# Patient Record
Sex: MALE | Race: WHITE | NOT HISPANIC OR LATINO | URBAN - METROPOLITAN AREA
[De-identification: names, ages, dates, MRNs, and addresses within clinical notes are randomized per-mention and may not be internally consistent; named-entity substitution may affect disease eponyms.]

---

## 2017-06-13 ENCOUNTER — OUTPATIENT (OUTPATIENT)
Dept: OUTPATIENT SERVICES | Facility: HOSPITAL | Age: 72
LOS: 1 days | End: 2017-06-13
Payer: MEDICARE

## 2017-06-13 VITALS
SYSTOLIC BLOOD PRESSURE: 132 MMHG | RESPIRATION RATE: 18 BRPM | HEART RATE: 72 BPM | WEIGHT: 216.05 LBS | DIASTOLIC BLOOD PRESSURE: 83 MMHG | HEIGHT: 72 IN | TEMPERATURE: 98 F | OXYGEN SATURATION: 95 %

## 2017-06-13 VITALS — WEIGHT: 212.97 LBS

## 2017-06-13 DIAGNOSIS — Z01.810 ENCOUNTER FOR PREPROCEDURAL CARDIOVASCULAR EXAMINATION: ICD-10-CM

## 2017-06-13 DIAGNOSIS — Z98.890 OTHER SPECIFIED POSTPROCEDURAL STATES: Chronic | ICD-10-CM

## 2017-06-13 DIAGNOSIS — I51.9 HEART DISEASE, UNSPECIFIED: Chronic | ICD-10-CM

## 2017-06-13 LAB
ANION GAP SERPL CALC-SCNC: 14 MMOL/L — SIGNIFICANT CHANGE UP (ref 5–17)
APTT BLD: 34.1 SEC — SIGNIFICANT CHANGE UP (ref 27.5–37.4)
BASOPHILS # BLD AUTO: 0 K/UL — SIGNIFICANT CHANGE UP (ref 0–0.2)
BASOPHILS NFR BLD AUTO: 0.1 % — SIGNIFICANT CHANGE UP (ref 0–2)
BUN SERPL-MCNC: 22 MG/DL — HIGH (ref 8–20)
CALCIUM SERPL-MCNC: 9.4 MG/DL — SIGNIFICANT CHANGE UP (ref 8.6–10.2)
CHLORIDE SERPL-SCNC: 99 MMOL/L — SIGNIFICANT CHANGE UP (ref 98–107)
CHOLEST SERPL-MCNC: 148 MG/DL — SIGNIFICANT CHANGE UP (ref 110–199)
CO2 SERPL-SCNC: 25 MMOL/L — SIGNIFICANT CHANGE UP (ref 22–29)
CREAT SERPL-MCNC: 1.68 MG/DL — HIGH (ref 0.5–1.3)
EOSINOPHIL # BLD AUTO: 0.1 K/UL — SIGNIFICANT CHANGE UP (ref 0–0.5)
EOSINOPHIL NFR BLD AUTO: 1.1 % — SIGNIFICANT CHANGE UP (ref 0–5)
GLUCOSE SERPL-MCNC: 113 MG/DL — SIGNIFICANT CHANGE UP (ref 70–115)
HCT VFR BLD CALC: 39.1 % — LOW (ref 42–52)
HDLC SERPL-MCNC: 46 MG/DL — LOW
HGB BLD-MCNC: 13.3 G/DL — LOW (ref 14–18)
INR BLD: 1.11 RATIO — SIGNIFICANT CHANGE UP (ref 0.88–1.16)
LIPID PNL WITH DIRECT LDL SERPL: 65 MG/DL — SIGNIFICANT CHANGE UP
LYMPHOCYTES # BLD AUTO: 1.9 K/UL — SIGNIFICANT CHANGE UP (ref 1–4.8)
LYMPHOCYTES # BLD AUTO: 27.6 % — SIGNIFICANT CHANGE UP (ref 20–55)
MAGNESIUM SERPL-MCNC: 2.3 MG/DL — SIGNIFICANT CHANGE UP (ref 1.6–2.6)
MCHC RBC-ENTMCNC: 31.6 PG — HIGH (ref 27–31)
MCHC RBC-ENTMCNC: 34 G/DL — SIGNIFICANT CHANGE UP (ref 32–36)
MCV RBC AUTO: 92.9 FL — SIGNIFICANT CHANGE UP (ref 80–94)
MONOCYTES # BLD AUTO: 0.7 K/UL — SIGNIFICANT CHANGE UP (ref 0–0.8)
MONOCYTES NFR BLD AUTO: 10.6 % — HIGH (ref 3–10)
NEUTROPHILS # BLD AUTO: 4.2 K/UL — SIGNIFICANT CHANGE UP (ref 1.8–8)
NEUTROPHILS NFR BLD AUTO: 60.2 % — SIGNIFICANT CHANGE UP (ref 37–73)
PLATELET # BLD AUTO: 266 K/UL — SIGNIFICANT CHANGE UP (ref 150–400)
POTASSIUM SERPL-MCNC: 4.2 MMOL/L — SIGNIFICANT CHANGE UP (ref 3.5–5.3)
POTASSIUM SERPL-SCNC: 4.2 MMOL/L — SIGNIFICANT CHANGE UP (ref 3.5–5.3)
PROTHROM AB SERPL-ACNC: 12.2 SEC — SIGNIFICANT CHANGE UP (ref 9.8–12.7)
RBC # BLD: 4.21 M/UL — LOW (ref 4.6–6.2)
RBC # FLD: 13.4 % — SIGNIFICANT CHANGE UP (ref 11–15.6)
SODIUM SERPL-SCNC: 138 MMOL/L — SIGNIFICANT CHANGE UP (ref 135–145)
TOTAL CHOLESTEROL/HDL RATIO MEASUREMENT: 3 RATIO — LOW (ref 3.4–9.6)
TRIGL SERPL-MCNC: 184 MG/DL — SIGNIFICANT CHANGE UP (ref 10–200)
TSH SERPL-MCNC: 4.31 UIU/ML — HIGH (ref 0.27–4.2)
WBC # BLD: 7 K/UL — SIGNIFICANT CHANGE UP (ref 4.8–10.8)
WBC # FLD AUTO: 7 K/UL — SIGNIFICANT CHANGE UP (ref 4.8–10.8)

## 2017-06-13 PROCEDURE — G0463: CPT

## 2017-06-13 PROCEDURE — 85730 THROMBOPLASTIN TIME PARTIAL: CPT

## 2017-06-13 PROCEDURE — 84443 ASSAY THYROID STIM HORMONE: CPT

## 2017-06-13 PROCEDURE — 93005 ELECTROCARDIOGRAM TRACING: CPT

## 2017-06-13 PROCEDURE — 85027 COMPLETE CBC AUTOMATED: CPT

## 2017-06-13 PROCEDURE — 82248 BILIRUBIN DIRECT: CPT

## 2017-06-13 PROCEDURE — 85610 PROTHROMBIN TIME: CPT

## 2017-06-13 PROCEDURE — 83735 ASSAY OF MAGNESIUM: CPT

## 2017-06-13 PROCEDURE — 80061 LIPID PANEL: CPT

## 2017-06-13 PROCEDURE — 80048 BASIC METABOLIC PNL TOTAL CA: CPT

## 2017-06-13 PROCEDURE — 80053 COMPREHEN METABOLIC PANEL: CPT

## 2017-06-13 PROCEDURE — 93010 ELECTROCARDIOGRAM REPORT: CPT

## 2017-06-13 NOTE — H&P PST ADULT - ASSESSMENT
70 y/o white male with recent VT noted on interrogation of ICD to undergo LHC to evaluate for CAD s/p CABG/PCI

## 2017-06-13 NOTE — H&P PST ADULT - NEGATIVE OPHTHALMOLOGIC SYMPTOMS
no blurred vision R/no lacrimation L/no photophobia/no diplopia/no blurred vision L/no lacrimation R/no discharge R/no discharge L

## 2017-06-13 NOTE — H&P PST ADULT - RS GEN PE MLT RESP DETAILS PC
breath sounds equal/good air movement/normal/no chest wall tenderness/clear to auscultation bilaterally/airway patent/respirations non-labored

## 2017-06-13 NOTE — H&P PST ADULT - HISTORY OF PRESENT ILLNESS
72 y/o white male presents to [facility] for LHC to evaluate for VT noted on routine interrogation of ICD (BSC) in May 2017  Started on amiodarone 400 then decreased to 200 mg June 2017  Denies CP, CROOK/SOB  May 2017 NST negative for ischemia with global hypokinesis and EF 45%.  March 2017 NST states cannot r/o ischemia in the basal anterior, anterolateral and inferolateral walls  CABG [facility] 1997  LIMA LAD SVG to RCA and SVG to Ramus (occluded per 2009 cath).  2010 LHC with PCI SVG-PDA 72 y/o white male presents to [facility] for LHC to evaluate for VT noted on routine interrogation of ICD (BSC) in May 2017  Started on amiodarone 400 then decreased to 200 mg June 2017  Denies CP, CROOK/SOB  May 2017 PET/CT negative for ischemia with global hypokinesis and EF 45%.    CABG [facility] 1997  LIMA LAD SVG to RCA and SVG to Ramus (occluded per 2009 cath).  2010 LHC with PCI SVG-PDA

## 2017-06-13 NOTE — H&P PST ADULT - PMH
AICD (automatic cardioverter/defibrillator) present  2010BSC  Angina pectoris    Arrhythmia    Bronchitis    CAD (coronary artery disease)    Chronic systolic CHF (congestive heart failure), NYHA class 2    Former smoker    HLD (hyperlipidemia)    Hypertension    Hypokinesis  apical  Hypothyroidism    Ischemic cardiomyopathy    Mitral regurgitation    Mitral regurgitation    Prostate cancer  s/p surgery no RT/CT  PSVT (paroxysmal supraventricular tachycardia)    PSVT (paroxysmal supraventricular tachycardia)    RBBB    Tricuspid regurgitation    Vitamin D deficiency    VT (ventricular tachycardia)  May 2017 no ICD shock

## 2017-06-13 NOTE — ASU PATIENT PROFILE, ADULT - PSH
H/O cardiac catheterization  stenting of SVG TO PDA IN 2010  History of electrophysiologic study  2009 positive study (AICD placement ) Cardiac disorder  triple bypass may 1997 [facility]  H/O cardiac catheterization  stenting of SVG TO PDA IN 2010  History of electrophysiologic study  2009 positive study (AICD placement )  History of prostate surgery  davinci surgery in 2008

## 2017-06-13 NOTE — H&P PST ADULT - NEGATIVE NEUROLOGICAL SYMPTOMS
no tremors/no paresthesias/no focal seizures/no vertigo/no syncope/no weakness/no generalized seizures/no transient paralysis

## 2017-06-13 NOTE — ASU PATIENT PROFILE, ADULT - PMH
AICD (automatic cardioverter/defibrillator) present    Angina pectoris    Arrhythmia    Bronchitis    CAD (coronary artery disease)    HLD (hyperlipidemia)    Hypertension    Hypothyroidism    Ischemic cardiomyopathy    Mitral regurgitation    Prostate cancer    PSVT (paroxysmal supraventricular tachycardia)    RBBB    Tricuspid regurgitation    Vitamin D deficiency    VT (ventricular tachycardia) AICD (automatic cardioverter/defibrillator) present  2010  Angina pectoris    Arrhythmia    Bronchitis    CAD (coronary artery disease)    HLD (hyperlipidemia)    Hypertension    Hypothyroidism    Ischemic cardiomyopathy    Mitral regurgitation    Prostate cancer    PSVT (paroxysmal supraventricular tachycardia)    RBBB    Tricuspid regurgitation    Vitamin D deficiency    VT (ventricular tachycardia)

## 2017-06-13 NOTE — H&P PST ADULT - NEUROLOGICAL DETAILS
alert and oriented x 3/responds to pain/sensation intact/deep reflexes intact/cranial nerves intact/responds to verbal commands

## 2017-06-13 NOTE — H&P PST ADULT - NEGATIVE ENMT SYMPTOMS
no vertigo/no sinus symptoms/no hearing difficulty/no nasal congestion/no nasal discharge/no ear pain/no tinnitus

## 2017-06-13 NOTE — H&P PST ADULT - NEGATIVE GENERAL GENITOURINARY SYMPTOMS
no urine discoloration/no hematuria/no renal colic/h/o prostate cancer/no flank pain R/no flank pain L

## 2017-06-13 NOTE — H&P PST ADULT - PSH
Cardiac disorder  triple bypass may 1997 [facility]  H/O cardiac catheterization  stenting of SVG TO PDA IN 2010  History of electrophysiologic study  2009 positive study (AICD placement )  History of prostate surgery  davinci surgery in 2008

## 2017-06-15 ENCOUNTER — APPOINTMENT (OUTPATIENT)
Dept: PULMONOLOGY | Facility: CLINIC | Age: 72
End: 2017-06-15

## 2017-06-15 VITALS
BODY MASS INDEX: 28.71 KG/M2 | DIASTOLIC BLOOD PRESSURE: 82 MMHG | OXYGEN SATURATION: 96 % | HEART RATE: 70 BPM | SYSTOLIC BLOOD PRESSURE: 134 MMHG | WEIGHT: 212 LBS | HEIGHT: 72 IN

## 2017-06-15 DIAGNOSIS — Z80.42 FAMILY HISTORY OF MALIGNANT NEOPLASM OF PROSTATE: ICD-10-CM

## 2017-06-15 DIAGNOSIS — Z86.79 PERSONAL HISTORY OF OTHER DISEASES OF THE CIRCULATORY SYSTEM: ICD-10-CM

## 2017-06-15 DIAGNOSIS — K21.9 GASTRO-ESOPHAGEAL REFLUX DISEASE W/OUT ESOPHAGITIS: ICD-10-CM

## 2017-06-15 DIAGNOSIS — Z95.5 PRESENCE OF CORONARY ANGIOPLASTY IMPLANT AND GRAFT: ICD-10-CM

## 2017-06-15 DIAGNOSIS — Z87.2 PERSONAL HISTORY OF DISEASES OF THE SKIN AND SUBCUTANEOUS TISSUE: ICD-10-CM

## 2017-06-15 DIAGNOSIS — I25.10 ATHEROSCLEROTIC HEART DISEASE OF NATIVE CORONARY ARTERY W/OUT ANGINA PECTORIS: ICD-10-CM

## 2017-06-15 DIAGNOSIS — Z86.39 PERSONAL HISTORY OF OTHER ENDOCRINE, NUTRITIONAL AND METABOLIC DISEASE: ICD-10-CM

## 2017-06-15 DIAGNOSIS — Z87.891 PERSONAL HISTORY OF NICOTINE DEPENDENCE: ICD-10-CM

## 2017-06-15 DIAGNOSIS — Z85.46 PERSONAL HISTORY OF MALIGNANT NEOPLASM OF PROSTATE: ICD-10-CM

## 2017-06-15 PROBLEM — I20.9 ANGINA PECTORIS, UNSPECIFIED: Chronic | Status: ACTIVE | Noted: 2017-06-13

## 2017-06-15 PROBLEM — I07.1 RHEUMATIC TRICUSPID INSUFFICIENCY: Chronic | Status: ACTIVE | Noted: 2017-06-13

## 2017-06-15 PROBLEM — I47.1 SUPRAVENTRICULAR TACHYCARDIA: Chronic | Status: ACTIVE | Noted: 2017-06-13

## 2017-06-15 PROBLEM — J40 BRONCHITIS, NOT SPECIFIED AS ACUTE OR CHRONIC: Chronic | Status: ACTIVE | Noted: 2017-06-13

## 2017-06-15 PROBLEM — I25.5 ISCHEMIC CARDIOMYOPATHY: Chronic | Status: ACTIVE | Noted: 2017-06-13

## 2017-06-15 PROBLEM — E55.9 VITAMIN D DEFICIENCY, UNSPECIFIED: Chronic | Status: ACTIVE | Noted: 2017-06-13

## 2017-06-15 PROBLEM — I34.0 NONRHEUMATIC MITRAL (VALVE) INSUFFICIENCY: Chronic | Status: ACTIVE | Noted: 2017-06-13

## 2017-06-15 PROBLEM — E03.9 HYPOTHYROIDISM, UNSPECIFIED: Chronic | Status: ACTIVE | Noted: 2017-06-13

## 2017-06-15 PROBLEM — E78.5 HYPERLIPIDEMIA, UNSPECIFIED: Chronic | Status: ACTIVE | Noted: 2017-06-13

## 2017-06-15 PROBLEM — I10 ESSENTIAL (PRIMARY) HYPERTENSION: Chronic | Status: ACTIVE | Noted: 2017-06-13

## 2017-06-15 PROBLEM — I45.10 UNSPECIFIED RIGHT BUNDLE-BRANCH BLOCK: Chronic | Status: ACTIVE | Noted: 2017-06-13

## 2017-06-15 PROBLEM — C61 MALIGNANT NEOPLASM OF PROSTATE: Chronic | Status: ACTIVE | Noted: 2017-06-13

## 2017-06-15 PROBLEM — I47.2 VENTRICULAR TACHYCARDIA: Chronic | Status: ACTIVE | Noted: 2017-06-13

## 2017-06-15 PROBLEM — I49.9 CARDIAC ARRHYTHMIA, UNSPECIFIED: Chronic | Status: ACTIVE | Noted: 2017-06-13

## 2017-06-16 ENCOUNTER — INPATIENT (INPATIENT)
Facility: HOSPITAL | Age: 72
LOS: 0 days | Discharge: HOME | DRG: 247 | End: 2017-06-17
Attending: INTERNAL MEDICINE | Admitting: INTERNAL MEDICINE
Payer: MEDICARE

## 2017-06-16 VITALS
TEMPERATURE: 98 F | SYSTOLIC BLOOD PRESSURE: 142 MMHG | HEART RATE: 64 BPM | OXYGEN SATURATION: 97 % | RESPIRATION RATE: 16 BRPM | DIASTOLIC BLOOD PRESSURE: 90 MMHG

## 2017-06-16 DIAGNOSIS — Z98.890 OTHER SPECIFIED POSTPROCEDURAL STATES: Chronic | ICD-10-CM

## 2017-06-16 DIAGNOSIS — I47.2 VENTRICULAR TACHYCARDIA: ICD-10-CM

## 2017-06-16 DIAGNOSIS — I51.9 HEART DISEASE, UNSPECIFIED: Chronic | ICD-10-CM

## 2017-06-16 LAB
ALBUMIN SERPL ELPH-MCNC: 4.7 G/DL — SIGNIFICANT CHANGE UP (ref 3.3–5.2)
ALP SERPL-CCNC: 67 U/L — SIGNIFICANT CHANGE UP (ref 40–120)
ALT FLD-CCNC: 20 U/L — SIGNIFICANT CHANGE UP
ANION GAP SERPL CALC-SCNC: 13 MMOL/L — SIGNIFICANT CHANGE UP (ref 5–17)
APPEARANCE UR: CLEAR — SIGNIFICANT CHANGE UP
AST SERPL-CCNC: 21 U/L — SIGNIFICANT CHANGE UP
BILIRUB DIRECT SERPL-MCNC: 0.2 MG/DL — SIGNIFICANT CHANGE UP (ref 0–0.3)
BILIRUB INDIRECT FLD-MCNC: 0.6 MG/DL — SIGNIFICANT CHANGE UP (ref 0.2–1)
BILIRUB SERPL-MCNC: 0.8 MG/DL — SIGNIFICANT CHANGE UP (ref 0.4–2)
BILIRUB UR-MCNC: NEGATIVE — SIGNIFICANT CHANGE UP
BUN SERPL-MCNC: 22 MG/DL — HIGH (ref 8–20)
CALCIUM SERPL-MCNC: 9.6 MG/DL — SIGNIFICANT CHANGE UP (ref 8.6–10.2)
CHLORIDE SERPL-SCNC: 103 MMOL/L — SIGNIFICANT CHANGE UP (ref 98–107)
CO2 SERPL-SCNC: 26 MMOL/L — SIGNIFICANT CHANGE UP (ref 22–29)
COLOR SPEC: YELLOW — SIGNIFICANT CHANGE UP
CREAT SERPL-MCNC: 1.57 MG/DL — HIGH (ref 0.5–1.3)
DIFF PNL FLD: NEGATIVE — SIGNIFICANT CHANGE UP
GLUCOSE SERPL-MCNC: 113 MG/DL — SIGNIFICANT CHANGE UP (ref 70–115)
GLUCOSE UR QL: NEGATIVE MG/DL — SIGNIFICANT CHANGE UP
KETONES UR-MCNC: NEGATIVE — SIGNIFICANT CHANGE UP
LEUKOCYTE ESTERASE UR-ACNC: NEGATIVE — SIGNIFICANT CHANGE UP
NITRITE UR-MCNC: NEGATIVE — SIGNIFICANT CHANGE UP
OSMOLALITY UR: 303 MOSM/KG — SIGNIFICANT CHANGE UP (ref 300–1000)
PH UR: 5 — SIGNIFICANT CHANGE UP (ref 5–8)
POTASSIUM SERPL-MCNC: 4.9 MMOL/L — SIGNIFICANT CHANGE UP (ref 3.5–5.3)
POTASSIUM SERPL-SCNC: 4.9 MMOL/L — SIGNIFICANT CHANGE UP (ref 3.5–5.3)
POTASSIUM UR-SCNC: 38 MMOL/L — SIGNIFICANT CHANGE UP
PROT SERPL-MCNC: 7.7 G/DL — SIGNIFICANT CHANGE UP (ref 6.6–8.7)
PROT UR-MCNC: NEGATIVE MG/DL — SIGNIFICANT CHANGE UP
SODIUM SERPL-SCNC: 142 MMOL/L — SIGNIFICANT CHANGE UP (ref 135–145)
SODIUM UR-SCNC: 34 MMOL/L — SIGNIFICANT CHANGE UP
SP GR SPEC: 1.01 — SIGNIFICANT CHANGE UP (ref 1.01–1.02)
UROBILINOGEN FLD QL: NEGATIVE MG/DL — SIGNIFICANT CHANGE UP

## 2017-06-16 PROCEDURE — 93010 ELECTROCARDIOGRAM REPORT: CPT

## 2017-06-16 RX ORDER — ATORVASTATIN CALCIUM 80 MG/1
40 TABLET, FILM COATED ORAL AT BEDTIME
Qty: 0 | Refills: 0 | Status: DISCONTINUED | OUTPATIENT
Start: 2017-06-16 | End: 2017-06-17

## 2017-06-16 RX ORDER — ONDANSETRON 8 MG/1
4 TABLET, FILM COATED ORAL EVERY 8 HOURS
Qty: 0 | Refills: 0 | Status: DISCONTINUED | OUTPATIENT
Start: 2017-06-16 | End: 2017-06-17

## 2017-06-16 RX ORDER — ACETYLCYSTEINE 200 MG/ML
1200 VIAL (ML) MISCELLANEOUS EVERY 12 HOURS
Qty: 0 | Refills: 0 | Status: DISCONTINUED | OUTPATIENT
Start: 2017-06-16 | End: 2017-06-16

## 2017-06-16 RX ORDER — ACETAMINOPHEN 500 MG
650 TABLET ORAL EVERY 6 HOURS
Qty: 0 | Refills: 0 | Status: DISCONTINUED | OUTPATIENT
Start: 2017-06-16 | End: 2017-06-17

## 2017-06-16 RX ORDER — ASPIRIN/CALCIUM CARB/MAGNESIUM 324 MG
81 TABLET ORAL DAILY
Qty: 0 | Refills: 0 | Status: DISCONTINUED | OUTPATIENT
Start: 2017-06-16 | End: 2017-06-17

## 2017-06-16 RX ORDER — CLOPIDOGREL BISULFATE 75 MG/1
75 TABLET, FILM COATED ORAL DAILY
Qty: 0 | Refills: 0 | Status: DISCONTINUED | OUTPATIENT
Start: 2017-06-16 | End: 2017-06-17

## 2017-06-16 RX ORDER — SODIUM CHLORIDE 9 MG/ML
1000 INJECTION, SOLUTION INTRAVENOUS
Qty: 0 | Refills: 0 | Status: DISCONTINUED | OUTPATIENT
Start: 2017-06-16 | End: 2017-06-16

## 2017-06-16 RX ORDER — ASPIRIN/CALCIUM CARB/MAGNESIUM 325 MG
325 TABLET ORAL ONCE
Qty: 0 | Refills: 0 | Status: COMPLETED | OUTPATIENT
Start: 2017-06-16 | End: 2017-06-16

## 2017-06-16 RX ORDER — SODIUM CHLORIDE 9 MG/ML
1000 INJECTION, SOLUTION INTRAVENOUS
Qty: 0 | Refills: 0 | Status: DISCONTINUED | OUTPATIENT
Start: 2017-06-16 | End: 2017-06-17

## 2017-06-16 RX ORDER — FAMOTIDINE 10 MG/ML
20 INJECTION INTRAVENOUS DAILY
Qty: 0 | Refills: 0 | Status: DISCONTINUED | OUTPATIENT
Start: 2017-06-16 | End: 2017-06-17

## 2017-06-16 RX ORDER — FENOFIBRATE,MICRONIZED 130 MG
145 CAPSULE ORAL DAILY
Qty: 0 | Refills: 0 | Status: DISCONTINUED | OUTPATIENT
Start: 2017-06-16 | End: 2017-06-17

## 2017-06-16 RX ORDER — ACETYLCYSTEINE 200 MG/ML
1200 VIAL (ML) MISCELLANEOUS EVERY 12 HOURS
Qty: 0 | Refills: 0 | Status: DISCONTINUED | OUTPATIENT
Start: 2017-06-16 | End: 2017-06-17

## 2017-06-16 RX ORDER — METOPROLOL TARTRATE 50 MG
100 TABLET ORAL DAILY
Qty: 0 | Refills: 0 | Status: DISCONTINUED | OUTPATIENT
Start: 2017-06-16 | End: 2017-06-17

## 2017-06-16 RX ORDER — AMIODARONE HYDROCHLORIDE 400 MG/1
200 TABLET ORAL DAILY
Qty: 0 | Refills: 0 | Status: DISCONTINUED | OUTPATIENT
Start: 2017-06-16 | End: 2017-06-17

## 2017-06-16 RX ORDER — LEVOTHYROXINE SODIUM 125 MCG
75 TABLET ORAL DAILY
Qty: 0 | Refills: 0 | Status: DISCONTINUED | OUTPATIENT
Start: 2017-06-16 | End: 2017-06-17

## 2017-06-16 RX ADMIN — Medication 325 MILLIGRAM(S): at 12:39

## 2017-06-16 RX ADMIN — Medication 1200 MILLIGRAM(S): at 13:05

## 2017-06-16 RX ADMIN — ATORVASTATIN CALCIUM 40 MILLIGRAM(S): 80 TABLET, FILM COATED ORAL at 22:59

## 2017-06-16 NOTE — DISCHARGE NOTE ADULT - PLAN OF CARE
Choose lean meats and poultry without skin and prepare them without added saturated and trans fat.  Eat fish at least twice a week. Recent research shows that eating oily fish containing omega-3 fatty acids (for example, salmon, trout and herring) may help lower your risk of death from coronary artery disease.  Select fat-free, 1 percent fat and low-fat dairy products.  Cut back on foods containing partially hydrogenated vegetable oils to reduce trans fat in your diet.   To lower cholesterol, reduce saturated fat to no more than 5 to 6 percent of total calories. For someone eating 2,000 calories a day, that’s about 13 grams of saturated fat.  Cut back on beverages and foods with added sugars.  Choose and prepare foods with little or no salt. To lower blood pressure, aim to eat no more than 2,400 milligrams of sodium per day. Reducing daily intake to 1,500 mg is desirable because it can lower blood pressure even further.  If you drink alcohol, drink in moderation. That means one drink per day if you’re a woman and two drinks  per day if you’re a man.  Follow the American Heart Association recommendations when you eat out, and keep an eye on your portion sizes. ADL without CP/VT

## 2017-06-16 NOTE — CONSULT NOTE ADULT - ATTENDING COMMENTS
Mucomyst and bicarb  pre cath.  Will need work up renal as op if sent home post cath., repeat bmp in couple days post cath as op .

## 2017-06-16 NOTE — PROGRESS NOTE ADULT - PROBLEM SELECTOR PLAN 1
continue amiodarone  daily asa/plavix  am labs/ecg  Rt groin site care w/instructions to pt  F/U Dr. Negron outpt  Renal f/u outpt Dr. Sanchez

## 2017-06-16 NOTE — DISCHARGE NOTE ADULT - HOSPITAL COURSE
Elective cath for VT on ICD received ZARINA X 1 to SVG to RPDA.  Tele SB with occasional PVC/s.  EF 50%.  Right groin angioseal site benign. AM EKG SR with PVC's with RBBB.  Labs noted. OK to d/c home. Creatinine stable.  Repeat BMP in 2 days follow up with Dr Sanchez as outpatient.  Will monitor. Also f/u with [facility] for further pulmonary work up as outpatient.

## 2017-06-16 NOTE — DISCHARGE NOTE ADULT - MEDICATION SUMMARY - MEDICATIONS TO TAKE
I will START or STAY ON the medications listed below when I get home from the hospital:    aspirin 81 mg oral tablet  -- 1 tab(s) by mouth once a day  -- Indication: For Ventricular tachycardia    amiodarone 200 mg oral tablet  -- 1 tab(s) by mouth once a day  -- Indication: For Ventricular tachycardia    Lipofen 150 mg oral capsule  -- 1 cap(s) by mouth once a day  -- Indication: For HLD    Lipitor 40 mg oral tablet  -- 1 tab(s) by mouth once a day  -- Indication: For HLD    Plavix 75 mg oral tablet  -- 1 tab(s) by mouth once a day  -- Indication: For CAD    Toprol- mg oral tablet, extended release  -- 1 tab(s) by mouth once a day  -- Indication: For Ventricular tachycardia    Axid Pulvules 150 mg oral capsule  --  by mouth once a day  -- Indication: For GERD    CoQ10  -- 100 milligram(s) by mouth once a day  -- Indication: For Supplement    levothyroxine 75 mcg (0.075 mg) oral tablet  -- 1 tab(s) by mouth once a day  -- Indication: For Thyroid    Centrum Silver oral tablet  -- 1 tab(s) by mouth once a day  -- Indication: For Supplement    Vitamin D3 2000 intl units oral tablet  -- 1 tab(s) by mouth once a day  -- Indication: For Supplement

## 2017-06-16 NOTE — CONSULT NOTE ADULT - SUBJECTIVE AND OBJECTIVE BOX
Patient is a 71y old  Male who presents for cardiac cath.    HPI: Hx tachycardia after receiving  antibiotic for URI.      PAST MEDICAL & SURGICAL HISTORY:    PSVT (paroxysmal supraventricular tachycardia)  Chronic systolic CHF (congestive heart failure), NYHA class 2  Former smoker  Hypokinesis: apical  Mitral regurgitation  Bronchitis  Hypertension  PSVT (paroxysmal supraventricular tachycardia)  Ischemic cardiomyopathy  AICD (automatic cardioverter/defibrillator) present: 2010BSC  VT (ventricular tachycardia): May 2017 no ICD shock  Vitamin D deficiency  Tricuspid regurgitation  RBBB  Prostate cancer: s/p surgery no RT/CT  Mitral regurgitation  Hypothyroidism  HLD (hyperlipidemia)  Arrhythmia  Angina pectoris  CAD (coronary artery disease)  History of prostate surgery: davinci surgery in 2008  Cardiac disorder: triple bypass may 1997 [facility]  History of electrophysiologic study: 2009 positive study (AICD placement )  H/O cardiac catheterization: stenting of SVG TO PDA IN 2010      FAMILY HISTORY:  NC    Social History:Non smoker    MEDICATIONS  (STANDING):  sodium chloride 0.9% 1000milliLiter(s) IV Continuous <Continuous>  aspirin buffered 325milliGRAM(s) Oral once    MEDICATIONS  (PRN):   Meds reviewed    Allergies    macrolide antibiotics (Urticaria; Rash; Hives)  penicillin (Urticaria; Rash)  Zithromax Z-Christian (Urticaria; Rash; Hives)    Intolerances         REVIEW OF SYSTEMS:    CONSTITUTIONAL:  Feels well  EYES: No eye pain, visual disturbances, or discharge  ENMT:  No difficulty hearing, tinnitus, vertigo; No sinus or throat pain  NECK: No pain or stiffness  BREASTS: No pain, masses, or nipple discharge  RESPIRATORY: cough with hemoptysis  CARDIOVASCULAR: No chest pain, palpitations, dizziness,   GASTROINTESTINAL: No abdominal or epigastric pain. No nausea, vomiting, or hematemesis; No diarrhea or constipation. No melena    GENITOURINARY: No dysuria, frequency, hematuria, or incontinence  NEUROLOGICAL: No headaches, memory loss, loss of strength, numbness, or tremors  SKIN: neg  LYMPH NODES: No enlarged glands  ENDOCRINE: No heat or cold intolerance; No hair loss  MUSCULOSKELETAL: Neg  PSYCHIATRIC: No depression, anxiety, mood swings, or difficulty sleeping  HEME/LYMPH: No easy bruising, or bleeding gums  ALLERY AND IMMUNOLOGIC: No hives or eczema      Vital Signs Last 24 Hrs  T(C): 36.4, Max: 36.4 (06-16 @ 12:00)  T(F): 97.5, Max: 97.5 (06-16 @ 12:00)  HR: 64 (64 - 64)  BP: 142/90 (142/90 - 142/90)  BP(mean): --  RR: 16 (16 - 16)  SpO2: 97% (97% - 97%)  Daily     Daily     PHYSICAL EXAM:    GENERAL: Oriented  HEAD:  Atraumatic, Normocephalic  EYES: EOMI, PERRLA, conjunctiva and sclera clear  ENMT: No tonsillar erythema, exudates, or enlargement; Moist mucous membranes,   NECK: Supple, neck  veins full  NERVOUS SYSTEM:  Alert & Oriented X3, Good concentration; Motor Strength wnl upper and lower extremities;  CHEST/LUNG: Clear to percussion bilaterally; base crackles  HEART: Regular rate and rhythm; No murmurs, rubs, or gallops; sternal scar left upper chest wall pacer  ABDOMEN: Soft, Nontender, Nondistended; Bowel sounds present, body wall and flank edema  EXTREMITIES:  wnl  LYMPH: No lymphadenopathy noted  SKIN: No rashes or lesions, pale   neg    LABS:    Reviewed as op.                    RADIOLOGY & ADDITIONAL TESTS:

## 2017-06-16 NOTE — PROGRESS NOTE ADULT - SUBJECTIVE AND OBJECTIVE BOX
I have seen and examined this patient. There is no change since the last H& P. Consent obtained. Agree with cardiac cath. Risks and benefits fully explained. All questions answered.    Ameya Buenrostro MD
s/p LHC RFA w/angioseal closure  PCI ZARINA x1 SVG to RPDA  Tolerated procedure well w/o complications  Seen post procedure by Dr. Buenrostro      REVIEW OF SYSTEMS:  Denies SOB, CP, NV, HA, dizziness, palpitations, site pain  PHYSICAL EXAM: A&Ox3 NAD Skin warm and dry  NEURO: Speech intact +gag +swallow Tongue midline GÓMEZ  NECK: No JVD, trachea midline. Eupneic  HEART: RRR S1S2 no g/m Tele/ECG SR/RBB  PULMONARY:  CTA chela  ABDOMEN: Soft nontender X4 +BS   EXTREMITIES: RFA site: No bleed, hematoma, pain, ecchymosis or swelling Rt DP/PT+

## 2017-06-16 NOTE — DISCHARGE NOTE ADULT - INSTRUCTIONS
Activities as tolerated minimize stair climbing, heavy lifting greater than 10lbs, strenous house work, contact sports,No intercourse for 1 week. Site care no Bath tubs, Hot tubs , Pools for 1 week. Monitor site for infection such as warmth drainage or swelling of site. Monitor for bleeding call MD if continous bleeding occurs hold pressure report to nearest ER, Do not opperate heavy machinery or drive for 24hours.  REMOVE RIGHT GROIN DRESSING IN 24 HOURS/JUNE 17

## 2017-06-16 NOTE — DISCHARGE NOTE ADULT - CARE PROVIDER_API CALL
Flaquito Negron (MILDRED), Cardiovascular Disease; Internal Medicine; Nuclear Cardiology  [address] [place] [address]  Phone: [phone]  Fax: [phone] Flaquito Negron (MILDRED), Cardiovascular Disease; Internal Medicine; Nuclear Cardiology  [address] [place] [id number]  Phone: [phone]  Fax: [phone]    Nilay Sanchez), Internal Medicine; Nephrology  [address] [place] [id number]  Phone: [phone]  Fax: [phone]

## 2017-06-16 NOTE — DISCHARGE NOTE ADULT - CARE PLAN
Instructions for follow-up, activity and diet:	Choose lean meats and poultry without skin and prepare them without added saturated and trans fat.  Eat fish at least twice a week. Recent research shows that eating oily fish containing omega-3 fatty acids (for example, salmon, trout and herring) may help lower your risk of death from coronary artery disease.  Select fat-free, 1 percent fat and low-fat dairy products.  Cut back on foods containing partially hydrogenated vegetable oils to reduce trans fat in your diet.   To lower cholesterol, reduce saturated fat to no more than 5 to 6 percent of total calories. For someone eating 2,000 calories a day, that’s about 13 grams of saturated fat.  Cut back on beverages and foods with added sugars.  Choose and prepare foods with little or no salt. To lower blood pressure, aim to eat no more than 2,400 milligrams of sodium per day. Reducing daily intake to 1,500 mg is desirable because it can lower blood pressure even further.  If you drink alcohol, drink in moderation. That means one drink per day if you’re a woman and two drinks  per day if you’re a man.  Follow the American Heart Association recommendations when you eat out, and keep an eye on your portion sizes. Principal Discharge DX:	CAD (coronary artery disease)  Goal:	ADL without CP/VT  Instructions for follow-up, activity and diet:	Choose lean meats and poultry without skin and prepare them without added saturated and trans fat.  Eat fish at least twice a week. Recent research shows that eating oily fish containing omega-3 fatty acids (for example, salmon, trout and herring) may help lower your risk of death from coronary artery disease.  Select fat-free, 1 percent fat and low-fat dairy products.  Cut back on foods containing partially hydrogenated vegetable oils to reduce trans fat in your diet.   To lower cholesterol, reduce saturated fat to no more than 5 to 6 percent of total calories. For someone eating 2,000 calories a day, that’s about 13 grams of saturated fat.  Cut back on beverages and foods with added sugars.  Choose and prepare foods with little or no salt. To lower blood pressure, aim to eat no more than 2,400 milligrams of sodium per day. Reducing daily intake to 1,500 mg is desirable because it can lower blood pressure even further.  If you drink alcohol, drink in moderation. That means one drink per day if you’re a woman and two drinks  per day if you’re a man.  Follow the American Heart Association recommendations when you eat out, and keep an eye on your portion sizes.

## 2017-06-16 NOTE — PATIENT PROFILE ADULT. - PSH
Cardiac disorder  triple bypass may 1997 [facility]  H/O cardiac catheterization  stenting of SVG TO PDA IN 2010  History of electrophysiologic study  2009 positive study (AICD placement )  History of prostate surgery  davinci surgery in 2008

## 2017-06-16 NOTE — DISCHARGE NOTE ADULT - PATIENT PORTAL LINK FT
“You can access the [employer] Patient Portal, offered by [facility], by registering with the following website: [email or web address]”

## 2017-06-17 VITALS — DIASTOLIC BLOOD PRESSURE: 90 MMHG | SYSTOLIC BLOOD PRESSURE: 144 MMHG | HEART RATE: 65 BPM

## 2017-06-17 LAB
ALBUMIN SERPL ELPH-MCNC: 4.1 G/DL — SIGNIFICANT CHANGE UP (ref 3.3–5.2)
ALP SERPL-CCNC: 61 U/L — SIGNIFICANT CHANGE UP (ref 40–120)
ALT FLD-CCNC: 19 U/L — SIGNIFICANT CHANGE UP
ANION GAP SERPL CALC-SCNC: 13 MMOL/L — SIGNIFICANT CHANGE UP (ref 5–17)
AST SERPL-CCNC: 26 U/L — SIGNIFICANT CHANGE UP
BILIRUB SERPL-MCNC: 0.7 MG/DL — SIGNIFICANT CHANGE UP (ref 0.4–2)
BUN SERPL-MCNC: 17 MG/DL — SIGNIFICANT CHANGE UP (ref 8–20)
CALCIUM SERPL-MCNC: 9 MG/DL — SIGNIFICANT CHANGE UP (ref 8.6–10.2)
CHLORIDE SERPL-SCNC: 102 MMOL/L — SIGNIFICANT CHANGE UP (ref 98–107)
CO2 SERPL-SCNC: 25 MMOL/L — SIGNIFICANT CHANGE UP (ref 22–29)
CREAT SERPL-MCNC: 1.5 MG/DL — HIGH (ref 0.5–1.3)
GLUCOSE SERPL-MCNC: 140 MG/DL — HIGH (ref 70–115)
HCT VFR BLD CALC: 38.4 % — LOW (ref 42–52)
HGB BLD-MCNC: 13 G/DL — LOW (ref 14–18)
INR BLD: 1.12 RATIO — SIGNIFICANT CHANGE UP (ref 0.88–1.16)
MAGNESIUM SERPL-MCNC: 2.2 MG/DL — SIGNIFICANT CHANGE UP (ref 1.6–2.6)
MCHC RBC-ENTMCNC: 31.7 PG — HIGH (ref 27–31)
MCHC RBC-ENTMCNC: 33.9 G/DL — SIGNIFICANT CHANGE UP (ref 32–36)
MCV RBC AUTO: 93.7 FL — SIGNIFICANT CHANGE UP (ref 80–94)
PLATELET # BLD AUTO: 250 K/UL — SIGNIFICANT CHANGE UP (ref 150–400)
POTASSIUM SERPL-MCNC: 4.6 MMOL/L — SIGNIFICANT CHANGE UP (ref 3.5–5.3)
POTASSIUM SERPL-SCNC: 4.6 MMOL/L — SIGNIFICANT CHANGE UP (ref 3.5–5.3)
PROT SERPL-MCNC: 6.9 G/DL — SIGNIFICANT CHANGE UP (ref 6.6–8.7)
PROTHROM AB SERPL-ACNC: 12.3 SEC — SIGNIFICANT CHANGE UP (ref 9.8–12.7)
RBC # BLD: 4.1 M/UL — LOW (ref 4.6–6.2)
RBC # FLD: 13.6 % — SIGNIFICANT CHANGE UP (ref 11–15.6)
SODIUM SERPL-SCNC: 140 MMOL/L — SIGNIFICANT CHANGE UP (ref 135–145)
WBC # BLD: 7.8 K/UL — SIGNIFICANT CHANGE UP (ref 4.8–10.8)
WBC # FLD AUTO: 7.8 K/UL — SIGNIFICANT CHANGE UP (ref 4.8–10.8)

## 2017-06-17 PROCEDURE — 93455 CORONARY ART/GRFT ANGIO S&I: CPT

## 2017-06-17 PROCEDURE — 85027 COMPLETE CBC AUTOMATED: CPT

## 2017-06-17 PROCEDURE — 93005 ELECTROCARDIOGRAM TRACING: CPT

## 2017-06-17 PROCEDURE — 83935 ASSAY OF URINE OSMOLALITY: CPT

## 2017-06-17 PROCEDURE — 83735 ASSAY OF MAGNESIUM: CPT

## 2017-06-17 PROCEDURE — C1894: CPT

## 2017-06-17 PROCEDURE — C1874: CPT

## 2017-06-17 PROCEDURE — 81003 URINALYSIS AUTO W/O SCOPE: CPT

## 2017-06-17 PROCEDURE — 93010 ELECTROCARDIOGRAM REPORT: CPT

## 2017-06-17 PROCEDURE — 36415 COLL VENOUS BLD VENIPUNCTURE: CPT

## 2017-06-17 PROCEDURE — 84300 ASSAY OF URINE SODIUM: CPT

## 2017-06-17 PROCEDURE — 80053 COMPREHEN METABOLIC PANEL: CPT

## 2017-06-17 PROCEDURE — C1884: CPT

## 2017-06-17 PROCEDURE — C1769: CPT

## 2017-06-17 PROCEDURE — 93459 L HRT ART/GRFT ANGIO: CPT | Mod: XU

## 2017-06-17 PROCEDURE — C1887: CPT

## 2017-06-17 PROCEDURE — C1760: CPT

## 2017-06-17 PROCEDURE — 84133 ASSAY OF URINE POTASSIUM: CPT

## 2017-06-17 PROCEDURE — C9604: CPT

## 2017-06-17 PROCEDURE — 85610 PROTHROMBIN TIME: CPT

## 2017-06-17 RX ADMIN — Medication 145 MILLIGRAM(S): at 15:04

## 2017-06-17 RX ADMIN — Medication 81 MILLIGRAM(S): at 15:05

## 2017-06-17 RX ADMIN — Medication 1200 MILLIGRAM(S): at 06:31

## 2017-06-17 RX ADMIN — CLOPIDOGREL BISULFATE 75 MILLIGRAM(S): 75 TABLET, FILM COATED ORAL at 15:04

## 2017-06-17 RX ADMIN — Medication 100 MILLIGRAM(S): at 06:30

## 2017-06-17 RX ADMIN — Medication 75 MICROGRAM(S): at 06:30

## 2017-06-17 RX ADMIN — FAMOTIDINE 20 MILLIGRAM(S): 10 INJECTION INTRAVENOUS at 15:04

## 2017-06-17 RX ADMIN — AMIODARONE HYDROCHLORIDE 200 MILLIGRAM(S): 400 TABLET ORAL at 06:30

## 2017-06-18 ENCOUNTER — FORM ENCOUNTER (OUTPATIENT)
Age: 72
End: 2017-06-18

## 2017-06-19 ENCOUNTER — OUTPATIENT (OUTPATIENT)
Dept: OUTPATIENT SERVICES | Facility: HOSPITAL | Age: 72
LOS: 1 days | End: 2017-06-19
Payer: MEDICARE

## 2017-06-19 ENCOUNTER — APPOINTMENT (OUTPATIENT)
Dept: CT IMAGING | Facility: CLINIC | Age: 72
End: 2017-06-19

## 2017-06-19 DIAGNOSIS — Z98.890 OTHER SPECIFIED POSTPROCEDURAL STATES: Chronic | ICD-10-CM

## 2017-06-19 DIAGNOSIS — R04.2 HEMOPTYSIS: ICD-10-CM

## 2017-06-19 DIAGNOSIS — I51.9 HEART DISEASE, UNSPECIFIED: Chronic | ICD-10-CM

## 2017-06-19 PROCEDURE — 71250 CT THORAX DX C-: CPT | Mod: 26

## 2017-06-19 PROCEDURE — 71250 CT THORAX DX C-: CPT

## 2017-06-20 ENCOUNTER — INPATIENT (INPATIENT)
Facility: HOSPITAL | Age: 72
LOS: 2 days | Discharge: HOME | DRG: 167 | End: 2017-06-23
Attending: INTERNAL MEDICINE | Admitting: FAMILY MEDICINE
Payer: MEDICARE

## 2017-06-20 VITALS
DIASTOLIC BLOOD PRESSURE: 79 MMHG | SYSTOLIC BLOOD PRESSURE: 122 MMHG | WEIGHT: 209 LBS | TEMPERATURE: 98 F | OXYGEN SATURATION: 97 % | HEART RATE: 82 BPM | RESPIRATION RATE: 20 BRPM

## 2017-06-20 DIAGNOSIS — I50.22 CHRONIC SYSTOLIC (CONGESTIVE) HEART FAILURE: ICD-10-CM

## 2017-06-20 DIAGNOSIS — I15.9 SECONDARY HYPERTENSION, UNSPECIFIED: ICD-10-CM

## 2017-06-20 DIAGNOSIS — I25.10 ATHEROSCLEROTIC HEART DISEASE OF NATIVE CORONARY ARTERY WITHOUT ANGINA PECTORIS: ICD-10-CM

## 2017-06-20 DIAGNOSIS — Z98.890 OTHER SPECIFIED POSTPROCEDURAL STATES: Chronic | ICD-10-CM

## 2017-06-20 DIAGNOSIS — R04.2 HEMOPTYSIS: ICD-10-CM

## 2017-06-20 DIAGNOSIS — E78.5 HYPERLIPIDEMIA, UNSPECIFIED: ICD-10-CM

## 2017-06-20 DIAGNOSIS — E03.9 HYPOTHYROIDISM, UNSPECIFIED: ICD-10-CM

## 2017-06-20 DIAGNOSIS — I51.9 HEART DISEASE, UNSPECIFIED: Chronic | ICD-10-CM

## 2017-06-20 LAB
ABO RH CONFIRMATION: SIGNIFICANT CHANGE UP
ALBUMIN SERPL ELPH-MCNC: 3.9 G/DL — SIGNIFICANT CHANGE UP (ref 3.3–5.2)
ALP SERPL-CCNC: 70 U/L — SIGNIFICANT CHANGE UP (ref 40–120)
ALT FLD-CCNC: 21 U/L — SIGNIFICANT CHANGE UP
ANION GAP SERPL CALC-SCNC: 17 MMOL/L — SIGNIFICANT CHANGE UP (ref 5–17)
APTT BLD: 33.8 SEC — SIGNIFICANT CHANGE UP (ref 27.5–37.4)
AST SERPL-CCNC: 37 U/L — SIGNIFICANT CHANGE UP
BASOPHILS # BLD AUTO: 0 K/UL — SIGNIFICANT CHANGE UP (ref 0–0.2)
BASOPHILS NFR BLD AUTO: 0.1 % — SIGNIFICANT CHANGE UP (ref 0–2)
BILIRUB SERPL-MCNC: 0.6 MG/DL — SIGNIFICANT CHANGE UP (ref 0.4–2)
BLD GP AB SCN SERPL QL: SIGNIFICANT CHANGE UP
BUN SERPL-MCNC: 20 MG/DL — SIGNIFICANT CHANGE UP (ref 8–20)
CALCIUM SERPL-MCNC: 9.4 MG/DL — SIGNIFICANT CHANGE UP (ref 8.6–10.2)
CHLORIDE SERPL-SCNC: 100 MMOL/L — SIGNIFICANT CHANGE UP (ref 98–107)
CO2 SERPL-SCNC: 21 MMOL/L — LOW (ref 22–29)
CREAT SERPL-MCNC: 1.39 MG/DL — HIGH (ref 0.5–1.3)
EOSINOPHIL # BLD AUTO: 0.2 K/UL — SIGNIFICANT CHANGE UP (ref 0–0.5)
EOSINOPHIL NFR BLD AUTO: 2 % — SIGNIFICANT CHANGE UP (ref 0–5)
GLUCOSE SERPL-MCNC: 164 MG/DL — HIGH (ref 70–115)
HCT VFR BLD CALC: 35.8 % — LOW (ref 42–52)
HGB BLD-MCNC: 12.4 G/DL — LOW (ref 14–18)
INR BLD: 1.14 RATIO — SIGNIFICANT CHANGE UP (ref 0.88–1.16)
LYMPHOCYTES # BLD AUTO: 1.4 K/UL — SIGNIFICANT CHANGE UP (ref 1–4.8)
LYMPHOCYTES # BLD AUTO: 18.1 % — LOW (ref 20–55)
MCHC RBC-ENTMCNC: 32.1 PG — HIGH (ref 27–31)
MCHC RBC-ENTMCNC: 34.6 G/DL — SIGNIFICANT CHANGE UP (ref 32–36)
MCV RBC AUTO: 92.7 FL — SIGNIFICANT CHANGE UP (ref 80–94)
MONOCYTES # BLD AUTO: 0.7 K/UL — SIGNIFICANT CHANGE UP (ref 0–0.8)
MONOCYTES NFR BLD AUTO: 9.8 % — SIGNIFICANT CHANGE UP (ref 3–10)
NEUTROPHILS # BLD AUTO: 5.2 K/UL — SIGNIFICANT CHANGE UP (ref 1.8–8)
NEUTROPHILS NFR BLD AUTO: 69.7 % — SIGNIFICANT CHANGE UP (ref 37–73)
PLATELET # BLD AUTO: 273 K/UL — SIGNIFICANT CHANGE UP (ref 150–400)
POTASSIUM SERPL-MCNC: 4.9 MMOL/L — SIGNIFICANT CHANGE UP (ref 3.5–5.3)
POTASSIUM SERPL-SCNC: 4.9 MMOL/L — SIGNIFICANT CHANGE UP (ref 3.5–5.3)
PROT SERPL-MCNC: 7.5 G/DL — SIGNIFICANT CHANGE UP (ref 6.6–8.7)
PROTHROM AB SERPL-ACNC: 12.6 SEC — SIGNIFICANT CHANGE UP (ref 9.8–12.7)
RBC # BLD: 3.86 M/UL — LOW (ref 4.6–6.2)
RBC # FLD: 13.5 % — SIGNIFICANT CHANGE UP (ref 11–15.6)
SODIUM SERPL-SCNC: 138 MMOL/L — SIGNIFICANT CHANGE UP (ref 135–145)
TYPE + AB SCN PNL BLD: SIGNIFICANT CHANGE UP
WBC # BLD: 7.5 K/UL — SIGNIFICANT CHANGE UP (ref 4.8–10.8)
WBC # FLD AUTO: 7.5 K/UL — SIGNIFICANT CHANGE UP (ref 4.8–10.8)

## 2017-06-20 PROCEDURE — 99285 EMERGENCY DEPT VISIT HI MDM: CPT

## 2017-06-20 PROCEDURE — 99223 1ST HOSP IP/OBS HIGH 75: CPT

## 2017-06-20 PROCEDURE — 93010 ELECTROCARDIOGRAM REPORT: CPT

## 2017-06-20 RX ORDER — SODIUM CHLORIDE 9 MG/ML
1000 INJECTION INTRAMUSCULAR; INTRAVENOUS; SUBCUTANEOUS
Qty: 0 | Refills: 0 | Status: COMPLETED | OUTPATIENT
Start: 2017-06-20 | End: 2017-06-21

## 2017-06-20 RX ORDER — LEVOTHYROXINE SODIUM 125 MCG
75 TABLET ORAL DAILY
Qty: 0 | Refills: 0 | Status: DISCONTINUED | OUTPATIENT
Start: 2017-06-20 | End: 2017-06-23

## 2017-06-20 RX ORDER — CLOPIDOGREL BISULFATE 75 MG/1
75 TABLET, FILM COATED ORAL DAILY
Qty: 0 | Refills: 0 | Status: DISCONTINUED | OUTPATIENT
Start: 2017-06-20 | End: 2017-06-23

## 2017-06-20 RX ORDER — METOPROLOL TARTRATE 50 MG
100 TABLET ORAL DAILY
Qty: 0 | Refills: 0 | Status: DISCONTINUED | OUTPATIENT
Start: 2017-06-20 | End: 2017-06-23

## 2017-06-20 RX ORDER — FENOFIBRATE,MICRONIZED 130 MG
145 CAPSULE ORAL AT BEDTIME
Qty: 0 | Refills: 0 | Status: DISCONTINUED | OUTPATIENT
Start: 2017-06-20 | End: 2017-06-23

## 2017-06-20 RX ORDER — CHOLECALCIFEROL (VITAMIN D3) 125 MCG
2000 CAPSULE ORAL DAILY
Qty: 0 | Refills: 0 | Status: DISCONTINUED | OUTPATIENT
Start: 2017-06-20 | End: 2017-06-23

## 2017-06-20 RX ORDER — ATORVASTATIN CALCIUM 80 MG/1
40 TABLET, FILM COATED ORAL AT BEDTIME
Qty: 0 | Refills: 0 | Status: DISCONTINUED | OUTPATIENT
Start: 2017-06-20 | End: 2017-06-23

## 2017-06-20 RX ORDER — AMIODARONE HYDROCHLORIDE 400 MG/1
200 TABLET ORAL DAILY
Qty: 0 | Refills: 0 | Status: DISCONTINUED | OUTPATIENT
Start: 2017-06-20 | End: 2017-06-23

## 2017-06-20 RX ORDER — ASPIRIN/CALCIUM CARB/MAGNESIUM 324 MG
81 TABLET ORAL DAILY
Qty: 0 | Refills: 0 | Status: DISCONTINUED | OUTPATIENT
Start: 2017-06-20 | End: 2017-06-23

## 2017-06-20 RX ORDER — SODIUM CHLORIDE 9 MG/ML
3 INJECTION INTRAMUSCULAR; INTRAVENOUS; SUBCUTANEOUS ONCE
Qty: 0 | Refills: 0 | Status: COMPLETED | OUTPATIENT
Start: 2017-06-20 | End: 2017-06-20

## 2017-06-20 RX ORDER — MULTIVIT-MIN/FERROUS GLUCONATE 9 MG/15 ML
1 LIQUID (ML) ORAL DAILY
Qty: 0 | Refills: 0 | Status: DISCONTINUED | OUTPATIENT
Start: 2017-06-20 | End: 2017-06-23

## 2017-06-20 RX ORDER — FAMOTIDINE 10 MG/ML
20 INJECTION INTRAVENOUS AT BEDTIME
Qty: 0 | Refills: 0 | Status: DISCONTINUED | OUTPATIENT
Start: 2017-06-20 | End: 2017-06-23

## 2017-06-20 RX ADMIN — SODIUM CHLORIDE 3 MILLILITER(S): 9 INJECTION INTRAMUSCULAR; INTRAVENOUS; SUBCUTANEOUS at 18:39

## 2017-06-20 NOTE — CONSULT NOTE ADULT - ASSESSMENT
71M, with one month h/o hemoptysis after being treated for bronchitis, it still persisted and pt was referred to pulmonary, now s/p CT chest would showed L infrahilar mass sent to ED by pulmonologist for further eval;

## 2017-06-20 NOTE — H&P ADULT - NSHPPHYSICALEXAM_GEN_ALL_CORE
Vital Signs Last 24 Hrs  T(C): 36.6, Max: 36.6 (06-20 @ 16:45)  T(F): 97.8, Max: 97.8 (06-20 @ 16:45)  HR: 82 (82 - 82)  BP: 122/79 (122/79 - 122/79)  BP(mean): --  RR: 20 (20 - 20)  SpO2: 97% (97% - 97%)    Constitutional/General: Well developed, well nourished, vitals reviewed  EYE: PERRL, conjunctiva clear  ENT: Good dentition, oropharynx clear  NECK: No masses, thyroid normal  RESP: Diminished bilateral air entry, no wheezes, no rhonchi, normal effort  CV: RRR, normal S1S2, no murmur, 2+ DP pulses, no JVD, no edema  ABDOMEN: Soft, nontender, no HSM  LYMPH: No cervical or supraclavicular adenopathy  SKIN: Warm, dry, no rashes  NEURO: CN II-XII intact grossly, sensation intact  PSYCHIATRIC: Oriented x3, normal mood and affect

## 2017-06-20 NOTE — ED ADULT NURSE REASSESSMENT NOTE - NS ED NURSE REASSESS COMMENT FT1
received report from mick guillaume and assumed care of pt. pt sitting calm in bed. a and o x3. bragg. perrl. breathing even and unlabored. lungs cta. cap refill brisk. skin w/d/i. + and = peripheral pulses. lungs cta. s1 s2 rrr. abdomen soft nontender nondistended. pt has no complaints at this time. will continue to monitor.

## 2017-06-20 NOTE — ED STATDOCS - PROGRESS NOTE DETAILS
72 y/o male h/o HLD presenting c/o hemoptysis x 3 weeks. CAD Stent placed by Dr. Ameya Buenrostro 4 days ago. On ASA and Plavix. +Weight loss. PCP: Dr. Jarrell. Pt sent by pulmonologist Dr. Garcia due to chest CT for PNA vs tumor. Pt transferred to main ED for further evaluation Spoke to Dr. Cornell - discussed case. Will arrange cardiothoracic surgery to see

## 2017-06-20 NOTE — ED STATDOCS - PSH
Cardiac disorder  triple bypass may 1997 [facility]  H/O cardiac catheterization  stenting of SVG TO PDA IN 2010  History of electrophysiologic study  2009 positive study (AICD placement )  History of prostate surgery  davinci surgery in 2008

## 2017-06-20 NOTE — H&P ADULT - PSH
Cardiac disorder  triple bypass may 1997 [facility]  H/O cardiac catheterization  stenting of SVG TO PDA IN 2010  History of electrophysiologic study  2009 positive study (AICD placement )  History of prostate surgery  davinci surgery in 2008

## 2017-06-20 NOTE — H&P ADULT - PROBLEM SELECTOR PLAN 1
CT surgery consult appreciated  Pulmonary Dr. Cornell aware of CT scan findings.   NC  monitor vitals, hemoglobin/hematocrit  gentle IV hydration as patient may require more IV contrast for clarification of vascular involvement, in view of CKD with recent cardiac cath

## 2017-06-20 NOTE — ED PROVIDER NOTE - OBJECTIVE STATEMENT
72 y/o M has been having intermittent hemoptysis for about one month, was recently admitted and had cardiac stent on asa and plavix was sent in for eval by Dr Almaguer whom requested pt be admitted ongoing outpt w/u showed ? mass lesion close to pulm vasculature does produce evangelista sputum here

## 2017-06-20 NOTE — CONSULT NOTE ADULT - ATTENDING COMMENTS
Pt seen and images reviewed.  Hemoptysis and LLL mass.  On plavix/asa. Will plan for bronch tomorrow

## 2017-06-20 NOTE — ED PROVIDER NOTE - PSH
Cardiac disorder  triple bypass may 1997 [facility]  H/O cardiac catheterization  stenting of SVG TO PDA IN 2010  History of electrophysiologic study  2009 positive study (AICD placement )  History of prostate surgery  davinci surgery in 2008

## 2017-06-20 NOTE — H&P ADULT - NSHPSOCIALHISTORY_GEN_ALL_CORE
quit smoking 20years ago; has an 80 pack-year smoke history  quit drinking alcohol several years ago  denies illicit drug use

## 2017-06-20 NOTE — H&P ADULT - ASSESSMENT
70 y/o male, former smoker (80 pack year), prostate cancer (s/p resection 2008) with hemoptysis, found to multiple lung nodules with significant 6cm infrahilar mass.   CT chest Abnormalities:   -	6 cm left lower lobe infrahilar mass which extends into the left hilum.   -	No clearly defined fat plane between this mass and the descending thoracic aorta.   -	Areas without defined planes between this mass and the portion of the left inferior pulmonary vein or the mass and the left posterior basilar branches of the pulmonary artery.   -	Extensive consolidative and ground glass opacities in the left lower lobe,   -	There is moderate emphysema with an upper lobe predominance.   -	There is mild biapical scarring.   -	There are nonspecific ground glass opacities in the right lower lobe medially, for example on image 80 of series 2.   -	There is a 6 mm nodule in the right lower lobe on image 90 of series 2.   -	There is a 2 mm peripheral nodule in the right lower lobe on image 90 of series 2.  -	There are areas of mild bronchiectasis and tree-in-bud nodularity in the periphery of the right lower lobe on images 94-97 of series 2.   -	There is a punctate calcified granuloma in the right lower lobe on image 104 of series 2.   -	There is a punctate granuloma in the left upper lobe on image 24 of series 2.   -	There are several additional 2 mm calcified and noncalcified left upper lobe nodules.   -	There is a 2 mm nodule in the lingula on image 82 of series 2.

## 2017-06-21 ENCOUNTER — RESULT REVIEW (OUTPATIENT)
Age: 72
End: 2017-06-21

## 2017-06-21 ENCOUNTER — RECORD ABSTRACTING (OUTPATIENT)
Age: 72
End: 2017-06-21

## 2017-06-21 DIAGNOSIS — I10 ESSENTIAL (PRIMARY) HYPERTENSION: ICD-10-CM

## 2017-06-21 DIAGNOSIS — I25.10 ATHEROSCLEROTIC HEART DISEASE OF NATIVE CORONARY ARTERY WITHOUT ANGINA PECTORIS: ICD-10-CM

## 2017-06-21 DIAGNOSIS — R91.8 OTHER NONSPECIFIC ABNORMAL FINDING OF LUNG FIELD: ICD-10-CM

## 2017-06-21 PROCEDURE — 99223 1ST HOSP IP/OBS HIGH 75: CPT

## 2017-06-21 PROCEDURE — 88112 CYTOPATH CELL ENHANCE TECH: CPT | Mod: 26

## 2017-06-21 PROCEDURE — 99233 SBSQ HOSP IP/OBS HIGH 50: CPT

## 2017-06-21 PROCEDURE — 88305 TISSUE EXAM BY PATHOLOGIST: CPT | Mod: 26

## 2017-06-21 RX ADMIN — Medication 1 TABLET(S): at 11:27

## 2017-06-21 RX ADMIN — Medication 81 MILLIGRAM(S): at 11:36

## 2017-06-21 RX ADMIN — Medication 75 MICROGRAM(S): at 06:07

## 2017-06-21 RX ADMIN — SODIUM CHLORIDE 75 MILLILITER(S): 9 INJECTION INTRAMUSCULAR; INTRAVENOUS; SUBCUTANEOUS at 00:56

## 2017-06-21 RX ADMIN — Medication 2000 UNIT(S): at 11:27

## 2017-06-21 RX ADMIN — AMIODARONE HYDROCHLORIDE 200 MILLIGRAM(S): 400 TABLET ORAL at 06:07

## 2017-06-21 RX ADMIN — Medication 100 MILLIGRAM(S): at 06:07

## 2017-06-21 RX ADMIN — FAMOTIDINE 20 MILLIGRAM(S): 10 INJECTION INTRAVENOUS at 00:55

## 2017-06-21 RX ADMIN — Medication 145 MILLIGRAM(S): at 23:01

## 2017-06-21 RX ADMIN — ATORVASTATIN CALCIUM 40 MILLIGRAM(S): 80 TABLET, FILM COATED ORAL at 23:01

## 2017-06-21 RX ADMIN — ATORVASTATIN CALCIUM 40 MILLIGRAM(S): 80 TABLET, FILM COATED ORAL at 00:55

## 2017-06-21 RX ADMIN — FAMOTIDINE 20 MILLIGRAM(S): 10 INJECTION INTRAVENOUS at 23:02

## 2017-06-21 RX ADMIN — CLOPIDOGREL BISULFATE 75 MILLIGRAM(S): 75 TABLET, FILM COATED ORAL at 11:36

## 2017-06-21 NOTE — PROGRESS NOTE ADULT - ASSESSMENT
72 y/o male, former smoker (80 pack year), prostate cancer (s/p resection 2008), CAD with recent stent on 6/16/17 admitted  with hemoptysis for more than 1 month  found to multiple lung nodules with significant 6cm infrahilar mass , seen by DR James thoracic surgery for  bronchoscopy  and biopsy scheduled for am

## 2017-06-21 NOTE — ED ADULT NURSE REASSESSMENT NOTE - NS ED NURSE REASSESS COMMENT FT1
Pt sleeping on left side, breathing well on RA. Medicated as ordered. Awaiting bed assignment. Resting comfortably.

## 2017-06-21 NOTE — PROGRESS NOTE ADULT - SUBJECTIVE AND OBJECTIVE BOX
Patient is a 71y old  Male who presents with a chief complaint of coughing up phlegm and blood   after developing bronchitis one month ago.  He was sent to the E.D. by the pulmonology group of   Dr. Raymond (2017 21:26) He is scheduled to have a FLEXIBLE BRONCHOSCOPY     PAST MEDICAL HISTORY:  Paroxysmal supraventricular tachycardia  Chronic systolic CHF - NYHA class 2  Mitral regurgitation  Bronchitis  Hypertension  Ischemic cardiomyopathy  AICD (automatic cardioverter/defibrillator) present  VT (ventricular tachycardia)  Tricuspid regurgitation  RBBB  Prostate cancer  Hypothyroidism  Hyperlipidemia  Angina pectoris  CAD (coronary artery disease)    PAST SURGICAL HISTORY:  Prostatectomy   CABG 20 years ago   2 cardiac stents placed in  and one more placed 5 days ago by Dr. Ameya Buenrostro  He had surgery on his head and left shoulder and left leg post trauma 15 years ago.    MEDICATIONS  (STANDING):  aspirin enteric coated 81milliGRAM(s) Oral daily  amiodarone    Tablet 200milliGRAM(s) Oral daily  atorvastatin 40milliGRAM(s) Oral at bedtime  fenofibrate Tablet 145milliGRAM(s) Oral at bedtime  clopidogrel Tablet 75milliGRAM(s) Oral daily  metoprolol succinate ER 100milliGRAM(s) Oral daily  famotidine    Tablet 20milliGRAM(s) Oral at bedtime  levothyroxine 75MICROGram(s) Oral daily  multivitamin/minerals 1Tablet(s) Oral daily  cholecalciferol 2000Unit(s) Oral kelly    Allergies;  macrolide antibiotics (Urticaria; Rash; Hives)                  penicillin (Urticaria; Rash)                  Zithromax Z-Christian (Urticaria; Rash; Hives)    SOCIAL HISTORY:  The patient quit drinking alcohol 20 years ago but before this time he drank                                 24 beers/day (1 Case) x 10 years.  He quit smoking 20 years ago but before                                 that he smoked 2 ppd x 35 to 40 years.  He never used illicit drugs.                      12.4   7.5   )-----------( 273      ( 2017 18:08 )             35.8       PT/INR - ( 2017 18:08 )   PT: 12.6 sec;   INR: 1.14 ratio         PTT - ( 2017 18:08 )  PTT:33.8 sec    06-20    138  |  100  |  20.0  ----------------------------<  164<H>  4.9   |  21.0<L>  |  1.39<H>    Ca    9.4      2017 18:08    TPro  7.5  /  Alb  3.9  /  TBili  0.6  /  DBili  x   /  AST  37  /  ALT  21  /  AlkPhos  70      [id number]  Sinus rhythm with frequent Premature ventricular complexes  Right bundle branch block  Abnormal ECG    TT ECHO:  None    Cardiac Cath 2017  INDICATIONS: Unstable angina   HISTORY: The patient has hypertension, renal failure (not requiring  dialysis), and medication-treated dyslipidemia. S/P ICD with NSVT PRIOR  A successful stent  with distal embolic protection was performed on the 80 % lesion in the  proximal third of the saphenous vein graft from the aorta to the right  posterior descending. Following intervention there was an excellent  angiographic appearance with a 1 % residual stenosis.   A 3.5 X 30 RESOLUTE RX drug-eluting stent was placed across the lesion.  CORONARY VESSELS: The coronary circulation is right dominant.  Recent echo showed EF 46%  DIAGNOSTIC RECOMMENDATIONS: ASA and Plavix  Prepared and signed by  Ameya Buenrostro MD    CT CHEST  -   2017    FINDINGS:  There is a ICD with the pulse generator in the left anterior chest wall   and leads extending into the right atrium and right ventricle.   There is moderate emphysema with an upper lobe predominence.   IMPRESSION:  There is a  6 cm left lower lobe infrahilar mass which extends into the left hilum.    Small amount of adjacent pleural fluid. No clearly defined fat plane   between this mass and the descending thoracic aorta. Areas without   defined planes between this mass and the portion of the left inferior   pulmonary vein or the mass and the left posterior basilar branches of the   pulmonary artery. Extensive consolidative and ground glass opacities in   the left lower lobe, which may represent infection/postobstructive   pneumonitis versus blood products.     ASA # =  4   Mallampati # =  2  (He has a full set of dentures)

## 2017-06-21 NOTE — PROGRESS NOTE ADULT - SUBJECTIVE AND OBJECTIVE BOX
CC : hemoptysis   H and P reviewed , pt is seen examined , he is c/o hemoptysis , cough and mild dyspnea referred  by pulmonary for further testing and possible lung biopsy   HPI :   This is a 71 year old male who has been experiencing cough with hemoptysis, intermittent, for one month, has been treated for bronchitis at the beginning of his symptoms and three days into his treatment with Z-Christian the cardiologist changed his medications to Clindamycin, patient was then evaluated by pulmonary on Thursday 6/15/17 due to nonresolving symptoms last week who recommended a CT chest. Before getting his CT chest, pt also was scheduled for a cath 6/16 to assess NSVT found incidentally on his ICD interrogation. During cath, pt was found to have an occluded SVG to RPDA graft and a ZARINA was placed.  He has been on Asa/Plavix x several years per patient. CT was completed as outpatient  and patient was advised by pulmonologist to report to ER today. Patient denies: aggravating/relieving factors, fever, prior episodes, weight loss, night sweats, shortness of breath, chest pain, palpitations, dizziness, abdominal pain, diarrhea, or constipation.     MEDICATIONS  (STANDING):  aspirin enteric coated 81milliGRAM(s) Oral daily  amiodarone    Tablet 200milliGRAM(s) Oral daily  atorvastatin 40milliGRAM(s) Oral at bedtime  fenofibrate Tablet 145milliGRAM(s) Oral at bedtime  clopidogrel Tablet 75milliGRAM(s) Oral daily  metoprolol succinate ER 100milliGRAM(s) Oral daily  famotidine    Tablet 20milliGRAM(s) Oral at bedtime  levothyroxine 75MICROGram(s) Oral daily  multivitamin/minerals 1Tablet(s) Oral daily  cholecalciferol 2000Unit(s) Oral daily    Vital Signs Last 24 Hrs  T(C): 37.1, Max: 37.1 (06-21 @ 15:55)  T(F): 98.7, Max: 98.7 (06-21 @ 15:55)  HR: 73 (62 - 74)  BP: 119/70 (112/71 - 125/70)  BP(mean): --  RR: 18 (18 - 20)  SpO2: 95% (94% - 98%)  General : awake alert oriented , comfortable sitting in the bed   Neck : supple , no JVD   Pulmonary : clear to auscultation , no wheezing , diminished BS on the left mid upper lung   Cardiovascular : regular rate rythm S1/S2  Gastrointestinal : soft no tenderness Bs positive   Extremities : no edema or clubbing  Skin : no rash                         12.4   7.5   )-----------( 273      ( 20 Jun 2017 18:08 )             35.8   06-20    138  |  100  |  20.0  ----------------------------<  164<H>  4.9   |  21.0<L>  |  1.39<H>    Ca    9.4      20 Jun 2017 18:08    TPro  7.5  /  Alb  3.9  /  TBili  0.6  /  DBili  x   /  AST  37  /  ALT  21  /  AlkPhos  70  06-20

## 2017-06-21 NOTE — CONSULT NOTE ADULT - ASSESSMENT
Hemoptysis likely secondary to pulmonary neoplasm.  No good evidence of infection and infiltrates in LLL likely represent "blood pneumonitis".  Problematic with recent stent and requirement for plavix re:biopsy.    Plan:  1.Coughed specimen for cytology  2.Discussed with thoracic>will need some procedure would advise bronchoscopy as initial  3.Cardiology input re: antiplatelet agents>?ability to stop.

## 2017-06-21 NOTE — CONSULT NOTE ADULT - ASSESSMENT
1. elderly male with remote heavy smoking hx presents w 1 month of hemoptysis. Lesion left infrahilar area on chest ct. R/O malignancy  2. Extensive cardiac hx with remote CABG/stents and very recent ZARINA to svg to RCA.   3. Mild systolic LV impairment with moderate MR on echo.  4. AICD for ventric. tach. Recent antitach pacing for VT. Now on oral amiodarone.-monitor thyroid status  5. hypothyroid and now on amiodarone.  6. Renal impairment

## 2017-06-22 ENCOUNTER — APPOINTMENT (OUTPATIENT)
Dept: THORACIC SURGERY | Facility: HOSPITAL | Age: 72
End: 2017-06-22

## 2017-06-22 ENCOUNTER — RESULT REVIEW (OUTPATIENT)
Age: 72
End: 2017-06-22

## 2017-06-22 LAB
GRAM STN FLD: SIGNIFICANT CHANGE UP
NON-GYN CYTOLOGY SPEC: SIGNIFICANT CHANGE UP
SPECIMEN SOURCE: SIGNIFICANT CHANGE UP

## 2017-06-22 PROCEDURE — 31624 DX BRONCHOSCOPE/LAVAGE: CPT

## 2017-06-22 PROCEDURE — 99233 SBSQ HOSP IP/OBS HIGH 50: CPT

## 2017-06-22 PROCEDURE — 88104 CYTOPATH FL NONGYN SMEARS: CPT | Mod: 26

## 2017-06-22 PROCEDURE — 31623 DX BRONCHOSCOPE/BRUSH: CPT

## 2017-06-22 RX ORDER — FENTANYL CITRATE 50 UG/ML
25 INJECTION INTRAVENOUS
Qty: 0 | Refills: 0 | Status: DISCONTINUED | OUTPATIENT
Start: 2017-06-22 | End: 2017-06-22

## 2017-06-22 RX ORDER — SODIUM CHLORIDE 9 MG/ML
1000 INJECTION, SOLUTION INTRAVENOUS
Qty: 0 | Refills: 0 | Status: DISCONTINUED | OUTPATIENT
Start: 2017-06-22 | End: 2017-06-22

## 2017-06-22 RX ORDER — ONDANSETRON 8 MG/1
4 TABLET, FILM COATED ORAL ONCE
Qty: 0 | Refills: 0 | Status: DISCONTINUED | OUTPATIENT
Start: 2017-06-22 | End: 2017-06-22

## 2017-06-22 RX ADMIN — CLOPIDOGREL BISULFATE 75 MILLIGRAM(S): 75 TABLET, FILM COATED ORAL at 17:19

## 2017-06-22 RX ADMIN — ATORVASTATIN CALCIUM 40 MILLIGRAM(S): 80 TABLET, FILM COATED ORAL at 21:26

## 2017-06-22 RX ADMIN — Medication 75 MICROGRAM(S): at 07:01

## 2017-06-22 RX ADMIN — Medication 100 MILLIGRAM(S): at 07:01

## 2017-06-22 RX ADMIN — FAMOTIDINE 20 MILLIGRAM(S): 10 INJECTION INTRAVENOUS at 21:26

## 2017-06-22 RX ADMIN — AMIODARONE HYDROCHLORIDE 200 MILLIGRAM(S): 400 TABLET ORAL at 07:01

## 2017-06-22 RX ADMIN — Medication 2000 UNIT(S): at 18:52

## 2017-06-22 RX ADMIN — Medication 81 MILLIGRAM(S): at 17:18

## 2017-06-22 RX ADMIN — Medication 145 MILLIGRAM(S): at 21:26

## 2017-06-22 RX ADMIN — Medication 1 TABLET(S): at 18:52

## 2017-06-22 NOTE — PROGRESS NOTE ADULT - SUBJECTIVE AND OBJECTIVE BOX
[facility]                                   [address] [place] [address]                                                      PHONE: [phone]                                                         FAX: [phone]  [email or web address]  ---------------------------------------------------------------------------------------------------------------------------------    FU for  Pt seen and examined.     Overnight events/patient complaints:    macrolide antibiotics (Urticaria; Rash; Hives)  penicillin (Urticaria; Rash)  Zithromax Z-Christian (Urticaria; Rash; Hives)    MEDICATIONS  (STANDING):  aspirin enteric coated 81milliGRAM(s) Oral daily  amiodarone    Tablet 200milliGRAM(s) Oral daily  atorvastatin 40milliGRAM(s) Oral at bedtime  fenofibrate Tablet 145milliGRAM(s) Oral at bedtime  clopidogrel Tablet 75milliGRAM(s) Oral daily  metoprolol succinate ER 100milliGRAM(s) Oral daily  famotidine    Tablet 20milliGRAM(s) Oral at bedtime  levothyroxine 75MICROGram(s) Oral daily  multivitamin/minerals 1Tablet(s) Oral daily  cholecalciferol 2000Unit(s) Oral daily    MEDICATIONS  (PRN):      Vital Signs Last 24 Hrs  T(C): 36.3, Max: 37.1 (06-21 @ 15:55)  T(F): 97.4, Max: 98.7 (06-21 @ 15:55)  HR: 64 (64 - 74)  BP: 128/72 (112/71 - 141/73)  BP(mean): --  RR: 18 (18 - 20)  SpO2: 95% (94% - 97%)  ICU Vital Signs Last 24 Hrs  I&O's Summary      PHYSICAL EXAM:  HEENT: no JVD  CARDIOVASCULAR: Normal S1 and S2, No murmur, rub, gallop or lift.   LUNGS: No rales, rhonchi or wheeze. Normal breath sounds bilaterally.  ABDOMEN: Soft, nontender without mass or organomegaly. bowel sounds normoactive.  EXTREMITIES: no edema          LABS:                        12.4   7.5   )-----------( 273      ( 20 Jun 2017 18:08 )             35.8     06-20    138  |  100  |  20.0  ----------------------------<  164<H>  4.9   |  21.0<L>  |  1.39<H>    Ca    9.4      20 Jun 2017 18:08    TPro  7.5  /  Alb  3.9  /  TBili  0.6  /  DBili  x   /  AST  37  /  ALT  21  /  AlkPhos  70  06-20    SULEIMAN GREGORY      PT/INR - ( 20 Jun 2017 18:08 )   PT: 12.6 sec;   INR: 1.14 ratio         PTT - ( 20 Jun 2017 18:08 )  PTT:33.8 sec      RADIOLOGY & ADDITIONAL STUDIES:    LABS:                        12.4   7.5   )-----------( 273      ( 20 Jun 2017 18:08 )             35.8     06-20    138  |  100  |  20.0  ----------------------------<  164<H>  4.9   |  21.0<L>  |  1.39<H>    Ca    9.4      20 Jun 2017 18:08    TPro  7.5  /  Alb  3.9  /  TBili  0.6  /  DBili  x   /  AST  37  /  ALT  21  /  AlkPhos  70  06-20    71y      PT/INR - ( 20 Jun 2017 18:08 )   PT: 12.6 sec;   INR: 1.14 ratio         PTT - ( 20 Jun 2017 18:08 )  PTT:33.8 sec      Assessment and Plan:  1. elderly male with remote heavy smoking hx presents w 1 month of hemoptysis. Lesion left infrahilar area on chest ct. R/O malignancy  2. Extensive cardiac hx with remote CABG/stents and very recent ZARINA to svg to RCA.   3. Mild systolic LV impairment with moderate MR on echo.  4. AICD for ventric. tach. Recent antitach pacing for VT. Now on oral amiodarone.-monitor thyroid status  5. hypothyroid and now on amiodarone.  6. Renal impairment [facility]                                   [address] [place] [address]                                                      PHONE: [phone]                                                         FAX: [phone]  [email or web address]  ---------------------------------------------------------------------------------------------------------------------------------    FU for  Pt seen and examined.     Overnight events/patient complaints: denies any cp/sob    macrolide antibiotics (Urticaria; Rash; Hives)  penicillin (Urticaria; Rash)  Zithromax Z-Christian (Urticaria; Rash; Hives)    MEDICATIONS  (STANDING):  aspirin enteric coated 81milliGRAM(s) Oral daily  amiodarone    Tablet 200milliGRAM(s) Oral daily  atorvastatin 40milliGRAM(s) Oral at bedtime  fenofibrate Tablet 145milliGRAM(s) Oral at bedtime  clopidogrel Tablet 75milliGRAM(s) Oral daily  metoprolol succinate ER 100milliGRAM(s) Oral daily  famotidine    Tablet 20milliGRAM(s) Oral at bedtime  levothyroxine 75MICROGram(s) Oral daily  multivitamin/minerals 1Tablet(s) Oral daily  cholecalciferol 2000Unit(s) Oral daily    MEDICATIONS  (PRN):      Vital Signs Last 24 Hrs  T(C): 36.3, Max: 37.1 (06-21 @ 15:55)  T(F): 97.4, Max: 98.7 (06-21 @ 15:55)  HR: 64 (64 - 74)  BP: 128/72 (112/71 - 141/73)  BP(mean): --  RR: 18 (18 - 20)  SpO2: 95% (94% - 97%)  ICU Vital Signs Last 24 Hrs  I&O's Summary      PHYSICAL EXAM:  HEENT: no JVD  CARDIOVASCULAR: Normal S1 and S2, No murmur, rub, gallop or lift.   LUNGS: No rales, rhonchi or wheeze. Normal breath sounds bilaterally.  ABDOMEN: Soft, nontender without mass or organomegaly. bowel sounds normoactive.  EXTREMITIES: no edema          LABS:                        12.4   7.5   )-----------( 273      ( 20 Jun 2017 18:08 )             35.8     06-20    138  |  100  |  20.0  ----------------------------<  164<H>  4.9   |  21.0<L>  |  1.39<H>    Ca    9.4      20 Jun 2017 18:08    TPro  7.5  /  Alb  3.9  /  TBili  0.6  /  DBili  x   /  AST  37  /  ALT  21  /  AlkPhos  70  06-20    SULEIMAN GREGORY      PT/INR - ( 20 Jun 2017 18:08 )   PT: 12.6 sec;   INR: 1.14 ratio         PTT - ( 20 Jun 2017 18:08 )  PTT:33.8 sec      RADIOLOGY & ADDITIONAL STUDIES:    LABS:                        12.4   7.5   )-----------( 273      ( 20 Jun 2017 18:08 )             35.8     06-20    138  |  100  |  20.0  ----------------------------<  164<H>  4.9   |  21.0<L>  |  1.39<H>    Ca    9.4      20 Jun 2017 18:08    TPro  7.5  /  Alb  3.9  /  TBili  0.6  /  DBili  x   /  AST  37  /  ALT  21  /  AlkPhos  70  06-20    71y      PT/INR - ( 20 Jun 2017 18:08 )   PT: 12.6 sec;   INR: 1.14 ratio         PTT - ( 20 Jun 2017 18:08 )  PTT:33.8 sec      Assessment and Plan:  1. elderly male with remote heavy smoking hx presents w hemoptysis. Lesion left infrahilar area on chest ct. bronchoscopy today.   2. Extensive cardiac hx with remote CABG/stents and very recent ZARINA to svg to RCA 6/16/17. No absolute cardiac contraindication to proceed with bronchoscopy today. His antiplatelet therapy can not be withheld, as stent deployment was few days ago.   3. Mild systolic LV impairment with moderate MR on echo.  4. AICD for ventric. tach. Recent antitach pacing for VT. Now on oral amiodarone.-monitor thyroid status  5. hypothyroid and now on amiodarone.  6. Renal impairment

## 2017-06-22 NOTE — PROGRESS NOTE ADULT - ASSESSMENT
70 y/o male, former smoker (80 pack year), prostate cancer (s/p resection 2008), CAD with recent stent on 6/16/17 admitted  with hemoptysis for more than 1 month  found to multiple lung nodules with significant 6cm infrahilar mass , seen by DR James thoracic surgery for  bronchoscopy  and biopsy scheduled for am

## 2017-06-22 NOTE — PROGRESS NOTE ADULT - SUBJECTIVE AND OBJECTIVE BOX
HEALTH ISSUES - PROBLEM Dx:  HPI:  This is a 71 year old male who has been experiencing cough with hemoptysis, intermittent, for one month, has been treated for bronchitis at the beginning of his symptoms and three days into his treatment with Z-Christian the cardiologist changed his medications to Clindamycin, patient was then evaluated by pulmonary on Thursday 6/15/17 due to nonresolving symptoms last week who recommended a CT chest. Before getting his CT chest, pt also was scheduled for a cath 6/16 to assess NSVT found incidentally on his ICD interrogation. During cath, pt was found to have an occluded SVG to RPDA graft and a ZARINA was placed.  He has been on Asa/Plavix x several years per patient. CT was completed as outpt and patient was advised by pulmonologist to report to ER today. Patient denies: aggravating/relieving factors, fever, prior episodes, weight loss, night sweats, shortness of breath, chest pain, palpitations, dizziness, abdominal pain, diarrhea, or constipation. (20 Jun 2017 21:26)    NOW  Infrahilar Lung nodule, Hemoptysis, recent CAD- stenting on DAP Rx.    INTERVAL HPI/ OVERNIGHT EVENTS:  Going for Bronchoscopy today. cough +, mild dyspnea  denies chest pain, nausea, vomits, SOB, fever, HA    MEDICATIONS  (STANDING):  aspirin enteric coated 81milliGRAM(s) Oral daily  amiodarone    Tablet 200milliGRAM(s) Oral daily  atorvastatin 40milliGRAM(s) Oral at bedtime  fenofibrate Tablet 145milliGRAM(s) Oral at bedtime  clopidogrel Tablet 75milliGRAM(s) Oral daily  metoprolol succinate ER 100milliGRAM(s) Oral daily  famotidine    Tablet 20milliGRAM(s) Oral at bedtime  levothyroxine 75MICROGram(s) Oral daily  multivitamin/minerals 1Tablet(s) Oral daily  cholecalciferol 2000Unit(s) Oral daily  lactated ringers. 1000milliLiter(s) IV Continuous <Continuous>    MEDICATIONS  (PRN):  fentaNYL    Injectable 25MICROGram(s) IV Push every 5 minutes PRN Moderate Pain  ondansetron Injectable 4milliGRAM(s) IV Push once PRN Nausea and/or Vomiting      Allergies    macrolide antibiotics (Urticaria; Rash; Hives)  penicillin (Urticaria; Rash)  Zithromax Z-Christian (Urticaria; Rash; Hives)    Intolerances        Vital Signs Last 24 Hrs  T(C): 36.4, Max: 37.1 (06-21 @ 15:55)  T(F): 97.5, Max: 98.7 (06-21 @ 15:55)  HR: 68 (64 - 74)  BP: 128/75 (119/70 - 141/73)  BP(mean): --  RR: 18 (18 - 20)  SpO2: 98% (94% - 98%)    ACCUCHECKS    PHYSICAL EXAM-  General: Appears well developed, well nourished alert and cooperative. dry cough afebrile  HEENT: Head; normocephalic, atraumatic.  Eyes;   Pupils reactive, cornea wnl.  Neck; Supple, no nodes adenopathy, no NVD or carotid bruit or thyromegaly.  CARDIOVASCULAR; No murmur, rub, gallop or lift. Normal S1 and S2.  LUNGS; Coarse BS bilat.  ABDOMEN ; Soft, nontender without mass or organomegaly. bowel sounds normoactive.  EXTREMITIES; No clubbing, cyanosis or edema. Distal pulses wnl. ROM normal.  SKIN; warm and dry with normal turgor.  NEURO; Alert/oriented x 3, normal motor exam. No pathologic reflexes.    PSYCH; normal affect.    LABS:                        12.4   7.5   )-----------( 273      ( 20 Jun 2017 18:08 )             35.8     06-20    138  |  100  |  20.0  ----------------------------<  164<H>  4.9   |  21.0<L>  |  1.39<H>    Ca    9.4      20 Jun 2017 18:08    TPro  7.5  /  Alb  3.9  /  TBili  0.6  /  DBili  x   /  AST  37  /  ALT  21  /  AlkPhos  70  06-20    PT/INR - ( 20 Jun 2017 18:08 )   PT: 12.6 sec;   INR: 1.14 ratio         PTT - ( 20 Jun 2017 18:08 )  PTT:33.8 sec    RADIOLOGY & ADDITIONAL TESTS:    Assessment and Plan  DVT Prophylaxis    Discussed with: Patient, family, RN, CM, Consultants  Plan of care/ Discharge planning discussed.    Visit Time:

## 2017-06-22 NOTE — PROGRESS NOTE ADULT - PROBLEM SELECTOR PLAN 2
discussed with Dr James for bronchoscopy and biopsy in  am.  Findings- Old blood in airways; no endobronchial lesion; no active bleeding; Cx and brushing sent for Histopath
discussed with Dr James for bronchoscopy and biopsy in  am

## 2017-06-22 NOTE — PROGRESS NOTE ADULT - PROBLEM SELECTOR PLAN 1
likely due to lung mass , suspected cancer   for Bronch today
lasha due to lung mass , suspected cancer   work up in progress, pulmonary and thoracic surgery input appreciated

## 2017-06-22 NOTE — PROGRESS NOTE ADULT - PROBLEM SELECTOR PLAN 3
recent aicd firing but recent interrogation revealed episode af antitachycardia pacing for VT. Subsequent cardiac pet scan done and pt underwent cath 6/16 and needed ZRAINA to svg to RCA.
recent aicd firing but recent interrogation revealed episode af antitachycardia pacing for VT. Subsequent cardiac pet scan done and pt underwent cath 6/16 and needed ZARINA to svg to RCA.

## 2017-06-23 ENCOUNTER — TRANSCRIPTION ENCOUNTER (OUTPATIENT)
Age: 72
End: 2017-06-23

## 2017-06-23 VITALS
OXYGEN SATURATION: 96 % | TEMPERATURE: 98 F | DIASTOLIC BLOOD PRESSURE: 74 MMHG | HEART RATE: 60 BPM | SYSTOLIC BLOOD PRESSURE: 122 MMHG | RESPIRATION RATE: 18 BRPM

## 2017-06-23 PROCEDURE — 85610 PROTHROMBIN TIME: CPT

## 2017-06-23 PROCEDURE — 87116 MYCOBACTERIA CULTURE: CPT

## 2017-06-23 PROCEDURE — 99239 HOSP IP/OBS DSCHRG MGMT >30: CPT

## 2017-06-23 PROCEDURE — 87070 CULTURE OTHR SPECIMN AEROBIC: CPT

## 2017-06-23 PROCEDURE — 88305 TISSUE EXAM BY PATHOLOGIST: CPT

## 2017-06-23 PROCEDURE — 99285 EMERGENCY DEPT VISIT HI MDM: CPT | Mod: 25

## 2017-06-23 PROCEDURE — 86901 BLOOD TYPING SEROLOGIC RH(D): CPT

## 2017-06-23 PROCEDURE — 87102 FUNGUS ISOLATION CULTURE: CPT

## 2017-06-23 PROCEDURE — 85027 COMPLETE CBC AUTOMATED: CPT

## 2017-06-23 PROCEDURE — 86850 RBC ANTIBODY SCREEN: CPT

## 2017-06-23 PROCEDURE — 87015 SPECIMEN INFECT AGNT CONCNTJ: CPT

## 2017-06-23 PROCEDURE — 80053 COMPREHEN METABOLIC PANEL: CPT

## 2017-06-23 PROCEDURE — 87206 SMEAR FLUORESCENT/ACID STAI: CPT

## 2017-06-23 PROCEDURE — 36415 COLL VENOUS BLD VENIPUNCTURE: CPT

## 2017-06-23 PROCEDURE — 88112 CYTOPATH CELL ENHANCE TECH: CPT

## 2017-06-23 PROCEDURE — 86900 BLOOD TYPING SEROLOGIC ABO: CPT

## 2017-06-23 PROCEDURE — 88104 CYTOPATH FL NONGYN SMEARS: CPT

## 2017-06-23 PROCEDURE — 93005 ELECTROCARDIOGRAM TRACING: CPT

## 2017-06-23 PROCEDURE — 85730 THROMBOPLASTIN TIME PARTIAL: CPT

## 2017-06-23 RX ADMIN — Medication 100 MILLIGRAM(S): at 05:51

## 2017-06-23 RX ADMIN — CLOPIDOGREL BISULFATE 75 MILLIGRAM(S): 75 TABLET, FILM COATED ORAL at 11:57

## 2017-06-23 RX ADMIN — Medication 2000 UNIT(S): at 11:57

## 2017-06-23 RX ADMIN — AMIODARONE HYDROCHLORIDE 200 MILLIGRAM(S): 400 TABLET ORAL at 05:51

## 2017-06-23 RX ADMIN — Medication 75 MICROGRAM(S): at 05:51

## 2017-06-23 RX ADMIN — Medication 81 MILLIGRAM(S): at 11:57

## 2017-06-23 RX ADMIN — Medication 1 TABLET(S): at 11:57

## 2017-06-23 NOTE — DISCHARGE NOTE ADULT - PATIENT PORTAL LINK FT
“You can access the [employer] Patient Portal, offered by [facility], by registering with the following website: [email or web address]”

## 2017-06-23 NOTE — PROGRESS NOTE ADULT - ASSESSMENT
A:  Lung mass  Hemoptysis due to above exacerbated by anti-platelet therapy post CATH STENT  P:  Consider DC and bring back for TTNA if no Dx  OP FU with CTS and Pulmonary A:  Lung mass  Hemoptysis due to above exacerbated by anti-platelet therapy post CATH STENT  P:  Consider DC and bring back for TTNA if no Dx and IR was willing despite asa/plavix (unlikely)  RT Onc evaluation  OP FU with CTS and Pulmonary

## 2017-06-23 NOTE — DISCHARGE NOTE ADULT - HOSPITAL COURSE
admitted with hemoptysis. on DAP agents for CAD and recent stenting. s/p bronch. Follow results outpatient. Medically stable and agreeable with discharge and follow up plan. Patient was advised to return to ED if any symptoms occur or worsen.    Vital Signs Last 24 Hrs  T(C): 36.4, Max: 36.6 (06-22 @ 22:52)  T(F): 97.5, Max: 97.9 (06-22 @ 22:52)  HR: 70 (64 - 70)  BP: 116/60 (91/53 - 120/74)  BP(mean): 65 (62 - 65)  RR: 18 (13 - 19)  SpO2: 95% (92% - 96%)    ACCUCHECKS    PHYSICAL EXAM-  General: Appears well developed, well nourished alert and cooperative. dry cough afebrile  HEENT: Head; normocephalic, atraumatic.  Eyes;   Pupils reactive, cornea wnl.  Neck; Supple, no nodes adenopathy, no NVD or carotid bruit or thyromegaly.  CARDIOVASCULAR; No murmur, rub, gallop or lift. Normal S1 and S2.  LUNGS; Coarse BS bilat.  ABDOMEN ; Soft, nontender without mass or organomegaly. bowel sounds normoactive.  EXTREMITIES; No clubbing, cyanosis or edema. Distal pulses wnl. ROM normal.  SKIN; warm and dry with normal turgor.  NEURO; Alert/oriented x 3, normal motor exam. No pathologic reflexes.    PSYCH; normal affect.      TIME 45 mins

## 2017-06-23 NOTE — DISCHARGE NOTE ADULT - MEDICATION SUMMARY - MEDICATIONS TO TAKE
I will START or STAY ON the medications listed below when I get home from the hospital:    aspirin 81 mg oral tablet  -- 1 tab(s) by mouth once a day  -- Indication: For Heart    amiodarone 200 mg oral tablet  -- 1 tab(s) by mouth once a day  -- Indication: For Heart    Lipofen 150 mg oral capsule  -- 1 cap(s) by mouth once a day  -- Indication: For Cholesterol    Lipitor 40 mg oral tablet  -- 1 tab(s) by mouth once a day  -- Indication: For Cholesterol    Plavix 75 mg oral tablet  -- 1 tab(s) by mouth once a day  -- Indication: For Heart    Toprol- mg oral tablet, extended release  -- 1 tab(s) by mouth once a day  -- Indication: For Heart    Axid Pulvules 150 mg oral capsule  --  by mouth once a day  -- Indication: For Stomach protecting    CoQ10  -- 100 milligram(s) by mouth once a day  -- Indication: For Choelsterol    levothyroxine 75 mcg (0.075 mg) oral tablet  -- 1 tab(s) by mouth once a day  -- Indication: For thyroid    Centrum Silver oral tablet  -- 1 tab(s) by mouth once a day  -- Indication: For vitamin    Vitamin D3 2000 intl units oral tablet  -- 1 tab(s) by mouth once a day  -- Indication: For vitamin

## 2017-06-23 NOTE — DISCHARGE NOTE ADULT - SECONDARY DIAGNOSIS.
Vitamin D deficiency Prostate cancer Hypothyroidism, unspecified type Hypertension Coronary artery disease involving native heart without angina pectoris, unspecified vessel or lesion type Chronic systolic CHF (congestive heart failure), NYHA class 2

## 2017-06-23 NOTE — DISCHARGE NOTE ADULT - CARE PROVIDER_API CALL
christi ADAIR,   Phone: (   )    -  Fax: (   )    -    Oc Raymond), Internal Medicine; Pulmonary Disease  [place] [address]  Phone: [phone]  Fax: [phone]

## 2017-06-23 NOTE — PROGRESS NOTE ADULT - SUBJECTIVE AND OBJECTIVE BOX
[facility], THE HEART CENTER                                   [address] [place] [address]                                                      PHONE: [phone]                                                         FAX: [phone]  -------------------------------------------------------------------------------------------------------------------------------    Pt seen and examined. FU for CAD    Overnight events/patient complaints: Pt without complains. Mild hemoptysis yesterday. No other bleeding.    Vital Signs Last 24 Hrs  T(C): 36.4, Max: 36.8 (06-22 @ 13:37)  T(F): 97.5, Max: 98.2 (06-22 @ 13:37)  HR: 70 (64 - 75)  BP: 116/60 (90/59 - 128/75)  BP(mean): 65 (62 - 86)  RR: 18 (8 - 19)  SpO2: 95% (91% - 98%)  I&O's Summary      PHYSICAL EXAM:  Constitutional: Oriented to time, place and person. Appears well developed, well nourished; alert and co-operative  HEENT:     Conjunctiva normal, Normal oral mucosa, No JVD	  Cardiovascular: Normal S1 S2, No murmurs  Respiratory: Lungs clear to auscultation; no crepitations, no wheeze  Gastrointestinal:  Soft, Non-tender, + BS	  Extremities: No cyanosis, clubbing or edema  Skin: Warm and dry  Neurologic: Alert oriented to time place and person  Psychiatric: affect was normal        LABS:    Telemetry: No arrhythmias    MEDICATIONS:  MEDICATIONS  (STANDING):  aspirin enteric coated 81milliGRAM(s) Oral daily  amiodarone    Tablet 200milliGRAM(s) Oral daily  atorvastatin 40milliGRAM(s) Oral at bedtime  fenofibrate Tablet 145milliGRAM(s) Oral at bedtime  clopidogrel Tablet 75milliGRAM(s) Oral daily  metoprolol succinate ER 100milliGRAM(s) Oral daily  famotidine    Tablet 20milliGRAM(s) Oral at bedtime  levothyroxine 75MICROGram(s) Oral daily  multivitamin/minerals 1Tablet(s) Oral daily  cholecalciferol 2000Unit(s) Oral daily    MEDICATIONS  (PRN):      ASSESSMENT AND PLAN:    71y Male with prior history of CABG/stents and very recent ZARINA to SVG to RCA 6/16/17, AICD for VT, Recent antitach pacing for VT- Now on oral amiodarone with hemoptysis.     -  Had been on ASA and Plavix prior to recent stents as per pt without any change in antiplatelet therapy  -  Continue remainder of cardiac meds  -  No cardiac contraindication to proceed with bronchoscopy [facility], THE HEART CENTER                                   [address] [place] [address]                                                      PHONE: [phone]                                                         FAX: [phone]  -------------------------------------------------------------------------------------------------------------------------------    Pt seen and examined. FU for CAD    Overnight events/patient complaints: Pt without complains. Mild hemoptysis yesterday. No other bleeding.    Vital Signs Last 24 Hrs  T(C): 36.4, Max: 36.8 (06-22 @ 13:37)  T(F): 97.5, Max: 98.2 (06-22 @ 13:37)  HR: 70 (64 - 75)  BP: 116/60 (90/59 - 128/75)  BP(mean): 65 (62 - 86)  RR: 18 (8 - 19)  SpO2: 95% (91% - 98%)  I&O's Summary      PHYSICAL EXAM:  Constitutional: Oriented to time, place and person. Appears well developed, well nourished; alert and co-operative  HEENT:     Conjunctiva normal, Normal oral mucosa, No JVD	  Cardiovascular: Normal S1 S2, No murmurs  Respiratory: Lungs clear to auscultation; no crepitations, no wheeze  Gastrointestinal:  Soft, Non-tender, + BS	  Extremities: No cyanosis, clubbing or edema  Skin: Warm and dry  Neurologic: Alert oriented to time place and person  Psychiatric: affect was normal        LABS:    Telemetry: No arrhythmias    MEDICATIONS:  MEDICATIONS  (STANDING):  aspirin enteric coated 81milliGRAM(s) Oral daily  amiodarone    Tablet 200milliGRAM(s) Oral daily  atorvastatin 40milliGRAM(s) Oral at bedtime  fenofibrate Tablet 145milliGRAM(s) Oral at bedtime  clopidogrel Tablet 75milliGRAM(s) Oral daily  metoprolol succinate ER 100milliGRAM(s) Oral daily  famotidine    Tablet 20milliGRAM(s) Oral at bedtime  levothyroxine 75MICROGram(s) Oral daily  multivitamin/minerals 1Tablet(s) Oral daily  cholecalciferol 2000Unit(s) Oral daily    MEDICATIONS  (PRN):      ASSESSMENT AND PLAN:    71y Male with prior history of CABG/stents and very recent ZARINA to SVG to RCA 6/16/17, AICD for VT, Recent antitach pacing for VT- Now on oral amiodarone with hemoptysis.     -  Had been on ASA and Plavix prior to recent stents as per pt without any change in antiplatelet therapy  -  Continue remainder of cardiac meds

## 2017-06-23 NOTE — PROGRESS NOTE ADULT - SUBJECTIVE AND OBJECTIVE BOX
PULMONARY PROGRESS NOTE      SULEIMAN GREGORYLAURA-[id number]    Patient is a 71y old  Male who presents with a chief complaint of sent by pulmonologist Dr. Segundo cuevas for coughing up blood (20 Jun 2017 21:26)  This is a 71 year old male who has been experiencing cough with hemoptysis, intermittent, for one month, has been treated for bronchitis at the beginning of his symptoms and three days into his treatment with Z-Christian the cardiologist changed his medications to Clindamycin, patient was then evaluated by pulmonary on Thursday 6/15/17 due to nonresolving symptoms last week who recommended a CT chest. Before getting his CT chest, pt also was scheduled for a cath 6/16 to assess NSVT found incidentally on his ICD interrogation. During cath, pt was found to have an occluded SVG to RPDA graft and a ZARINA was placed.  He has been on Asa/Plavix x several years per patient. CT was completed as outpt and patient was advised by pulmonologist to report to ER today. Patient denies: aggravating/relieving factors, fever, prior episodes, weight loss, night sweats, shortness of breath, chest pain, palpitations, dizziness, abdominal pain, diarrhea, or constipation. (20 Jun 2017    INTERVAL HPI/OVERNIGHT EVENTS:  Comfortable  Much less hemoptysis  Bronch on 6/22    MEDICATIONS  (STANDING):  aspirin enteric coated 81milliGRAM(s) Oral daily  amiodarone    Tablet 200milliGRAM(s) Oral daily  atorvastatin 40milliGRAM(s) Oral at bedtime  fenofibrate Tablet 145milliGRAM(s) Oral at bedtime  clopidogrel Tablet 75milliGRAM(s) Oral daily  metoprolol succinate ER 100milliGRAM(s) Oral daily  famotidine    Tablet 20milliGRAM(s) Oral at bedtime  levothyroxine 75MICROGram(s) Oral daily  multivitamin/minerals 1Tablet(s) Oral daily  cholecalciferol 2000Unit(s) Oral daily      MEDICATIONS  (PRN):      Allergies    macrolide antibiotics (Urticaria; Rash; Hives)  penicillin (Urticaria; Rash)  Zithromax Z-Christian (Urticaria; Rash; Hives)    Intolerances        PAST MEDICAL & SURGICAL HISTORY:    PSVT (paroxysmal supraventricular tachycardia)  Chronic systolic CHF (congestive heart failure), NYHA class 2  Former smoker  Hypokinesis: apical  Mitral regurgitation  Bronchitis  Hypertension  PSVT (paroxysmal supraventricular tachycardia)  Ischemic cardiomyopathy  AICD (automatic cardioverter/defibrillator) present: 2010BSC  VT (ventricular tachycardia): May 2017 no ICD shock  Vitamin D deficiency  Tricuspid regurgitation  RBBB  Prostate cancer: s/p surgery no RT/CT  Mitral regurgitation  Hypothyroidism  HLD (hyperlipidemia)  Arrhythmia  Angina pectoris  CAD (coronary artery disease)  History of prostate surgery: davinci surgery in 2008  Cardiac disorder: triple bypass may 1997 [facility]  History of electrophysiologic study: 2009 positive study (AICD placement )  H/O cardiac catheterization: stenting of SVG TO PDA IN 2010      SOCIAL HISTORY  Smoking History:       REVIEW OF SYSTEMS:    CONSTITUTIONAL:  No distress    HEENT:  Eyes:  No diplopia or blurred vision. ENT:  No earache, sore throat or runny nose.    CARDIOVASCULAR:  No pressure, squeezing, tightness, heaviness or aching about the chest; no palpitations.    RESPIRATORY:  No  shortness of breath, PND or orthopnea. Mild SOBOE    GASTROINTESTINAL:  No nausea, vomiting or diarrhea.    GENITOURINARY:  No dysuria, frequency or urgency.    NEUROLOGIC:  No paresthesias, fasciculations, seizures or weakness.    PSYCHIATRIC:  No disorder of thought or mood.    Vital Signs Last 24 Hrs  T(C): 36.4, Max: 36.8 (06-22 @ 13:37)  T(F): 97.6, Max: 98.2 (06-22 @ 13:37)  HR: 69 (64 - 75)  BP: 111/66 (90/59 - 128/75)  BP(mean): 65 (62 - 86)  RR: 18 (8 - 19)  SpO2: 93% (91% - 98%)    PHYSICAL EXAMINATION:    GENERAL: The patient is awake and alert in no apparent distress.     HEENT: Head is normocephalic and atraumatic. Extraocular muscles are intact. Mucous membranes are moist.    NECK: Supple.    LUNGS: Clear to auscultation without wheezing, rales or rhonchi; respirations unlabored    HEART: Regular rate and rhythm without murmur.    ABDOMEN: Soft, nontender, and nondistended.      EXTREMITIES: Without any cyanosis, clubbing, rash, lesions or edema.    NEUROLOGIC: Grossly intact.    LABS:                              MICROBIOLOGY:    RADIOLOGY & ADDITIONAL STUDIES:

## 2017-06-23 NOTE — DISCHARGE NOTE ADULT - CARE PLAN
Principal Discharge DX:	Hemoptysis  Goal:	s/p bronch  Instructions for follow-up, activity and diet:	follow with CTS for biopsy results, f/u pulmonary and PMD < 1 week  Secondary Diagnosis:	Vitamin D deficiency  Secondary Diagnosis:	Prostate cancer  Secondary Diagnosis:	Hypothyroidism, unspecified type  Secondary Diagnosis:	Hypertension  Secondary Diagnosis:	Coronary artery disease involving native heart without angina pectoris, unspecified vessel or lesion type  Secondary Diagnosis:	Chronic systolic CHF (congestive heart failure), NYHA class 2

## 2017-06-24 LAB
CULTURE RESULTS: SIGNIFICANT CHANGE UP
NIGHT BLUE STAIN TISS: SIGNIFICANT CHANGE UP
SPECIMEN SOURCE: SIGNIFICANT CHANGE UP
SPECIMEN SOURCE: SIGNIFICANT CHANGE UP

## 2017-06-26 ENCOUNTER — TRANSCRIPTION ENCOUNTER (OUTPATIENT)
Age: 72
End: 2017-06-26

## 2017-06-26 LAB — NON-GYN CYTOLOGY SPEC: SIGNIFICANT CHANGE UP

## 2017-06-28 ENCOUNTER — APPOINTMENT (OUTPATIENT)
Dept: THORACIC SURGERY | Facility: CLINIC | Age: 72
End: 2017-06-28

## 2017-06-28 VITALS
DIASTOLIC BLOOD PRESSURE: 83 MMHG | HEART RATE: 69 BPM | BODY MASS INDEX: 28.71 KG/M2 | RESPIRATION RATE: 16 BRPM | OXYGEN SATURATION: 96 % | SYSTOLIC BLOOD PRESSURE: 124 MMHG | HEIGHT: 72 IN | WEIGHT: 212 LBS

## 2017-06-28 RX ORDER — ATORVASTATIN CALCIUM 40 MG/1
40 TABLET, FILM COATED ORAL
Refills: 0 | Status: ACTIVE | COMMUNITY

## 2017-06-28 RX ORDER — METOPROLOL SUCCINATE 100 MG/1
100 TABLET, EXTENDED RELEASE ORAL
Refills: 0 | Status: ACTIVE | COMMUNITY

## 2017-06-28 RX ORDER — AMIODARONE HYDROCHLORIDE 400 MG/1
400 TABLET ORAL
Qty: 16 | Refills: 0 | Status: DISCONTINUED | COMMUNITY
Start: 2017-05-26

## 2017-06-28 RX ORDER — CLOPIDOGREL 75 MG/1
75 TABLET, FILM COATED ORAL
Refills: 0 | Status: ACTIVE | COMMUNITY

## 2017-06-28 RX ORDER — FENOFIBRATE 150 MG/1
150 CAPSULE ORAL
Refills: 0 | Status: ACTIVE | COMMUNITY

## 2017-06-28 RX ORDER — LEVOTHYROXINE SODIUM 0.05 MG/1
50 TABLET ORAL
Qty: 90 | Refills: 0 | Status: DISCONTINUED | COMMUNITY
Start: 2016-10-07

## 2017-06-28 RX ORDER — AMIODARONE HYDROCHLORIDE 200 MG/1
200 TABLET ORAL
Refills: 0 | Status: ACTIVE | COMMUNITY

## 2017-06-28 RX ORDER — CLINDAMYCIN HYDROCHLORIDE 150 MG/1
150 CAPSULE ORAL
Qty: 30 | Refills: 0 | Status: DISCONTINUED | COMMUNITY
Start: 2017-05-26

## 2017-06-28 RX ORDER — LISINOPRIL 2.5 MG/1
2.5 TABLET ORAL
Qty: 90 | Refills: 0 | Status: ACTIVE | COMMUNITY
Start: 2017-03-13

## 2017-06-28 RX ORDER — NIZATIDINE 150 MG
150 CAPSULE ORAL
Refills: 0 | Status: ACTIVE | COMMUNITY

## 2017-06-28 RX ORDER — UBIDECARENONE/VIT E ACET 100MG-5
1000 CAPSULE ORAL
Refills: 0 | Status: ACTIVE | COMMUNITY

## 2017-06-28 RX ORDER — FENOFIBRATE 160 MG/1
160 TABLET ORAL
Qty: 90 | Refills: 0 | Status: DISCONTINUED | COMMUNITY
Start: 2016-10-07

## 2017-06-28 RX ORDER — METOPROLOL SUCCINATE 50 MG/1
50 TABLET, EXTENDED RELEASE ORAL
Qty: 135 | Refills: 0 | Status: DISCONTINUED | COMMUNITY
Start: 2016-10-07

## 2017-06-28 RX ORDER — MV-MIN/FOLIC/K1/LYCOPEN/LUTEIN 300-60 MCG
TABLET ORAL
Refills: 0 | Status: ACTIVE | COMMUNITY

## 2017-06-28 RX ORDER — LEVOTHYROXINE SODIUM 0.07 MG/1
75 TABLET ORAL
Refills: 0 | Status: ACTIVE | COMMUNITY

## 2017-06-28 RX ORDER — AZITHROMYCIN 250 MG/1
250 TABLET, FILM COATED ORAL
Qty: 6 | Refills: 0 | Status: DISCONTINUED | COMMUNITY
Start: 2017-05-22

## 2017-06-28 RX ORDER — UBIDECARENONE/VIT E ACET 100MG-5
CAPSULE ORAL
Refills: 0 | Status: ACTIVE | COMMUNITY

## 2017-06-30 ENCOUNTER — OTHER (OUTPATIENT)
Age: 72
End: 2017-06-30

## 2017-06-30 ENCOUNTER — APPOINTMENT (OUTPATIENT)
Dept: PULMONOLOGY | Facility: CLINIC | Age: 72
End: 2017-06-30

## 2017-06-30 VITALS
DIASTOLIC BLOOD PRESSURE: 70 MMHG | OXYGEN SATURATION: 96 % | HEART RATE: 81 BPM | BODY MASS INDEX: 28.21 KG/M2 | SYSTOLIC BLOOD PRESSURE: 126 MMHG | WEIGHT: 208 LBS

## 2017-06-30 RX ORDER — HYDROCODONE BITARTRATE AND HOMATROPINE METHYLBROMIDE 5; 1.5 MG/5ML; MG/5ML
5-1.5 SYRUP ORAL
Qty: 300 | Refills: 0 | Status: ACTIVE | COMMUNITY
Start: 2017-06-30 | End: 1900-01-01

## 2017-06-30 RX ORDER — HYDROCODONE BITARTRATE AND HOMATROPINE METHYLBROMIDE 5; 1.5 MG/5ML; MG/5ML
5-1.5 SYRUP ORAL
Qty: 300 | Refills: 0 | Status: DISCONTINUED | COMMUNITY
Start: 2017-06-30 | End: 2017-06-30

## 2017-07-03 ENCOUNTER — OUTPATIENT (OUTPATIENT)
Dept: OUTPATIENT SERVICES | Facility: HOSPITAL | Age: 72
LOS: 1 days | End: 2017-07-03

## 2017-07-03 ENCOUNTER — APPOINTMENT (OUTPATIENT)
Dept: NUCLEAR MEDICINE | Facility: CLINIC | Age: 72
End: 2017-07-03

## 2017-07-03 DIAGNOSIS — Z98.890 OTHER SPECIFIED POSTPROCEDURAL STATES: Chronic | ICD-10-CM

## 2017-07-03 DIAGNOSIS — I51.9 HEART DISEASE, UNSPECIFIED: Chronic | ICD-10-CM

## 2017-07-09 ENCOUNTER — EMERGENCY (EMERGENCY)
Facility: HOSPITAL | Age: 72
LOS: 1 days | Discharge: HOME | End: 2017-07-09
Attending: EMERGENCY MEDICINE | Admitting: EMERGENCY MEDICINE
Payer: MEDICARE

## 2017-07-09 VITALS
HEART RATE: 70 BPM | OXYGEN SATURATION: 98 % | DIASTOLIC BLOOD PRESSURE: 67 MMHG | RESPIRATION RATE: 15 BRPM | SYSTOLIC BLOOD PRESSURE: 121 MMHG

## 2017-07-09 VITALS
TEMPERATURE: 98 F | DIASTOLIC BLOOD PRESSURE: 68 MMHG | RESPIRATION RATE: 18 BRPM | HEIGHT: 72 IN | SYSTOLIC BLOOD PRESSURE: 112 MMHG | WEIGHT: 207.9 LBS | HEART RATE: 76 BPM | OXYGEN SATURATION: 98 %

## 2017-07-09 DIAGNOSIS — I51.9 HEART DISEASE, UNSPECIFIED: Chronic | ICD-10-CM

## 2017-07-09 DIAGNOSIS — Z98.890 OTHER SPECIFIED POSTPROCEDURAL STATES: Chronic | ICD-10-CM

## 2017-07-09 DIAGNOSIS — Z98.890 OTHER SPECIFIED POSTPROCEDURAL STATES: ICD-10-CM

## 2017-07-09 DIAGNOSIS — E78.5 HYPERLIPIDEMIA, UNSPECIFIED: ICD-10-CM

## 2017-07-09 DIAGNOSIS — I25.10 ATHEROSCLEROTIC HEART DISEASE OF NATIVE CORONARY ARTERY WITHOUT ANGINA PECTORIS: ICD-10-CM

## 2017-07-09 DIAGNOSIS — R07.89 OTHER CHEST PAIN: ICD-10-CM

## 2017-07-09 DIAGNOSIS — Z88.0 ALLERGY STATUS TO PENICILLIN: ICD-10-CM

## 2017-07-09 DIAGNOSIS — M54.5 LOW BACK PAIN: ICD-10-CM

## 2017-07-09 DIAGNOSIS — Z95.5 PRESENCE OF CORONARY ANGIOPLASTY IMPLANT AND GRAFT: ICD-10-CM

## 2017-07-09 DIAGNOSIS — Z88.1 ALLERGY STATUS TO OTHER ANTIBIOTIC AGENTS STATUS: ICD-10-CM

## 2017-07-09 DIAGNOSIS — Z87.891 PERSONAL HISTORY OF NICOTINE DEPENDENCE: ICD-10-CM

## 2017-07-09 DIAGNOSIS — I10 ESSENTIAL (PRIMARY) HYPERTENSION: ICD-10-CM

## 2017-07-09 DIAGNOSIS — R04.2 HEMOPTYSIS: ICD-10-CM

## 2017-07-09 DIAGNOSIS — E03.9 HYPOTHYROIDISM, UNSPECIFIED: ICD-10-CM

## 2017-07-09 LAB
ANION GAP SERPL CALC-SCNC: 13 MMOL/L — SIGNIFICANT CHANGE UP (ref 5–17)
APTT BLD: 34.2 SEC — SIGNIFICANT CHANGE UP (ref 27.5–37.4)
BUN SERPL-MCNC: 19 MG/DL — SIGNIFICANT CHANGE UP (ref 8–20)
CALCIUM SERPL-MCNC: 9.2 MG/DL — SIGNIFICANT CHANGE UP (ref 8.6–10.2)
CHLORIDE SERPL-SCNC: 99 MMOL/L — SIGNIFICANT CHANGE UP (ref 98–107)
CK SERPL-CCNC: 65 U/L — SIGNIFICANT CHANGE UP (ref 30–200)
CK SERPL-CCNC: 66 U/L — SIGNIFICANT CHANGE UP (ref 30–200)
CO2 SERPL-SCNC: 23 MMOL/L — SIGNIFICANT CHANGE UP (ref 22–29)
CREAT SERPL-MCNC: 1.5 MG/DL — HIGH (ref 0.5–1.3)
GLUCOSE SERPL-MCNC: 187 MG/DL — HIGH (ref 70–115)
HCT VFR BLD CALC: 30.1 % — LOW (ref 42–52)
HGB BLD-MCNC: 9.9 G/DL — LOW (ref 14–18)
INR BLD: 1.2 RATIO — HIGH (ref 0.88–1.16)
MCHC RBC-ENTMCNC: 30.2 PG — SIGNIFICANT CHANGE UP (ref 27–31)
MCHC RBC-ENTMCNC: 32.9 G/DL — SIGNIFICANT CHANGE UP (ref 32–36)
MCV RBC AUTO: 91.8 FL — SIGNIFICANT CHANGE UP (ref 80–94)
NT-PROBNP SERPL-SCNC: 576 PG/ML — HIGH (ref 0–300)
PLATELET # BLD AUTO: 352 K/UL — SIGNIFICANT CHANGE UP (ref 150–400)
POTASSIUM SERPL-MCNC: 4 MMOL/L — SIGNIFICANT CHANGE UP (ref 3.5–5.3)
POTASSIUM SERPL-SCNC: 4 MMOL/L — SIGNIFICANT CHANGE UP (ref 3.5–5.3)
PROTHROM AB SERPL-ACNC: 13.3 SEC — HIGH (ref 9.8–12.7)
RBC # BLD: 3.28 M/UL — LOW (ref 4.6–6.2)
RBC # FLD: 13.2 % — SIGNIFICANT CHANGE UP (ref 11–15.6)
SODIUM SERPL-SCNC: 135 MMOL/L — SIGNIFICANT CHANGE UP (ref 135–145)
TROPONIN T SERPL-MCNC: <0.01 NG/ML — SIGNIFICANT CHANGE UP (ref 0–0.06)
TROPONIN T SERPL-MCNC: <0.01 NG/ML — SIGNIFICANT CHANGE UP (ref 0–0.06)
WBC # BLD: 8.8 K/UL — SIGNIFICANT CHANGE UP (ref 4.8–10.8)
WBC # FLD AUTO: 8.8 K/UL — SIGNIFICANT CHANGE UP (ref 4.8–10.8)

## 2017-07-09 PROCEDURE — 85610 PROTHROMBIN TIME: CPT

## 2017-07-09 PROCEDURE — 36415 COLL VENOUS BLD VENIPUNCTURE: CPT

## 2017-07-09 PROCEDURE — 93010 ELECTROCARDIOGRAM REPORT: CPT

## 2017-07-09 PROCEDURE — 80048 BASIC METABOLIC PNL TOTAL CA: CPT

## 2017-07-09 PROCEDURE — 83880 ASSAY OF NATRIURETIC PEPTIDE: CPT

## 2017-07-09 PROCEDURE — 99283 EMERGENCY DEPT VISIT LOW MDM: CPT | Mod: 25

## 2017-07-09 PROCEDURE — 85027 COMPLETE CBC AUTOMATED: CPT

## 2017-07-09 PROCEDURE — 82550 ASSAY OF CK (CPK): CPT

## 2017-07-09 PROCEDURE — 84484 ASSAY OF TROPONIN QUANT: CPT

## 2017-07-09 PROCEDURE — 99285 EMERGENCY DEPT VISIT HI MDM: CPT

## 2017-07-09 PROCEDURE — 93005 ELECTROCARDIOGRAM TRACING: CPT

## 2017-07-09 PROCEDURE — 85730 THROMBOPLASTIN TIME PARTIAL: CPT

## 2017-07-09 PROCEDURE — 71045 X-RAY EXAM CHEST 1 VIEW: CPT

## 2017-07-09 PROCEDURE — 71010: CPT | Mod: 26

## 2017-07-09 RX ORDER — SODIUM CHLORIDE 9 MG/ML
3 INJECTION INTRAMUSCULAR; INTRAVENOUS; SUBCUTANEOUS ONCE
Qty: 0 | Refills: 0 | Status: COMPLETED | OUTPATIENT
Start: 2017-07-09 | End: 2017-07-09

## 2017-07-09 RX ADMIN — SODIUM CHLORIDE 3 MILLILITER(S): 9 INJECTION INTRAMUSCULAR; INTRAVENOUS; SUBCUTANEOUS at 12:42

## 2017-07-09 NOTE — ED PROVIDER NOTE - PROGRESS NOTE DETAILS
DISCUSSED WITH DR YOUNG, COVERING DR SHAVER. REC PAIN MEDS AND D/C. NOT TO GIVE SPECIFICS OF PET SCAN RESULTS AND TO JUST RELAY THAT  THERE WAS SOME FINDINGS THAT INDICATE THE BACK PAIN COULD BE RELATED. DR SHAVER WILL DISCUSS IN FULL TOMORROW RESULTS OF LABS DISCUSSED WITH PT AND FAMILY. TOLD CARDIO TO SEE. TOLD MAY BE SOMETHING ON PET SCAN CAUSING PAIN AND DR SHAVER WILL DISCUSS TOMORROW. AGREE WITH PLAN AT THIS TIME

## 2017-07-09 NOTE — CONSULT NOTE ADULT - SUBJECTIVE AND OBJECTIVE BOX
[facility], THE HEART CENTER                                   [address] [address]                                                      PHONE: [phone]                                                         FAX: [phone]  -------------------------------------------------------------------------------------------------------------------------------    71y Male with past medical history as under presented with chest pain, back pain and hemoptysis. Chest pain and back pain have been present for 2 weeks. He has also noted persistent hemoptysis. He had PET scan and is due to see Dr. James in . No headache, dizziness. No worsening shortness of breath. At the time of evaluation, pt reports no symptoms. He has remained compliant with his medications.    PAST MEDICAL & SURGICAL HISTORY:    PSVT (paroxysmal supraventricular tachycardia)  Chronic systolic CHF (congestive heart failure), NYHA class 2  Former smoker  Hypokinesis: apical  Mitral regurgitation  Bronchitis  Hypertension  PSVT (paroxysmal supraventricular tachycardia)  Ischemic cardiomyopathy  AICD (automatic cardioverter/defibrillator) present: 2010BSC  VT (ventricular tachycardia): May 2017 no ICD shock  Vitamin D deficiency  Tricuspid regurgitation  RBBB  Prostate cancer: s/p surgery no RT/CT  Mitral regurgitation  Hypothyroidism  HLD (hyperlipidemia)  Arrhythmia  Angina pectoris  CAD (coronary artery disease)  History of prostate surgery: davinci surgery in 2008  Cardiac disorder: triple bypass may 1997 [facility]  History of electrophysiologic study: 2009 positive study (AICD placement )  H/O cardiac catheterization: stenting of SVG TO PDA IN 2010      macrolide antibiotics (Urticaria; Rash; Hives)  penicillin (Urticaria; Rash)  Zithromax Z-Christian (Urticaria; Rash; Hives)      Review of Systems:   Positive for chest pain  Rest of the systems were reviewed and was negative.     Family history reviewed and non-contributory    Social History:  Former smoking   No alcohol  No other drug use    Vital Signs Last 24 Hrs  T(C): 36.7 (09 Jul 2017 11:35), Max: 36.7 (09 Jul 2017 11:35)  T(F): 98 (09 Jul 2017 11:35), Max: 98 (09 Jul 2017 11:35)  HR: 75 (09 Jul 2017 12:00) (75 - 76)  BP: 130/74 (09 Jul 2017 12:00) (112/68 - 130/74)  BP(mean): --  RR: 15 (09 Jul 2017 12:00) (15 - 18)  SpO2: 96% (09 Jul 2017 12:00) (96% - 98%)    PHYSICAL EXAM:  Constitutional: Oriented to time, place and person. Appears well developed, well nourished; alert and co-operative  HEENT:     Conjunctiva normal, Normal oral mucosa, No JVD	  Cardiovascular: Normal S1 S2, No murmurs  Respiratory: Lungs clear to auscultation; no crepitations, no wheeze  Gastrointestinal:  Soft, Non-tender, + BS	  Extremities: No cyanosis, clubbing or edema  Skin: Warm and dry  Neurologic: Alert oriented to time place and person  Psychiatric: affect was normal        LABS:                        9.9    8.8   )-----------( 352      ( 09 Jul 2017 12:28 )             30.1     07-09    135  |  99  |  19.0  ----------------------------<  187<H>  4.0   |  23.0  |  1.50<H>    Ca    9.2      09 Jul 2017 12:28      CARDIAC MARKERS ( 09 Jul 2017 12:28 )  x     / <0.01 ng/mL / 66 U/L / x     / x          PT/INR - ( 09 Jul 2017 12:28 )   PT: 13.3 sec;   INR: 1.20 ratio         PTT - ( 09 Jul 2017 12:28 )  PTT:34.2 sec    RADIOLOGY & ADDITIONAL STUDIES:    CARDIOLOGY TESTING:     ECG: NSR with RBBB    Cardiac Catheterization:  < from: Cardiac Cath Lab - Adult (06.16.17 @ 15:05) >  VENTRICLES: LV not done Cr 1.7 Recent echo showed EF 46%  CORONARY VESSELS: The coronary circulation is right dominant.  LM:   --  LM: Normal.  LAD:--  Mid LAD: There was a 100 % stenosis. There was good blood supply  to the distal myocardium from a graft.  CX:   --  OM1: There was a 100 % stenosis.  RCA:   --  Mid RCA: There was a 100 % stenosis. There was good blood supply  to the distal myocardium from a graft.  GRAFTS:   --  Graft to the mid LAD: The graft was a LIMA. It was normal.  --  Graft to the RPDA: The graft was a saphenous vein graft from the aorta.  There was a 80 % stenosis in the proximal third of the graft. There was a  1 % stenosis in the distal third of the graft, at the site of a prior  stent.  COMPLICATIONS: There were no complications. No complications occurred  during the cath lab visit.  DIAGNOSTIC IMPRESSIONS: Prior GRAY to LAD is patent with severe disease of  SVG to RPDA. (SVG to ramus/om is known to be occluded) The SVG to PP DA  was treated with ZARINA (resolute) with distal protection.  DIAGNOSTIC RECOMMENDATIONS: ASA and Plavix  INTERVENTIONAL IMPRESSIONS: Prior GRAY to LAD is patent with severe disease  of SVG to RPDA. (SVG to ramus/om is known to be occluded) The SVG to PP DA  was treated with ZARINA (resolute) with distal protection.    < end of copied text >      MEDICATIONS:  MEDICATIONS  (STANDING):    MEDICATIONS  (PRN):      ASSESSMENT AND PLAN:    71y Male with prior history of CABG/stents and very recent ZARINA to SVG to RCA 6/16/17, AICD for VT, Recent antitach pacing for VT presents with chest pain and hemoptysis.    -  Continue ASA and Plavix given recent stents  -  Check cardiac enzymes X2. If negative, can be discharged from cardiac standpoint with outpt FU as scheduled with Dr. villeda  -  No evidence of ACS at this time based upon ECG and troponin

## 2017-07-09 NOTE — ED ADULT NURSE NOTE - OBJECTIVE STATEMENT
pt c/o chest pain radiating to back for a couple of days, states he has appt with Dr. James f/u for mass on lungs coughing up blood for a few months, recent stent June 2016, lungs left side rhonchi and course, right clear

## 2017-07-09 NOTE — ED PROVIDER NOTE - OBJECTIVE STATEMENT
PATIENT PRESENTS WITH CHEST PAIN AND BACK PAIN X 3 WEEKS. PATIENT HERE IN JUNE FOR CHEST PAIN. DR JIM JONES PLACED A STENT AT THAT TIME. DR TA SHAVER DID A BRONCHOSCOPY DURING THE SAME ADMISSION. PATIENT DISCHARGED TO HOME AFTER HOSPITAL STAY. HE CONTINUES TO HAVE HEMOPTYSIS WHICH HE DESCRIBES AS MUCOUS WITH STREAKS OF BLOOD.  HE NOW HAS PAIN TO THE LEFT BACK THAT IS INTERMITTENT. WORSE AT NIGHT AND WITH MOVEMENT. STATES IT RADIATES FROM HIS LOWER LEFT BACK TO THE UPPER BACK ON THE LEFT SIDE.  HE ALSO HAS SOME CHEST WALL PAIN THAT HE BELIEVES IS FROM THE COUGH/HEMOPTYSIS.  NO FEVER OR CHILLS. NO CHANGE IN APPETITE. ABLE TO PASS URINE WITHOUT DIFFICULTY. PT HAD A RECENT PET SCAN ORDERED BY DR SHAVER AND IS SCHEDULED TO REVIEW THE RESULTS WITH HIM TOMORROW AT TEN O'CLOCK.

## 2017-07-09 NOTE — ED PROVIDER NOTE - MEDICAL DECISION MAKING DETAILS
PATIENT WITH HEMOPTYSIS X 3WEEKS AND ASSOCIATED BACK PAIN SINCE DISCHARGE FROM HOSPITAL. S/P STENT AND BRONCHOSCOPY DURING HOSPITAL STAY 3 WEEKS AGO. BACK PAIN SINCE THEN. RECENT PET SCAN SIG LEFT LUNG TUMOR SUSPICIOUS FOR MALIGNANCY, TUMOR LEFT DIAPHRAGM SUSPICIOUS FOR MALIGNANCY AND LESION TO SACRUM, POSSIBLE MALIGNANCY. DR FALLON CALLED TO DISCUSS.  DR JIM JONES/[facility] CARDIO TO CONSULT. PMD IS DR VIDAL. WORK UP ORDERED. WILL FOLLOW.

## 2017-07-09 NOTE — ED PROVIDER NOTE - PSH
Cardiac disorder  triple bypass may 1997 [facility]  H/O cardiac catheterization  stenting of SVG TO PDA IN 2010  History of electrophysiologic study  2009 positive study (AICD placement )  History of prostate surgery  davinci surgery in 2008

## 2017-07-09 NOTE — ED PROVIDER NOTE - CONSTITUTIONAL, MLM
normal... Well appearing, well nourished, awake, alert, oriented to person, place, time/situation and in no apparent distress. CARDIAC MONITOR SIG PVCS

## 2017-07-09 NOTE — ED ADULT NURSE NOTE - PSH
Cardiac disorder  triple bypass may 1997 [facility]  H/O cardiac catheterization  stenting of SVG TO PDA IN 2010  History of electrophysiologic study  2009 positive study (AICD placement )  History of prostate surgery  davinci surgery in 2008

## 2017-07-09 NOTE — ED PROVIDER NOTE - CHPI ED SYMPTOMS NEG
no diaphoresis/no fever/no dizziness/no chills/no shortness of breath/no vomiting/no syncope/no nausea

## 2017-07-10 ENCOUNTER — APPOINTMENT (OUTPATIENT)
Dept: THORACIC SURGERY | Facility: CLINIC | Age: 72
End: 2017-07-10

## 2017-07-10 VITALS
OXYGEN SATURATION: 96 % | RESPIRATION RATE: 18 BRPM | DIASTOLIC BLOOD PRESSURE: 74 MMHG | WEIGHT: 205 LBS | HEART RATE: 78 BPM | SYSTOLIC BLOOD PRESSURE: 124 MMHG | BODY MASS INDEX: 27.77 KG/M2 | HEIGHT: 72 IN

## 2017-07-11 ENCOUNTER — APPOINTMENT (OUTPATIENT)
Dept: PULMONOLOGY | Facility: CLINIC | Age: 72
End: 2017-07-11

## 2017-07-13 ENCOUNTER — APPOINTMENT (OUTPATIENT)
Dept: RADIATION ONCOLOGY | Facility: CLINIC | Age: 72
End: 2017-07-13

## 2017-07-13 ENCOUNTER — OUTPATIENT (OUTPATIENT)
Dept: OUTPATIENT SERVICES | Facility: HOSPITAL | Age: 72
LOS: 1 days | Discharge: HOME | End: 2017-07-13

## 2017-07-13 VITALS
DIASTOLIC BLOOD PRESSURE: 64 MMHG | HEART RATE: 78 BPM | WEIGHT: 211 LBS | TEMPERATURE: 98.7 F | BODY MASS INDEX: 28.58 KG/M2 | RESPIRATION RATE: 18 BRPM | OXYGEN SATURATION: 93 % | SYSTOLIC BLOOD PRESSURE: 122 MMHG | HEIGHT: 72 IN

## 2017-07-13 DIAGNOSIS — Z98.890 OTHER SPECIFIED POSTPROCEDURAL STATES: Chronic | ICD-10-CM

## 2017-07-13 DIAGNOSIS — R91.8 OTHER NONSPECIFIC ABNORMAL FINDING OF LUNG FIELD: ICD-10-CM

## 2017-07-13 DIAGNOSIS — I51.9 HEART DISEASE, UNSPECIFIED: Chronic | ICD-10-CM

## 2017-07-17 ENCOUNTER — OUTPATIENT (OUTPATIENT)
Dept: OUTPATIENT SERVICES | Facility: HOSPITAL | Age: 72
LOS: 1 days | End: 2017-07-17
Payer: MEDICARE

## 2017-07-17 VITALS
RESPIRATION RATE: 16 BRPM | WEIGHT: 209.44 LBS | TEMPERATURE: 98 F | HEART RATE: 79 BPM | HEIGHT: 70.5 IN | DIASTOLIC BLOOD PRESSURE: 75 MMHG | SYSTOLIC BLOOD PRESSURE: 125 MMHG

## 2017-07-17 DIAGNOSIS — E03.9 HYPOTHYROIDISM, UNSPECIFIED: ICD-10-CM

## 2017-07-17 DIAGNOSIS — Z98.890 OTHER SPECIFIED POSTPROCEDURAL STATES: Chronic | ICD-10-CM

## 2017-07-17 DIAGNOSIS — I10 ESSENTIAL (PRIMARY) HYPERTENSION: ICD-10-CM

## 2017-07-17 DIAGNOSIS — I25.10 ATHEROSCLEROTIC HEART DISEASE OF NATIVE CORONARY ARTERY WITHOUT ANGINA PECTORIS: ICD-10-CM

## 2017-07-17 DIAGNOSIS — I51.9 HEART DISEASE, UNSPECIFIED: Chronic | ICD-10-CM

## 2017-07-17 DIAGNOSIS — R91.8 OTHER NONSPECIFIC ABNORMAL FINDING OF LUNG FIELD: ICD-10-CM

## 2017-07-17 DIAGNOSIS — E78.5 HYPERLIPIDEMIA, UNSPECIFIED: ICD-10-CM

## 2017-07-17 DIAGNOSIS — E55.9 VITAMIN D DEFICIENCY, UNSPECIFIED: ICD-10-CM

## 2017-07-17 DIAGNOSIS — Z01.818 ENCOUNTER FOR OTHER PREPROCEDURAL EXAMINATION: ICD-10-CM

## 2017-07-17 LAB
ANION GAP SERPL CALC-SCNC: 15 MMOL/L — SIGNIFICANT CHANGE UP (ref 5–17)
ANISOCYTOSIS BLD QL: SLIGHT — SIGNIFICANT CHANGE UP
APTT BLD: 29.5 SEC — SIGNIFICANT CHANGE UP (ref 27.5–37.4)
BUN SERPL-MCNC: 19 MG/DL — SIGNIFICANT CHANGE UP (ref 8–20)
CALCIUM SERPL-MCNC: 11.8 MG/DL — HIGH (ref 8.6–10.2)
CHLORIDE SERPL-SCNC: 94 MMOL/L — LOW (ref 98–107)
CO2 SERPL-SCNC: 22 MMOL/L — SIGNIFICANT CHANGE UP (ref 22–29)
CREAT SERPL-MCNC: 1.62 MG/DL — HIGH (ref 0.5–1.3)
ELLIPTOCYTES BLD QL SMEAR: SLIGHT — SIGNIFICANT CHANGE UP
GLUCOSE SERPL-MCNC: 149 MG/DL — HIGH (ref 70–115)
HCT VFR BLD CALC: 31.6 % — LOW (ref 42–52)
HGB BLD-MCNC: 10.3 G/DL — LOW (ref 14–18)
HYPOCHROMIA BLD QL: SLIGHT — SIGNIFICANT CHANGE UP
INR BLD: 1.28 RATIO — HIGH (ref 0.88–1.16)
LYMPHOCYTES # BLD AUTO: 13 % — LOW (ref 20–55)
MACROCYTES BLD QL: SLIGHT — SIGNIFICANT CHANGE UP
MCHC RBC-ENTMCNC: 29.5 PG — SIGNIFICANT CHANGE UP (ref 27–31)
MCHC RBC-ENTMCNC: 32.6 G/DL — SIGNIFICANT CHANGE UP (ref 32–36)
MCV RBC AUTO: 90.5 FL — SIGNIFICANT CHANGE UP (ref 80–94)
MICROCYTES BLD QL: SLIGHT — SIGNIFICANT CHANGE UP
MONOCYTES NFR BLD AUTO: 5 % — SIGNIFICANT CHANGE UP (ref 3–10)
NEUTROPHILS NFR BLD AUTO: 81 % — HIGH (ref 37–73)
NEUTS BAND # BLD: 1 % — SIGNIFICANT CHANGE UP (ref 0–8)
OVALOCYTES BLD QL SMEAR: SLIGHT — SIGNIFICANT CHANGE UP
PLAT MORPH BLD: NORMAL — SIGNIFICANT CHANGE UP
PLATELET # BLD AUTO: 504 K/UL — HIGH (ref 150–400)
POIKILOCYTOSIS BLD QL AUTO: SLIGHT — SIGNIFICANT CHANGE UP
POLYCHROMASIA BLD QL SMEAR: SLIGHT — SIGNIFICANT CHANGE UP
POTASSIUM SERPL-MCNC: 4.3 MMOL/L — SIGNIFICANT CHANGE UP (ref 3.5–5.3)
POTASSIUM SERPL-SCNC: 4.3 MMOL/L — SIGNIFICANT CHANGE UP (ref 3.5–5.3)
PROTHROM AB SERPL-ACNC: 14.1 SEC — HIGH (ref 9.8–12.7)
RBC # BLD: 3.49 M/UL — LOW (ref 4.6–6.2)
RBC # FLD: 13.5 % — SIGNIFICANT CHANGE UP (ref 11–15.6)
RBC BLD AUTO: ABNORMAL
SODIUM SERPL-SCNC: 131 MMOL/L — LOW (ref 135–145)
WBC # BLD: 16.8 K/UL — HIGH (ref 4.8–10.8)
WBC # FLD AUTO: 16.8 K/UL — HIGH (ref 4.8–10.8)

## 2017-07-17 PROCEDURE — 85730 THROMBOPLASTIN TIME PARTIAL: CPT

## 2017-07-17 PROCEDURE — G0463: CPT

## 2017-07-17 PROCEDURE — 85610 PROTHROMBIN TIME: CPT

## 2017-07-17 PROCEDURE — 85027 COMPLETE CBC AUTOMATED: CPT

## 2017-07-17 PROCEDURE — 36415 COLL VENOUS BLD VENIPUNCTURE: CPT

## 2017-07-17 PROCEDURE — 80048 BASIC METABOLIC PNL TOTAL CA: CPT

## 2017-07-17 NOTE — H&P PST ADULT - PMH
AICD (automatic cardioverter/defibrillator) present  2010 [employer]  Angina pectoris    Arrhythmia    Bronchitis    CAD (coronary artery disease)    Chronic systolic CHF (congestive heart failure), NYHA class 2    Former smoker    HLD (hyperlipidemia)    Hypertension    Hypokinesis  apical  Hypothyroidism    Ischemic cardiomyopathy    Mitral regurgitation    Mitral regurgitation    Prostate cancer  s/p surgery no RT/CT  Psoriasis    PSVT (paroxysmal supraventricular tachycardia)    PSVT (paroxysmal supraventricular tachycardia)    RBBB    Tricuspid regurgitation    Vitamin D deficiency    VT (ventricular tachycardia)  May 2017 no ICD shock

## 2017-07-17 NOTE — H&P PST ADULT - LAB RESULTS AND INTERPRETATION
7-20-17 WBC 16.8, platelets 504 , Crit 1.62, called Dr. Beard's office spoke to Shaista and informed, also faxed the results, also informed Dr. Palumbo via e mail and Dr. James.

## 2017-07-17 NOTE — ASU PATIENT PROFILE, ADULT - PSH
Cardiac disorder  triple bypass may 1997 [facility]  H/O cardiac catheterization  stenting of SVG TO PDA IN 2010  History of electrophysiologic study  2009 positive study (AICD placement )  History of prostate surgery  davinci surgery in 2008

## 2017-07-17 NOTE — H&P PST ADULT - PSH
Cardiac disorder  triple bypass may 1997 [facility]  H/O cardiac catheterization  stenting of SVG TO PDA IN 2010 2017 one stent  History of bronchoscopy  2017  History of electrophysiologic study  2009 positive study (AICD placement  2010)  History of prostate surgery  davinci surgery in 2008

## 2017-07-17 NOTE — H&P PST ADULT - ASSESSMENT
medications reviewed, instructions given on what medications to take and what not to take. Asked the patient to take the Blood pressure medication/ heart medication on DOP. patient states that cardiologist advised him to stop Plavix 5 days ago and ASA 3 days ago medications reviewed, instructions given on what medications to take and what not to take. Asked the patient to take the Blood pressure medication/ heart medication on DOP. patient states that cardiologist advised him to stop Plavix 5 days ago and ASA 3 days ago  7-20-17: spoke to patient's daughter she said he has pneumonia tried calling [facility] few times no answer medications reviewed, instructions given on what medications to take and what not to take. Asked the patient to take the Blood pressure medication/ heart medication on DOP. patient states that cardiologist advised him to stop Plavix 5 days ago and ASA 3 days ago  7-20-17: spoke to patient's daughter she said he has pneumonia called [facility] department and spoke to dr. Palumbo and he wants to cancel the case till the pneumonia resolves and the repeat chest X ray is normal,  will inform scheduling and patient medications reviewed, instructions given on what medications to take and what not to take. Asked the patient to take the Blood pressure medication/ heart medication on DOP. patient states that cardiologist advised him to stop Plavix 5 days ago and ASA 3 days ago  7-20-17: spoke to patient's daughter she said he has pneumonia called [facility] department and spoke to dr. Palumbo and he wants to cancel the case till the pneumonia resolves and the repeat chest X ray is normal,  will inform scheduling and patient   7-20-17: patient   's procedure is cancelled as per Nessa from radiology, called and informed patient's daughter medications reviewed, instructions given on what medications to take and what not to take. Asked the patient to take the Blood pressure medication/ heart medication on DOP. patient states that cardiologist advised him to stop Plavix 5 days ago and ASA 3 days ago  7-20-17: spoke to patient's daughter she said he has pneumonia called [facility] and spoke to dr. Palumbo and he wants to cancel the case till the pneumonia resolves and the repeat chest X ray is normal,  will inform scheduling and patient   7-20-17: patient   's procedure is cancelled as per Nessa from radiology, called and informed patient's daughter and dr. silver's office ([place])

## 2017-07-17 NOTE — H&P PST ADULT - NS MD HP INPLANTS MED DEV
Automatic Implantable Cardioverter Defibrillator/cardiac stents, [employer]/Vascular stents/Clips

## 2017-07-17 NOTE — ASU PATIENT PROFILE, ADULT - LEARNING ASSESSMENT (PATIENT) ADDITIONAL COMMENTS
pre surgical, surgical scrub instructions , pain management education given -- verbalized understanding

## 2017-07-17 NOTE — H&P PST ADULT - FAMILY HISTORY
Sibling  Still living? Yes, Estimated age: Age Unknown  Family history of prostate cancer, Age at diagnosis: Age Unknown  Family history of lung cancer, Age at diagnosis: Age Unknown  Family history of diabetes mellitus, Age at diagnosis: Age Unknown  Family history of heart disease, Age at diagnosis: Age Unknown

## 2017-07-17 NOTE — H&P PST ADULT - HISTORY OF PRESENT ILLNESS
70 y/o white male presents to [facility] for LHC to evaluate for VT noted on routine interrogation of ICD (BSC) in May 2017  Started on amiodarone 400 then decreased to 200 mg June 2017  Denies CP, CROOK/SOB  May 2017 PET/CT negative for ischemia with global hypokinesis and EF 45%.    CABG [facility] 1997  LIMA LAD SVG to RCA and SVG to Ramus (occluded per 2009 cath).  2010 LHC with PCI SVG-PDA 72 y/o white male presents to PST CT guided Lung biopsy, he has hemoptysis for the last few weeks.

## 2017-07-19 LAB
CULTURE RESULTS: SIGNIFICANT CHANGE UP
SPECIMEN SOURCE: SIGNIFICANT CHANGE UP

## 2017-07-20 ENCOUNTER — INPATIENT (INPATIENT)
Facility: HOSPITAL | Age: 72
LOS: 18 days | Discharge: NURSING, REHAB OR LONG-TERM CARE | DRG: 180 | End: 2017-08-08
Attending: HOSPITALIST | Admitting: HOSPITALIST
Payer: MEDICARE

## 2017-07-20 VITALS
OXYGEN SATURATION: 93 % | DIASTOLIC BLOOD PRESSURE: 73 MMHG | HEIGHT: 72 IN | SYSTOLIC BLOOD PRESSURE: 116 MMHG | RESPIRATION RATE: 18 BRPM | HEART RATE: 93 BPM | TEMPERATURE: 98 F | WEIGHT: 210.1 LBS

## 2017-07-20 DIAGNOSIS — I51.9 HEART DISEASE, UNSPECIFIED: Chronic | ICD-10-CM

## 2017-07-20 DIAGNOSIS — I50.22 CHRONIC SYSTOLIC (CONGESTIVE) HEART FAILURE: ICD-10-CM

## 2017-07-20 DIAGNOSIS — I25.10 ATHEROSCLEROTIC HEART DISEASE OF NATIVE CORONARY ARTERY WITHOUT ANGINA PECTORIS: ICD-10-CM

## 2017-07-20 DIAGNOSIS — Z98.890 OTHER SPECIFIED POSTPROCEDURAL STATES: Chronic | ICD-10-CM

## 2017-07-20 DIAGNOSIS — J18.1 LOBAR PNEUMONIA, UNSPECIFIED ORGANISM: ICD-10-CM

## 2017-07-20 DIAGNOSIS — J84.10 PULMONARY FIBROSIS, UNSPECIFIED: ICD-10-CM

## 2017-07-20 DIAGNOSIS — N17.1 ACUTE KIDNEY FAILURE WITH ACUTE CORTICAL NECROSIS: ICD-10-CM

## 2017-07-20 PROBLEM — Z95.810 PRESENCE OF AUTOMATIC (IMPLANTABLE) CARDIAC DEFIBRILLATOR: Chronic | Status: ACTIVE | Noted: 2017-06-13

## 2017-07-20 LAB
ALBUMIN SERPL ELPH-MCNC: 3.4 G/DL — SIGNIFICANT CHANGE UP (ref 3.3–5.2)
ALP SERPL-CCNC: 236 U/L — HIGH (ref 40–120)
ALT FLD-CCNC: 95 U/L — HIGH
ANION GAP SERPL CALC-SCNC: 15 MMOL/L — SIGNIFICANT CHANGE UP (ref 5–17)
APPEARANCE UR: CLEAR — SIGNIFICANT CHANGE UP
APTT BLD: 31.5 SEC — SIGNIFICANT CHANGE UP (ref 27.5–37.4)
AST SERPL-CCNC: 69 U/L — HIGH
BASOPHILS # BLD AUTO: 0 K/UL — SIGNIFICANT CHANGE UP (ref 0–0.2)
BASOPHILS NFR BLD AUTO: 0.1 % — SIGNIFICANT CHANGE UP (ref 0–2)
BILIRUB SERPL-MCNC: 1 MG/DL — SIGNIFICANT CHANGE UP (ref 0.4–2)
BILIRUB UR-MCNC: NEGATIVE — SIGNIFICANT CHANGE UP
BUN SERPL-MCNC: 22 MG/DL — HIGH (ref 8–20)
CALCIUM SERPL-MCNC: 11.6 MG/DL — HIGH (ref 8.6–10.2)
CHLORIDE SERPL-SCNC: 95 MMOL/L — LOW (ref 98–107)
CO2 SERPL-SCNC: 23 MMOL/L — SIGNIFICANT CHANGE UP (ref 22–29)
COLOR SPEC: YELLOW — SIGNIFICANT CHANGE UP
CREAT SERPL-MCNC: 1.72 MG/DL — HIGH (ref 0.5–1.3)
DIFF PNL FLD: NEGATIVE — SIGNIFICANT CHANGE UP
EOSINOPHIL # BLD AUTO: 0 K/UL — SIGNIFICANT CHANGE UP (ref 0–0.5)
EOSINOPHIL NFR BLD AUTO: 0.2 % — SIGNIFICANT CHANGE UP (ref 0–5)
GLUCOSE SERPL-MCNC: 180 MG/DL — HIGH (ref 70–115)
GLUCOSE UR QL: NEGATIVE MG/DL — SIGNIFICANT CHANGE UP
HCT VFR BLD CALC: 27.8 % — LOW (ref 42–52)
HGB BLD-MCNC: 9.2 G/DL — LOW (ref 14–18)
INR BLD: 1.42 RATIO — HIGH (ref 0.88–1.16)
KETONES UR-MCNC: NEGATIVE — SIGNIFICANT CHANGE UP
LACTATE BLDV-MCNC: 2 MMOL/L — SIGNIFICANT CHANGE UP (ref 0.5–2)
LEUKOCYTE ESTERASE UR-ACNC: NEGATIVE — SIGNIFICANT CHANGE UP
LYMPHOCYTES # BLD AUTO: 1 K/UL — SIGNIFICANT CHANGE UP (ref 1–4.8)
LYMPHOCYTES # BLD AUTO: 5.9 % — LOW (ref 20–55)
MCHC RBC-ENTMCNC: 29.4 PG — SIGNIFICANT CHANGE UP (ref 27–31)
MCHC RBC-ENTMCNC: 33.1 G/DL — SIGNIFICANT CHANGE UP (ref 32–36)
MCV RBC AUTO: 88.8 FL — SIGNIFICANT CHANGE UP (ref 80–94)
MONOCYTES # BLD AUTO: 1.2 K/UL — HIGH (ref 0–0.8)
MONOCYTES NFR BLD AUTO: 7.3 % — SIGNIFICANT CHANGE UP (ref 3–10)
NEUTROPHILS # BLD AUTO: 14.2 K/UL — HIGH (ref 1.8–8)
NEUTROPHILS NFR BLD AUTO: 85.9 % — HIGH (ref 37–73)
NITRITE UR-MCNC: NEGATIVE — SIGNIFICANT CHANGE UP
PH UR: 6 — SIGNIFICANT CHANGE UP (ref 5–8)
PLATELET # BLD AUTO: 529 K/UL — HIGH (ref 150–400)
POTASSIUM SERPL-MCNC: 3.8 MMOL/L — SIGNIFICANT CHANGE UP (ref 3.5–5.3)
POTASSIUM SERPL-SCNC: 3.8 MMOL/L — SIGNIFICANT CHANGE UP (ref 3.5–5.3)
PROT SERPL-MCNC: 6.8 G/DL — SIGNIFICANT CHANGE UP (ref 6.6–8.7)
PROT UR-MCNC: NEGATIVE MG/DL — SIGNIFICANT CHANGE UP
PROTHROM AB SERPL-ACNC: 15.7 SEC — HIGH (ref 9.8–12.7)
RBC # BLD: 3.13 M/UL — LOW (ref 4.6–6.2)
RBC # FLD: 13.9 % — SIGNIFICANT CHANGE UP (ref 11–15.6)
SODIUM SERPL-SCNC: 133 MMOL/L — LOW (ref 135–145)
SP GR SPEC: 1.01 — SIGNIFICANT CHANGE UP (ref 1.01–1.02)
UROBILINOGEN FLD QL: NEGATIVE MG/DL — SIGNIFICANT CHANGE UP
WBC # BLD: 16.5 K/UL — HIGH (ref 4.8–10.8)
WBC # FLD AUTO: 16.5 K/UL — HIGH (ref 4.8–10.8)

## 2017-07-20 PROCEDURE — 71010: CPT | Mod: 26

## 2017-07-20 PROCEDURE — 99223 1ST HOSP IP/OBS HIGH 75: CPT

## 2017-07-20 PROCEDURE — 99285 EMERGENCY DEPT VISIT HI MDM: CPT

## 2017-07-20 PROCEDURE — 93010 ELECTROCARDIOGRAM REPORT: CPT

## 2017-07-20 RX ORDER — VANCOMYCIN HCL 1 G
VIAL (EA) INTRAVENOUS
Qty: 0 | Refills: 0 | Status: DISCONTINUED | OUTPATIENT
Start: 2017-07-20 | End: 2017-07-23

## 2017-07-20 RX ORDER — ASPIRIN/CALCIUM CARB/MAGNESIUM 324 MG
81 TABLET ORAL DAILY
Qty: 0 | Refills: 0 | Status: DISCONTINUED | OUTPATIENT
Start: 2017-07-20 | End: 2017-07-20

## 2017-07-20 RX ORDER — MULTIVIT-MIN/FERROUS GLUCONATE 9 MG/15 ML
1 LIQUID (ML) ORAL DAILY
Qty: 0 | Refills: 0 | Status: DISCONTINUED | OUTPATIENT
Start: 2017-07-20 | End: 2017-07-21

## 2017-07-20 RX ORDER — VANCOMYCIN HCL 1 G
1250 VIAL (EA) INTRAVENOUS ONCE
Qty: 0 | Refills: 0 | Status: COMPLETED | OUTPATIENT
Start: 2017-07-20 | End: 2017-07-20

## 2017-07-20 RX ORDER — AZTREONAM 2 G
500 VIAL (EA) INJECTION EVERY 8 HOURS
Qty: 0 | Refills: 0 | Status: DISCONTINUED | OUTPATIENT
Start: 2017-07-21 | End: 2017-07-24

## 2017-07-20 RX ORDER — AZTREONAM 2 G
500 VIAL (EA) INJECTION ONCE
Qty: 0 | Refills: 0 | Status: COMPLETED | OUTPATIENT
Start: 2017-07-20 | End: 2017-07-20

## 2017-07-20 RX ORDER — VANCOMYCIN HCL 1 G
VIAL (EA) INTRAVENOUS
Qty: 0 | Refills: 0 | Status: DISCONTINUED | OUTPATIENT
Start: 2017-07-20 | End: 2017-07-20

## 2017-07-20 RX ORDER — FENOFIBRATE,MICRONIZED 130 MG
48 CAPSULE ORAL DAILY
Qty: 0 | Refills: 0 | Status: DISCONTINUED | OUTPATIENT
Start: 2017-07-20 | End: 2017-07-21

## 2017-07-20 RX ORDER — METOPROLOL TARTRATE 50 MG
100 TABLET ORAL DAILY
Qty: 0 | Refills: 0 | Status: DISCONTINUED | OUTPATIENT
Start: 2017-07-20 | End: 2017-07-21

## 2017-07-20 RX ORDER — AZTREONAM 2 G
VIAL (EA) INJECTION
Qty: 0 | Refills: 0 | Status: DISCONTINUED | OUTPATIENT
Start: 2017-07-20 | End: 2017-07-24

## 2017-07-20 RX ORDER — CLOPIDOGREL BISULFATE 75 MG/1
75 TABLET, FILM COATED ORAL DAILY
Qty: 0 | Refills: 0 | Status: DISCONTINUED | OUTPATIENT
Start: 2017-07-20 | End: 2017-07-24

## 2017-07-20 RX ORDER — FAMOTIDINE 10 MG/ML
20 INJECTION INTRAVENOUS DAILY
Qty: 0 | Refills: 0 | Status: DISCONTINUED | OUTPATIENT
Start: 2017-07-20 | End: 2017-07-21

## 2017-07-20 RX ORDER — SODIUM CHLORIDE 9 MG/ML
2500 INJECTION INTRAMUSCULAR; INTRAVENOUS; SUBCUTANEOUS ONCE
Qty: 0 | Refills: 0 | Status: COMPLETED | OUTPATIENT
Start: 2017-07-20 | End: 2017-07-20

## 2017-07-20 RX ORDER — ASPIRIN/CALCIUM CARB/MAGNESIUM 324 MG
81 TABLET ORAL DAILY
Qty: 0 | Refills: 0 | Status: DISCONTINUED | OUTPATIENT
Start: 2017-07-20 | End: 2017-07-24

## 2017-07-20 RX ORDER — OXYCODONE AND ACETAMINOPHEN 5; 325 MG/1; MG/1
1 TABLET ORAL ONCE
Qty: 0 | Refills: 0 | Status: DISCONTINUED | OUTPATIENT
Start: 2017-07-20 | End: 2017-07-20

## 2017-07-20 RX ORDER — PIPERACILLIN AND TAZOBACTAM 4; .5 G/20ML; G/20ML
3.38 INJECTION, POWDER, LYOPHILIZED, FOR SOLUTION INTRAVENOUS ONCE
Qty: 0 | Refills: 0 | Status: COMPLETED | OUTPATIENT
Start: 2017-07-20 | End: 2017-07-20

## 2017-07-20 RX ORDER — LEVOTHYROXINE SODIUM 125 MCG
75 TABLET ORAL DAILY
Qty: 0 | Refills: 0 | Status: DISCONTINUED | OUTPATIENT
Start: 2017-07-20 | End: 2017-07-21

## 2017-07-20 RX ORDER — IPRATROPIUM/ALBUTEROL SULFATE 18-103MCG
3 AEROSOL WITH ADAPTER (GRAM) INHALATION EVERY 6 HOURS
Qty: 0 | Refills: 0 | Status: DISCONTINUED | OUTPATIENT
Start: 2017-07-20 | End: 2017-08-02

## 2017-07-20 RX ORDER — ATORVASTATIN CALCIUM 80 MG/1
40 TABLET, FILM COATED ORAL AT BEDTIME
Qty: 0 | Refills: 0 | Status: DISCONTINUED | OUTPATIENT
Start: 2017-07-20 | End: 2017-07-21

## 2017-07-20 RX ORDER — VANCOMYCIN HCL 1 G
1250 VIAL (EA) INTRAVENOUS EVERY 24 HOURS
Qty: 0 | Refills: 0 | Status: DISCONTINUED | OUTPATIENT
Start: 2017-07-21 | End: 2017-07-23

## 2017-07-20 RX ORDER — SODIUM CHLORIDE 9 MG/ML
1000 INJECTION, SOLUTION INTRAVENOUS
Qty: 0 | Refills: 0 | Status: DISCONTINUED | OUTPATIENT
Start: 2017-07-20 | End: 2017-07-24

## 2017-07-20 RX ORDER — SODIUM CHLORIDE 9 MG/ML
3 INJECTION INTRAMUSCULAR; INTRAVENOUS; SUBCUTANEOUS ONCE
Qty: 0 | Refills: 0 | Status: COMPLETED | OUTPATIENT
Start: 2017-07-20 | End: 2017-07-20

## 2017-07-20 RX ORDER — AMIODARONE HYDROCHLORIDE 400 MG/1
200 TABLET ORAL DAILY
Qty: 0 | Refills: 0 | Status: DISCONTINUED | OUTPATIENT
Start: 2017-07-20 | End: 2017-07-21

## 2017-07-20 RX ADMIN — SODIUM CHLORIDE 2500 MILLILITER(S): 9 INJECTION INTRAMUSCULAR; INTRAVENOUS; SUBCUTANEOUS at 19:09

## 2017-07-20 RX ADMIN — ATORVASTATIN CALCIUM 40 MILLIGRAM(S): 80 TABLET, FILM COATED ORAL at 23:34

## 2017-07-20 RX ADMIN — SODIUM CHLORIDE 3 MILLILITER(S): 9 INJECTION INTRAMUSCULAR; INTRAVENOUS; SUBCUTANEOUS at 16:45

## 2017-07-20 RX ADMIN — Medication 3 MILLILITER(S): at 23:32

## 2017-07-20 RX ADMIN — OXYCODONE AND ACETAMINOPHEN 1 TABLET(S): 5; 325 TABLET ORAL at 20:45

## 2017-07-20 RX ADMIN — OXYCODONE AND ACETAMINOPHEN 1 TABLET(S): 5; 325 TABLET ORAL at 20:15

## 2017-07-20 RX ADMIN — Medication 50 MILLIGRAM(S): at 23:34

## 2017-07-20 RX ADMIN — SODIUM CHLORIDE 100 MILLILITER(S): 9 INJECTION, SOLUTION INTRAVENOUS at 23:35

## 2017-07-20 RX ADMIN — PIPERACILLIN AND TAZOBACTAM 200 GRAM(S): 4; .5 INJECTION, POWDER, LYOPHILIZED, FOR SOLUTION INTRAVENOUS at 16:45

## 2017-07-20 NOTE — H&P ADULT - NSHPPHYSICALEXAM_GEN_ALL_CORE
Gen: elderly male, mild distress, on oxygen, ill-appearing, pale, mentating  HEENT: dry MM  CVS: S1 S2 RRR  PULM: +LLL crackles and decreased breath sounds  ABD: soft, nontender, thin, no rebound, no guarding  NEURO: intact  PSYCH: mentating well  EXTREM: no edema  Skin: pale, warm

## 2017-07-20 NOTE — H&P ADULT - PSH
Cardiac disorder  triple bypass may 1997 [facility]  H/O cardiac catheterization  stenting of SVG TO PDA IN 2010 2017 one stent  History of bronchoscopy  2017  History of electrophysiologic study  2009 positive study (AICD placement  2010)  History of prostate surgery  davinci surgery in 2008

## 2017-07-20 NOTE — ED ADULT TRIAGE NOTE - CHIEF COMPLAINT QUOTE
Patient was told he has pneumonia yesterday and put on levaquin, today patient not eating, complains of chestpain, appears pale

## 2017-07-20 NOTE — ED ADULT NURSE NOTE - OBJECTIVE STATEMENT
Pt A&Ox4 c/o increase SOB with CP at this time. Pt resting comfortably, VSS, no signs of distress at this time, #20 RFA bloods sent to lab. Safety maintained, call bell in reach.

## 2017-07-20 NOTE — ED ADULT NURSE REASSESSMENT NOTE - NS ED NURSE REASSESS COMMENT FT1
pt care assumed at 1930, no apparent distress noted at this time, charting as noted. Pt received Alert and Oriented to person, place, and time sitting in bed talking with family members at the bedside. Pt c/o back pain, MD advised, awaiting further orders. Pt is 95% oxygen sat, lung sounds are diminished on the left side. IV fluids are infusing well, no edema or redness noted at the IV site. plan of care explained, will continue to monitor.

## 2017-07-20 NOTE — ED PROVIDER NOTE - MEDICAL DECISION MAKING DETAILS
likely PNA - admit less likely PE but will recommend duplex LE to med and CTA if no clinical response

## 2017-07-20 NOTE — H&P ADULT - PROBLEM SELECTOR PLAN 1
-start vancomycin/aztreonam  -monitor white count  -monitored bed w/pulse oximetry  -sputum cx, legionella ag, strep ag sent

## 2017-07-20 NOTE — ED ADULT NURSE NOTE - PSH
Cardiac disorder  triple bypass may 1997 [facility]  H/O cardiac catheterization  stenting of SVG TO PDA IN 2010 2017 one stent  History of bronchoscopy  2017  History of electrophysiologic study  2009 positive study (AICD placement  2010)  History of prostate surgery  davinci surgery in 2008

## 2017-07-20 NOTE — ED PROVIDER NOTE - OBJECTIVE STATEMENT
72 y/o M recently dx with lung ca on PET scan no active treatment or Bx yet presents with cough, fatigue, pale skin.  Was having some PST blood work for bx and found to have elev WBC ct - pt was started on ABX yesterday levaquin but not getting any better

## 2017-07-20 NOTE — H&P ADULT - HISTORY OF PRESENT ILLNESS
is a 72 y/o male w/PMHx of CAD, s/p stent in June 2017, HTN, HLD, w/recent hx of LLL mass discovered here after he was treated here for hemoptysis and pleural effusion. However, he then had a PET scan which confirmed a LLL mass with areas of metastasis. He was supposed to have a CT guided biopsy but when he came to the hospital today, he was found to be ill-appearing, with cough and a high white count, prompting an admission for pneumonia. He was started as an outpatient on levaquin but his white count remains high. I have started vancomycin/aztreonam given his recent hospitalization but I have recommended holding off on CT guided biopsy until his pneumonia is much improved. He's currently on oxygen and does not have oxygen at home.     Of note, he's an ex-smoker, smoked 20 years ago, for 20 years. One pack a day for the first ten years, and then 2 packs/day for the second ten years, total 20 year smoking hx. He was recently stented last month for CAD and we're continuing all his medications including his asa/statin/plavix/and toprol. He presents in acute renal failure presumed to be secondary to pre-renal etiology and I've started IVF.  is a 72 y/o male w/PMHx of CAD, s/p stent in June 2017, HTN, HLD, w/recent hx of LLL mass discovered here after he was treated here for hemoptysis and pleural effusion. However, he then had a PET scan which confirmed a LLL mass with areas of metastasis including left hilar lymphadenopathy, left pleural mets, left sacrum lesion. He was supposed to have a CT guided biopsy of the lung but when he came to the hospital today, he was found to be ill-appearing, with cough and a high white count, prompting an admission for pneumonia. He was started as an outpatient on levaquin but his white count remains high. I have started vancomycin/aztreonam given his recent hospitalization but I have recommended holding off on CT guided biopsy until his pneumonia is much improved. He's currently on oxygen and does not have oxygen at home.     Of note, he's an ex-smoker, smoked 20 years ago, for 20 years. One pack a day for the first ten years, and then 2 packs/day for the second ten years, total 20 year smoking hx. He was recently stented last month for CAD and we're continuing all his medications including his asa/statin/plavix/and toprol. He presents in acute renal failure presumed to be secondary to pre-renal etiology and I've started IVF.  is a 70 y/o male w/PMHx of CAD, s/p stent in June 2017, HTN, HLD, w/recent hx of LLL mass discovered here after he was treated here for hemoptysis and pleural effusion. However, he then had a PET scan which confirmed a LLL mass with areas of metastasis including left hilar lymphadenopathy, left pleural mets, left sacrum lesion. He was supposed to have a CT guided biopsy of the lung but when he came to the hospital today, he was found to be ill-appearing, with cough and a high white count, prompting an admission for pneumonia. He was started as an outpatient on levaquin but his white count remains high. I have started vancomycin/aztreonam given his recent hospitalization but I have recommended holding off on CT guided biopsy until his pneumonia is much improved. He's currently on oxygen and does not have oxygen at home.     Of note, he's an ex-smoker, smoked 20 years ago, for 20 years. One pack a day for the first ten years, and then 2 packs/day for the second ten years, total 20 year smoking hx. He was recently stented last month for CAD and we're continuing all his medications including his asa/statin/plavix/and toprol. He presents in acute renal failure presumed to be secondary to pre-renal etiology and I've started IVF.     At the time of this admission,  states he's a FULL CODE.

## 2017-07-21 DIAGNOSIS — R91.8 OTHER NONSPECIFIC ABNORMAL FINDING OF LUNG FIELD: ICD-10-CM

## 2017-07-21 DIAGNOSIS — J18.9 PNEUMONIA, UNSPECIFIED ORGANISM: ICD-10-CM

## 2017-07-21 LAB
ALBUMIN SERPL ELPH-MCNC: 2.7 G/DL — LOW (ref 3.3–5.2)
ALP SERPL-CCNC: 191 U/L — HIGH (ref 40–120)
ALT FLD-CCNC: 71 U/L — HIGH
ANION GAP SERPL CALC-SCNC: 13 MMOL/L — SIGNIFICANT CHANGE UP (ref 5–17)
AST SERPL-CCNC: 36 U/L — SIGNIFICANT CHANGE UP
BILIRUB DIRECT SERPL-MCNC: 0.3 MG/DL — SIGNIFICANT CHANGE UP (ref 0–0.3)
BILIRUB INDIRECT FLD-MCNC: 0.5 MG/DL — SIGNIFICANT CHANGE UP (ref 0.2–1)
BILIRUB SERPL-MCNC: 0.8 MG/DL — SIGNIFICANT CHANGE UP (ref 0.4–2)
BUN SERPL-MCNC: 19 MG/DL — SIGNIFICANT CHANGE UP (ref 8–20)
CALCIUM SERPL-MCNC: 11 MG/DL — HIGH (ref 8.6–10.2)
CHLORIDE SERPL-SCNC: 99 MMOL/L — SIGNIFICANT CHANGE UP (ref 98–107)
CO2 SERPL-SCNC: 24 MMOL/L — SIGNIFICANT CHANGE UP (ref 22–29)
CREAT SERPL-MCNC: 1.58 MG/DL — HIGH (ref 0.5–1.3)
CRP SERPL-MCNC: 17.9 MG/DL — HIGH (ref 0–0.4)
CULTURE RESULTS: NO GROWTH — SIGNIFICANT CHANGE UP
GLUCOSE SERPL-MCNC: 133 MG/DL — HIGH (ref 70–115)
HCT VFR BLD CALC: 28.1 % — LOW (ref 42–52)
HGB BLD-MCNC: 9 G/DL — LOW (ref 14–18)
MAGNESIUM SERPL-MCNC: 2 MG/DL — SIGNIFICANT CHANGE UP (ref 1.6–2.6)
MCHC RBC-ENTMCNC: 29.1 PG — SIGNIFICANT CHANGE UP (ref 27–31)
MCHC RBC-ENTMCNC: 32 G/DL — SIGNIFICANT CHANGE UP (ref 32–36)
MCV RBC AUTO: 90.9 FL — SIGNIFICANT CHANGE UP (ref 80–94)
PHOSPHATE SERPL-MCNC: 2.6 MG/DL — SIGNIFICANT CHANGE UP (ref 2.4–4.7)
PLATELET # BLD AUTO: 431 K/UL — HIGH (ref 150–400)
POTASSIUM SERPL-MCNC: 3.8 MMOL/L — SIGNIFICANT CHANGE UP (ref 3.5–5.3)
POTASSIUM SERPL-SCNC: 3.8 MMOL/L — SIGNIFICANT CHANGE UP (ref 3.5–5.3)
PROCALCITONIN SERPL-MCNC: 0.37 NG/ML — SIGNIFICANT CHANGE UP (ref 0–0.04)
PROT SERPL-MCNC: 6 G/DL — LOW (ref 6.6–8.7)
RBC # BLD: 3.09 M/UL — LOW (ref 4.6–6.2)
RBC # FLD: 13.6 % — SIGNIFICANT CHANGE UP (ref 11–15.6)
SODIUM SERPL-SCNC: 136 MMOL/L — SIGNIFICANT CHANGE UP (ref 135–145)
SODIUM UR-SCNC: <30 MMOL/L — SIGNIFICANT CHANGE UP
SPECIMEN SOURCE: SIGNIFICANT CHANGE UP
WBC # BLD: 14.4 K/UL — HIGH (ref 4.8–10.8)
WBC # FLD AUTO: 14.4 K/UL — HIGH (ref 4.8–10.8)

## 2017-07-21 PROCEDURE — 93010 ELECTROCARDIOGRAM REPORT: CPT

## 2017-07-21 PROCEDURE — 99233 SBSQ HOSP IP/OBS HIGH 50: CPT

## 2017-07-21 PROCEDURE — 99223 1ST HOSP IP/OBS HIGH 75: CPT

## 2017-07-21 RX ORDER — OXYCODONE AND ACETAMINOPHEN 5; 325 MG/1; MG/1
1 TABLET ORAL EVERY 4 HOURS
Qty: 0 | Refills: 0 | Status: DISCONTINUED | OUTPATIENT
Start: 2017-07-21 | End: 2017-07-25

## 2017-07-21 RX ORDER — HYDROMORPHONE HYDROCHLORIDE 2 MG/ML
2 INJECTION INTRAMUSCULAR; INTRAVENOUS; SUBCUTANEOUS EVERY 6 HOURS
Qty: 0 | Refills: 0 | Status: DISCONTINUED | OUTPATIENT
Start: 2017-07-21 | End: 2017-07-27

## 2017-07-21 RX ORDER — LACTULOSE 10 G/15ML
10 SOLUTION ORAL
Qty: 0 | Refills: 0 | Status: DISCONTINUED | OUTPATIENT
Start: 2017-07-21 | End: 2017-07-22

## 2017-07-21 RX ORDER — HYDROMORPHONE HYDROCHLORIDE 2 MG/ML
0.5 INJECTION INTRAMUSCULAR; INTRAVENOUS; SUBCUTANEOUS EVERY 6 HOURS
Qty: 0 | Refills: 0 | Status: DISCONTINUED | OUTPATIENT
Start: 2017-07-21 | End: 2017-07-27

## 2017-07-21 RX ADMIN — Medication 3 MILLILITER(S): at 21:01

## 2017-07-21 RX ADMIN — Medication 100 MILLIGRAM(S): at 05:12

## 2017-07-21 RX ADMIN — FAMOTIDINE 20 MILLIGRAM(S): 10 INJECTION INTRAVENOUS at 10:44

## 2017-07-21 RX ADMIN — Medication 3 MILLILITER(S): at 07:55

## 2017-07-21 RX ADMIN — Medication 75 MICROGRAM(S): at 05:12

## 2017-07-21 RX ADMIN — LACTULOSE 10 GRAM(S): 10 SOLUTION ORAL at 17:32

## 2017-07-21 RX ADMIN — ATORVASTATIN CALCIUM 40 MILLIGRAM(S): 80 TABLET, FILM COATED ORAL at 21:02

## 2017-07-21 RX ADMIN — OXYCODONE AND ACETAMINOPHEN 1 TABLET(S): 5; 325 TABLET ORAL at 21:15

## 2017-07-21 RX ADMIN — AMIODARONE HYDROCHLORIDE 200 MILLIGRAM(S): 400 TABLET ORAL at 05:12

## 2017-07-21 RX ADMIN — Medication 50 MILLIGRAM(S): at 23:27

## 2017-07-21 RX ADMIN — SODIUM CHLORIDE 100 MILLILITER(S): 9 INJECTION, SOLUTION INTRAVENOUS at 08:04

## 2017-07-21 RX ADMIN — Medication 3 MILLILITER(S): at 14:52

## 2017-07-21 RX ADMIN — Medication 166.67 MILLIGRAM(S): at 21:19

## 2017-07-21 RX ADMIN — Medication 50 MILLIGRAM(S): at 17:31

## 2017-07-21 RX ADMIN — SODIUM CHLORIDE 100 MILLILITER(S): 9 INJECTION, SOLUTION INTRAVENOUS at 05:12

## 2017-07-21 RX ADMIN — Medication 166.67 MILLIGRAM(S): at 00:07

## 2017-07-21 RX ADMIN — OXYCODONE AND ACETAMINOPHEN 1 TABLET(S): 5; 325 TABLET ORAL at 15:02

## 2017-07-21 RX ADMIN — Medication 3 MILLILITER(S): at 03:06

## 2017-07-21 RX ADMIN — Medication 48 MILLIGRAM(S): at 10:44

## 2017-07-21 RX ADMIN — OXYCODONE AND ACETAMINOPHEN 1 TABLET(S): 5; 325 TABLET ORAL at 20:15

## 2017-07-21 RX ADMIN — Medication 50 MILLIGRAM(S): at 09:54

## 2017-07-21 RX ADMIN — Medication 1 TABLET(S): at 10:44

## 2017-07-21 RX ADMIN — CLOPIDOGREL BISULFATE 75 MILLIGRAM(S): 75 TABLET, FILM COATED ORAL at 10:44

## 2017-07-21 RX ADMIN — OXYCODONE AND ACETAMINOPHEN 1 TABLET(S): 5; 325 TABLET ORAL at 10:38

## 2017-07-21 RX ADMIN — SODIUM CHLORIDE 100 MILLILITER(S): 9 INJECTION, SOLUTION INTRAVENOUS at 16:39

## 2017-07-21 RX ADMIN — OXYCODONE AND ACETAMINOPHEN 1 TABLET(S): 5; 325 TABLET ORAL at 10:08

## 2017-07-21 RX ADMIN — Medication 81 MILLIGRAM(S): at 10:44

## 2017-07-21 RX ADMIN — OXYCODONE AND ACETAMINOPHEN 1 TABLET(S): 5; 325 TABLET ORAL at 16:45

## 2017-07-21 NOTE — CONSULT NOTE ADULT - PROBLEM SELECTOR RECOMMENDATION 9
-Ct guided biopsy with Dr. James  to obtain diagnosis. -Ct guided biopsy with Dr. James to obtain diagnosis.

## 2017-07-21 NOTE — PROGRESS NOTE ADULT - SUBJECTIVE AND OBJECTIVE BOX
SULEIMAN GREGORY     Chief Complaint: Patient is a 71y old  Male who presents with a chief complaint of "I was feeling tired, unwell at home." (2017 22:29)      HPI:  is a 70 y/o male w/PMHx of CAD, s/p stent in 2017, HTN, HLD, w/recent hx of LLL mass discovered here after he was treated here for hemoptysis and pleural effusion. However, he then had a PET scan which confirmed a LLL mass with areas of metastasis including left hilar lymphadenopathy, left pleural mets, left sacrum lesion. He was supposed to have a CT guided biopsy of the lung but when he came to the hospital today, he was found to be ill-appearing, with cough and a high white count, prompting an admission for pneumonia. He was started as an outpatient on levaquin but his white count remains high. I have started vancomycin/aztreonam given his recent hospitalization but I have recommended holding off on CT guided biopsy until his pneumonia is much improved. He's currently on oxygen and does not have oxygen at home.     Of note, he's an ex-smoker, smoked 20 years ago, for 20 years. One pack a day for the first ten years, and then 2 packs/day for the second ten years, total 20 year smoking hx. He was recently stented last month for CAD and we're continuing all his medications including his asa/statin/plavix/and toprol. He presents in acute renal failure presumed to be secondary to pre-renal etiology and I've started IVF.     At the time of this admission,  states he's a FULL CODE.    Interval: Pt seen by me in ERHR. Pt in NAD, has family at bedside, was having pain of left back earlier, received Percocet, states pain is well managed at this time. Pt reports no acute overnight events. States he feels ill and lethargic. Pt admits productive cough w hemoptysis. Pt denies chest pain, sob, palpitations.       PAST MEDICAL & SURGICAL HISTORY:  Psoriasis    PSVT (paroxysmal supraventricular tachycardia)  Chronic systolic CHF (congestive heart failure), NYHA class 2  Former smoker  Hypokinesis: apical  Mitral regurgitation  Bronchitis  Hypertension  PSVT (paroxysmal supraventricular tachycardia)  Ischemic cardiomyopathy  AICD (automatic cardioverter/defibrillator) present:  [employer]  VT (ventricular tachycardia): May 2017 no ICD shock  Vitamin D deficiency  Tricuspid regurgitation  RBBB  Prostate cancer: s/p surgery no RT/CT  Mitral regurgitation  Hypothyroidism  HLD (hyperlipidemia)  Arrhythmia  Angina pectoris  CAD (coronary artery disease)  History of bronchoscopy:   History of prostate surgery: davinci surgery in   Cardiac disorder: triple bypass may 1997 [facility]  History of electrophysiologic study:  positive study (AICD placement  )  H/O cardiac catheterization: stenting of SVG TO PDA IN 2010 one stent      MEDICATIONS  (STANDING):  lactated ringers. 1000 milliLiter(s) (100 mL/Hr) IV Continuous <Continuous>  amiodarone    Tablet 200 milliGRAM(s) Oral daily  atorvastatin 40 milliGRAM(s) Oral at bedtime  fenofibrate Tablet 48 milliGRAM(s) Oral daily  clopidogrel Tablet 75 milliGRAM(s) Oral daily  metoprolol succinate  milliGRAM(s) Oral daily  famotidine    Tablet 20 milliGRAM(s) Oral daily  levothyroxine 75 MICROGram(s) Oral daily  multivitamin/minerals 1 Tablet(s) Oral daily  aztreonam  IVPB   IV Intermittent   aspirin  chewable 81 milliGRAM(s) Oral daily  aztreonam  IVPB 500 milliGRAM(s) IV Intermittent every 8 hours  vancomycin  IVPB   IV Intermittent   vancomycin  IVPB 1250 milliGRAM(s) IV Intermittent every 24 hours  ALBUTerol/ipratropium for Nebulization 3 milliLiter(s) Nebulizer every 6 hours  lactulose Syrup 10 Gram(s) Oral two times a day      Allergies: macrolide antibiotics (Urticaria; Rash; Hives)  penicillin (Urticaria; Rash)  Zithromax Z-Christian (Urticaria; Rash; Hives)      REVIEW OF SYSTEMS:    CONSTITUTIONAL: No fever  ENMT:  No difficulty hearing, tinnitus, vertigo; No sinus or throat pain  NECK: No pain or stiffness  RESPIRATORY: No cough, wheezing, chills or hemoptysis; No shortness of breath  CARDIOVASCULAR: No chest pain, palpitations, dizziness, or leg swelling  GASTROINTESTINAL: No abdominal or epigastric pain. No nausea, vomiting, or hematemesis; No diarrhea or constipation. No melena or hematochezia.  GENITOURINARY: No dysuria, frequency, hematuria, or incontinence  NEUROLOGICAL: No headaches, memory loss, loss of strength, numbness, or tremors  SKIN: No itching, burning, rashes, or lesions   MUSCULOSKELETAL: No joint pain or swelling; No muscle, back, or extremity pain  PSYCHIATRIC: No depression, anxiety, mood swings, or difficulty sleeping    Vital Signs Last 24 Hrs  T(C): 36.4 (2017 11:11), Max: 37 (2017 17:30)  T(F): 97.5 (2017 11:11), Max: 98.6 (2017 17:30)  HR: 78 (2017 11:11) (78 - 112)  BP: 109/59 (2017 11:11) (109/59 - 126/72)  BP(mean): --  RR: 18 (2017 11:11) (16 - 18)  SpO2: 98% (2017 11:11) (93% - 100%)        CAPILLARY BLOOD GLUCOSE    LABS:                        9.0    14.4  )-----------( 431      ( 2017 06:28 )             28.1         136  |  99  |  19.0  ----------------------------<  133<H>  3.8   |  24.0  |  1.58<H>    Ca    11.0<H>      2017 06:28  Phos  2.6       Mg     2.0         TPro  6.0<L>  /  Alb  2.7<L>  /  TBili  0.8  /  DBili  0.3  /  AST  36  /  ALT  71<H>  /  AlkPhos  191<H>      PT/INR - ( 2017 16:39 )   PT: 15.7 sec;   INR: 1.42 ratio         PTT - ( 2017 16:39 )  PTT:31.5 sec  Urinalysis Basic - ( 2017 16:59 )    Color: Yellow / Appearance: Clear / S.015 / pH: x  Gluc: x / Ketone: Negative  / Bili: Negative / Urobili: Negative mg/dL   Blood: x / Protein: Negative mg/dL / Nitrite: Negative   Leuk Esterase: Negative / RBC: x / WBC x   Sq Epi: x / Non Sq Epi: x / Bacteria: x        RADIOLOGY & ADDITIONAL TESTS:    CXR 17  IMPRESSION: Left lower lobe infiltrate, unchanged. Cardiomegaly.      Chest CT  6/19/17  IMPRESSION: 6 cm left lower lobe infrahilar mass which extends into the left hilum.    Small amount of adjacent pleural fluid. No clearly defined fat plane       PE:   General: NAD, thin, ill appearing, sitting up in bed, well groomed in nightgown  Eyes: PERRLA, EOM intact  Neck: Supple and symmetrical, no JVD  CV: RRR, no m/r/g  Lungs: left side rhonchi noted   Skin: pallor, warm, dry, no rashes  Psych: normal mood/affect  Abdomen: Normal BS, soft, NT/ND  Extremities: no edema, cyanosis  Musculoskeletal: good strength b/l UE/LE, normal ROM, no joint swelling  Neuro: A & O X 3, CN 2-12 grossly intact, no sensory or motor deficit.      Fibrosis of lung: PULMONARY FIBROSIS, UNSPECIFIED  H/o or current diagnosis of HF- Contraindication to ACEI/ARBs  Family history of heart disease (Sibling)  Family history of diabetes mellitus (Sibling)  Family history of lung cancer (Sibling)  Family history of prostate cancer (Sibling)  Handoff  MEWS Score: 3 (2017 11:12)  Psoriasis    PSVT (paroxysmal supraventricular tachycardia)  Chronic systolic CHF (congestive heart failure), NYHA class 2  Former smoker  Hypokinesis: apical  Mitral regurgitation  Bronchitis  Hypertension  PSVT (paroxysmal supraventricular tachycardia)  Ischemic cardiomyopathy  AICD (automatic cardioverter/defibrillator) present:  [employer]  VT (ventricular tachycardia): May 2017 no ICD shock  Vitamin D deficiency  Tricuspid regurgitation  RBBB  Prostate cancer: s/p surgery no RT/CT  Mitral regurgitation  Hypothyroidism  HLD (hyperlipidemia)  Arrhythmia  Angina pectoris  CAD (coronary artery disease)  Pneumonia  Pneumonia: Pneumonia  Lung mass: Lung mass  Coronary artery disease involving native heart without angina pectoris, unspecified vessel or lesion type: Coronary artery disease involving native heart without angina pectoris, unspecified vessel or lesion type  Chronic systolic CHF (congestive heart failure), NYHA class 2: Chronic systolic CHF (congestive heart failure), NYHA class 2  Acute renal failure with acute cortical necrosis: Acute renal failure with acute cortical necrosis  Pneumonia of left lower lobe due to infectious organism: Pneumonia of left lower lobe due to infectious organism  History of bronchoscopy:   History of prostate surgery: davinci surgery in   Cardiac disorder: triple bypass may 1997 [facility]  History of electrophysiologic study:  positive study (AICD placement  )  H/O cardiac catheterization: stenting of SVG TO PDA IN 2010 one stent  CHEST PAIN NOT EATING WEAKNESS: CHEST PAIN NOT EATING WEAKNESS  11

## 2017-07-21 NOTE — CONSULT NOTE ADULT - SUBJECTIVE AND OBJECTIVE BOX
HPI:   is a 70 y/o male w/ a recently diagnosed 6 cm left lower lobe infrahilar mass which extends into the left hilum.  He is s/p cardiac stent in 2017.  Patient presented with hemoptysis and pleural effusion. A PET scan confirmed LLL mass with left hilar lymphadenopathy, pleural effusion and possible osseous metatastases.  He was scheduled for a CT guided biopsy of the lung however is now a/w pneumonia.   He is currently a non-smoker, quit ~20 years ago, with a 30 PY history.      PAST MEDICAL & SURGICAL HISTORY:  Psoriasis    PSVT (paroxysmal supraventricular tachycardia)  Chronic systolic CHF (congestive heart failure), NYHA class 2  Former smoker  Hypokinesis: apical  Mitral regurgitation  Bronchitis  Hypertension  PSVT (paroxysmal supraventricular tachycardia)  Ischemic cardiomyopathy  AICD (automatic cardioverter/defibrillator) present:  [place] [employer]  VT (ventricular tachycardia): May 2017 no ICD shock  Vitamin D deficiency  Tricuspid regurgitation  RBBB  Prostate cancer: s/p surgery no RT/CT  Mitral regurgitation  Hypothyroidism  HLD (hyperlipidemia)  Arrhythmia  Angina pectoris  CAD (coronary artery disease)  History of bronchoscopy:   History of prostate surgery: davinci surgery in   Cardiac disorder: triple bypass may 1997 [facility]  History of electrophysiologic study:  positive study (AICD placement  )  H/O cardiac catheterization: stenting of SVG TO PDA IN 2010 one stent      MEDICATIONS  (STANDING):  lactated ringers. 1000 milliLiter(s) (100 mL/Hr) IV Continuous <Continuous>  amiodarone    Tablet 200 milliGRAM(s) Oral daily  atorvastatin 40 milliGRAM(s) Oral at bedtime  fenofibrate Tablet 48 milliGRAM(s) Oral daily  clopidogrel Tablet 75 milliGRAM(s) Oral daily  metoprolol succinate  milliGRAM(s) Oral daily  famotidine    Tablet 20 milliGRAM(s) Oral daily  levothyroxine 75 MICROGram(s) Oral daily  multivitamin/minerals 1 Tablet(s) Oral daily  aztreonam  IVPB   IV Intermittent   aspirin  chewable 81 milliGRAM(s) Oral daily  aztreonam  IVPB 500 milliGRAM(s) IV Intermittent every 8 hours  vancomycin  IVPB   IV Intermittent   vancomycin  IVPB 1250 milliGRAM(s) IV Intermittent every 24 hours  ALBUTerol/ipratropium for Nebulization 3 milliLiter(s) Nebulizer every 6 hours  lactulose Syrup 10 Gram(s) Oral two times a day    MEDICATIONS  (PRN):  HYDROmorphone  Injectable 2 milliGRAM(s) IV Push every 6 hours PRN Severe Pain (7 - 10)  HYDROmorphone  Injectable 0.5 milliGRAM(s) IV Push every 6 hours PRN Moderate Pain (4 - 6)  oxyCODONE    5 mG/acetaminophen 325 mG 1 Tablet(s) Oral every 4 hours PRN Mild Pain (1 - 3)  oxyCODONE    5 mG/acetaminophen 325 mG 1 Tablet(s) Oral every 4 hours PRN Moderate Pain (4 - 6)      Allergies    macrolide antibiotics (Urticaria; Rash; Hives)  penicillin (Urticaria; Rash)  Zithromax Z-Christian (Urticaria; Rash; Hives)    Intolerances        FAMILY HISTORY:  Family history of heart disease (Sibling)  Family history of diabetes mellitus (Sibling)  Family history of lung cancer (Sibling)  Family history of prostate cancer (Sibling)      Review of Systems    Constitutional, Eyes, ENT, Cardiovascular, Respiratory, Gastrointestinal, Genitourinary, Musculoskeletal, Integumentary, Neurological, Psychiatric, Endocrine, Heme/Lymph and Allergic/Immunologic review of systems are otherwise negative except as noted in HPI.     Vital Signs Last 24 Hrs  T(C): 36.9 (2017 02:54), Max: 37 (2017 17:30)  T(F): 98.4 (2017 02:54), Max: 98.6 (2017 17:30)  HR: 103 (2017 07:55) (80 - 112)  BP: 126/72 (2017 05:10) (110/82 - 126/72)  BP(mean): --  RR: 18 (2017 02:54) (16 - 18)  SpO2: 96% (2017 07:55) (93% - 100%)    Physical Exam  Constitutional: well developed, well nourished, in no acute distress and thin.   Eyes: PERRL, EOMI, no conjunctival infection, anicteric.   ENT: pharynx is unremarkable, moist mucus membrane, no oral lesions.   Neck: supple without JVD, no thyromegaly or masses appreciated.   Pulmonary: clear to auscultation bilaterally, no dullness, no wheezing.   Cardiac: RRR, normal S1S2, no murmurs, rubs, gallops.   Vascular: no JVD, no calf tenderness, venous stasis changes, varices.   Abdomen: normoactive bowel sounds, soft and nontender, no hepatosplenomegaly or masses appreciated.   Lymphatic: no peripheral adenopathy appreciated.   Musculoskeletal: full range of motion and no deformities appreciated.   Skin: normal appearance, no rash, nodules, vesicles, ulcers, erythema.   Neurology: grossly intact.   Psychiatric: affect appropriate.       LABS:  CBC Full  -  ( 2017 06:28 )  WBC Count : 14.4 K/uL  Hemoglobin : 9.0 g/dL  Hematocrit : 28.1 %  Platelet Count - Automated : 431 K/uL  Mean Cell Volume : 90.9 fl  Mean Cell Hemoglobin : 29.1 pg  Mean Cell Hemoglobin Concentration : 32.0 g/dL  Auto Neutrophil # : x  Auto Lymphocyte # : x  Auto Monocyte # : x  Auto Eosinophil # : x  Auto Basophil # : x  Auto Neutrophil % : x  Auto Lymphocyte % : x  Auto Monocyte % : x  Auto Eosinophil % : x  Auto Basophil % : x        136  |  99  |  19.0  ----------------------------<  133<H>  3.8   |  24.0  |  1.58<H>    Ca    11.0<H>      2017 06:28  Phos  2.6       Mg     2.0         TPro  6.0<L>  /  Alb  2.7<L>  /  TBili  0.8  /  DBili  0.3  /  AST  36  /  ALT  71<H>  /  AlkPhos  191<H>      PT/INR - ( 2017 16:39 )   PT: 15.7 sec;   INR: 1.42 ratio         PTT - ( 2017 16:39 )  PTT:31.5 sec  Urinalysis Basic - ( 2017 16:59 )    Color: Yellow / Appearance: Clear / S.015 / pH: x  Gluc: x / Ketone: Negative  / Bili: Negative / Urobili: Negative mg/dL   Blood: x / Protein: Negative mg/dL / Nitrite: Negative   Leuk Esterase: Negative / RBC: x / WBC x   Sq Epi: x / Non Sq Epi: x / Bacteria: x        RADIOLOGY & ADDITIONAL STUDIES:  EXAM:  CT CHEST    PROCEDURE DATE:  2017    There are no enlarged axillary or mediastinal lymph nodes. There is left hilar   lymphadenopathy.  Images of the visualized upper abdomen demonstrate a 1.2   cm hepatic hypodensity,  not fully evaluated on this noncontrast CT.   There is a 6 mm nodule in the right lower lobe on image 90 of series 2.   There is a 2 mm peripheral nodule in   the right lower lobe on image 90 of series 2. There are areas of mild   bronchiectasis and tree-in-bud nodularity in the periphery of the right   lower lobe on images 94-97 of series 2.  There   are several additional 2 mm calcified and noncalcified left upper lobe   nodules. There is a 2 mm nodule in the lingula on image 82 of series 2.    There is a 6 cm left lower lobe infrahilar mass which extends into the   left hilum. There is a small amount of adjacent pleural fluid. There is   no clearly defined fat plane between this mass and the descending   thoracic aorta. There are areas without a defined plane between this mass   and the portion of the left inferior pulmonary vein or the mass and the   left posterior basilar branches of the pulmonary artery. There are   extensive consolidative and groundglass opacities in the left lower lobe,   which may represent infection/postobstructive pneumonitis versus blood   products.    IMPRESSION:    6 cm left lower lobe infrahilar mass which extends into the left hilum.    Small amount of adjacent pleural fluid. No clearly defined fat plane     < from: NM PET/CT Onc FDG Skull to Thigh, Inital (17 @ 10:25) >  EXAM:  PETCT [facility] ONC FDG INIT        2017  PET/CT SKULL BASE-MID THIGH IMAGING     CHEST:  Left hilar lymph node, poorly defined (SUV 7.6; image 89). FDG   avid nodule in the left posterior diaphragmatic pleura (SUV 3.7; image   147).  Physiologic FDG activity in the myocardium and blood pool.      LUNGS: 5.9 x 4.9 cm left lower lobe mass extending into the left   infrahilar region (SUV 32.3; image 101). Minimally FDG avid patchy areas   of groundglass and alveolar consolidation in the left lower lobe,   nonspecific.    PLEURA/PERICARDIUM: FDG avid moderate left pleural effusion, new since   2017 (SUV 4.0; image 77). Poorly delineated areas of pleural   thickening in the left lower lobe (SUV 6.7; image 122).    HEPATOBILIARY/PANCREAS:  Liver background SUV mean as a reference for   comparing studies is 3.1.    BONES/SOFT TISSUES: Focusof FDG avidity in the left anterior sacrum,   without definite CT correlate (SUV 10.5; image 207) degenerative changes   at the right L5-S1 facet.    IMPRESSION:      1. FDG avid left lower lobe mass suspicious for malignancy. Probable   metastatic left hilar lymphadenopathy.    2.  New, FDG avid left pleural effusion and nodular foci in the pleura,   probably pleural metastatic disease.    3.  FDG avid lesion in the anterior left sacrum, suspicious for osseous   metastasis.     4. FDG avid nodule in the left posterior diaphragmatic rudy, probably   metastatic implant. HPI:   is a 72 y/o male w/ a recently diagnosed 6 cm left lower lobe infrahilar mass which extends into the left hilum.  He is s/p cardiac stent in 2017.  Patient presented with hemoptysis and pleural effusion. A PET scan confirmed LLL mass with left hilar lymphadenopathy, pleural effusion and possible osseous metatastases.  He was scheduled for a CT guided biopsy of the lung however is now a/w pneumonia.   He is currently a non-smoker, quit ~20 years ago, with a 30 PY history.      PAST MEDICAL & SURGICAL HISTORY:  Psoriasis    PSVT (paroxysmal supraventricular tachycardia)  Chronic systolic CHF (congestive heart failure), NYHA class 2  Former smoker  Hypokinesis: apical  Mitral regurgitation  Bronchitis  Hypertension  PSVT (paroxysmal supraventricular tachycardia)  Ischemic cardiomyopathy  AICD (automatic cardioverter/defibrillator) present:  [place] [employer]  VT (ventricular tachycardia): May 2017 no ICD shock  Vitamin D deficiency  Tricuspid regurgitation  RBBB  Prostate cancer: s/p surgery no RT/CT  Mitral regurgitation  Hypothyroidism  HLD (hyperlipidemia)  Arrhythmia  Angina pectoris  CAD (coronary artery disease)  History of bronchoscopy:   History of prostate surgery: davinci surgery in   Cardiac disorder: triple bypass may 1997 [facility]  History of electrophysiologic study:  positive study (AICD placement  )  H/O cardiac catheterization: stenting of SVG TO PDA IN 2010 one stent      MEDICATIONS  (STANDING):  lactated ringers. 1000 milliLiter(s) (100 mL/Hr) IV Continuous <Continuous>  amiodarone    Tablet 200 milliGRAM(s) Oral daily  atorvastatin 40 milliGRAM(s) Oral at bedtime  fenofibrate Tablet 48 milliGRAM(s) Oral daily  clopidogrel Tablet 75 milliGRAM(s) Oral daily  metoprolol succinate  milliGRAM(s) Oral daily  famotidine    Tablet 20 milliGRAM(s) Oral daily  levothyroxine 75 MICROGram(s) Oral daily  multivitamin/minerals 1 Tablet(s) Oral daily  aztreonam  IVPB   IV Intermittent   aspirin  chewable 81 milliGRAM(s) Oral daily  aztreonam  IVPB 500 milliGRAM(s) IV Intermittent every 8 hours  vancomycin  IVPB   IV Intermittent   vancomycin  IVPB 1250 milliGRAM(s) IV Intermittent every 24 hours  ALBUTerol/ipratropium for Nebulization 3 milliLiter(s) Nebulizer every 6 hours  lactulose Syrup 10 Gram(s) Oral two times a day    MEDICATIONS  (PRN):  HYDROmorphone  Injectable 2 milliGRAM(s) IV Push every 6 hours PRN Severe Pain (7 - 10)  HYDROmorphone  Injectable 0.5 milliGRAM(s) IV Push every 6 hours PRN Moderate Pain (4 - 6)  oxyCODONE    5 mG/acetaminophen 325 mG 1 Tablet(s) Oral every 4 hours PRN Mild Pain (1 - 3)  oxyCODONE    5 mG/acetaminophen 325 mG 1 Tablet(s) Oral every 4 hours PRN Moderate Pain (4 - 6)      Allergies    macrolide antibiotics (Urticaria; Rash; Hives)  penicillin (Urticaria; Rash)  Zithromax Z-Christian (Urticaria; Rash; Hives)    Intolerances        FAMILY HISTORY:  Family history of heart disease (Sibling)  Family history of diabetes mellitus (Sibling)  Family history of lung cancer (Sibling)  Family history of prostate cancer (Sibling)      Review of Systems    Constitutional, Eyes, ENT, Cardiovascular, Respiratory, Gastrointestinal, Genitourinary, Musculoskeletal, Integumentary, Neurological, Psychiatric, Endocrine, Heme/Lymph and Allergic/Immunologic review of systems are otherwise negative except as noted in HPI.     Vital Signs Last 24 Hrs  T(C): 36.9 (2017 02:54), Max: 37 (2017 17:30)  T(F): 98.4 (2017 02:54), Max: 98.6 (2017 17:30)  HR: 103 (2017 07:55) (80 - 112)  BP: 126/72 (2017 05:10) (110/82 - 126/72)  BP(mean): --  RR: 18 (2017 02:54) (16 - 18)  SpO2: 96% (2017 07:55) (93% - 100%)    Physical Exam  Constitutional: well developed, well nourished, in no acute distress and thin.   Eyes: PERRL, EOMI, no conjunctival infection, anicteric.   ENT: pharynx is unremarkable, moist mucus membrane, no oral lesions.   Neck: supple without JVD, no thyromegaly or masses appreciated.   Pulmonary: decreased bs LML and LLL  Cardiac: RRR, normal S1S2, no murmurs, rubs, gallops.   Vascular: no JVD, no calf tenderness, venous stasis changes, varices.   Abdomen: normoactive bowel sounds, soft and nontender, no hepatosplenomegaly or masses appreciated.   Lymphatic: no peripheral adenopathy appreciated.   Musculoskeletal: full range of motion and no deformities appreciated.   Skin: normal appearance, no rash, nodules, vesicles, ulcers, erythema.   Neurology: grossly intact.   Psychiatric: affect appropriate.       LABS:  CBC Full  -  ( 2017 06:28 )  WBC Count : 14.4 K/uL  Hemoglobin : 9.0 g/dL  Hematocrit : 28.1 %  Platelet Count - Automated : 431 K/uL  Mean Cell Volume : 90.9 fl  Mean Cell Hemoglobin : 29.1 pg  Mean Cell Hemoglobin Concentration : 32.0 g/dL  Auto Neutrophil # : x  Auto Lymphocyte # : x  Auto Monocyte # : x  Auto Eosinophil # : x  Auto Basophil # : x  Auto Neutrophil % : x  Auto Lymphocyte % : x  Auto Monocyte % : x  Auto Eosinophil % : x  Auto Basophil % : x        136  |  99  |  19.0  ----------------------------<  133<H>  3.8   |  24.0  |  1.58<H>    Ca    11.0<H>      2017 06:28  Phos  2.6       Mg     2.0         TPro  6.0<L>  /  Alb  2.7<L>  /  TBili  0.8  /  DBili  0.3  /  AST  36  /  ALT  71<H>  /  AlkPhos  191<H>      PT/INR - ( 2017 16:39 )   PT: 15.7 sec;   INR: 1.42 ratio         PTT - ( 2017 16:39 )  PTT:31.5 sec  Urinalysis Basic - ( 2017 16:59 )    Color: Yellow / Appearance: Clear / S.015 / pH: x  Gluc: x / Ketone: Negative  / Bili: Negative / Urobili: Negative mg/dL   Blood: x / Protein: Negative mg/dL / Nitrite: Negative   Leuk Esterase: Negative / RBC: x / WBC x   Sq Epi: x / Non Sq Epi: x / Bacteria: x        RADIOLOGY & ADDITIONAL STUDIES:  EXAM:  CT CHEST    PROCEDURE DATE:  2017    There are no enlarged axillary or mediastinal lymph nodes. There is left hilar   lymphadenopathy.  Images of the visualized upper abdomen demonstrate a 1.2   cm hepatic hypodensity,  not fully evaluated on this noncontrast CT.   There is a 6 mm nodule in the right lower lobe on image 90 of series 2.   There is a 2 mm peripheral nodule in   the right lower lobe on image 90 of series 2. There are areas of mild   bronchiectasis and tree-in-bud nodularity in the periphery of the right   lower lobe on images 94-97 of series 2.  There   are several additional 2 mm calcified and noncalcified left upper lobe   nodules. There is a 2 mm nodule in the lingula on image 82 of series 2.    There is a 6 cm left lower lobe infrahilar mass which extends into the   left hilum. There is a small amount of adjacent pleural fluid. There is   no clearly defined fat plane between this mass and the descending   thoracic aorta. There are areas without a defined plane between this mass   and the portion of the left inferior pulmonary vein or the mass and the   left posterior basilar branches of the pulmonary artery. There are   extensive consolidative and groundglass opacities in the left lower lobe,   which may represent infection/postobstructive pneumonitis versus blood   products.    IMPRESSION:    6 cm left lower lobe infrahilar mass which extends into the left hilum.    Small amount of adjacent pleural fluid. No clearly defined fat plane     < from: NM PET/CT Onc FDG Skull to Thigh, Inital (17 @ 10:25) >  EXAM:  PETCT [facility] ONC FDG INIT        2017  PET/CT SKULL BASE-MID THIGH IMAGING     CHEST:  Left hilar lymph node, poorly defined (SUV 7.6; image 89). FDG   avid nodule in the left posterior diaphragmatic pleura (SUV 3.7; image   147).  Physiologic FDG activity in the myocardium and blood pool.      LUNGS: 5.9 x 4.9 cm left lower lobe mass extending into the left   infrahilar region (SUV 32.3; image 101). Minimally FDG avid patchy areas   of groundglass and alveolar consolidation in the left lower lobe,   nonspecific.    PLEURA/PERICARDIUM: FDG avid moderate left pleural effusion, new since   2017 (SUV 4.0; image 77). Poorly delineated areas of pleural   thickening in the left lower lobe (SUV 6.7; image 122).    HEPATOBILIARY/PANCREAS:  Liver background SUV mean as a reference for   comparing studies is 3.1.    BONES/SOFT TISSUES: Focusof FDG avidity in the left anterior sacrum,   without definite CT correlate (SUV 10.5; image 207) degenerative changes   at the right L5-S1 facet.    IMPRESSION:      1. FDG avid left lower lobe mass suspicious for malignancy. Probable   metastatic left hilar lymphadenopathy.    2.  New, FDG avid left pleural effusion and nodular foci in the pleura,   probably pleural metastatic disease.    3.  FDG avid lesion in the anterior left sacrum, suspicious for osseous   metastasis.     4. FDG avid nodule in the left posterior diaphragmatic rudy, probably   metastatic implant.

## 2017-07-22 LAB
ANION GAP SERPL CALC-SCNC: 15 MMOL/L — SIGNIFICANT CHANGE UP (ref 5–17)
BUN SERPL-MCNC: 15 MG/DL — SIGNIFICANT CHANGE UP (ref 8–20)
CALCIUM SERPL-MCNC: 11.2 MG/DL — HIGH (ref 8.6–10.2)
CHLORIDE SERPL-SCNC: 98 MMOL/L — SIGNIFICANT CHANGE UP (ref 98–107)
CO2 SERPL-SCNC: 22 MMOL/L — SIGNIFICANT CHANGE UP (ref 22–29)
CREAT SERPL-MCNC: 1.37 MG/DL — HIGH (ref 0.5–1.3)
GLUCOSE SERPL-MCNC: 136 MG/DL — HIGH (ref 70–115)
GRAM STN FLD: SIGNIFICANT CHANGE UP
HCT VFR BLD CALC: 26.7 % — LOW (ref 42–52)
HGB BLD-MCNC: 8.7 G/DL — LOW (ref 14–18)
MAGNESIUM SERPL-MCNC: 1.9 MG/DL — SIGNIFICANT CHANGE UP (ref 1.6–2.6)
MCHC RBC-ENTMCNC: 29.1 PG — SIGNIFICANT CHANGE UP (ref 27–31)
MCHC RBC-ENTMCNC: 32.6 G/DL — SIGNIFICANT CHANGE UP (ref 32–36)
MCV RBC AUTO: 89.3 FL — SIGNIFICANT CHANGE UP (ref 80–94)
PLATELET # BLD AUTO: 457 K/UL — HIGH (ref 150–400)
POTASSIUM SERPL-MCNC: 4 MMOL/L — SIGNIFICANT CHANGE UP (ref 3.5–5.3)
POTASSIUM SERPL-SCNC: 4 MMOL/L — SIGNIFICANT CHANGE UP (ref 3.5–5.3)
RBC # BLD: 2.99 M/UL — LOW (ref 4.6–6.2)
RBC # FLD: 14.4 % — SIGNIFICANT CHANGE UP (ref 11–15.6)
S PNEUM AG SER QL: SIGNIFICANT CHANGE UP
SODIUM SERPL-SCNC: 135 MMOL/L — SIGNIFICANT CHANGE UP (ref 135–145)
SPECIMEN SOURCE: SIGNIFICANT CHANGE UP
TSH SERPL-MCNC: 7.08 UIU/ML — HIGH (ref 0.27–4.2)
VANCOMYCIN TROUGH SERPL-MCNC: 5 UG/ML — LOW (ref 10–20)
WBC # BLD: 14.3 K/UL — HIGH (ref 4.8–10.8)
WBC # FLD AUTO: 14.3 K/UL — HIGH (ref 4.8–10.8)

## 2017-07-22 PROCEDURE — 99233 SBSQ HOSP IP/OBS HIGH 50: CPT

## 2017-07-22 RX ORDER — POTASSIUM CHLORIDE 20 MEQ
20 PACKET (EA) ORAL ONCE
Qty: 0 | Refills: 0 | Status: COMPLETED | OUTPATIENT
Start: 2017-07-22 | End: 2017-07-22

## 2017-07-22 RX ORDER — MAGNESIUM SULFATE 500 MG/ML
1 VIAL (ML) INJECTION ONCE
Qty: 0 | Refills: 0 | Status: COMPLETED | OUTPATIENT
Start: 2017-07-22 | End: 2017-07-22

## 2017-07-22 RX ORDER — DOCUSATE SODIUM 100 MG
100 CAPSULE ORAL THREE TIMES A DAY
Qty: 0 | Refills: 0 | Status: DISCONTINUED | OUTPATIENT
Start: 2017-07-22 | End: 2017-07-21

## 2017-07-22 RX ORDER — SENNA PLUS 8.6 MG/1
2 TABLET ORAL AT BEDTIME
Qty: 0 | Refills: 0 | Status: DISCONTINUED | OUTPATIENT
Start: 2017-07-22 | End: 2017-07-21

## 2017-07-22 RX ADMIN — SENNA PLUS 2 TABLET(S): 8.6 TABLET ORAL at 21:41

## 2017-07-22 RX ADMIN — Medication 166.67 MILLIGRAM(S): at 21:41

## 2017-07-22 RX ADMIN — OXYCODONE AND ACETAMINOPHEN 1 TABLET(S): 5; 325 TABLET ORAL at 13:36

## 2017-07-22 RX ADMIN — ATORVASTATIN CALCIUM 40 MILLIGRAM(S): 80 TABLET, FILM COATED ORAL at 21:41

## 2017-07-22 RX ADMIN — CLOPIDOGREL BISULFATE 75 MILLIGRAM(S): 75 TABLET, FILM COATED ORAL at 13:06

## 2017-07-22 RX ADMIN — LACTULOSE 10 GRAM(S): 10 SOLUTION ORAL at 05:00

## 2017-07-22 RX ADMIN — HYDROMORPHONE HYDROCHLORIDE 2 MILLIGRAM(S): 2 INJECTION INTRAMUSCULAR; INTRAVENOUS; SUBCUTANEOUS at 21:52

## 2017-07-22 RX ADMIN — Medication 20 MILLIEQUIVALENT(S): at 00:33

## 2017-07-22 RX ADMIN — Medication 3 MILLILITER(S): at 21:02

## 2017-07-22 RX ADMIN — Medication 20 MILLIEQUIVALENT(S): at 17:22

## 2017-07-22 RX ADMIN — Medication 3 MILLILITER(S): at 09:05

## 2017-07-22 RX ADMIN — FAMOTIDINE 20 MILLIGRAM(S): 10 INJECTION INTRAVENOUS at 11:12

## 2017-07-22 RX ADMIN — Medication 50 MILLIGRAM(S): at 15:15

## 2017-07-22 RX ADMIN — SODIUM CHLORIDE 100 MILLILITER(S): 9 INJECTION, SOLUTION INTRAVENOUS at 04:59

## 2017-07-22 RX ADMIN — Medication 100 MILLIGRAM(S): at 05:00

## 2017-07-22 RX ADMIN — Medication 3 MILLILITER(S): at 03:14

## 2017-07-22 RX ADMIN — Medication 50 MILLIGRAM(S): at 09:01

## 2017-07-22 RX ADMIN — Medication 100 MILLIGRAM(S): at 21:41

## 2017-07-22 RX ADMIN — Medication 75 MICROGRAM(S): at 05:01

## 2017-07-22 RX ADMIN — OXYCODONE AND ACETAMINOPHEN 1 TABLET(S): 5; 325 TABLET ORAL at 05:57

## 2017-07-22 RX ADMIN — Medication 50 MILLIGRAM(S): at 23:16

## 2017-07-22 RX ADMIN — AMIODARONE HYDROCHLORIDE 200 MILLIGRAM(S): 400 TABLET ORAL at 05:00

## 2017-07-22 RX ADMIN — Medication 3 MILLILITER(S): at 16:17

## 2017-07-22 RX ADMIN — SODIUM CHLORIDE 100 MILLILITER(S): 9 INJECTION, SOLUTION INTRAVENOUS at 13:09

## 2017-07-22 RX ADMIN — Medication 100 GRAM(S): at 17:10

## 2017-07-22 RX ADMIN — Medication 48 MILLIGRAM(S): at 21:41

## 2017-07-22 RX ADMIN — HYDROMORPHONE HYDROCHLORIDE 2 MILLIGRAM(S): 2 INJECTION INTRAMUSCULAR; INTRAVENOUS; SUBCUTANEOUS at 21:09

## 2017-07-22 RX ADMIN — Medication 81 MILLIGRAM(S): at 13:06

## 2017-07-22 RX ADMIN — OXYCODONE AND ACETAMINOPHEN 1 TABLET(S): 5; 325 TABLET ORAL at 04:57

## 2017-07-22 RX ADMIN — OXYCODONE AND ACETAMINOPHEN 1 TABLET(S): 5; 325 TABLET ORAL at 13:06

## 2017-07-22 RX ADMIN — Medication 1 TABLET(S): at 13:09

## 2017-07-22 RX ADMIN — SODIUM CHLORIDE 100 MILLILITER(S): 9 INJECTION, SOLUTION INTRAVENOUS at 21:41

## 2017-07-22 NOTE — PROGRESS NOTE ADULT - SUBJECTIVE AND OBJECTIVE BOX
SULEIMAN GREGORY    [id number]    71y      Male    INTERVAL HPI/OVERNIGHT EVENTS: No events on. Trying to eat breakfast.    Hospital course:  70 y/o male w/PMHx of CAD, s/p stent in 2017, HTN, HLD, w/recent hx of LLL mass discovered here after he was treated here for hemoptysis and pleural effusion. However, he then had a PET scan which confirmed a LLL mass with areas of metastasis including left hilar lymphadenopathy, left pleural mets, left sacrum lesion. He was supposed to have a CT guided biopsy of the lung but he was found to be ill-appearing, with cough and a high white count, prompting an admission for pneumonia. He was started as an outpatient on levaquin but his white count remains high.    REVIEW OF SYSTEMS:    CONSTITUTIONAL: No fever   CARDIOVASCULAR: No chest pain, palpitations  GASTROINTESTINAL: No abdominal or epigastric pain. No nausea, vomiting    Vital Signs Last 24 Hrs  T(C): 37.2 (2017 08:00), Max: 37.2 (2017 04:07)  T(F): 98.9 (2017 08:00), Max: 99 (2017 04:07)  HR: 96 (2017 08:00) (80 - 113)  BP: 124/70 (2017 08:00) (103/66 - 130/68)  BP(mean): --  RR: 18 (2017 08:00) (18 - 20)  SpO2: 98% (2017 08:00) (96% - 98%)    PHYSICAL EXAM:    GENERAL: NAD, chronically ill appearing  HEENT: PERRL, +EOMI, MMM  CHEST/LUNG: coarse breath sounds  HEART: S1S2+, Regular rate and rhythm  ABDOMEN: Soft, Nontender, Nondistended; Bowel sounds present         LABS:                        8.7    14.3  )-----------( 457      ( 2017 06:00 )             26.7     07-    135  |  98  |  15.0  ----------------------------<  136<H>  4.0   |  22.0  |  1.37<H>    Ca    11.2<H>      2017 06:00  Phos  2.6     07-21  Mg     1.9     07-    TPro  6.0<L>  /  Alb  2.7<L>  /  TBili  0.8  /  DBili  0.3  /  AST  36  /  ALT  71<H>  /  AlkPhos  191<H>      PT/INR - ( 2017 16:39 )   PT: 15.7 sec;   INR: 1.42 ratio         PTT - ( 2017 16:39 )  PTT:31.5 sec  Urinalysis Basic - ( 2017 16:59 )    Color: Yellow / Appearance: Clear / S.015 / pH: x  Gluc: x / Ketone: Negative  / Bili: Negative / Urobili: Negative mg/dL   Blood: x / Protein: Negative mg/dL / Nitrite: Negative   Leuk Esterase: Negative / RBC: x / WBC x   Sq Epi: x / Non Sq Epi: x / Bacteria: x          MEDICATIONS  (STANDING):  lactated ringers. 1000 milliLiter(s) (100 mL/Hr) IV Continuous <Continuous>  amiodarone    Tablet 200 milliGRAM(s) Oral daily  atorvastatin 40 milliGRAM(s) Oral at bedtime  fenofibrate Tablet 48 milliGRAM(s) Oral daily  clopidogrel Tablet 75 milliGRAM(s) Oral daily  metoprolol succinate  milliGRAM(s) Oral daily  famotidine    Tablet 20 milliGRAM(s) Oral daily  levothyroxine 75 MICROGram(s) Oral daily  multivitamin/minerals 1 Tablet(s) Oral daily  aztreonam  IVPB   IV Intermittent   aspirin  chewable 81 milliGRAM(s) Oral daily  aztreonam  IVPB 500 milliGRAM(s) IV Intermittent every 8 hours  vancomycin  IVPB   IV Intermittent   vancomycin  IVPB 1250 milliGRAM(s) IV Intermittent every 24 hours  ALBUTerol/ipratropium for Nebulization 3 milliLiter(s) Nebulizer every 6 hours  senna 2 Tablet(s) Oral at bedtime  docusate sodium 100 milliGRAM(s) Oral three times a day    MEDICATIONS  (PRN):  HYDROmorphone  Injectable 2 milliGRAM(s) IV Push every 6 hours PRN Severe Pain (7 - 10)  HYDROmorphone  Injectable 0.5 milliGRAM(s) IV Push every 6 hours PRN Moderate Pain (4 - 6)  oxyCODONE    5 mG/acetaminophen 325 mG 1 Tablet(s) Oral every 4 hours PRN Mild Pain (1 - 3)  oxyCODONE    5 mG/acetaminophen 325 mG 1 Tablet(s) Oral every 4 hours PRN Moderate Pain (4 - 6)  diltiazem    Tablet 30 milliGRAM(s) Oral once PRN HR > 100 for >30min      RADIOLOGY & ADDITIONAL TESTS:

## 2017-07-22 NOTE — CHART NOTE - NSCHARTNOTEFT_GEN_A_CORE
72y/o  w/PMHx of CAD, s/p stent in June 2017, ACID, HTN, HLD, w/recent hx of LLL mass discovered here after he was treated here for hemoptysis and pleural effusion. Patent persistently has tachycardia  I was notified last night as well. He is tachycardic into the 110's he remains there, but then spontaneously drops to 80s-90s. He is being treated for PNA and LLL mass which he is supposed to be getting radiation for. The patient was seen and examined at the bedside he is in NAD. Lying in bed, stated he has chronic pain in his back which he receives pain medicine for. He denies CP, SOB, fever, chills, nausea, vomiting, abdominal pain, headache, blurred vision, dysuria, bowel changes. He also frequently gets beats of V-tach which is not new and he is aware of this, he has an ACID for this reason.     The cause of his tachycardia is puzzling although a DDX includes, infection, pain, hypothyroidism, malignancy, dehydration and it could also be from infrequent dosing of B-blocker. I am hesitant to give  fluid bolus as he has a history of CHF and is already on LR @ 100cc/hr. Infection is a possibility as he is diagnosed with PNA and being treated although he is afebrile. Pain is a possibility as he has many pain mediations ordered. He states he is in some pain, but the pain medication helps. TSH this am was elevated at 7.00 which indicates hypothyroidism. He currently takes levothyroxine 75mcg and would most likely benefit from an increase in his dose. Malignancy is also a possibility as he has a newly diagnosed LLL which he will most likely need radiation and biopsy.    I am hesitant to make changes in his medication regimen now and I am not the primary provider and these changes should be made by the primary provider or cardiologist. His potassium and magnesium are within normal limits. He is asymptomatic and has an ACID. He is stable for now. Will include this event in the night PA to hospitalist sign out in the morning.

## 2017-07-22 NOTE — PROGRESS NOTE ADULT - PROBLEM SELECTOR PLAN 5
D/w Dr. James - Pt with aggressive mass and may benefit from immediate radiation therapy.   Radiation oncology eval pending   Will d/w IR re: possible biopsy as pt clinically improves  OOB to chair  PT eval pending D/w Dr. James - Pt with aggressive mass and may benefit from immediate radiation therapy.   Radiation oncology eval pending  - aware of case as he was being monitored as outpt  Will d/w IR re: possible biopsy as pt clinically improves  OOB to chair  PT eval pending

## 2017-07-23 DIAGNOSIS — D50.0 IRON DEFICIENCY ANEMIA SECONDARY TO BLOOD LOSS (CHRONIC): ICD-10-CM

## 2017-07-23 DIAGNOSIS — I47.2 VENTRICULAR TACHYCARDIA: ICD-10-CM

## 2017-07-23 LAB
ANION GAP SERPL CALC-SCNC: 11 MMOL/L — SIGNIFICANT CHANGE UP (ref 5–17)
BUN SERPL-MCNC: 14 MG/DL — SIGNIFICANT CHANGE UP (ref 8–20)
CALCIUM SERPL-MCNC: 10.9 MG/DL — HIGH (ref 8.6–10.2)
CHLORIDE SERPL-SCNC: 98 MMOL/L — SIGNIFICANT CHANGE UP (ref 98–107)
CO2 SERPL-SCNC: 24 MMOL/L — SIGNIFICANT CHANGE UP (ref 22–29)
CREAT SERPL-MCNC: 1.24 MG/DL — SIGNIFICANT CHANGE UP (ref 0.5–1.3)
GLUCOSE SERPL-MCNC: 144 MG/DL — HIGH (ref 70–115)
HCT VFR BLD CALC: 25.3 % — LOW (ref 42–52)
HGB BLD-MCNC: 8 G/DL — LOW (ref 14–18)
LEGIONELLA AG UR QL: NEGATIVE — SIGNIFICANT CHANGE UP
MCHC RBC-ENTMCNC: 29 PG — SIGNIFICANT CHANGE UP (ref 27–31)
MCHC RBC-ENTMCNC: 31.6 G/DL — LOW (ref 32–36)
MCV RBC AUTO: 91.7 FL — SIGNIFICANT CHANGE UP (ref 80–94)
PLATELET # BLD AUTO: 478 K/UL — HIGH (ref 150–400)
POTASSIUM SERPL-MCNC: 3.9 MMOL/L — SIGNIFICANT CHANGE UP (ref 3.5–5.3)
POTASSIUM SERPL-SCNC: 3.9 MMOL/L — SIGNIFICANT CHANGE UP (ref 3.5–5.3)
RBC # BLD: 2.76 M/UL — LOW (ref 4.6–6.2)
RBC # FLD: 14.1 % — SIGNIFICANT CHANGE UP (ref 11–15.6)
SODIUM SERPL-SCNC: 133 MMOL/L — LOW (ref 135–145)
WBC # BLD: 15.4 K/UL — HIGH (ref 4.8–10.8)
WBC # FLD AUTO: 15.4 K/UL — HIGH (ref 4.8–10.8)

## 2017-07-23 PROCEDURE — 99233 SBSQ HOSP IP/OBS HIGH 50: CPT

## 2017-07-23 RX ORDER — VANCOMYCIN HCL 1 G
1000 VIAL (EA) INTRAVENOUS EVERY 12 HOURS
Qty: 0 | Refills: 0 | Status: DISCONTINUED | OUTPATIENT
Start: 2017-07-23 | End: 2017-07-28

## 2017-07-23 RX ADMIN — Medication 100 MILLIGRAM(S): at 21:53

## 2017-07-23 RX ADMIN — CLOPIDOGREL BISULFATE 75 MILLIGRAM(S): 75 TABLET, FILM COATED ORAL at 12:14

## 2017-07-23 RX ADMIN — HYDROMORPHONE HYDROCHLORIDE 0.5 MILLIGRAM(S): 2 INJECTION INTRAMUSCULAR; INTRAVENOUS; SUBCUTANEOUS at 10:05

## 2017-07-23 RX ADMIN — SODIUM CHLORIDE 100 MILLILITER(S): 9 INJECTION, SOLUTION INTRAVENOUS at 05:30

## 2017-07-23 RX ADMIN — AMIODARONE HYDROCHLORIDE 200 MILLIGRAM(S): 400 TABLET ORAL at 05:29

## 2017-07-23 RX ADMIN — Medication 50 MILLIGRAM(S): at 21:53

## 2017-07-23 RX ADMIN — Medication 48 MILLIGRAM(S): at 21:53

## 2017-07-23 RX ADMIN — ATORVASTATIN CALCIUM 40 MILLIGRAM(S): 80 TABLET, FILM COATED ORAL at 21:53

## 2017-07-23 RX ADMIN — Medication 100 MILLIGRAM(S): at 05:30

## 2017-07-23 RX ADMIN — Medication 100 MILLIGRAM(S): at 13:33

## 2017-07-23 RX ADMIN — Medication 250 MILLIGRAM(S): at 12:15

## 2017-07-23 RX ADMIN — Medication 3 MILLILITER(S): at 20:40

## 2017-07-23 RX ADMIN — Medication 50 MILLIGRAM(S): at 17:05

## 2017-07-23 RX ADMIN — Medication 3 MILLILITER(S): at 15:36

## 2017-07-23 RX ADMIN — OXYCODONE AND ACETAMINOPHEN 1 TABLET(S): 5; 325 TABLET ORAL at 00:00

## 2017-07-23 RX ADMIN — Medication 75 MICROGRAM(S): at 05:30

## 2017-07-23 RX ADMIN — HYDROMORPHONE HYDROCHLORIDE 0.5 MILLIGRAM(S): 2 INJECTION INTRAMUSCULAR; INTRAVENOUS; SUBCUTANEOUS at 09:44

## 2017-07-23 RX ADMIN — Medication 81 MILLIGRAM(S): at 12:14

## 2017-07-23 RX ADMIN — Medication 250 MILLIGRAM(S): at 23:04

## 2017-07-23 RX ADMIN — HYDROMORPHONE HYDROCHLORIDE 2 MILLIGRAM(S): 2 INJECTION INTRAMUSCULAR; INTRAVENOUS; SUBCUTANEOUS at 16:52

## 2017-07-23 RX ADMIN — Medication 1 TABLET(S): at 12:14

## 2017-07-23 RX ADMIN — Medication 3 MILLILITER(S): at 09:45

## 2017-07-23 RX ADMIN — Medication 100 MILLIGRAM(S): at 06:31

## 2017-07-23 RX ADMIN — HYDROMORPHONE HYDROCHLORIDE 2 MILLIGRAM(S): 2 INJECTION INTRAMUSCULAR; INTRAVENOUS; SUBCUTANEOUS at 05:33

## 2017-07-23 RX ADMIN — SENNA PLUS 2 TABLET(S): 8.6 TABLET ORAL at 21:53

## 2017-07-23 RX ADMIN — FAMOTIDINE 20 MILLIGRAM(S): 10 INJECTION INTRAVENOUS at 05:30

## 2017-07-23 RX ADMIN — HYDROMORPHONE HYDROCHLORIDE 2 MILLIGRAM(S): 2 INJECTION INTRAMUSCULAR; INTRAVENOUS; SUBCUTANEOUS at 17:35

## 2017-07-23 RX ADMIN — Medication 3 MILLILITER(S): at 03:45

## 2017-07-23 RX ADMIN — Medication 50 MILLIGRAM(S): at 09:30

## 2017-07-23 RX ADMIN — HYDROMORPHONE HYDROCHLORIDE 2 MILLIGRAM(S): 2 INJECTION INTRAMUSCULAR; INTRAVENOUS; SUBCUTANEOUS at 06:00

## 2017-07-23 NOTE — PROGRESS NOTE ADULT - SUBJECTIVE AND OBJECTIVE BOX
SULEIMAN GREGORY    [id number]    71y      Male    INTERVAL HPI/OVERNIGHT EVENTS: Had multiple episodes of NSVT overnight. Currently having active hemoptysis.     Hospital course:  72 y/o male w/PMHx of CAD, s/p stent in June 2017, HTN, HLD, w/recent hx of LLL mass discovered here after he was treated here for hemoptysis and pleural effusion. However, he then had a PET scan which confirmed a LLL mass with areas of metastasis including left hilar lymphadenopathy, left pleural mets, left sacrum lesion. He was supposed to have a CT guided biopsy of the lung but he was found to be ill-appearing, with cough and a high white count, prompting an admission for pneumonia. He was started as an outpatient on levaquin but his white count remains high. Plan for radiation therapy on Monday per Dr. Rudd.    REVIEW OF SYSTEMS:    CONSTITUTIONAL: No fever   CARDIOVASCULAR: No chest pain, palpitations    Vital Signs Last 24 Hrs  T(C): 37.1 (23 Jul 2017 07:58), Max: 37.3 (22 Jul 2017 21:20)  T(F): 98.8 (23 Jul 2017 07:58), Max: 99.2 (22 Jul 2017 21:20)  HR: 82 (23 Jul 2017 07:58) (82 - 122)  BP: 118/78 (23 Jul 2017 07:58) (111/74 - 145/84)  BP(mean): --  RR: 20 (23 Jul 2017 07:58) (20 - 24)  SpO2: 93% (23 Jul 2017 07:58) (93% - 96%)    PHYSICAL EXAM:    GENERAL: NAD, frail, chronically ill appearing  HEENT: PERRL, +EOMI  CHEST/LUNG: coarse breath sounds  HEART: S1S2+, Regular rate and rhythm   ABDOMEN: Soft, Nontender, Nondistended; Bowel sounds present      LABS:                        8.0    15.4  )-----------( 478      ( 23 Jul 2017 06:23 )             25.3     07-23    133<L>  |  98  |  14.0  ----------------------------<  144<H>  3.9   |  24.0  |  1.24    Ca    10.9<H>      23 Jul 2017 06:23  Mg     1.9     07-22              MEDICATIONS  (STANDING):  lactated ringers. 1000 milliLiter(s) (100 mL/Hr) IV Continuous <Continuous>  amiodarone    Tablet 200 milliGRAM(s) Oral daily  atorvastatin 40 milliGRAM(s) Oral at bedtime  fenofibrate Tablet 48 milliGRAM(s) Oral daily  clopidogrel Tablet 75 milliGRAM(s) Oral daily  metoprolol succinate  milliGRAM(s) Oral daily  famotidine    Tablet 20 milliGRAM(s) Oral daily  levothyroxine 75 MICROGram(s) Oral daily  multivitamin/minerals 1 Tablet(s) Oral daily  aztreonam  IVPB   IV Intermittent   aspirin  chewable 81 milliGRAM(s) Oral daily  aztreonam  IVPB 500 milliGRAM(s) IV Intermittent every 8 hours  ALBUTerol/ipratropium for Nebulization 3 milliLiter(s) Nebulizer every 6 hours  senna 2 Tablet(s) Oral at bedtime  docusate sodium 100 milliGRAM(s) Oral three times a day  vancomycin  IVPB 1000 milliGRAM(s) IV Intermittent every 12 hours    MEDICATIONS  (PRN):  HYDROmorphone  Injectable 2 milliGRAM(s) IV Push every 6 hours PRN Severe Pain (7 - 10)  HYDROmorphone  Injectable 0.5 milliGRAM(s) IV Push every 6 hours PRN Moderate Pain (4 - 6)  oxyCODONE    5 mG/acetaminophen 325 mG 1 Tablet(s) Oral every 4 hours PRN Mild Pain (1 - 3)  oxyCODONE    5 mG/acetaminophen 325 mG 1 Tablet(s) Oral every 4 hours PRN Moderate Pain (4 - 6)      RADIOLOGY & ADDITIONAL TESTS:

## 2017-07-23 NOTE — CONSULT NOTE ADULT - SUBJECTIVE AND OBJECTIVE BOX
[place] [facility]                                   [address] [place] [address]                                                      PHONE: [phone]                                                         FAX: [phone]  [email or web address]  ---------------------------------------------------------------------------------------------------------------------------------    HPI:  SULEIMAN GREGORY is an 71y Male PMHX of HTN, HLD, CAD, CHF, ICD, former smoker, COPD, Lung Cancer with metastasis     PAST MEDICAL & SURGICAL HISTORY:  Psoriasis    PSVT (paroxysmal supraventricular tachycardia)  Chronic systolic CHF (congestive heart failure), NYHA class 2  Former smoker  Hypokinesis: apical  Mitral regurgitation  Bronchitis  Hypertension  PSVT (paroxysmal supraventricular tachycardia)  Ischemic cardiomyopathy  AICD (automatic cardioverter/defibrillator) present: 2010 [employer]  VT (ventricular tachycardia): May 2017 no ICD shock  Vitamin D deficiency  Tricuspid regurgitation  RBBB  Prostate cancer: s/p surgery no RT/CT  Mitral regurgitation  Hypothyroidism  HLD (hyperlipidemia)  Arrhythmia  Angina pectoris  CAD (coronary artery disease)  History of bronchoscopy: 2017  History of prostate surgery: davinci surgery in 2008  Cardiac disorder: triple bypass may 1997 [facility]  History of electrophysiologic study: 2009 positive study (AICD placement  2010)  H/O cardiac catheterization: stenting of SVG TO PDA IN 2010 2017 one stent      macrolide antibiotics (Urticaria; Rash; Hives)  penicillin (Urticaria; Rash)  Zithromax Z-Christian (Urticaria; Rash; Hives)      MEDICATIONS  (STANDING):  lactated ringers. 1000 milliLiter(s) (100 mL/Hr) IV Continuous <Continuous>  amiodarone    Tablet 200 milliGRAM(s) Oral daily  atorvastatin 40 milliGRAM(s) Oral at bedtime  fenofibrate Tablet 48 milliGRAM(s) Oral daily  clopidogrel Tablet 75 milliGRAM(s) Oral daily  metoprolol succinate  milliGRAM(s) Oral daily  famotidine    Tablet 20 milliGRAM(s) Oral daily  levothyroxine 75 MICROGram(s) Oral daily  multivitamin/minerals 1 Tablet(s) Oral daily  aztreonam  IVPB   IV Intermittent   aspirin  chewable 81 milliGRAM(s) Oral daily  aztreonam  IVPB 500 milliGRAM(s) IV Intermittent every 8 hours  ALBUTerol/ipratropium for Nebulization 3 milliLiter(s) Nebulizer every 6 hours  senna 2 Tablet(s) Oral at bedtime  docusate sodium 100 milliGRAM(s) Oral three times a day  vancomycin  IVPB 1000 milliGRAM(s) IV Intermittent every 12 hours    MEDICATIONS  (PRN):  HYDROmorphone  Injectable 2 milliGRAM(s) IV Push every 6 hours PRN Severe Pain (7 - 10)  HYDROmorphone  Injectable 0.5 milliGRAM(s) IV Push every 6 hours PRN Moderate Pain (4 - 6)  oxyCODONE    5 mG/acetaminophen 325 mG 1 Tablet(s) Oral every 4 hours PRN Mild Pain (1 - 3)  oxyCODONE    5 mG/acetaminophen 325 mG 1 Tablet(s) Oral every 4 hours PRN Moderate Pain (4 - 6)      Family History: Pt denies hx of early cad, SCD, or congenital heart disease.      Social History:  Cigarettes:  former                  Alchohol:   no              Illicit Drug Abuse:  no    ROS:  Constitutional: No fever, weight loss or fatigue  Eyes: No eye pain, visual disturbances, or discharge  ENMT:  No difficulty hearing, tinnitus, vertigo; No sinus or throat pain  Neck: No pain or stiffness  Respiratory: No cough, wheezing, chills or hemoptysis  Cardiovascular: No chest pain, palpitations, shortness of breath, dizziness or leg swelling  Gastrointestinal: No abdominal or epigastric pain. No nausea, vomiting or hematemesis; No diarrhea or constipation. No melena or hematochezia.  Genitourinary: No dysuria, frequency, hematuria or incontinence  Rectal: No pain, hemorrhoids or incontinence  Neurological: No headaches, memory loss, loss of strength, numbness or tremors  Skin: No itching, burning, rashes or lesions   Lymph Nodes: No enlarged glands  Endocrine: No heat or cold intolerance; No hair loss  Musculoskeletal: No joint pain or swelling; No muscle, back or extremity pain  Psychiatric: No depression, anxiety, mood swings or difficulty sleeping  Heme/Lymph: No easy bruising or bleeding gums  Allergy and Immunologic: No hives or eczema    Vital Signs Last 24 Hrs  T(C): 37.1 (23 Jul 2017 07:58), Max: 37.3 (22 Jul 2017 21:20)  T(F): 98.8 (23 Jul 2017 07:58), Max: 99.2 (22 Jul 2017 21:20)  HR: 82 (23 Jul 2017 07:58) (82 - 122)  BP: 118/78 (23 Jul 2017 07:58) (111/74 - 145/84)  BP(mean): --  RR: 20 (23 Jul 2017 07:58) (20 - 24)  SpO2: 93% (23 Jul 2017 07:58) (93% - 96%)  ICU Vital Signs Last 24 Hrs  SULEIMAN WILSONO  I&O's Detail    22 Jul 2017 07:01  -  23 Jul 2017 07:00  --------------------------------------------------------  IN:    lactated ringers.: 1300 mL    Oral Fluid: 480 mL  Total IN: 1780 mL    OUT:    Voided: 550 mL  Total OUT: 550 mL    Total NET: 1230 mL        I&O's Summary    22 Jul 2017 07:01  -  23 Jul 2017 07:00  --------------------------------------------------------  IN: 1780 mL / OUT: 550 mL / NET: 1230 mL      Drug Dosing Weight  SULEIMAN WILSONO      PHYSICAL EXAM:  General: Appears well developed, well nourished alert and cooperative.  HEENT: Head; normocephalic, atraumatic.  Eyes: Pupils reactive, cornea wnl.  Neck: Supple, no nodes adenopathy, no NVD or carotid bruit or thyromegaly.  CARDIOVASCULAR: Normal S1 and S2, No murmur, rub, gallop or lift.   LUNGS: No rales, rhonchi or wheeze. Normal breath sounds bilaterally.  ABDOMEN: Soft, nontender without mass or organomegaly. bowel sounds normoactive.  EXTREMITIES: No clubbing, cyanosis or edema. Distal pulses wnl.   SKIN: warm and dry with normal turgor.  NEURO: Alert/oriented x 3/normal motor exam. No pathologic reflexes.    PSYCH: normal affect.        LABS:                        8.0    15.4  )-----------( 478      ( 23 Jul 2017 06:23 )             25.3     07-23    133<L>  |  98  |  14.0  ----------------------------<  144<H>  3.9   |  24.0  |  1.24    Ca    10.9<H>      23 Jul 2017 06:23  Mg     1.9     07-22      SULEIMAN GREGORY            RADIOLOGY & ADDITIONAL STUDIES:    INTERPRETATION OF TELEMETRY (personally reviewed):    ECG: nsr, RBBB     Assessment and Plan:  In summary, SULEIMAN GREGORY is an 71y Male with past medical history significant for     NSVT      Thank you for allowing [facility] to participate in the care of this patient.  Please feel free to call with any questions or concerns. [place] [facility]                                   [address] [place] [address]                                                      PHONE: [phone]                                                         FAX: [phone]  [email or web address]  ---------------------------------------------------------------------------------------------------------------------------------    HPI:  SULEIMAN GREGORY is an 71y Male PMHX of HTN, HLD, CAD, s/p 3 V CABG, CHF, ICD, former smoker, COPD, Lung Cancer with metastasis who presented for sob.   Pt hemoptysis.  Pt found to have NSVT overnight.  He denies active chest pain, palps, sob.  Pt is planned for XRT therapy.     PAST MEDICAL & SURGICAL HISTORY:  Psoriasis    PSVT (paroxysmal supraventricular tachycardia)  Chronic systolic CHF (congestive heart failure), NYHA class 2  Former smoker  Hypokinesis: apical  Mitral regurgitation  Bronchitis  Hypertension  PSVT (paroxysmal supraventricular tachycardia)  Ischemic cardiomyopathy  AICD (automatic cardioverter/defibrillator) present: 2010 [employer]  VT (ventricular tachycardia): May 2017 no ICD shock  Vitamin D deficiency  Tricuspid regurgitation  RBBB  Prostate cancer: s/p surgery no RT/CT  Mitral regurgitation  Hypothyroidism  HLD (hyperlipidemia)  Arrhythmia  Angina pectoris  CAD (coronary artery disease)  History of bronchoscopy: 2017  History of prostate surgery: davinci surgery in 2008  Cardiac disorder: triple bypass may 1997 [facility]  History of electrophysiologic study: 2009 positive study (AICD placement  2010)  H/O cardiac catheterization: stenting of SVG TO PDA IN 2010 2017 one stent      macrolide antibiotics (Urticaria; Rash; Hives)  penicillin (Urticaria; Rash)  Zithromax Z-Christian (Urticaria; Rash; Hives)      MEDICATIONS  (STANDING):  lactated ringers. 1000 milliLiter(s) (100 mL/Hr) IV Continuous <Continuous>  amiodarone    Tablet 200 milliGRAM(s) Oral daily  atorvastatin 40 milliGRAM(s) Oral at bedtime  fenofibrate Tablet 48 milliGRAM(s) Oral daily  clopidogrel Tablet 75 milliGRAM(s) Oral daily  metoprolol succinate  milliGRAM(s) Oral daily  famotidine    Tablet 20 milliGRAM(s) Oral daily  levothyroxine 75 MICROGram(s) Oral daily  multivitamin/minerals 1 Tablet(s) Oral daily  aztreonam  IVPB   IV Intermittent   aspirin  chewable 81 milliGRAM(s) Oral daily  aztreonam  IVPB 500 milliGRAM(s) IV Intermittent every 8 hours  ALBUTerol/ipratropium for Nebulization 3 milliLiter(s) Nebulizer every 6 hours  senna 2 Tablet(s) Oral at bedtime  docusate sodium 100 milliGRAM(s) Oral three times a day  vancomycin  IVPB 1000 milliGRAM(s) IV Intermittent every 12 hours    MEDICATIONS  (PRN):  HYDROmorphone  Injectable 2 milliGRAM(s) IV Push every 6 hours PRN Severe Pain (7 - 10)  HYDROmorphone  Injectable 0.5 milliGRAM(s) IV Push every 6 hours PRN Moderate Pain (4 - 6)  oxyCODONE    5 mG/acetaminophen 325 mG 1 Tablet(s) Oral every 4 hours PRN Mild Pain (1 - 3)  oxyCODONE    5 mG/acetaminophen 325 mG 1 Tablet(s) Oral every 4 hours PRN Moderate Pain (4 - 6)      Family History: Pt denies hx of early cad, SCD, or congenital heart disease.      Social History:  Cigarettes:  former                  Alchohol:   no              Illicit Drug Abuse:  no    ROS:  Constitutional: No fever, weight loss or fatigue  Eyes: No eye pain, visual disturbances, or discharge  ENMT:  No difficulty hearing, tinnitus, vertigo; No sinus or throat pain  Neck: No pain or stiffness  Respiratory: No cough, wheezing, chills or hemoptysis  Cardiovascular: No chest pain, palpitations, shortness of breath, dizziness or leg swelling  Gastrointestinal: No abdominal or epigastric pain. No nausea, vomiting or hematemesis; No diarrhea or constipation. No melena or hematochezia.  Genitourinary: No dysuria, frequency, hematuria or incontinence  Rectal: No pain, hemorrhoids or incontinence  Neurological: No headaches, memory loss, loss of strength, numbness or tremors  Skin: No itching, burning, rashes or lesions   Lymph Nodes: No enlarged glands  Endocrine: No heat or cold intolerance; No hair loss  Musculoskeletal: No joint pain or swelling; No muscle, back or extremity pain  Psychiatric: No depression, anxiety, mood swings or difficulty sleeping  Heme/Lymph: No easy bruising or bleeding gums  Allergy and Immunologic: No hives or eczema    Vital Signs Last 24 Hrs  T(C): 37.1 (23 Jul 2017 07:58), Max: 37.3 (22 Jul 2017 21:20)  T(F): 98.8 (23 Jul 2017 07:58), Max: 99.2 (22 Jul 2017 21:20)  HR: 82 (23 Jul 2017 07:58) (82 - 122)  BP: 118/78 (23 Jul 2017 07:58) (111/74 - 145/84)  BP(mean): --  RR: 20 (23 Jul 2017 07:58) (20 - 24)  SpO2: 93% (23 Jul 2017 07:58) (93% - 96%)  ICU Vital Signs Last 24 Hrs  SULEIMAN GREGORY  I&O's Detail    22 Jul 2017 07:01  -  23 Jul 2017 07:00  --------------------------------------------------------  IN:    lactated ringers.: 1300 mL    Oral Fluid: 480 mL  Total IN: 1780 mL    OUT:    Voided: 550 mL  Total OUT: 550 mL    Total NET: 1230 mL        I&O's Summary    22 Jul 2017 07:01  -  23 Jul 2017 07:00  --------------------------------------------------------  IN: 1780 mL / OUT: 550 mL / NET: 1230 mL      Drug Dosing Weight  SULEIMAN GREGORY      PHYSICAL EXAM:  General: Appears well developed, well nourished alert and cooperative.  HEENT: Head; normocephalic, atraumatic.  Eyes: Pupils reactive, cornea wnl.  Neck: Supple, no nodes adenopathy, no NVD or carotid bruit or thyromegaly.  CARDIOVASCULAR: Normal S1 and S2, No murmur, rub, gallop or lift.   LUNGS: No rales, rhonchi or wheeze. Normal breath sounds bilaterally.  ABDOMEN: Soft, nontender without mass or organomegaly. bowel sounds normoactive.  EXTREMITIES: No clubbing, cyanosis or edema. Distal pulses wnl.   SKIN: warm and dry with normal turgor.  NEURO: Alert/oriented x 3/normal motor exam. No pathologic reflexes.    PSYCH: normal affect.        LABS:                        8.0    15.4  )-----------( 478      ( 23 Jul 2017 06:23 )             25.3     07-23    133<L>  |  98  |  14.0  ----------------------------<  144<H>  3.9   |  24.0  |  1.24    Ca    10.9<H>      23 Jul 2017 06:23  Mg     1.9     07-22      SULEIMAN GREGORY   RADIOLOGY & ADDITIONAL STUDIES:    INTERPRETATION OF TELEMETRY (personally reviewed):    ECG: nsr, RBBB     Assessment and Plan:  In summary,  SULEIMAN GREGORY is an 71y Male PMHX of HTN, HLD, CAD, s/p 3 V CABG, CHF, ICD, former smoker, COPD, Lung Cancer with metastasis who presented for sob.   Pt hemoptysis.  Pt found to have NSVT overnight.  He denies active chest pain, palps, sob.  Pt is planned for XRT therapy.     NSVT:  continue tele monitoring  maintain potassium >4, phos >3, and Magnesium >2  continue current cardiac medications/dosages  no further interventions warranted  pt optimized to undergo XRT treatment tomorrow    Thank you for allowing [facility] to participate in the care of this patient.  Please feel free to call with any questions or concerns.

## 2017-07-24 VITALS
RESPIRATION RATE: 16 BRPM | HEIGHT: 72 IN | OXYGEN SATURATION: 97 % | TEMPERATURE: 98.7 F | SYSTOLIC BLOOD PRESSURE: 126 MMHG | DIASTOLIC BLOOD PRESSURE: 71 MMHG | WEIGHT: 211 LBS | HEART RATE: 92 BPM | BODY MASS INDEX: 28.58 KG/M2

## 2017-07-24 DIAGNOSIS — I25.10 ATHEROSCLEROTIC HEART DISEASE OF NATIVE CORONARY ARTERY WITHOUT ANGINA PECTORIS: ICD-10-CM

## 2017-07-24 LAB
ANION GAP SERPL CALC-SCNC: 10 MMOL/L — SIGNIFICANT CHANGE UP (ref 5–17)
BLD GP AB SCN SERPL QL: SIGNIFICANT CHANGE UP
BUN SERPL-MCNC: 15 MG/DL — SIGNIFICANT CHANGE UP (ref 8–20)
CALCIUM SERPL-MCNC: 11.2 MG/DL — HIGH (ref 8.6–10.2)
CHLORIDE SERPL-SCNC: 96 MMOL/L — LOW (ref 98–107)
CO2 SERPL-SCNC: 27 MMOL/L — SIGNIFICANT CHANGE UP (ref 22–29)
CREAT SERPL-MCNC: 1.13 MG/DL — SIGNIFICANT CHANGE UP (ref 0.5–1.3)
CULTURE RESULTS: SIGNIFICANT CHANGE UP
GLUCOSE SERPL-MCNC: 169 MG/DL — HIGH (ref 70–115)
GRAM STN FLD: SIGNIFICANT CHANGE UP
HCT VFR BLD CALC: 23.9 % — LOW (ref 42–52)
HGB BLD-MCNC: 7.6 G/DL — LOW (ref 14–18)
MAGNESIUM SERPL-MCNC: 1.9 MG/DL — SIGNIFICANT CHANGE UP (ref 1.6–2.6)
MCHC RBC-ENTMCNC: 29 PG — SIGNIFICANT CHANGE UP (ref 27–31)
MCHC RBC-ENTMCNC: 31.8 G/DL — LOW (ref 32–36)
MCV RBC AUTO: 91.2 FL — SIGNIFICANT CHANGE UP (ref 80–94)
PHOSPHATE SERPL-MCNC: 2.1 MG/DL — LOW (ref 2.4–4.7)
PLATELET # BLD AUTO: 499 K/UL — HIGH (ref 150–400)
POTASSIUM SERPL-MCNC: 3.9 MMOL/L — SIGNIFICANT CHANGE UP (ref 3.5–5.3)
POTASSIUM SERPL-SCNC: 3.9 MMOL/L — SIGNIFICANT CHANGE UP (ref 3.5–5.3)
PROCALCITONIN SERPL-MCNC: 0.73 NG/ML — HIGH (ref 0–0.04)
RBC # BLD: 2.62 M/UL — LOW (ref 4.6–6.2)
RBC # FLD: 14.2 % — SIGNIFICANT CHANGE UP (ref 11–15.6)
SODIUM SERPL-SCNC: 133 MMOL/L — LOW (ref 135–145)
SPECIMEN SOURCE: SIGNIFICANT CHANGE UP
SPECIMEN SOURCE: SIGNIFICANT CHANGE UP
TYPE + AB SCN PNL BLD: SIGNIFICANT CHANGE UP
VANCOMYCIN TROUGH SERPL-MCNC: 14 UG/ML — SIGNIFICANT CHANGE UP (ref 10–20)
WBC # BLD: 17.3 K/UL — HIGH (ref 4.8–10.8)
WBC # FLD AUTO: 17.3 K/UL — HIGH (ref 4.8–10.8)

## 2017-07-24 PROCEDURE — 99233 SBSQ HOSP IP/OBS HIGH 50: CPT

## 2017-07-24 PROCEDURE — 99223 1ST HOSP IP/OBS HIGH 75: CPT

## 2017-07-24 RX ORDER — MEROPENEM 1 G/30ML
1000 INJECTION INTRAVENOUS ONCE
Qty: 0 | Refills: 0 | Status: COMPLETED | OUTPATIENT
Start: 2017-07-24 | End: 2017-07-24

## 2017-07-24 RX ORDER — MEROPENEM 1 G/30ML
1000 INJECTION INTRAVENOUS EVERY 8 HOURS
Qty: 0 | Refills: 0 | Status: DISCONTINUED | OUTPATIENT
Start: 2017-07-24 | End: 2017-07-28

## 2017-07-24 RX ORDER — MEROPENEM 1 G/30ML
INJECTION INTRAVENOUS
Qty: 0 | Refills: 0 | Status: DISCONTINUED | OUTPATIENT
Start: 2017-07-24 | End: 2017-07-28

## 2017-07-24 RX ADMIN — Medication 100 MILLIGRAM(S): at 05:56

## 2017-07-24 RX ADMIN — Medication 1 TABLET(S): at 13:31

## 2017-07-24 RX ADMIN — Medication 100 MILLIGRAM(S): at 13:31

## 2017-07-24 RX ADMIN — Medication 75 MICROGRAM(S): at 05:56

## 2017-07-24 RX ADMIN — Medication 48 MILLIGRAM(S): at 22:07

## 2017-07-24 RX ADMIN — HYDROMORPHONE HYDROCHLORIDE 2 MILLIGRAM(S): 2 INJECTION INTRAMUSCULAR; INTRAVENOUS; SUBCUTANEOUS at 17:03

## 2017-07-24 RX ADMIN — OXYCODONE AND ACETAMINOPHEN 1 TABLET(S): 5; 325 TABLET ORAL at 23:26

## 2017-07-24 RX ADMIN — HYDROMORPHONE HYDROCHLORIDE 2 MILLIGRAM(S): 2 INJECTION INTRAMUSCULAR; INTRAVENOUS; SUBCUTANEOUS at 17:18

## 2017-07-24 RX ADMIN — Medication 3 MILLILITER(S): at 15:12

## 2017-07-24 RX ADMIN — ATORVASTATIN CALCIUM 40 MILLIGRAM(S): 80 TABLET, FILM COATED ORAL at 22:06

## 2017-07-24 RX ADMIN — SENNA PLUS 2 TABLET(S): 8.6 TABLET ORAL at 22:06

## 2017-07-24 RX ADMIN — HYDROMORPHONE HYDROCHLORIDE 2 MILLIGRAM(S): 2 INJECTION INTRAMUSCULAR; INTRAVENOUS; SUBCUTANEOUS at 06:20

## 2017-07-24 RX ADMIN — Medication 100 MILLIGRAM(S): at 22:06

## 2017-07-24 RX ADMIN — Medication 50 MILLIGRAM(S): at 10:25

## 2017-07-24 RX ADMIN — FAMOTIDINE 20 MILLIGRAM(S): 10 INJECTION INTRAVENOUS at 05:56

## 2017-07-24 RX ADMIN — MEROPENEM 200 MILLIGRAM(S): 1 INJECTION INTRAVENOUS at 17:03

## 2017-07-24 RX ADMIN — MEROPENEM 200 MILLIGRAM(S): 1 INJECTION INTRAVENOUS at 21:10

## 2017-07-24 RX ADMIN — AMIODARONE HYDROCHLORIDE 200 MILLIGRAM(S): 400 TABLET ORAL at 05:56

## 2017-07-24 RX ADMIN — Medication 3 MILLILITER(S): at 21:14

## 2017-07-24 RX ADMIN — Medication 3 MILLILITER(S): at 09:34

## 2017-07-24 RX ADMIN — Medication 250 MILLIGRAM(S): at 13:31

## 2017-07-24 RX ADMIN — HYDROMORPHONE HYDROCHLORIDE 2 MILLIGRAM(S): 2 INJECTION INTRAMUSCULAR; INTRAVENOUS; SUBCUTANEOUS at 05:56

## 2017-07-24 RX ADMIN — SODIUM CHLORIDE 100 MILLILITER(S): 9 INJECTION, SOLUTION INTRAVENOUS at 06:37

## 2017-07-24 NOTE — CONSULT NOTE ADULT - PROBLEM SELECTOR RECOMMENDATION 2
-He was started as an outpatient on levaquin   -Currently tx with vancomycin/aztreonam
suspected cancer,

## 2017-07-24 NOTE — CONSULT NOTE ADULT - SUBJECTIVE AND OBJECTIVE BOX
[facility]FECTI[facility] DISEASES [facility]NAL MEDICINE OF [place]  =======================================================  Bethel Bernard MD [facility]   Darryl Uribe MD  Diplomates American Board of Internal Medicine and Infectious Diseases  =======================================================    [id number]  SULEIMAN GREGORY is a 71y  Male   This 71 y.o. man with CAD, s/p stent, HTN, HLD, former smoker, hx of LLL mass, on PET scan with areas of metastasis including left hilar lymphadenopathy, left pleural mets, left sacrum lesion, WORKUP in process.   He was admitted on 7/20/17 for WBC elevation, thought to be pneumonia         =======================================================  Past Medical & Surgical Hx:  =====================  PAST MEDICAL & SURGICAL HISTORY:  Psoriasis    PSVT (paroxysmal supraventricular tachycardia)  Chronic systolic CHF (congestive heart failure), NYHA class 2  Former smoker  Hypokinesis: apical  Mitral regurgitation  Bronchitis  Hypertension  PSVT (paroxysmal supraventricular tachycardia)  Ischemic cardiomyopathy  AICD (automatic cardioverter/defibrillator) present: 2010 [employer]  VT (ventricular tachycardia): May 2017 no ICD shock  Vitamin D deficiency  Tricuspid regurgitation  RBBB  Prostate cancer: s/p surgery no RT/CT  Mitral regurgitation  Hypothyroidism  HLD (hyperlipidemia)  Arrhythmia  Angina pectoris  CAD (coronary artery disease)  History of bronchoscopy: 2017  History of prostate surgery: davinci surgery in 2008  Cardiac disorder: triple bypass may 1997 [facility]  History of electrophysiologic study: 2009 positive study (AICD placement  2010)  H/O cardiac catheterization: stenting of SVG TO PDA IN 2010 2017 one stent      Problem List:  ==========  HEALTH ISSUES - PROBLEM Dx:  VT (ventricular tachycardia): VT (ventricular tachycardia)  Anemia due to blood loss: Anemia due to blood loss  Pneumonia: Pneumonia  Lung mass: Lung mass  Coronary artery disease involving native heart without angina pectoris, unspecified vessel or lesion type: Coronary artery disease involving native heart without angina pectoris, unspecified vessel or lesion type  Chronic systolic CHF (congestive heart failure), NYHA class 2: Chronic systolic CHF (congestive heart failure), NYHA class 2  Acute renal failure with acute cortical necrosis: Acute renal failure with acute cortical necrosis  Pneumonia of left lower lobe due to infectious organism: Pneumonia of left lower lobe due to infectious organism          Social Hx:  =======  no toxic habits currently  ex-smoker, smoked 20 years ago, for 20 years  One pack a day x 10 years, then 2PPD x 10 more years          FAMILY HISTORY:  Family history of heart disease (Sibling)  Family history of diabetes mellitus (Sibling)  Family history of lung cancer (Sibling)  Family history of prostate cancer (Sibling)      Allergies    macrolide antibiotics (Urticaria; Rash; Hives)  penicillin (Urticaria; Rash)  Zithromax Z-Christian (Urticaria; Rash; Hives)    Intolerances        MEDICATIONS  (STANDING):  amiodarone    Tablet 200 milliGRAM(s) Oral daily  atorvastatin 40 milliGRAM(s) Oral at bedtime  fenofibrate Tablet 48 milliGRAM(s) Oral daily  metoprolol succinate  milliGRAM(s) Oral daily  famotidine    Tablet 20 milliGRAM(s) Oral daily  levothyroxine 75 MICROGram(s) Oral daily  multivitamin/minerals 1 Tablet(s) Oral daily  ALBUTerol/ipratropium for Nebulization 3 milliLiter(s) Nebulizer every 6 hours  senna 2 Tablet(s) Oral at bedtime  docusate sodium 100 milliGRAM(s) Oral three times a day  vancomycin  IVPB 1000 milliGRAM(s) IV Intermittent every 12 hours  meropenem IVPB   IV Intermittent     MEDICATIONS  (PRN):  HYDROmorphone  Injectable 2 milliGRAM(s) IV Push every 6 hours PRN Severe Pain (7 - 10)  HYDROmorphone  Injectable 0.5 milliGRAM(s) IV Push every 6 hours PRN Moderate Pain (4 - 6)  oxyCODONE    5 mG/acetaminophen 325 mG 1 Tablet(s) Oral every 4 hours PRN Mild Pain (1 - 3)  oxyCODONE    5 mG/acetaminophen 325 mG 1 Tablet(s) Oral every 4 hours PRN Moderate Pain (4 - 6)        =======================================================  REVIEW OF SYSTEMS:  Constitutional: No fever, No chills, No sweats.  Eye: No icterus, No double vision.  Ear/Nose/Mouth/Throat: No nasal congestion, No sore throat.  Respiratory: No shortness of breath, No cough, No sputum production, No wheezing.  Cardiovascular: No chest pain, No palpitations, No syncope.  Gastrointestinal: No nausea, No vomiting, No diarrhea, No abdominal pain.  Genitourinary: No dysuria, No hematuria, No change in urine stream.  Hematology/Lymphatics: No bleeding tendency.  Endocrine: No excessive thirst, No polyuria.  Immunologic: No malaise.  Musculoskeletal: No back pain, No neck pain, No joint pain, No muscle pain.  Integumentary: No rash, No pruritus, No skin lesion.  Neurologic: No numbness, No tingling, No headache.  Psychiatric: No depression.    =======================================================  I&O's Detail    23 Jul 2017 07:01  -  24 Jul 2017 07:00  --------------------------------------------------------  IN:    lactated ringers.: 2200 mL  Total IN: 2200 mL    OUT:  Total OUT: 0 mL    Total NET: 2200 mL      24 Jul 2017 07:01  -  24 Jul 2017 15:42  --------------------------------------------------------  IN:    Oral Fluid: 240 mL  Total IN: 240 mL    OUT:    Voided: 601 mL  Total OUT: 601 mL    Total NET: -361 mL          Physical Exam:  ============  Vital Signs Last 24 Hrs  T(C): 36.4 (24 Jul 2017 09:50), Max: 37.6 (23 Jul 2017 21:50)  T(F): 97.6 (24 Jul 2017 09:50), Max: 99.6 (23 Jul 2017 21:50)  HR: 102 (24 Jul 2017 09:50) (90 - 102)  BP: 121/76 (24 Jul 2017 09:50) (118/70 - 126/77)  BP(mean): --  RR: 18 (24 Jul 2017 09:50) (18 - 18)  SpO2: 94% (24 Jul 2017 15:13) (93% - 96%)    General: Alert and oriented, No acute distress.  Eye: Pupils are equal, round and reactive to light, Extraocular movements are intact, Normal conjunctiva.  HENT: Normocephalic, Oral mucosa is moist, No pharyngeal erythema, No sinus tenderness.  Neck: Supple, No lymphadenopathy.  Respiratory: Lungs are clear to auscultation, Respirations are non-labored.  Cardiovascular: Normal rate, Regular rhythm, No murmur, Good pulses equal in all extremities, No edema.  Gastrointestinal: Soft, Non-tender, Non-distended, Normal bowel sounds.  Genitourinary: No costovertebral angle tenderness.  Lymphatics: No lymphadenopathy neck, axilla, groin.  Musculoskeletal: Normal range of motion, Normal strength.  Integumentary: No rash.  Neurologic: Alert, Oriented, No focal deficits, Cranial Nerves II-XII are grossly intact.  Psychiatric: Appropriate mood & affect.      =======================================================  Labs:  ====    Labs:  07-24    133<L>  |  96<L>  |  15.0  ----------------------------<  169<H>  3.9   |  27.0  |  1.13    Ca    11.2<H>      24 Jul 2017 06:35  Phos  2.1     07-24  Mg     1.9     07-24                            7.6    17.3  )-----------( 499      ( 24 Jul 2017 06:35 )             23.9            ===========  Patient ID: [id number] Patient Name: SULEIMAN GREGORY   YOB: 1945 Sex: M     EXAM: CHEST SINGLE VIEW FRONTAL    PROCEDURE DATE: 07/20/2017        INTERPRETATION: TECHNIQUE: Single portable view of the chest.    COMPARISON: 7/9/2017    CLINICAL HISTORY: Shortness of breath.    FINDINGS:    Single frontal view of the chest demonstrates the left lower lobe infiltrate, unchanged. The cardiomediastinal silhouette is enlarged. No acute osseous abnormalities. Left-sided AICD. Mediastinal surgical clips.      IMPRESSION: Left lower lobe infiltrate, unchanged. Cardiomegaly.       DORIS VARGAS M.D., ATTENDING RADIOLOGIST  This document has been electronically signed. Jul 20 2017 6:50PM [facility] INFECTIOUS DISEASES [facility] INTERNAL MEDICINE OF [place]  =======================================================  Bethel Bernard MD [facility]   Darryl Uribe MD  Diplomates American Board of Internal Medicine and Infectious Diseases  =======================================================    [id number]  SULEIMAN GREGORY is a 71y  Male   This 71 y.o. man with CAD, s/p stent, HTN, HLD, former smoker, hx of LLL mass, on PET scan with areas of metastasis including left hilar lymphadenopathy, left pleural mets, left sacrum lesion, WORKUP in process.   He was admitted on 7/20/17 for WBC elevation, thought to be pneumonia     history is obtained from patient's daughter.  He had been doing poorly in the days leading to his admission. Poor appetite. falling asleep during conversation.  Then he developed chills, sweats at night. no overt fevers reported.   he was brought to his PMD Dr. Carter Beard on 7/19/17 and received levaquin, which he took for 2 days.  On 7/20/17, he presented to the ER for worsening symptoms of SOB.   He was started on Aztreonam and Vancomycin,  WBC continue to increase.  His course significant for constipation, resolved after bowel regimen.       =======================================================  Past Medical & Surgical Hx:  =====================  PAST MEDICAL & SURGICAL HISTORY:  Psoriasis    PSVT (paroxysmal supraventricular tachycardia)  Chronic systolic CHF (congestive heart failure), NYHA class 2  Former smoker  Hypokinesis: apical  Mitral regurgitation  Bronchitis  Hypertension  PSVT (paroxysmal supraventricular tachycardia)  Ischemic cardiomyopathy  AICD (automatic cardioverter/defibrillator) present: 2010 [employer]  VT (ventricular tachycardia): May 2017 no ICD shock  Vitamin D deficiency  Tricuspid regurgitation  RBBB  Prostate cancer: s/p surgery no RT/CT  Mitral regurgitation  Hypothyroidism  HLD (hyperlipidemia)  Arrhythmia  Angina pectoris  CAD (coronary artery disease)  History of bronchoscopy: 2017  History of prostate surgery: davinci surgery in 2008  Cardiac disorder: triple bypass may 1997 [facility]  History of electrophysiologic study: 2009 positive study (AICD placement  2010)  H/O cardiac catheterization: stenting of SVG TO PDA IN 2010 2017 one stent      Problem List:  ==========  HEALTH ISSUES - PROBLEM Dx:  VT (ventricular tachycardia): VT (ventricular tachycardia)  Anemia due to blood loss: Anemia due to blood loss  Pneumonia: Pneumonia  Lung mass: Lung mass  Coronary artery disease involving native heart without angina pectoris, unspecified vessel or lesion type: Coronary artery disease involving native heart without angina pectoris, unspecified vessel or lesion type  Chronic systolic CHF (congestive heart failure), NYHA class 2: Chronic systolic CHF (congestive heart failure), NYHA class 2  Acute renal failure with acute cortical necrosis: Acute renal failure with acute cortical necrosis  Pneumonia of left lower lobe due to infectious organism: Pneumonia of left lower lobe due to infectious organism       Social Hx:  =======  no toxic habits currently  ex-smoker, smoked 20 years ago, for 20 years  One pack a day x 10 years, then 2PPD x 10 more years  former ETOH abuse  no drugs.   was working as a Printer.       FAMILY HISTORY:  Family history of heart disease (Sibling)  Family history of diabetes mellitus (Sibling)  Family history of lung cancer (Sibling)  Family history of prostate cancer (Sibling)      Allergies    macrolide antibiotics (Urticaria; Rash; Hives)  penicillin (Urticaria; Rash)  Zithromax Z-Christian (Urticaria; Rash; Hives)    Intolerances        MEDICATIONS  (STANDING):  amiodarone    Tablet 200 milliGRAM(s) Oral daily  atorvastatin 40 milliGRAM(s) Oral at bedtime  fenofibrate Tablet 48 milliGRAM(s) Oral daily  metoprolol succinate  milliGRAM(s) Oral daily  famotidine    Tablet 20 milliGRAM(s) Oral daily  levothyroxine 75 MICROGram(s) Oral daily  multivitamin/minerals 1 Tablet(s) Oral daily  ALBUTerol/ipratropium for Nebulization 3 milliLiter(s) Nebulizer every 6 hours  senna 2 Tablet(s) Oral at bedtime  docusate sodium 100 milliGRAM(s) Oral three times a day  vancomycin  IVPB 1000 milliGRAM(s) IV Intermittent every 12 hours  meropenem IVPB   IV Intermittent     MEDICATIONS  (PRN):  HYDROmorphone  Injectable 2 milliGRAM(s) IV Push every 6 hours PRN Severe Pain (7 - 10)  HYDROmorphone  Injectable 0.5 milliGRAM(s) IV Push every 6 hours PRN Moderate Pain (4 - 6)  oxyCODONE    5 mG/acetaminophen 325 mG 1 Tablet(s) Oral every 4 hours PRN Mild Pain (1 - 3)  oxyCODONE    5 mG/acetaminophen 325 mG 1 Tablet(s) Oral every 4 hours PRN Moderate Pain (4 - 6)        =======================================================  REVIEW OF SYSTEMS:  Constitutional: as above, POOR APPETITE  Eye: No icterus, No double vision.  Ear/Nose/Mouth/Throat: No nasal congestion, No sore throat.  Respiratory: No shortness of breath, No cough,  + SOB, + cough --> BLOOD TINGED SPUTUM  Cardiovascular: No chest pain, No palpitations, No syncope.  Gastrointestinal: No nausea, No vomiting, No diarrhea, No abdominal pain.  Genitourinary: No dysuria, No hematuria, No change in urine stream.  Hematology/Lymphatics: No bleeding tendency.  Endocrine: No excessive thirst, No polyuria.  Immunologic: No malaise.  Musculoskeletal: No back pain, No neck pain, No joint pain, No muscle pain.  Integumentary: No rash, No pruritus, No skin lesion.  Neurologic: No numbness, No tingling, No headache.  Psychiatric: No depression.    =======================================================  I&O's Detail    23 Jul 2017 07:01  -  24 Jul 2017 07:00  --------------------------------------------------------  IN:    lactated ringers.: 2200 mL  Total IN: 2200 mL    OUT:  Total OUT: 0 mL    Total NET: 2200 mL      24 Jul 2017 07:01  -  24 Jul 2017 15:42  --------------------------------------------------------  IN:    Oral Fluid: 240 mL  Total IN: 240 mL    OUT:    Voided: 601 mL  Total OUT: 601 mL    Total NET: -361 mL          Physical Exam:  ============  Vital Signs Last 24 Hrs  T(C): 36.4 (24 Jul 2017 09:50), Max: 37.6 (23 Jul 2017 21:50)  T(F): 97.6 (24 Jul 2017 09:50), Max: 99.6 (23 Jul 2017 21:50)  HR: 102 (24 Jul 2017 09:50) (90 - 102)  BP: 121/76 (24 Jul 2017 09:50) (118/70 - 126/77)  BP(mean): --  RR: 18 (24 Jul 2017 09:50) (18 - 18)  SpO2: 94% (24 Jul 2017 15:13) (93% - 96%)    General: Alert and oriented, No acute distress.  Eye: Pupils are equal, round and reactive to light, Extraocular movements are intact, Normal conjunctiva. PALLOR  HENT: Normocephalic, Oral mucosa is moist, No pharyngeal erythema, No sinus tenderness.  Neck: Supple, No lymphadenopathy.  Respiratory: DIMINISHED BREATH SOUNDS AT LEFT SIDE  Cardiovascular: Normal rate, Regular rhythm, No murmur, Good pulses equal in all extremities, No edema.  Gastrointestinal: Soft, Non-tender, Non-distended, Normal bowel sounds.  Genitourinary: No costovertebral angle tenderness.  Lymphatics: No lymphadenopathy neck, axilla, groin.  Musculoskeletal: Normal range of motion, Normal strength.  Integumentary: No rash.  Neurologic: Alert, Oriented, No focal deficits, Cranial Nerves II-XII are grossly intact.  Psychiatric: Appropriate mood & affect.      =======================================================  Labs:  ====    Labs:  07-24    133<L>  |  96<L>  |  15.0  ----------------------------<  169<H>  3.9   |  27.0  |  1.13    Ca    11.2<H>      24 Jul 2017 06:35  Phos  2.1     07-24  Mg     1.9     07-24                            7.6    17.3  )-----------( 499      ( 24 Jul 2017 06:35 )             23.9            ===========  Patient ID: [id number] Patient Name: SULEIMAN GREGORY   YOB: 1945 Sex: M     EXAM: CHEST SINGLE VIEW FRONTAL    PROCEDURE DATE: 07/20/2017    iNTERPRETATION: TECHNIQUE: Single portable view of the chest.    COMPARISON: 7/9/2017    CLINICAL HISTORY: Shortness of breath.    FINDINGS:    Single frontal view of the chest demonstrates the left lower lobe infiltrate, unchanged. The cardiomediastinal silhouette is enlarged. No acute osseous abnormalities. Left-sided AICD. Mediastinal surgical clips.      IMPRESSION: Left lower lobe infiltrate, unchanged. Cardiomegaly.       DORIS VARGAS M.D., ATTENDING RADIOLOGIST  This document has been electronically signed. Jul 20 2017 6:50PM

## 2017-07-24 NOTE — PROGRESS NOTE ADULT - SUBJECTIVE AND OBJECTIVE BOX
SULEIMAN GREGORY    [id number]    71y      Male    INTERVAL HPI/OVERNIGHT EVENTS: No events on. Continuing to have hemoptysis.    Hospital course:  70 y/o male w/PMHx of CAD, s/p stent in June 2017, HTN, HLD, w/recent hx of LLL mass discovered here after he was treated here for hemoptysis and pleural effusion. However, he then had a PET scan which confirmed a LLL mass with areas of metastasis including left hilar lymphadenopathy, left pleural mets, left sacrum lesion. He was supposed to have a CT guided biopsy of the lung but he was found to be ill-appearing, with cough and a high white count, prompting an admission for pneumonia. He was started as an outpatient on levaquin but his white count remains high.     REVIEW OF SYSTEMS:    CONSTITUTIONAL: No fever   CARDIOVASCULAR: No chest pain, palpitations  GASTROINTESTINAL: No abdominal or epigastric pain. No nausea, vomiting       Vital Signs Last 24 Hrs  T(C): 36.4 (24 Jul 2017 09:50), Max: 37.6 (23 Jul 2017 21:50)  T(F): 97.6 (24 Jul 2017 09:50), Max: 99.6 (23 Jul 2017 21:50)  HR: 102 (24 Jul 2017 09:50) (90 - 102)  BP: 121/76 (24 Jul 2017 09:50) (118/70 - 126/77)  BP(mean): --  RR: 18 (24 Jul 2017 09:50) (18 - 18)  SpO2: 96% (24 Jul 2017 09:50) (93% - 96%)     PHYSICAL EXAM:    GENERAL: NAD, frail, chronically ill appearing  HEENT: PERRL, +EOMI, MMM  CHEST/LUNG: Clear to percussion bilaterally; No wheezing  HEART: S1S2+, Regular rate and rhythm   ABDOMEN: Soft, Nontender, Nondistended; Bowel sounds present  EXTREMITIES:  No edema    LABS:                        7.6    17.3  )-----------( 499      ( 24 Jul 2017 06:35 )             23.9     07-24    133<L>  |  96<L>  |  15.0  ----------------------------<  169<H>  3.9   |  27.0  |  1.13    Ca    11.2<H>      24 Jul 2017 06:35  Phos  2.1     07-24  Mg     1.9     07-24              MEDICATIONS  (STANDING):  lactated ringers. 1000 milliLiter(s) (100 mL/Hr) IV Continuous <Continuous>  amiodarone    Tablet 200 milliGRAM(s) Oral daily  atorvastatin 40 milliGRAM(s) Oral at bedtime  fenofibrate Tablet 48 milliGRAM(s) Oral daily  clopidogrel Tablet 75 milliGRAM(s) Oral daily  metoprolol succinate  milliGRAM(s) Oral daily  famotidine    Tablet 20 milliGRAM(s) Oral daily  levothyroxine 75 MICROGram(s) Oral daily  multivitamin/minerals 1 Tablet(s) Oral daily  aztreonam  IVPB   IV Intermittent   aspirin  chewable 81 milliGRAM(s) Oral daily  aztreonam  IVPB 500 milliGRAM(s) IV Intermittent every 8 hours  ALBUTerol/ipratropium for Nebulization 3 milliLiter(s) Nebulizer every 6 hours  senna 2 Tablet(s) Oral at bedtime  docusate sodium 100 milliGRAM(s) Oral three times a day  vancomycin  IVPB 1000 milliGRAM(s) IV Intermittent every 12 hours    MEDICATIONS  (PRN):  HYDROmorphone  Injectable 2 milliGRAM(s) IV Push every 6 hours PRN Severe Pain (7 - 10)  HYDROmorphone  Injectable 0.5 milliGRAM(s) IV Push every 6 hours PRN Moderate Pain (4 - 6)  oxyCODONE    5 mG/acetaminophen 325 mG 1 Tablet(s) Oral every 4 hours PRN Mild Pain (1 - 3)  oxyCODONE    5 mG/acetaminophen 325 mG 1 Tablet(s) Oral every 4 hours PRN Moderate Pain (4 - 6)      RADIOLOGY & ADDITIONAL TESTS: SULEIMAN GREGORY    [id number]    71y      Male    INTERVAL HPI/OVERNIGHT EVENTS: No events on. Continuing to have hemoptysis.    Hospital course:  70 y/o male w/PMHx of CAD, s/p stent in June 2017, HTN, HLD, w/recent hx of LLL mass discovered here after he was treated here for hemoptysis and pleural effusion. However, he then had a PET scan which confirmed a LLL mass with areas of metastasis including left hilar lymphadenopathy, left pleural mets, left sacrum lesion. He was supposed to have a CT guided biopsy of the lung but he was found to be ill-appearing, with cough and a high white count, prompting an admission for pneumonia. He was started as an outpatient on levaquin but his white count remains high.     REVIEW OF SYSTEMS:    CONSTITUTIONAL: No fever   CARDIOVASCULAR: No chest pain, palpitations  GASTROINTESTINAL: No abdominal or epigastric pain. No nausea, vomiting       Vital Signs Last 24 Hrs  T(C): 36.4 (24 Jul 2017 09:50), Max: 37.6 (23 Jul 2017 21:50)  T(F): 97.6 (24 Jul 2017 09:50), Max: 99.6 (23 Jul 2017 21:50)  HR: 102 (24 Jul 2017 09:50) (90 - 102)  BP: 121/76 (24 Jul 2017 09:50) (118/70 - 126/77)  BP(mean): --  RR: 18 (24 Jul 2017 09:50) (18 - 18)  SpO2: 96% (24 Jul 2017 09:50) (93% - 96%)     PHYSICAL EXAM:    GENERAL: NAD, frail, chronically ill appearing  HEENT: PERRL, +EOMI, MMM  CHEST/LUNG: coarse breath sounds  HEART: S1S2+, Regular rate and rhythm   ABDOMEN: Soft, Nontender, Nondistended; Bowel sounds present  EXTREMITIES:  No edema    LABS:                        7.6    17.3  )-----------( 499      ( 24 Jul 2017 06:35 )             23.9     07-24    133<L>  |  96<L>  |  15.0  ----------------------------<  169<H>  3.9   |  27.0  |  1.13    Ca    11.2<H>      24 Jul 2017 06:35  Phos  2.1     07-24  Mg     1.9     07-24              MEDICATIONS  (STANDING):  lactated ringers. 1000 milliLiter(s) (100 mL/Hr) IV Continuous <Continuous>  amiodarone    Tablet 200 milliGRAM(s) Oral daily  atorvastatin 40 milliGRAM(s) Oral at bedtime  fenofibrate Tablet 48 milliGRAM(s) Oral daily  clopidogrel Tablet 75 milliGRAM(s) Oral daily  metoprolol succinate  milliGRAM(s) Oral daily  famotidine    Tablet 20 milliGRAM(s) Oral daily  levothyroxine 75 MICROGram(s) Oral daily  multivitamin/minerals 1 Tablet(s) Oral daily  aztreonam  IVPB   IV Intermittent   aspirin  chewable 81 milliGRAM(s) Oral daily  aztreonam  IVPB 500 milliGRAM(s) IV Intermittent every 8 hours  ALBUTerol/ipratropium for Nebulization 3 milliLiter(s) Nebulizer every 6 hours  senna 2 Tablet(s) Oral at bedtime  docusate sodium 100 milliGRAM(s) Oral three times a day  vancomycin  IVPB 1000 milliGRAM(s) IV Intermittent every 12 hours    MEDICATIONS  (PRN):  HYDROmorphone  Injectable 2 milliGRAM(s) IV Push every 6 hours PRN Severe Pain (7 - 10)  HYDROmorphone  Injectable 0.5 milliGRAM(s) IV Push every 6 hours PRN Moderate Pain (4 - 6)  oxyCODONE    5 mG/acetaminophen 325 mG 1 Tablet(s) Oral every 4 hours PRN Mild Pain (1 - 3)  oxyCODONE    5 mG/acetaminophen 325 mG 1 Tablet(s) Oral every 4 hours PRN Moderate Pain (4 - 6)      RADIOLOGY & ADDITIONAL TESTS:

## 2017-07-24 NOTE — CONSULT NOTE ADULT - PROBLEM SELECTOR RECOMMENDATION 9
- repeat blood culture  - sputum culture today  - continue Vancomycin  - d/c Aztreonam  - start Merrem to cover resistant gram negative rods

## 2017-07-25 VITALS
BODY MASS INDEX: 28.58 KG/M2 | SYSTOLIC BLOOD PRESSURE: 144 MMHG | WEIGHT: 211 LBS | DIASTOLIC BLOOD PRESSURE: 76 MMHG | HEIGHT: 72 IN | TEMPERATURE: 98 F | OXYGEN SATURATION: 97 % | RESPIRATION RATE: 16 BRPM | HEART RATE: 96 BPM

## 2017-07-25 LAB
ANION GAP SERPL CALC-SCNC: 13 MMOL/L — SIGNIFICANT CHANGE UP (ref 5–17)
BUN SERPL-MCNC: 17 MG/DL — SIGNIFICANT CHANGE UP (ref 8–20)
CALCIUM SERPL-MCNC: 12.1 MG/DL — HIGH (ref 8.6–10.2)
CHLORIDE SERPL-SCNC: 95 MMOL/L — LOW (ref 98–107)
CO2 SERPL-SCNC: 27 MMOL/L — SIGNIFICANT CHANGE UP (ref 22–29)
CREAT SERPL-MCNC: 1.23 MG/DL — SIGNIFICANT CHANGE UP (ref 0.5–1.3)
GLUCOSE SERPL-MCNC: 148 MG/DL — HIGH (ref 70–115)
HCT VFR BLD CALC: 30 % — LOW (ref 42–52)
HGB BLD-MCNC: 9.4 G/DL — LOW (ref 14–18)
MCHC RBC-ENTMCNC: 28.3 PG — SIGNIFICANT CHANGE UP (ref 27–31)
MCHC RBC-ENTMCNC: 31.3 G/DL — LOW (ref 32–36)
MCV RBC AUTO: 90.4 FL — SIGNIFICANT CHANGE UP (ref 80–94)
PLATELET # BLD AUTO: 572 K/UL — HIGH (ref 150–400)
POTASSIUM SERPL-MCNC: 3.7 MMOL/L — SIGNIFICANT CHANGE UP (ref 3.5–5.3)
POTASSIUM SERPL-SCNC: 3.7 MMOL/L — SIGNIFICANT CHANGE UP (ref 3.5–5.3)
RBC # BLD: 3.32 M/UL — LOW (ref 4.6–6.2)
RBC # FLD: 15 % — SIGNIFICANT CHANGE UP (ref 11–15.6)
SODIUM SERPL-SCNC: 135 MMOL/L — SIGNIFICANT CHANGE UP (ref 135–145)
WBC # BLD: 20 K/UL — HIGH (ref 4.8–10.8)
WBC # FLD AUTO: 20 K/UL — HIGH (ref 4.8–10.8)

## 2017-07-25 PROCEDURE — 99233 SBSQ HOSP IP/OBS HIGH 50: CPT

## 2017-07-25 PROCEDURE — 71010: CPT | Mod: 26

## 2017-07-25 PROCEDURE — 74020: CPT | Mod: 26

## 2017-07-25 PROCEDURE — 93971 EXTREMITY STUDY: CPT | Mod: 26,RT

## 2017-07-25 RX ORDER — CALCITONIN SALMON 200 [IU]/ML
380 INJECTION, SOLUTION INTRAMUSCULAR EVERY 12 HOURS
Qty: 0 | Refills: 0 | Status: COMPLETED | OUTPATIENT
Start: 2017-07-25 | End: 2017-07-27

## 2017-07-25 RX ORDER — PAMIDRONATE DISODIUM 9 MG/ML
60 INJECTION, SOLUTION INTRAVENOUS ONCE
Qty: 0 | Refills: 0 | Status: COMPLETED | OUTPATIENT
Start: 2017-07-25 | End: 2017-07-25

## 2017-07-25 RX ORDER — SODIUM CHLORIDE 9 MG/ML
1000 INJECTION INTRAMUSCULAR; INTRAVENOUS; SUBCUTANEOUS
Qty: 0 | Refills: 0 | Status: DISCONTINUED | OUTPATIENT
Start: 2017-07-25 | End: 2017-07-25

## 2017-07-25 RX ORDER — SODIUM CHLORIDE 9 MG/ML
1000 INJECTION INTRAMUSCULAR; INTRAVENOUS; SUBCUTANEOUS
Qty: 0 | Refills: 0 | Status: DISCONTINUED | OUTPATIENT
Start: 2017-07-25 | End: 2017-07-27

## 2017-07-25 RX ORDER — POTASSIUM CHLORIDE 20 MEQ
30 PACKET (EA) ORAL
Qty: 0 | Refills: 0 | Status: DISCONTINUED | OUTPATIENT
Start: 2017-07-25 | End: 2017-08-07

## 2017-07-25 RX ORDER — FUROSEMIDE 40 MG
60 TABLET ORAL
Qty: 0 | Refills: 0 | Status: DISCONTINUED | OUTPATIENT
Start: 2017-07-25 | End: 2017-07-27

## 2017-07-25 RX ORDER — OXYCODONE AND ACETAMINOPHEN 5; 325 MG/1; MG/1
2 TABLET ORAL EVERY 4 HOURS
Qty: 0 | Refills: 0 | Status: DISCONTINUED | OUTPATIENT
Start: 2017-07-25 | End: 2017-07-30

## 2017-07-25 RX ADMIN — Medication 30 MILLIEQUIVALENT(S): at 17:39

## 2017-07-25 RX ADMIN — SENNA PLUS 2 TABLET(S): 8.6 TABLET ORAL at 21:11

## 2017-07-25 RX ADMIN — AMIODARONE HYDROCHLORIDE 200 MILLIGRAM(S): 400 TABLET ORAL at 05:09

## 2017-07-25 RX ADMIN — OXYCODONE AND ACETAMINOPHEN 2 TABLET(S): 5; 325 TABLET ORAL at 22:00

## 2017-07-25 RX ADMIN — CALCITONIN SALMON 380 INTERNATIONAL UNIT(S): 200 INJECTION, SOLUTION INTRAMUSCULAR at 18:35

## 2017-07-25 RX ADMIN — ATORVASTATIN CALCIUM 40 MILLIGRAM(S): 80 TABLET, FILM COATED ORAL at 21:11

## 2017-07-25 RX ADMIN — Medication 100 MILLIGRAM(S): at 21:11

## 2017-07-25 RX ADMIN — Medication 75 MICROGRAM(S): at 05:09

## 2017-07-25 RX ADMIN — Medication 3 MILLILITER(S): at 15:22

## 2017-07-25 RX ADMIN — Medication 48 MILLIGRAM(S): at 21:11

## 2017-07-25 RX ADMIN — OXYCODONE AND ACETAMINOPHEN 2 TABLET(S): 5; 325 TABLET ORAL at 09:45

## 2017-07-25 RX ADMIN — SODIUM CHLORIDE 125 MILLILITER(S): 9 INJECTION INTRAMUSCULAR; INTRAVENOUS; SUBCUTANEOUS at 21:10

## 2017-07-25 RX ADMIN — HYDROMORPHONE HYDROCHLORIDE 2 MILLIGRAM(S): 2 INJECTION INTRAMUSCULAR; INTRAVENOUS; SUBCUTANEOUS at 17:28

## 2017-07-25 RX ADMIN — PAMIDRONATE DISODIUM 65 MILLIGRAM(S): 9 INJECTION, SOLUTION INTRAVENOUS at 18:28

## 2017-07-25 RX ADMIN — Medication 60 MILLIGRAM(S): at 17:39

## 2017-07-25 RX ADMIN — OXYCODONE AND ACETAMINOPHEN 2 TABLET(S): 5; 325 TABLET ORAL at 21:22

## 2017-07-25 RX ADMIN — MEROPENEM 200 MILLIGRAM(S): 1 INJECTION INTRAVENOUS at 17:20

## 2017-07-25 RX ADMIN — Medication 250 MILLIGRAM(S): at 17:09

## 2017-07-25 RX ADMIN — Medication 100 MILLIGRAM(S): at 05:09

## 2017-07-25 RX ADMIN — Medication 1 TABLET(S): at 17:08

## 2017-07-25 RX ADMIN — Medication 3 MILLILITER(S): at 08:14

## 2017-07-25 RX ADMIN — FAMOTIDINE 20 MILLIGRAM(S): 10 INJECTION INTRAVENOUS at 05:09

## 2017-07-25 RX ADMIN — HYDROMORPHONE HYDROCHLORIDE 2 MILLIGRAM(S): 2 INJECTION INTRAMUSCULAR; INTRAVENOUS; SUBCUTANEOUS at 17:09

## 2017-07-25 RX ADMIN — Medication 250 MILLIGRAM(S): at 02:49

## 2017-07-25 RX ADMIN — OXYCODONE AND ACETAMINOPHEN 2 TABLET(S): 5; 325 TABLET ORAL at 11:09

## 2017-07-25 RX ADMIN — SODIUM CHLORIDE 125 MILLILITER(S): 9 INJECTION INTRAMUSCULAR; INTRAVENOUS; SUBCUTANEOUS at 17:08

## 2017-07-25 RX ADMIN — Medication 100 MILLIGRAM(S): at 17:08

## 2017-07-25 NOTE — PROGRESS NOTE ADULT - SUBJECTIVE AND OBJECTIVE BOX
SULEIMAN GREGORY    [id number]    71y      Male    INTERVAL HPI/OVERNIGHT EVENTS: Lost peripheral IV access overnight. Right arm more swollen than usual. Denies paresthesias or numbness.     Hospital course:  72 y/o male w/PMHx of CAD, s/p stent in June 2017, HTN, HLD, w/recent hx of LLL mass discovered here after he was treated here for hemoptysis and pleural effusion. However, he then had a PET scan which confirmed a LLL mass with areas of metastasis including left hilar lymphadenopathy, left pleural mets, left sacrum lesion. He was supposed to have a CT guided biopsy of the lung but he was found to be ill-appearing, with cough and a high white count, prompting an admission for pneumonia. He was started as an outpatient on levaquin but his white count remains high.     REVIEW OF SYSTEMS:    CONSTITUTIONAL: No fever, +fatigue, +anorexia   RESPIRATORY: +hemoptysis  CARDIOVASCULAR: No chest pain, palpitations       Vital Signs Last 24 Hrs  T(C): 36.7 (25 Jul 2017 04:05), Max: 37.6 (24 Jul 2017 18:24)  T(F): 98.1 (25 Jul 2017 04:05), Max: 99.7 (24 Jul 2017 18:24)  HR: 81 (25 Jul 2017 04:05) (81 - 115)  BP: 132/91 (25 Jul 2017 04:05) (117/66 - 134/76)  BP(mean): --  RR: 20 (25 Jul 2017 04:05) (18 - 20)  SpO2: 96% (25 Jul 2017 01:30) (93% - 97%)    PHYSICAL EXAM:    GENERAL: NAD, frail, appears older than stated age  HEENT: PERRL, +EOMI, MMM  NECK: soft, Supple, No JVD,   CHEST/LUNG: Coarse breath sounds  HEART: S1S2+, Regular rate and rhythm   ABDOMEN: Soft, Nontender, Nondistended; Bowel sounds present  EXTREMITIES:  2+ radial pulse, +2 pitting edema of right ue      LABS:                        9.4    20.0  )-----------( 572      ( 25 Jul 2017 06:33 )             30.0     07-25    135  |  95<L>  |  17.0  ----------------------------<  148<H>  3.7   |  27.0  |  1.23    Ca    12.1<H>      25 Jul 2017 06:33  Phos  2.1     07-24  Mg     1.9     07-24              MEDICATIONS  (STANDING):  amiodarone    Tablet 200 milliGRAM(s) Oral daily  atorvastatin 40 milliGRAM(s) Oral at bedtime  fenofibrate Tablet 48 milliGRAM(s) Oral daily  metoprolol succinate  milliGRAM(s) Oral daily  famotidine    Tablet 20 milliGRAM(s) Oral daily  levothyroxine 75 MICROGram(s) Oral daily  multivitamin/minerals 1 Tablet(s) Oral daily  ALBUTerol/ipratropium for Nebulization 3 milliLiter(s) Nebulizer every 6 hours  senna 2 Tablet(s) Oral at bedtime  docusate sodium 100 milliGRAM(s) Oral three times a day  vancomycin  IVPB 1000 milliGRAM(s) IV Intermittent every 12 hours  meropenem IVPB 1000 milliGRAM(s) IV Intermittent every 8 hours  meropenem IVPB   IV Intermittent   sodium chloride 0.9%. 1000 milliLiter(s) (100 mL/Hr) IV Continuous <Continuous>    MEDICATIONS  (PRN):  HYDROmorphone  Injectable 2 milliGRAM(s) IV Push every 6 hours PRN Severe Pain (7 - 10)  HYDROmorphone  Injectable 0.5 milliGRAM(s) IV Push every 6 hours PRN Moderate Pain (4 - 6)  oxyCODONE    5 mG/acetaminophen 325 mG 2 Tablet(s) Oral every 4 hours PRN breakthrough pain      RADIOLOGY & ADDITIONAL TESTS:

## 2017-07-25 NOTE — CONSULT NOTE ADULT - SUBJECTIVE AND OBJECTIVE BOX
Patient is a 71y old  Male who presents with a chief complaint of "I was feeling tired, unwell at home." (20 Jul 2017 22:29)       HPI:   is a 70 y/o male w/PMHx of CAD, s/p stent in June 2017, HTN, HLD, w/recent hx of LLL mass discovered here after he was treated here for hemoptysis and pleural effusion. However, he then had a PET scan which confirmed a LLL mass with areas of metastasis including left hilar lymphadenopathy, left pleural mets, left sacrum lesion. He was supposed to have a CT guided biopsy of the lung but when he came to the hospital today, he was found to be ill-appearing, with cough and a high white count, prompting an admission for pneumonia. He was started as an outpatient on levaquin but his white count remains high. I have started vancomycin/aztreonam given his recent hospitalization but I have recommended holding off on CT guided biopsy until his pneumonia is much improved. He's currently on oxygen and does not have oxygen at home.     Of note, he's an ex-smoker, smoked 20 years ago, for 20 years. One pack a day for the first ten years, and then 2 packs/day for the second ten years, total 20 year smoking hx. He was recently stented last month for CAD and we're continuing all his medications including his asa/statin/plavix/and toprol. He presents in acute renal failure presumed to be secondary to pre-renal etiology ..     At the time of this admission,  states he's a FULL CODE. (20 Jul 2017 22:29)      PAST MEDICAL & SURGICAL HISTORY:  Psoriasis    PSVT (paroxysmal supraventricular tachycardia)  Chronic systolic CHF (congestive heart failure), NYHA class 2  Former smoker  Hypokinesis: apical  Mitral regurgitation  Bronchitis  Hypertension  PSVT (paroxysmal supraventricular tachycardia)  Ischemic cardiomyopathy  AICD (automatic cardioverter/defibrillator) present: 2010 [employer]  VT (ventricular tachycardia): May 2017 no ICD shock  Vitamin D deficiency  Tricuspid regurgitation  RBBB  Prostate cancer: s/p surgery no RT/CT  Mitral regurgitation  Hypothyroidism  HLD (hyperlipidemia)  Arrhythmia  Angina pectoris  CAD (coronary artery disease)  History of bronchoscopy: 2017  History of prostate surgery: davinci surgery in 2008  Cardiac disorder: triple bypass may 1997 [facility]  History of electrophysiologic study: 2009 positive study (AICD placement  2010)  H/O cardiac catheterization: stenting of SVG TO PDA IN 2010  2017 one stent      FAMILY HISTORY:  Family history of heart disease (Sibling)  Family history of diabetes mellitus (Sibling)  Family history of lung cancer (Sibling)  Family history of prostate cancer (Sibling)  NC    Social History:Non smoker    MEDICATIONS  (STANDING):  amiodarone    Tablet 200 milliGRAM(s) Oral daily  atorvastatin 40 milliGRAM(s) Oral at bedtime  fenofibrate Tablet 48 milliGRAM(s) Oral daily  metoprolol succinate  milliGRAM(s) Oral daily  famotidine    Tablet 20 milliGRAM(s) Oral daily  levothyroxine 75 MICROGram(s) Oral daily  multivitamin/minerals 1 Tablet(s) Oral daily  ALBUTerol/ipratropium for Nebulization 3 milliLiter(s) Nebulizer every 6 hours  senna 2 Tablet(s) Oral at bedtime  docusate sodium 100 milliGRAM(s) Oral three times a day  vancomycin  IVPB 1000 milliGRAM(s) IV Intermittent every 12 hours  meropenem IVPB 1000 milliGRAM(s) IV Intermittent every 8 hours  meropenem IVPB   IV Intermittent   sodium chloride 0.9%. 1000 milliLiter(s) (100 mL/Hr) IV Continuous <Continuous>    MEDICATIONS  (PRN):  HYDROmorphone  Injectable 2 milliGRAM(s) IV Push every 6 hours PRN Severe Pain (7 - 10)  HYDROmorphone  Injectable 0.5 milliGRAM(s) IV Push every 6 hours PRN Moderate Pain (4 - 6)  oxyCODONE    5 mG/acetaminophen 325 mG 2 Tablet(s) Oral every 4 hours PRN breakthrough pain   Meds reviewed    Allergies    macrolide antibiotics (Urticaria; Rash; Hives)  penicillin (Urticaria; Rash)  Zithromax Z-Christian (Urticaria; Rash; Hives)      REVIEW OF SYSTEMS:    CONSTITUTIONAL:  sob, weight gain, feels weak  EYES: No eye pain, visual disturbances, or discharge  ENMT:  No difficulty hearing, tinnitus, vertigo; No sinus or throat pain  NECK: No pain or stiffness  BREASTS: No pain, masses, or nipple discharge  RESPIRATORY: sob  CARDIOVASCULAR: No chest pain, palpitations, dizziness,   GASTROINTESTINAL:  Mild  abdominal  pain. No nausea, vomiting, or hematemesis; No diarrhea or constipation.  GENITOURINARY: No dysuria, frequency, hematuria, or incontinence  NEUROLOGICAL: No headaches, memory loss, loss of strength, numbness, or tremors  SKIN: neg  LYMPH NODES: No enlarged glands  ENDOCRINE: No heat or cold intolerance; No hair loss  MUSCULOSKELETAL: neg  PSYCHIATRIC: No depression, anxiety, mood swings, or difficulty sleeping  HEME/LYMPH: No easy bruising, or bleeding gums  ALLERY AND IMMUNOLOGIC: No hives or eczema      Vital Signs Last 24 Hrs  T(C): 36.7 (25 Jul 2017 04:05), Max: 37.6 (24 Jul 2017 18:24)  T(F): 98.1 (25 Jul 2017 04:05), Max: 99.7 (24 Jul 2017 18:24)  HR: 81 (25 Jul 2017 04:05) (81 - 115)  BP: 132/91 (25 Jul 2017 04:05) (117/66 - 134/76)  BP(mean): --  RR: 20 (25 Jul 2017 04:05) (18 - 20)  SpO2: 96% (25 Jul 2017 01:30) (93% - 97%)       PHYSICAL EXAM:    GENERAL: appears acutely  ill, oriented.  HEAD:  Atraumatic, Normocephalic  EYES: EOMI, PERRLA, conjunctiva and sclera clear  ENMT: No tonsillar erythema, exudates, or enlargement;   NECK: Supple, neck  veins full  NERVOUS SYSTEM:  Alert & Oriented X3, Good concentration; Motor Strength wnl upper and lower extremities;  CHEST/LUNG: Clear to percussion bilaterally; No  wheezing, or rubs; on nasal 02  HEART: Regular rate , pacer; No murmurs, rubs, or gallops  ABDOMEN: Soft, Nontender, distended; Bowel sounds present,  EXTREMITIES:  trace  leg edema   LYMPH: No lymphadenopathy noted  SKIN: No rashes or lesions, pale   neg    LABS:                        9.4    20.0  )-----------( 572      ( 25 Jul 2017 06:33 )             30.0     07-25    135  |  95<L>  |  17.0  ----------------------------<  148<H>  3.7   |  27.0  |  1.23    Ca    12.1<H>      25 Jul 2017 06:33  Phos  2.1     07-24  Mg     1.9     07-24                  RADIOLOGY & ADDITIONAL TESTS:

## 2017-07-25 NOTE — PROGRESS NOTE ADULT - SUBJECTIVE AND OBJECTIVE BOX
[facility]                                   [address] [place] [address]                                                      PHONE: [phone]                                                         FAX: [phone]  [email or web address]  ---------------------------------------------------------------------------------------------------------------------------------    HPI:  SULEIMAN GREGORY is an 71y Male PMHX of HTN, HLD, CAD, s/p 3 V CABG, CHF, ICD, former smoker, COPD, Lung Cancer with metastasis who presented for sob.   Pt hemoptysis.  Pt found to have NSVT presently NSR He denies active chest pain, palps, sob.  Pt is planned for Lung Biopsy in the next few days had been off antiplatelet therapy  for 2 days . Cath < from: Cardiac Cath Lab - Adult (06.16.17 @ 15:05) >INTERVENTIONAL IMPRESSIONS: Prior GRAY to LAD is patent with severe disease of SVG to RPDA. (SVG to ramus/om is known to be occluded) The SVG to PP DA was treated with ZARINA (resolute) with distal protection.  Discussed in detail with family and Sergio Johnson          PAST MEDICAL & SURGICAL HISTORY:  Psoriasis    PSVT (paroxysmal supraventricular tachycardia)  Chronic systolic CHF (congestive heart failure), NYHA class 2  Former smoker  Hypokinesis: apical  Mitral regurgitation  Bronchitis  Hypertension  PSVT (paroxysmal supraventricular tachycardia)  Ischemic cardiomyopathy  AICD (automatic cardioverter/defibrillator) present: 2010 [employer]  VT (ventricular tachycardia): May 2017 no ICD shock  Vitamin D deficiency  Tricuspid regurgitation  RBBB  Prostate cancer: s/p surgery no RT/CT  Mitral regurgitation  Hypothyroidism  HLD (hyperlipidemia)  Arrhythmia  Angina pectoris  CAD (coronary artery disease)  History of bronchoscopy: 2017  History of prostate surgery: davinci surgery in 2008  Cardiac disorder: triple bypass may 1997 [facility]  History of electrophysiologic study: 2009 positive study (AICD placement  2010)  H/O cardiac catheterization: stenting of SVG TO PDA IN 2010 2017 one stent      macrolide antibiotics (Urticaria; Rash; Hives)  penicillin (Urticaria; Rash)  Zithromax Z-Christian (Urticaria; Rash; Hives)      MEDICATIONS  (STANDING):  lactated ringers. 1000 milliLiter(s) (100 mL/Hr) IV Continuous <Continuous>  amiodarone    Tablet 200 milliGRAM(s) Oral daily  atorvastatin 40 milliGRAM(s) Oral at bedtime  fenofibrate Tablet 48 milliGRAM(s) Oral daily  clopidogrel Tablet 75 milliGRAM(s) Oral daily  metoprolol succinate  milliGRAM(s) Oral daily  famotidine    Tablet 20 milliGRAM(s) Oral daily  levothyroxine 75 MICROGram(s) Oral daily  multivitamin/minerals 1 Tablet(s) Oral daily  aztreonam  IVPB   IV Intermittent   aspirin  chewable 81 milliGRAM(s) Oral daily  aztreonam  IVPB 500 milliGRAM(s) IV Intermittent every 8 hours  ALBUTerol/ipratropium for Nebulization 3 milliLiter(s) Nebulizer every 6 hours  senna 2 Tablet(s) Oral at bedtime  docusate sodium 100 milliGRAM(s) Oral three times a day  vancomycin  IVPB 1000 milliGRAM(s) IV Intermittent every 12 hours    MEDICATIONS  (PRN):  HYDROmorphone  Injectable 2 milliGRAM(s) IV Push every 6 hours PRN Severe Pain (7 - 10)  HYDROmorphone  Injectable 0.5 milliGRAM(s) IV Push every 6 hours PRN Moderate Pain (4 - 6)  oxyCODONE    5 mG/acetaminophen 325 mG 1 Tablet(s) Oral every 4 hours PRN Mild Pain (1 - 3)  oxyCODONE    5 mG/acetaminophen 325 mG 1 Tablet(s) Oral every 4 hours PRN Moderate Pain (4 - 6)      Family History: Pt denies hx of early cad, SCD, or congenital heart disease.      Social History:  Cigarettes:  former                  Alchohol:   no              Illicit Drug Abuse:  no    ROS:  Constitutional: No fever, weight loss or fatigue  Eyes: No eye pain, visual disturbances, or discharge  ENMT:  No difficulty hearing, tinnitus, vertigo; No sinus or throat pain  Neck: No pain or stiffness  Respiratory: No cough, wheezing, chills or hemoptysis  Cardiovascular: No chest pain, palpitations, shortness of breath, dizziness or leg swelling  Gastrointestinal: No abdominal or epigastric pain. No nausea, vomiting or hematemesis; No diarrhea or constipation. No melena or hematochezia.  Genitourinary: No dysuria, frequency, hematuria or incontinence  Rectal: No pain, hemorrhoids or incontinence  Neurological: No headaches, memory loss, loss of strength, numbness or tremors  Skin: No itching, burning, rashes or lesions   Lymph Nodes: No enlarged glands  Endocrine: No heat or cold intolerance; No hair loss  Musculoskeletal: No joint pain or swelling; No muscle, back or extremity pain  Psychiatric: No depression, anxiety, mood swings or difficulty sleeping  Heme/Lymph: No easy bruising or bleeding gums  Allergy and Immunologic: No hives or eczema    Vital Signs Last 24 Hrs  T(C): 37.1 (23 Jul 2017 07:58), Max: 37.3 (22 Jul 2017 21:20)  T(F): 98.8 (23 Jul 2017 07:58), Max: 99.2 (22 Jul 2017 21:20)  HR: 82 (23 Jul 2017 07:58) (82 - 122)  BP: 118/78 (23 Jul 2017 07:58) (111/74 - 145/84)  BP(mean): --  RR: 20 (23 Jul 2017 07:58) (20 - 24)  SpO2: 93% (23 Jul 2017 07:58) (93% - 96%)  ICU Vital Signs Last 24 Hrs  SULEIMAN GREGORY  I&O's Detail    22 Jul 2017 07:01  -  23 Jul 2017 07:00  --------------------------------------------------------  IN:    lactated ringers.: 1300 mL    Oral Fluid: 480 mL  Total IN: 1780 mL    OUT:    Voided: 550 mL  Total OUT: 550 mL    Total NET: 1230 mL        I&O's Summary    22 Jul 2017 07:01  -  23 Jul 2017 07:00  --------------------------------------------------------  IN: 1780 mL / OUT: 550 mL / NET: 1230 mL      Drug Dosing Weight  SULEIMAN GREGORY      PHYSICAL EXAM:  General: Appears well developed, well nourished alert and cooperative.  HEENT: Head; normocephalic, atraumatic.  Eyes: Pupils reactive, cornea wnl.  Neck: Supple, no nodes adenopathy, no NVD or carotid bruit or thyromegaly.  CARDIOVASCULAR: Normal S1 and S2, No murmur, rub, gallop or lift.   LUNGS: No rales, rhonchi or wheeze. Normal breath sounds bilaterally.  ABDOMEN: Soft, nontender without mass or organomegaly. bowel sounds normoactive.  EXTREMITIES: No clubbing, cyanosis or edema. Distal pulses wnl.   SKIN: warm and dry with normal turgor.  NEURO: Alert/oriented x 3/normal motor exam. No pathologic reflexes.    PSYCH: normal affect.        LABS:                        8.0    15.4  )-----------( 478      ( 23 Jul 2017 06:23 )             25.3     07-23    133<L>  |  98  |  14.0  ----------------------------<  144<H>  3.9   |  24.0  |  1.24    Ca    10.9<H>      23 Jul 2017 06:23  Mg     1.9     07-22      SULEIMAN GREGORY   RADIOLOGY & ADDITIONAL STUDIES:    INTERPRETATION OF TELEMETRY (personally reviewed):    ECG: nsr, RBBB     Assessment and Plan:    HPI:  SULEIMAN GREGORY is an 71y Male PMHX of HTN, HLD, CAD, s/p 3 V CABG, CHF, ICD, former smoker, COPD, Lung Cancer with metastasis who presented for sob.   Pt hemoptysis.  Pt found to have NSVT presently NSR He denies active chest pain, palps, sob.  Pt is planned for Lung Biopsy in the next few days had been off antiplatelet therapy  for 2 days will need completion of 5 days as per IR  . Cath < from: Cardiac Cath Lab - Adult (06.16.17 @ 15:05) >INTERVENTIONAL IMPRESSIONS: Prior GRAY to LAD is patent with severe disease of SVG to RPDA. (SVG to ramus/om is known to be occluded) The SVG to PP DA was treated with ZARINA (resolute) with distal protection.  Discussed in detail with family and Sergio Johnson about potential complications while off antiplatelet therapy.   Patient will be clear as a High risk for his Lung Biopsy by IR.   Will FUP postop     Thank you for allowing [facility] to participate in the care of this patient.  Please feel free to call with any questions or concerns.

## 2017-07-25 NOTE — CONSULT NOTE ADULT - ASSESSMENT
Malignancy with bone pain and hypercalcemia;  Abdominal distention.
This 71 y.o. man with lung mass, suspected Cancer, workup in process, now here for SOB, chills, sweats, suspected Left sided obstructive Pneumonia.   Initial sputum culture with respiratory pina.

## 2017-07-26 ENCOUNTER — RESULT REVIEW (OUTPATIENT)
Age: 72
End: 2017-07-26

## 2017-07-26 VITALS
HEART RATE: 80 BPM | OXYGEN SATURATION: 92 % | RESPIRATION RATE: 16 BRPM | SYSTOLIC BLOOD PRESSURE: 117 MMHG | TEMPERATURE: 98 F | DIASTOLIC BLOOD PRESSURE: 73 MMHG | HEIGHT: 72 IN

## 2017-07-26 DIAGNOSIS — J90 PLEURAL EFFUSION, NOT ELSEWHERE CLASSIFIED: ICD-10-CM

## 2017-07-26 LAB
ALBUMIN FLD-MCNC: 2.1 G/DL — SIGNIFICANT CHANGE UP
ALBUMIN SERPL ELPH-MCNC: 2.8 G/DL — LOW (ref 3.3–5.2)
ALP SERPL-CCNC: 234 U/L — HIGH (ref 40–120)
ALT FLD-CCNC: 51 U/L — HIGH
ANION GAP SERPL CALC-SCNC: 13 MMOL/L — SIGNIFICANT CHANGE UP (ref 5–17)
AST SERPL-CCNC: 27 U/L — SIGNIFICANT CHANGE UP
B PERT IGG+IGM PNL SER: ABNORMAL
BILIRUB SERPL-MCNC: 0.6 MG/DL — SIGNIFICANT CHANGE UP (ref 0.4–2)
BUN SERPL-MCNC: 20 MG/DL — SIGNIFICANT CHANGE UP (ref 8–20)
CA-I BLD-SCNC: 1.7 MMOL/L — CRITICAL HIGH (ref 1.12–1.3)
CALCIUM SERPL-MCNC: 11.2 MG/DL — HIGH (ref 8.6–10.2)
CHLORIDE SERPL-SCNC: 95 MMOL/L — LOW (ref 98–107)
CO2 SERPL-SCNC: 27 MMOL/L — SIGNIFICANT CHANGE UP (ref 22–29)
COLOR FLD: ABNORMAL
CREAT SERPL-MCNC: 1.19 MG/DL — SIGNIFICANT CHANGE UP (ref 0.5–1.3)
CULTURE RESULTS: SIGNIFICANT CHANGE UP
CULTURE RESULTS: SIGNIFICANT CHANGE UP
FLUID INTAKE SUBSTANCE CLASS: SIGNIFICANT CHANGE UP
FLUID SEGMENTED GRANULOCYTES: 73 % — SIGNIFICANT CHANGE UP
GLUCOSE FLD-MCNC: 153 MG/DL — SIGNIFICANT CHANGE UP
GLUCOSE SERPL-MCNC: 187 MG/DL — HIGH (ref 70–115)
GRAM STN FLD: SIGNIFICANT CHANGE UP
HCT VFR BLD CALC: 28.1 % — LOW (ref 42–52)
HGB BLD-MCNC: 9 G/DL — LOW (ref 14–18)
LYMPHOCYTES # FLD: 16 % — SIGNIFICANT CHANGE UP
MCHC RBC-ENTMCNC: 28.8 PG — SIGNIFICANT CHANGE UP (ref 27–31)
MCHC RBC-ENTMCNC: 32 G/DL — SIGNIFICANT CHANGE UP (ref 32–36)
MCV RBC AUTO: 89.8 FL — SIGNIFICANT CHANGE UP (ref 80–94)
MONOS+MACROS # FLD: 11 % — SIGNIFICANT CHANGE UP
PH FLD: 8 — SIGNIFICANT CHANGE UP
PLATELET # BLD AUTO: 537 K/UL — HIGH (ref 150–400)
POTASSIUM SERPL-MCNC: 4.2 MMOL/L — SIGNIFICANT CHANGE UP (ref 3.5–5.3)
POTASSIUM SERPL-SCNC: 4.2 MMOL/L — SIGNIFICANT CHANGE UP (ref 3.5–5.3)
PROT FLD-MCNC: 4.1 G/DL — SIGNIFICANT CHANGE UP
PROT SERPL-MCNC: 6.4 G/DL — LOW (ref 6.6–8.7)
RBC # BLD: 3.13 M/UL — LOW (ref 4.6–6.2)
RBC # FLD: 15.1 % — SIGNIFICANT CHANGE UP (ref 11–15.6)
RCV VOL RI: HIGH /UL (ref 0–5)
SODIUM SERPL-SCNC: 135 MMOL/L — SIGNIFICANT CHANGE UP (ref 135–145)
SPECIMEN SOURCE: SIGNIFICANT CHANGE UP
TOTAL NUCLEATED CELL COUNT, BODY FLUID: 1250 /UL — HIGH (ref 0–5)
TUBE TYPE: SIGNIFICANT CHANGE UP
WBC # BLD: 21.9 K/UL — HIGH (ref 4.8–10.8)
WBC # FLD AUTO: 21.9 K/UL — HIGH (ref 4.8–10.8)

## 2017-07-26 PROCEDURE — 99233 SBSQ HOSP IP/OBS HIGH 50: CPT

## 2017-07-26 PROCEDURE — 32557 INSERT CATH PLEURA W/ IMAGE: CPT

## 2017-07-26 PROCEDURE — 71010: CPT | Mod: 26

## 2017-07-26 PROCEDURE — 88305 TISSUE EXAM BY PATHOLOGIST: CPT | Mod: 26

## 2017-07-26 PROCEDURE — 88112 CYTOPATH CELL ENHANCE TECH: CPT | Mod: 26

## 2017-07-26 PROCEDURE — 71250 CT THORAX DX C-: CPT | Mod: 26

## 2017-07-26 RX ADMIN — OXYCODONE AND ACETAMINOPHEN 2 TABLET(S): 5; 325 TABLET ORAL at 15:32

## 2017-07-26 RX ADMIN — Medication 30 MILLIEQUIVALENT(S): at 18:40

## 2017-07-26 RX ADMIN — Medication 60 MILLIGRAM(S): at 05:50

## 2017-07-26 RX ADMIN — HYDROMORPHONE HYDROCHLORIDE 0.5 MILLIGRAM(S): 2 INJECTION INTRAMUSCULAR; INTRAVENOUS; SUBCUTANEOUS at 22:30

## 2017-07-26 RX ADMIN — HYDROMORPHONE HYDROCHLORIDE 2 MILLIGRAM(S): 2 INJECTION INTRAMUSCULAR; INTRAVENOUS; SUBCUTANEOUS at 06:16

## 2017-07-26 RX ADMIN — HYDROMORPHONE HYDROCHLORIDE 2 MILLIGRAM(S): 2 INJECTION INTRAMUSCULAR; INTRAVENOUS; SUBCUTANEOUS at 19:15

## 2017-07-26 RX ADMIN — CALCITONIN SALMON 380 INTERNATIONAL UNIT(S): 200 INJECTION, SOLUTION INTRAMUSCULAR at 05:39

## 2017-07-26 RX ADMIN — Medication 3 MILLILITER(S): at 20:46

## 2017-07-26 RX ADMIN — HYDROMORPHONE HYDROCHLORIDE 2 MILLIGRAM(S): 2 INJECTION INTRAMUSCULAR; INTRAVENOUS; SUBCUTANEOUS at 19:45

## 2017-07-26 RX ADMIN — Medication 60 MILLIGRAM(S): at 18:40

## 2017-07-26 RX ADMIN — MEROPENEM 200 MILLIGRAM(S): 1 INJECTION INTRAVENOUS at 15:17

## 2017-07-26 RX ADMIN — Medication 250 MILLIGRAM(S): at 05:40

## 2017-07-26 RX ADMIN — ATORVASTATIN CALCIUM 40 MILLIGRAM(S): 80 TABLET, FILM COATED ORAL at 22:45

## 2017-07-26 RX ADMIN — CALCITONIN SALMON 380 INTERNATIONAL UNIT(S): 200 INJECTION, SOLUTION INTRAMUSCULAR at 19:15

## 2017-07-26 RX ADMIN — Medication 250 MILLIGRAM(S): at 18:40

## 2017-07-26 RX ADMIN — Medication 3 MILLILITER(S): at 16:19

## 2017-07-26 RX ADMIN — Medication 100 MILLIGRAM(S): at 05:51

## 2017-07-26 RX ADMIN — HYDROMORPHONE HYDROCHLORIDE 2 MILLIGRAM(S): 2 INJECTION INTRAMUSCULAR; INTRAVENOUS; SUBCUTANEOUS at 05:50

## 2017-07-26 RX ADMIN — SODIUM CHLORIDE 125 MILLILITER(S): 9 INJECTION INTRAMUSCULAR; INTRAVENOUS; SUBCUTANEOUS at 22:51

## 2017-07-26 RX ADMIN — AMIODARONE HYDROCHLORIDE 200 MILLIGRAM(S): 400 TABLET ORAL at 05:50

## 2017-07-26 RX ADMIN — FAMOTIDINE 20 MILLIGRAM(S): 10 INJECTION INTRAVENOUS at 05:50

## 2017-07-26 RX ADMIN — Medication 3 MILLILITER(S): at 09:15

## 2017-07-26 RX ADMIN — OXYCODONE AND ACETAMINOPHEN 2 TABLET(S): 5; 325 TABLET ORAL at 15:16

## 2017-07-26 RX ADMIN — HYDROMORPHONE HYDROCHLORIDE 0.5 MILLIGRAM(S): 2 INJECTION INTRAMUSCULAR; INTRAVENOUS; SUBCUTANEOUS at 13:43

## 2017-07-26 RX ADMIN — Medication 1 TABLET(S): at 15:17

## 2017-07-26 RX ADMIN — MEROPENEM 200 MILLIGRAM(S): 1 INJECTION INTRAVENOUS at 05:40

## 2017-07-26 RX ADMIN — Medication 48 MILLIGRAM(S): at 22:46

## 2017-07-26 RX ADMIN — HYDROMORPHONE HYDROCHLORIDE 0.5 MILLIGRAM(S): 2 INJECTION INTRAMUSCULAR; INTRAVENOUS; SUBCUTANEOUS at 22:56

## 2017-07-26 RX ADMIN — HYDROMORPHONE HYDROCHLORIDE 0.5 MILLIGRAM(S): 2 INJECTION INTRAMUSCULAR; INTRAVENOUS; SUBCUTANEOUS at 13:28

## 2017-07-26 RX ADMIN — MEROPENEM 200 MILLIGRAM(S): 1 INJECTION INTRAVENOUS at 22:46

## 2017-07-26 RX ADMIN — SODIUM CHLORIDE 125 MILLILITER(S): 9 INJECTION INTRAMUSCULAR; INTRAVENOUS; SUBCUTANEOUS at 18:41

## 2017-07-26 RX ADMIN — Medication 30 MILLIEQUIVALENT(S): at 05:50

## 2017-07-26 RX ADMIN — Medication 75 MICROGRAM(S): at 05:51

## 2017-07-26 NOTE — PROGRESS NOTE ADULT - SUBJECTIVE AND OBJECTIVE BOX
NEPHROLOGY INTERVAL HPI/OVERNIGHT EVENTS:  pt resting; comfortable  no acute distress    MEDICATIONS  (STANDING):  amiodarone    Tablet 200 milliGRAM(s) Oral daily  atorvastatin 40 milliGRAM(s) Oral at bedtime  fenofibrate Tablet 48 milliGRAM(s) Oral daily  metoprolol succinate  milliGRAM(s) Oral daily  famotidine    Tablet 20 milliGRAM(s) Oral daily  levothyroxine 75 MICROGram(s) Oral daily  multivitamin/minerals 1 Tablet(s) Oral daily  ALBUTerol/ipratropium for Nebulization 3 milliLiter(s) Nebulizer every 6 hours  senna 2 Tablet(s) Oral at bedtime  docusate sodium 100 milliGRAM(s) Oral three times a day  vancomycin  IVPB 1000 milliGRAM(s) IV Intermittent every 12 hours  meropenem IVPB 1000 milliGRAM(s) IV Intermittent every 8 hours  meropenem IVPB   IV Intermittent   sodium chloride 0.9%. 1000 milliLiter(s) (125 mL/Hr) IV Continuous <Continuous>  furosemide   Injectable 60 milliGRAM(s) IV Push two times a day  calcitonin Injectable 380 International Unit(s) IntraMuscular every 12 hours  potassium chloride    Tablet ER 30 milliEquivalent(s) Oral two times a day    MEDICATIONS  (PRN):  HYDROmorphone  Injectable 2 milliGRAM(s) IV Push every 6 hours PRN Severe Pain (7 - 10)  HYDROmorphone  Injectable 0.5 milliGRAM(s) IV Push every 6 hours PRN Moderate Pain (4 - 6)  oxyCODONE    5 mG/acetaminophen 325 mG 2 Tablet(s) Oral every 4 hours PRN breakthrough pain      Allergies    macrolide antibiotics (Urticaria; Rash; Hives)  penicillin (Urticaria; Rash)  Zithromax Z-Chirstian (Urticaria; Rash; Hives)        Vital Signs Last 24 Hrs  T(C): 36.8 (25 Jul 2017 20:44), Max: 36.8 (25 Jul 2017 16:45)  T(F): 98.2 (25 Jul 2017 20:44), Max: 98.3 (25 Jul 2017 16:45)  HR: 98 (25 Jul 2017 20:44) (74 - 98)  BP: 142/77 (25 Jul 2017 20:44) (116/77 - 142/77)  BP(mean): --  RR: 18 (25 Jul 2017 20:44) (18 - 19)  SpO2: 98% (25 Jul 2017 16:45) (97% - 98%)    PHYSICAL EXAM:  GENERAL: appears acutely  ill, oriented.  HEAD:  Atraumatic, Normocephalic  EYES: EOMI, PERRLA, conjunctiva and sclera clear  ENMT: No tonsillar erythema, exudates, or enlargement;   NECK: Supple, neck  veins full  NERVOUS SYSTEM:  Alert & Oriented X3, non focal  CHEST/LUNG: Clear to percussion bilaterally  HEART: Regular rate , pacer; No rub  ABDOMEN: Soft, Nontender, + distension; + BS  EXTREMITIES:  trace leg edema   LYMPH: No lymphadenopathy noted  SKIN: No rashes or lesions, pale   neg    LABS:                        9.0    21.9  )-----------( 537      ( 26 Jul 2017 05:00 )             28.1     07-26    135  |  95<L>  |  20.0  ----------------------------<  187<H>  4.2   |  27.0  |  1.19    Ca    11.2<H>      26 Jul 2017 05:00    TPro  6.4<L>  /  Alb  2.8<L>  /  TBili  0.6  /  DBili  x   /  AST  27  /  ALT  51<H>  /  AlkPhos  234<H>  07-26    Calcium, Total Serum: 12.1 mg/dL (07.25.17 @ 06:33)            RADIOLOGY & ADDITIONAL TESTS:

## 2017-07-26 NOTE — PROGRESS NOTE ADULT - SUBJECTIVE AND OBJECTIVE BOX
SULEIMAN GREGORY    [id number]    71y      Male    INTERVAL HPI/OVERNIGHT EVENTS: Had multiple episodes of NSVT (~20beats) and atrial tach. Reportedly asymptomatic. States he has improving hemoptysis.     Hospital course:  70 y/o male w/PMHx of CAD, s/p stent in June 2017, HTN, HLD, w/recent hx of LLL mass discovered here after he was treated here for hemoptysis and pleural effusion. However, he then had a PET scan which confirmed a LLL mass with areas of metastasis including left hilar lymphadenopathy, left pleural mets, left sacrum lesion. He was supposed to have a CT guided biopsy of the lung but he was found to be ill-appearing, with cough and a high white count, prompting an admission for pneumonia. He was started as an outpatient on levaquin but his white count remains high. Developed RUE swelling, US showed superficial thrombosis of right cephalic vein. Received RUE PICC on 7/25. Repeat CXR showing worsening left pleural effusion with possible loculation.       REVIEW OF SYSTEMS:    CONSTITUTIONAL: No fever  CARDIOVASCULAR: No chest pain, palpitations  GASTROINTESTINAL: No abdominal or epigastric pain. No nausea, vomiting      Vital Signs Last 24 Hrs  T(C): 36.8 (25 Jul 2017 20:44), Max: 36.8 (25 Jul 2017 16:45)  T(F): 98.2 (25 Jul 2017 20:44), Max: 98.3 (25 Jul 2017 16:45)  HR: 98 (25 Jul 2017 20:44) (74 - 98)  BP: 142/77 (25 Jul 2017 20:44) (116/77 - 142/77)  BP(mean): --  RR: 18 (25 Jul 2017 20:44) (18 - 19)  SpO2: 98% (25 Jul 2017 16:45) (97% - 98%)    PHYSICAL EXAM:    GENERAL: NAD, frail, appears older than stated age  HEENT: PERRL, +EOMI, MMM  CHEST/LUNG: coarse breath sounds  HEART: S1S2+, Regular rate and rhythm   ABDOMEN: Soft, Nontender, Nondistended; Bowel sounds present        LABS:                        9.0    21.9  )-----------( 537      ( 26 Jul 2017 05:00 )             28.1     07-26    135  |  95<L>  |  20.0  ----------------------------<  187<H>  4.2   |  27.0  |  1.19    Ca    11.2<H>      26 Jul 2017 05:00    TPro  6.4<L>  /  Alb  2.8<L>  /  TBili  0.6  /  DBili  x   /  AST  27  /  ALT  51<H>  /  AlkPhos  234<H>  07-26            MEDICATIONS  (STANDING):  amiodarone    Tablet 200 milliGRAM(s) Oral daily  atorvastatin 40 milliGRAM(s) Oral at bedtime  fenofibrate Tablet 48 milliGRAM(s) Oral daily  metoprolol succinate  milliGRAM(s) Oral daily  famotidine    Tablet 20 milliGRAM(s) Oral daily  levothyroxine 75 MICROGram(s) Oral daily  multivitamin/minerals 1 Tablet(s) Oral daily  ALBUTerol/ipratropium for Nebulization 3 milliLiter(s) Nebulizer every 6 hours  senna 2 Tablet(s) Oral at bedtime  docusate sodium 100 milliGRAM(s) Oral three times a day  vancomycin  IVPB 1000 milliGRAM(s) IV Intermittent every 12 hours  meropenem IVPB 1000 milliGRAM(s) IV Intermittent every 8 hours  meropenem IVPB   IV Intermittent   sodium chloride 0.9%. 1000 milliLiter(s) (125 mL/Hr) IV Continuous <Continuous>  furosemide   Injectable 60 milliGRAM(s) IV Push two times a day  calcitonin Injectable 380 International Unit(s) IntraMuscular every 12 hours  potassium chloride    Tablet ER 30 milliEquivalent(s) Oral two times a day    MEDICATIONS  (PRN):  HYDROmorphone  Injectable 2 milliGRAM(s) IV Push every 6 hours PRN Severe Pain (7 - 10)  HYDROmorphone  Injectable 0.5 milliGRAM(s) IV Push every 6 hours PRN Moderate Pain (4 - 6)  oxyCODONE    5 mG/acetaminophen 325 mG 2 Tablet(s) Oral every 4 hours PRN breakthrough pain      RADIOLOGY & ADDITIONAL TESTS:

## 2017-07-26 NOTE — PROCEDURE NOTE - PROCEDURE
PICC line placement  07/25/2017    Active  VINI
Chest tube placement with US guidance  07/26/2017  Pigtail 14fr placed with US guidance  Active  [id number]

## 2017-07-26 NOTE — PROGRESS NOTE ADULT - PROBLEM SELECTOR PLAN 5
Recently placed ZARINA to SVG to RCA 6/16/17  Will require to be off ASA and plavix for biopsy  Appreciate cardiology consult - high risk for lung biopsy

## 2017-07-27 ENCOUNTER — OUTPATIENT (OUTPATIENT)
Dept: OUTPATIENT SERVICES | Facility: HOSPITAL | Age: 72
LOS: 1 days | Discharge: HOME | End: 2017-07-27

## 2017-07-27 VITALS
SYSTOLIC BLOOD PRESSURE: 104 MMHG | HEART RATE: 89 BPM | BODY MASS INDEX: 28.58 KG/M2 | WEIGHT: 211 LBS | RESPIRATION RATE: 14 BRPM | HEIGHT: 72 IN | OXYGEN SATURATION: 94 % | DIASTOLIC BLOOD PRESSURE: 60 MMHG

## 2017-07-27 DIAGNOSIS — E83.52 HYPERCALCEMIA: ICD-10-CM

## 2017-07-27 DIAGNOSIS — Z98.890 OTHER SPECIFIED POSTPROCEDURAL STATES: Chronic | ICD-10-CM

## 2017-07-27 DIAGNOSIS — I51.9 HEART DISEASE, UNSPECIFIED: Chronic | ICD-10-CM

## 2017-07-27 DIAGNOSIS — C34.90 MALIGNANT NEOPLASM OF UNSPECIFIED PART OF UNSPECIFIED BRONCHUS OR LUNG: ICD-10-CM

## 2017-07-27 LAB
-  AMPICILLIN/SULBACTAM: SIGNIFICANT CHANGE UP
-  CEFAZOLIN: SIGNIFICANT CHANGE UP
-  CIPROFLOXACIN: SIGNIFICANT CHANGE UP
-  CLINDAMYCIN: SIGNIFICANT CHANGE UP
-  ERYTHROMYCIN: SIGNIFICANT CHANGE UP
-  GENTAMICIN: SIGNIFICANT CHANGE UP
-  LEVOFLOXACIN: SIGNIFICANT CHANGE UP
-  MOXIFLOXACIN(AEROBIC): SIGNIFICANT CHANGE UP
-  OXACILLIN: SIGNIFICANT CHANGE UP
-  RIFAMPIN: SIGNIFICANT CHANGE UP
-  TETRACYCLINE: SIGNIFICANT CHANGE UP
-  TRIMETHOPRIM/SULFAMETHOXAZOLE: SIGNIFICANT CHANGE UP
-  VANCOMYCIN: SIGNIFICANT CHANGE UP
ALBUMIN SERPL ELPH-MCNC: 2.5 G/DL — LOW (ref 3.3–5.2)
ALP SERPL-CCNC: 227 U/L — HIGH (ref 40–120)
ALT FLD-CCNC: 42 U/L — HIGH
ANION GAP SERPL CALC-SCNC: 11 MMOL/L — SIGNIFICANT CHANGE UP (ref 5–17)
AST SERPL-CCNC: 28 U/L — SIGNIFICANT CHANGE UP
BILIRUB SERPL-MCNC: 0.6 MG/DL — SIGNIFICANT CHANGE UP (ref 0.4–2)
BUN SERPL-MCNC: 21 MG/DL — HIGH (ref 8–20)
CALCIUM SERPL-MCNC: 10.7 MG/DL — HIGH (ref 8.6–10.2)
CALCIUM SERPL-MCNC: 12 MG/DL — HIGH (ref 8.4–10.5)
CHLORIDE SERPL-SCNC: 97 MMOL/L — LOW (ref 98–107)
CO2 SERPL-SCNC: 29 MMOL/L — SIGNIFICANT CHANGE UP (ref 22–29)
CREAT SERPL-MCNC: 1.31 MG/DL — HIGH (ref 0.5–1.3)
CULTURE RESULTS: SIGNIFICANT CHANGE UP
GLUCOSE SERPL-MCNC: 156 MG/DL — HIGH (ref 70–115)
HCT VFR BLD CALC: 28.4 % — LOW (ref 42–52)
HGB BLD-MCNC: 9 G/DL — LOW (ref 14–18)
LDH SERPL L TO P-CCNC: 2340 U/L — SIGNIFICANT CHANGE UP
MCHC RBC-ENTMCNC: 28 PG — SIGNIFICANT CHANGE UP (ref 27–31)
MCHC RBC-ENTMCNC: 31.7 G/DL — LOW (ref 32–36)
MCV RBC AUTO: 88.5 FL — SIGNIFICANT CHANGE UP (ref 80–94)
METHOD TYPE: SIGNIFICANT CHANGE UP
NIGHT BLUE STAIN TISS: SIGNIFICANT CHANGE UP
NON-GYN CYTOLOGY SPEC: SIGNIFICANT CHANGE UP
ORGANISM # SPEC MICROSCOPIC CNT: SIGNIFICANT CHANGE UP
ORGANISM # SPEC MICROSCOPIC CNT: SIGNIFICANT CHANGE UP
PLATELET # BLD AUTO: 516 K/UL — HIGH (ref 150–400)
POTASSIUM SERPL-MCNC: 4 MMOL/L — SIGNIFICANT CHANGE UP (ref 3.5–5.3)
POTASSIUM SERPL-SCNC: 4 MMOL/L — SIGNIFICANT CHANGE UP (ref 3.5–5.3)
PROT SERPL-MCNC: 6.1 G/DL — LOW (ref 6.6–8.7)
PTH-INTACT FLD-MCNC: 5 PG/ML — LOW (ref 15–65)
RBC # BLD: 3.21 M/UL — LOW (ref 4.6–6.2)
RBC # FLD: 15.4 % — SIGNIFICANT CHANGE UP (ref 11–15.6)
SODIUM SERPL-SCNC: 137 MMOL/L — SIGNIFICANT CHANGE UP (ref 135–145)
SPECIMEN SOURCE: SIGNIFICANT CHANGE UP
SPECIMEN SOURCE: SIGNIFICANT CHANGE UP
VANCOMYCIN TROUGH SERPL-MCNC: 18.9 UG/ML — SIGNIFICANT CHANGE UP (ref 10–20)
WBC # BLD: 19.8 K/UL — HIGH (ref 4.8–10.8)
WBC # FLD AUTO: 19.8 K/UL — HIGH (ref 4.8–10.8)

## 2017-07-27 PROCEDURE — 99233 SBSQ HOSP IP/OBS HIGH 50: CPT

## 2017-07-27 RX ORDER — HYDROMORPHONE HYDROCHLORIDE 2 MG/ML
1 INJECTION INTRAMUSCULAR; INTRAVENOUS; SUBCUTANEOUS EVERY 4 HOURS
Qty: 0 | Refills: 0 | Status: DISCONTINUED | OUTPATIENT
Start: 2017-07-27 | End: 2017-07-31

## 2017-07-27 RX ORDER — DEXTROSE 50 % IN WATER 50 %
1 SYRINGE (ML) INTRAVENOUS ONCE
Qty: 0 | Refills: 0 | Status: DISCONTINUED | OUTPATIENT
Start: 2017-07-27 | End: 2017-07-21

## 2017-07-27 RX ORDER — GLUCAGON INJECTION, SOLUTION 0.5 MG/.1ML
1 INJECTION, SOLUTION SUBCUTANEOUS ONCE
Qty: 0 | Refills: 0 | Status: DISCONTINUED | OUTPATIENT
Start: 2017-07-27 | End: 2017-07-21

## 2017-07-27 RX ORDER — HYDROMORPHONE HYDROCHLORIDE 2 MG/ML
2 INJECTION INTRAMUSCULAR; INTRAVENOUS; SUBCUTANEOUS EVERY 4 HOURS
Qty: 0 | Refills: 0 | Status: DISCONTINUED | OUTPATIENT
Start: 2017-07-27 | End: 2017-08-03

## 2017-07-27 RX ORDER — INSULIN LISPRO 100/ML
VIAL (ML) SUBCUTANEOUS
Qty: 0 | Refills: 0 | Status: DISCONTINUED | OUTPATIENT
Start: 2017-07-27 | End: 2017-07-21

## 2017-07-27 RX ORDER — DEXTROSE 50 % IN WATER 50 %
25 SYRINGE (ML) INTRAVENOUS ONCE
Qty: 0 | Refills: 0 | Status: DISCONTINUED | OUTPATIENT
Start: 2017-07-27 | End: 2017-07-21

## 2017-07-27 RX ORDER — DEXTROSE 50 % IN WATER 50 %
12.5 SYRINGE (ML) INTRAVENOUS ONCE
Qty: 0 | Refills: 0 | Status: DISCONTINUED | OUTPATIENT
Start: 2017-07-27 | End: 2017-07-21

## 2017-07-27 RX ORDER — CALCITONIN SALMON 200 [IU]/ML
390 INJECTION, SOLUTION INTRAMUSCULAR EVERY 12 HOURS
Qty: 0 | Refills: 0 | Status: DISCONTINUED | OUTPATIENT
Start: 2017-07-27 | End: 2017-08-05

## 2017-07-27 RX ORDER — INSULIN LISPRO 100/ML
VIAL (ML) SUBCUTANEOUS AT BEDTIME
Qty: 0 | Refills: 0 | Status: DISCONTINUED | OUTPATIENT
Start: 2017-07-27 | End: 2017-07-21

## 2017-07-27 RX ORDER — SODIUM CHLORIDE 9 MG/ML
1000 INJECTION, SOLUTION INTRAVENOUS
Qty: 0 | Refills: 0 | Status: DISCONTINUED | OUTPATIENT
Start: 2017-07-27 | End: 2017-07-21

## 2017-07-27 RX ORDER — SODIUM CHLORIDE 9 MG/ML
1000 INJECTION INTRAMUSCULAR; INTRAVENOUS; SUBCUTANEOUS
Qty: 0 | Refills: 0 | Status: DISCONTINUED | OUTPATIENT
Start: 2017-07-27 | End: 2017-07-29

## 2017-07-27 RX ADMIN — HYDROMORPHONE HYDROCHLORIDE 2 MILLIGRAM(S): 2 INJECTION INTRAMUSCULAR; INTRAVENOUS; SUBCUTANEOUS at 06:31

## 2017-07-27 RX ADMIN — Medication 3 MILLILITER(S): at 09:56

## 2017-07-27 RX ADMIN — Medication 3 MILLILITER(S): at 21:12

## 2017-07-27 RX ADMIN — Medication 30 MILLIEQUIVALENT(S): at 06:38

## 2017-07-27 RX ADMIN — OXYCODONE AND ACETAMINOPHEN 2 TABLET(S): 5; 325 TABLET ORAL at 10:17

## 2017-07-27 RX ADMIN — MEROPENEM 200 MILLIGRAM(S): 1 INJECTION INTRAVENOUS at 21:22

## 2017-07-27 RX ADMIN — Medication 3 MILLILITER(S): at 03:23

## 2017-07-27 RX ADMIN — Medication: at 12:03

## 2017-07-27 RX ADMIN — HYDROMORPHONE HYDROCHLORIDE 2 MILLIGRAM(S): 2 INJECTION INTRAMUSCULAR; INTRAVENOUS; SUBCUTANEOUS at 18:45

## 2017-07-27 RX ADMIN — MEROPENEM 200 MILLIGRAM(S): 1 INJECTION INTRAVENOUS at 13:42

## 2017-07-27 RX ADMIN — MEROPENEM 200 MILLIGRAM(S): 1 INJECTION INTRAVENOUS at 07:50

## 2017-07-27 RX ADMIN — HYDROMORPHONE HYDROCHLORIDE 2 MILLIGRAM(S): 2 INJECTION INTRAMUSCULAR; INTRAVENOUS; SUBCUTANEOUS at 11:55

## 2017-07-27 RX ADMIN — Medication 60 MILLIGRAM(S): at 06:59

## 2017-07-27 RX ADMIN — SODIUM CHLORIDE 100 MILLILITER(S): 9 INJECTION INTRAMUSCULAR; INTRAVENOUS; SUBCUTANEOUS at 18:15

## 2017-07-27 RX ADMIN — HYDROMORPHONE HYDROCHLORIDE 2 MILLIGRAM(S): 2 INJECTION INTRAMUSCULAR; INTRAVENOUS; SUBCUTANEOUS at 18:10

## 2017-07-27 RX ADMIN — Medication 250 MILLIGRAM(S): at 18:15

## 2017-07-27 RX ADMIN — SENNA PLUS 2 TABLET(S): 8.6 TABLET ORAL at 21:33

## 2017-07-27 RX ADMIN — CALCITONIN SALMON 390 INTERNATIONAL UNIT(S): 200 INJECTION, SOLUTION INTRAMUSCULAR at 19:03

## 2017-07-27 RX ADMIN — ATORVASTATIN CALCIUM 40 MILLIGRAM(S): 80 TABLET, FILM COATED ORAL at 21:33

## 2017-07-27 RX ADMIN — Medication 250 MILLIGRAM(S): at 06:35

## 2017-07-27 RX ADMIN — OXYCODONE AND ACETAMINOPHEN 2 TABLET(S): 5; 325 TABLET ORAL at 09:17

## 2017-07-27 RX ADMIN — Medication 100 MILLIGRAM(S): at 13:42

## 2017-07-27 RX ADMIN — AMIODARONE HYDROCHLORIDE 200 MILLIGRAM(S): 400 TABLET ORAL at 06:36

## 2017-07-27 RX ADMIN — FAMOTIDINE 20 MILLIGRAM(S): 10 INJECTION INTRAVENOUS at 06:37

## 2017-07-27 RX ADMIN — Medication 100 MILLIGRAM(S): at 06:37

## 2017-07-27 RX ADMIN — HYDROMORPHONE HYDROCHLORIDE 2 MILLIGRAM(S): 2 INJECTION INTRAMUSCULAR; INTRAVENOUS; SUBCUTANEOUS at 12:30

## 2017-07-27 RX ADMIN — HYDROMORPHONE HYDROCHLORIDE 2 MILLIGRAM(S): 2 INJECTION INTRAMUSCULAR; INTRAVENOUS; SUBCUTANEOUS at 23:43

## 2017-07-27 RX ADMIN — Medication 100 MILLIGRAM(S): at 06:38

## 2017-07-27 RX ADMIN — CALCITONIN SALMON 380 INTERNATIONAL UNIT(S): 200 INJECTION, SOLUTION INTRAMUSCULAR at 07:50

## 2017-07-27 RX ADMIN — Medication 48 MILLIGRAM(S): at 21:33

## 2017-07-27 RX ADMIN — Medication 100 MILLIGRAM(S): at 21:33

## 2017-07-27 RX ADMIN — HYDROMORPHONE HYDROCHLORIDE 2 MILLIGRAM(S): 2 INJECTION INTRAMUSCULAR; INTRAVENOUS; SUBCUTANEOUS at 23:28

## 2017-07-27 RX ADMIN — HYDROMORPHONE HYDROCHLORIDE 2 MILLIGRAM(S): 2 INJECTION INTRAMUSCULAR; INTRAVENOUS; SUBCUTANEOUS at 06:55

## 2017-07-27 RX ADMIN — Medication 1 TABLET(S): at 13:42

## 2017-07-27 RX ADMIN — Medication 75 MICROGRAM(S): at 06:37

## 2017-07-27 RX ADMIN — Medication 2: at 18:24

## 2017-07-27 RX ADMIN — Medication 30 MILLIEQUIVALENT(S): at 18:09

## 2017-07-27 NOTE — PROGRESS NOTE ADULT - SUBJECTIVE AND OBJECTIVE BOX
SULEIMAN GREGORY    [id number]    71y      Male    INTERVAL HPI/OVERNIGHT EVENTS: S/p chest tube yesterday. Reports improving hemoptysis. But continues to feel short of breath.    Hospital course:  72 y/o male w/PMHx of CAD, s/p stent in June 2017, HTN, HLD, w/recent hx of LLL mass discovered here after he was treated here for hemoptysis and pleural effusion. However, he then had a PET scan which confirmed a LLL mass with areas of metastasis including left hilar lymphadenopathy, left pleural mets, left sacrum lesion. He was supposed to have a CT guided biopsy of the lung but he was found to be ill-appearing, with cough and a high white count, prompting an admission for pneumonia. He was started as an outpatient on levaquin but his white count remains high. On 7/24, started radiation therapy at [facility]. Hgb trended down to 7.6, and received 1u PRBC with appropriate response. Developed RUE swelling, US showed superficial thrombosis of right cephalic vein. Received RUE PICC on 7/25. Repeat CXR showing worsening left pleural effusion with possible loculation. CT chest showing pleural metastases with large left loculated pleural effusion. On 7/26, received left chest tube. Noted to have hypercalcemia, received pamidronate and calcitonin.     REVIEW OF SYSTEMS:    CONSTITUTIONAL: +fatigue, only able to drink ensure  CARDIOVASCULAR: No chest pain, palpitations     Vital Signs Last 24 Hrs  T(C): 36.7 (27 Jul 2017 04:52), Max: 37.3 (26 Jul 2017 22:59)  T(F): 98 (27 Jul 2017 04:52), Max: 99.1 (26 Jul 2017 22:59)  HR: 93 (27 Jul 2017 04:52) (84 - 93)  BP: 136/86 (27 Jul 2017 04:52) (125/79 - 136/86)  BP(mean): --  RR: 20 (27 Jul 2017 04:52) (16 - 20)  SpO2: 92% (26 Jul 2017 20:49) (91% - 97%)    PHYSICAL EXAM:    GENERAL: frail, cachetic, ill appearing  HEENT: PERRL, +EOMI, MMM  CHEST/LUNG: coarse breath sounds  HEART: S1S2+, Regular rate and rhythm   ABDOMEN: Soft, Nontender, Nondistended; Bowel sounds present       LABS:                        9.0    21.9  )-----------( 537      ( 26 Jul 2017 05:00 )             28.1     07-27    137  |  97<L>  |  21.0<H>  ----------------------------<  156<H>  4.0   |  29.0  |  1.31<H>    Ca    10.7<H>      27 Jul 2017 06:25    TPro  6.1<L>  /  Alb  2.5<L>  /  TBili  0.6  /  DBili  x   /  AST  28  /  ALT  42<H>  /  AlkPhos  227<H>  07-27            MEDICATIONS  (STANDING):  amiodarone    Tablet 200 milliGRAM(s) Oral daily  atorvastatin 40 milliGRAM(s) Oral at bedtime  fenofibrate Tablet 48 milliGRAM(s) Oral daily  metoprolol succinate  milliGRAM(s) Oral daily  famotidine    Tablet 20 milliGRAM(s) Oral daily  levothyroxine 75 MICROGram(s) Oral daily  multivitamin/minerals 1 Tablet(s) Oral daily  ALBUTerol/ipratropium for Nebulization 3 milliLiter(s) Nebulizer every 6 hours  senna 2 Tablet(s) Oral at bedtime  docusate sodium 100 milliGRAM(s) Oral three times a day  vancomycin  IVPB 1000 milliGRAM(s) IV Intermittent every 12 hours  meropenem IVPB 1000 milliGRAM(s) IV Intermittent every 8 hours  meropenem IVPB   IV Intermittent   sodium chloride 0.9%. 1000 milliLiter(s) (125 mL/Hr) IV Continuous <Continuous>  furosemide   Injectable 60 milliGRAM(s) IV Push two times a day  potassium chloride    Tablet ER 30 milliEquivalent(s) Oral two times a day    MEDICATIONS  (PRN):  HYDROmorphone  Injectable 2 milliGRAM(s) IV Push every 6 hours PRN Severe Pain (7 - 10)  HYDROmorphone  Injectable 0.5 milliGRAM(s) IV Push every 6 hours PRN Moderate Pain (4 - 6)  oxyCODONE    5 mG/acetaminophen 325 mG 2 Tablet(s) Oral every 4 hours PRN breakthrough pain      RADIOLOGY & ADDITIONAL TESTS:

## 2017-07-27 NOTE — CHART NOTE - NSCHARTNOTEFT_GEN_A_CORE
Pt history of v tach. Pt has AICD. pt scheduled for interrogation tomorrow. VSS b/p 127/75 p 92 pt asymptomatic. pt without complaints, CMP and MG in for am

## 2017-07-27 NOTE — CHART NOTE - NSCHARTNOTEFT_GEN_A_CORE
Patient seen and evaluated bedside. Chart reviewed.    Patient laying comfortably in bed, son at bedside.      Son and provider had lengthy conversation about anxiety regarding father's status.  Supportive counseling given.  Consider sit down with primary care team and family regarding patient status and prognosis in the days ahead.      Chest tube noted to have serosang fluid in chambers totaling 1250 at this time, noted to have chart procedure note with an initial output of 1050.    Without air leak, connections in tact.       Maintain chest tube to water seal at this time with 2 x a shift documentation of output please.

## 2017-07-27 NOTE — PROGRESS NOTE ADULT - SUBJECTIVE AND OBJECTIVE BOX
NEPHROLOGY INTERVAL HPI/OVERNIGHT EVENTS:  pt resting comfortably  no acute distress  son at bedside    MEDICATIONS  (STANDING):  amiodarone    Tablet 200 milliGRAM(s) Oral daily  atorvastatin 40 milliGRAM(s) Oral at bedtime  fenofibrate Tablet 48 milliGRAM(s) Oral daily  metoprolol succinate  milliGRAM(s) Oral daily  famotidine    Tablet 20 milliGRAM(s) Oral daily  levothyroxine 75 MICROGram(s) Oral daily  multivitamin/minerals 1 Tablet(s) Oral daily  ALBUTerol/ipratropium for Nebulization 3 milliLiter(s) Nebulizer every 6 hours  senna 2 Tablet(s) Oral at bedtime  docusate sodium 100 milliGRAM(s) Oral three times a day  vancomycin  IVPB 1000 milliGRAM(s) IV Intermittent every 12 hours  meropenem IVPB 1000 milliGRAM(s) IV Intermittent every 8 hours  meropenem IVPB   IV Intermittent   potassium chloride    Tablet ER 30 milliEquivalent(s) Oral two times a day  insulin lispro (HumaLOG) corrective regimen sliding scale   SubCutaneous three times a day before meals  insulin lispro (HumaLOG) corrective regimen sliding scale   SubCutaneous at bedtime  dextrose 5%. 1000 milliLiter(s) (50 mL/Hr) IV Continuous <Continuous>  dextrose 50% Injectable 12.5 Gram(s) IV Push once  dextrose 50% Injectable 25 Gram(s) IV Push once  dextrose 50% Injectable 25 Gram(s) IV Push once  sodium chloride 0.9%. 1000 milliLiter(s) (100 mL/Hr) IV Continuous <Continuous>    MEDICATIONS  (PRN):  oxyCODONE    5 mG/acetaminophen 325 mG 2 Tablet(s) Oral every 4 hours PRN breakthrough pain  dextrose Gel 1 Dose(s) Oral once PRN Blood Glucose LESS THAN 70 milliGRAM(s)/deciliter  glucagon  Injectable 1 milliGRAM(s) IntraMuscular once PRN Glucose LESS THAN 70 milligrams/deciliter  HYDROmorphone  Injectable 2 milliGRAM(s) IV Push every 4 hours PRN Severe Pain (7 - 10)  HYDROmorphone  Injectable 1 milliGRAM(s) IV Push every 4 hours PRN Moderate Pain (4 - 6)      Allergies    macrolide antibiotics (Urticaria; Rash; Hives)  penicillin (Urticaria; Rash)  Zithromax Z-Christian (Urticaria; Rash; Hives)          Vital Signs Last 24 Hrs  T(C): 36.7 (27 Jul 2017 04:52), Max: 37.3 (26 Jul 2017 22:59)  T(F): 98 (27 Jul 2017 04:52), Max: 99.1 (26 Jul 2017 22:59)  HR: 93 (27 Jul 2017 04:52) (92 - 93)  BP: 136/86 (27 Jul 2017 04:52) (134/67 - 136/86)  BP(mean): --  RR: 20 (27 Jul 2017 04:52) (20 - 20)  SpO2: 92% (26 Jul 2017 20:49) (91% - 92%)    PHYSICAL EXAM:  GENERAL: appears acutely  ill, oriented.  HEAD:  Atraumatic, Normocephalic  EYES: EOMI, PERRLA, conjunctiva and sclera clear  NECK: Supple,no jvd  NERVOUS SYSTEM:  Alert & Oriented X3, non focal  CHEST/LUNG: Clear to percussion bilaterally  HEART: Regular rate; No rub  ABDOMEN: Soft, Nontender, + distension; + BS  EXTREMITIES:  trace leg edema       LABS:                        9.0    19.8  )-----------( 516      ( 27 Jul 2017 11:51 )             28.4     07-27    137  |  97<L>  |  21.0<H>  ----------------------------<  156<H>  4.0   |  29.0  |  1.31<H>    Ca    10.7<H>      27 Jul 2017 06:25    TPro  6.1<L>  /  Alb  2.5<L>  /  TBili  0.6  /  DBili  x   /  AST  28  /  ALT  42<H>  /  AlkPhos  227<H>  07-27        Intact PTH: 5 pg/mL (07-26 @ 18:42)      RADIOLOGY & ADDITIONAL TESTS:

## 2017-07-28 ENCOUNTER — RESULT REVIEW (OUTPATIENT)
Age: 72
End: 2017-07-28

## 2017-07-28 LAB
ALBUMIN SERPL ELPH-MCNC: 2.6 G/DL — LOW (ref 3.3–5.2)
ALP SERPL-CCNC: 207 U/L — HIGH (ref 40–120)
ALT FLD-CCNC: 36 U/L — SIGNIFICANT CHANGE UP
ANION GAP SERPL CALC-SCNC: 12 MMOL/L — SIGNIFICANT CHANGE UP (ref 5–17)
AST SERPL-CCNC: 28 U/L — SIGNIFICANT CHANGE UP
BILIRUB SERPL-MCNC: 0.5 MG/DL — SIGNIFICANT CHANGE UP (ref 0.4–2)
BUN SERPL-MCNC: 22 MG/DL — HIGH (ref 8–20)
CALCIUM SERPL-MCNC: 10.4 MG/DL — HIGH (ref 8.6–10.2)
CHLORIDE SERPL-SCNC: 96 MMOL/L — LOW (ref 98–107)
CO2 SERPL-SCNC: 26 MMOL/L — SIGNIFICANT CHANGE UP (ref 22–29)
CREAT SERPL-MCNC: 1.33 MG/DL — HIGH (ref 0.5–1.3)
GLUCOSE SERPL-MCNC: 168 MG/DL — HIGH (ref 70–115)
HCT VFR BLD CALC: 26.7 % — LOW (ref 42–52)
HGB BLD-MCNC: 8.5 G/DL — LOW (ref 14–18)
MAGNESIUM SERPL-MCNC: 1.7 MG/DL — SIGNIFICANT CHANGE UP (ref 1.6–2.6)
MCHC RBC-ENTMCNC: 27.9 PG — SIGNIFICANT CHANGE UP (ref 27–31)
MCHC RBC-ENTMCNC: 31.8 G/DL — LOW (ref 32–36)
MCV RBC AUTO: 87.5 FL — SIGNIFICANT CHANGE UP (ref 80–94)
PHOSPHATE SERPL-MCNC: 2.6 MG/DL — SIGNIFICANT CHANGE UP (ref 2.4–4.7)
PLATELET # BLD AUTO: 447 K/UL — HIGH (ref 150–400)
POTASSIUM SERPL-MCNC: 4.2 MMOL/L — SIGNIFICANT CHANGE UP (ref 3.5–5.3)
POTASSIUM SERPL-SCNC: 4.2 MMOL/L — SIGNIFICANT CHANGE UP (ref 3.5–5.3)
PROT SERPL-MCNC: 5.8 G/DL — LOW (ref 6.6–8.7)
RBC # BLD: 3.05 M/UL — LOW (ref 4.6–6.2)
RBC # FLD: 15.5 % — SIGNIFICANT CHANGE UP (ref 11–15.6)
SODIUM SERPL-SCNC: 134 MMOL/L — LOW (ref 135–145)
WBC # BLD: 17.8 K/UL — HIGH (ref 4.8–10.8)
WBC # FLD AUTO: 17.8 K/UL — HIGH (ref 4.8–10.8)

## 2017-07-28 PROCEDURE — 32405: CPT | Mod: LT

## 2017-07-28 PROCEDURE — 71010: CPT | Mod: 26

## 2017-07-28 PROCEDURE — 99233 SBSQ HOSP IP/OBS HIGH 50: CPT

## 2017-07-28 PROCEDURE — 77012 CT SCAN FOR NEEDLE BIOPSY: CPT | Mod: 26

## 2017-07-28 PROCEDURE — 99232 SBSQ HOSP IP/OBS MODERATE 35: CPT

## 2017-07-28 PROCEDURE — 88341 IMHCHEM/IMCYTCHM EA ADD ANTB: CPT | Mod: 26

## 2017-07-28 PROCEDURE — 88305 TISSUE EXAM BY PATHOLOGIST: CPT | Mod: 26

## 2017-07-28 PROCEDURE — 88342 IMHCHEM/IMCYTCHM 1ST ANTB: CPT | Mod: 26

## 2017-07-28 RX ADMIN — HYDROMORPHONE HYDROCHLORIDE 2 MILLIGRAM(S): 2 INJECTION INTRAMUSCULAR; INTRAVENOUS; SUBCUTANEOUS at 05:01

## 2017-07-28 RX ADMIN — Medication 75 MICROGRAM(S): at 05:26

## 2017-07-28 RX ADMIN — Medication 100 MILLIGRAM(S): at 05:26

## 2017-07-28 RX ADMIN — Medication 30 MILLIEQUIVALENT(S): at 05:26

## 2017-07-28 RX ADMIN — CALCITONIN SALMON 390 INTERNATIONAL UNIT(S): 200 INJECTION, SOLUTION INTRAMUSCULAR at 05:26

## 2017-07-28 RX ADMIN — HYDROMORPHONE HYDROCHLORIDE 2 MILLIGRAM(S): 2 INJECTION INTRAMUSCULAR; INTRAVENOUS; SUBCUTANEOUS at 10:48

## 2017-07-28 RX ADMIN — FAMOTIDINE 20 MILLIGRAM(S): 10 INJECTION INTRAVENOUS at 05:26

## 2017-07-28 RX ADMIN — Medication 100 MILLIGRAM(S): at 21:45

## 2017-07-28 RX ADMIN — Medication 3 MILLILITER(S): at 03:37

## 2017-07-28 RX ADMIN — SODIUM CHLORIDE 100 MILLILITER(S): 9 INJECTION INTRAMUSCULAR; INTRAVENOUS; SUBCUTANEOUS at 20:31

## 2017-07-28 RX ADMIN — Medication 3 MILLILITER(S): at 08:37

## 2017-07-28 RX ADMIN — ATORVASTATIN CALCIUM 40 MILLIGRAM(S): 80 TABLET, FILM COATED ORAL at 21:45

## 2017-07-28 RX ADMIN — HYDROMORPHONE HYDROCHLORIDE 2 MILLIGRAM(S): 2 INJECTION INTRAMUSCULAR; INTRAVENOUS; SUBCUTANEOUS at 20:31

## 2017-07-28 RX ADMIN — MEROPENEM 200 MILLIGRAM(S): 1 INJECTION INTRAVENOUS at 05:03

## 2017-07-28 RX ADMIN — Medication 250 MILLIGRAM(S): at 05:42

## 2017-07-28 RX ADMIN — Medication 30 MILLIEQUIVALENT(S): at 17:21

## 2017-07-28 RX ADMIN — Medication 3 MILLILITER(S): at 16:16

## 2017-07-28 RX ADMIN — Medication 48 MILLIGRAM(S): at 21:45

## 2017-07-28 RX ADMIN — HYDROMORPHONE HYDROCHLORIDE 2 MILLIGRAM(S): 2 INJECTION INTRAMUSCULAR; INTRAVENOUS; SUBCUTANEOUS at 15:20

## 2017-07-28 RX ADMIN — SENNA PLUS 2 TABLET(S): 8.6 TABLET ORAL at 21:45

## 2017-07-28 RX ADMIN — HYDROMORPHONE HYDROCHLORIDE 2 MILLIGRAM(S): 2 INJECTION INTRAMUSCULAR; INTRAVENOUS; SUBCUTANEOUS at 11:28

## 2017-07-28 RX ADMIN — Medication: at 08:19

## 2017-07-28 RX ADMIN — AMIODARONE HYDROCHLORIDE 200 MILLIGRAM(S): 400 TABLET ORAL at 05:26

## 2017-07-28 RX ADMIN — SODIUM CHLORIDE 100 MILLILITER(S): 9 INJECTION INTRAMUSCULAR; INTRAVENOUS; SUBCUTANEOUS at 05:32

## 2017-07-28 RX ADMIN — HYDROMORPHONE HYDROCHLORIDE 2 MILLIGRAM(S): 2 INJECTION INTRAMUSCULAR; INTRAVENOUS; SUBCUTANEOUS at 16:41

## 2017-07-28 RX ADMIN — HYDROMORPHONE HYDROCHLORIDE 2 MILLIGRAM(S): 2 INJECTION INTRAMUSCULAR; INTRAVENOUS; SUBCUTANEOUS at 05:16

## 2017-07-28 RX ADMIN — HYDROMORPHONE HYDROCHLORIDE 2 MILLIGRAM(S): 2 INJECTION INTRAMUSCULAR; INTRAVENOUS; SUBCUTANEOUS at 21:38

## 2017-07-28 RX ADMIN — CALCITONIN SALMON 390 INTERNATIONAL UNIT(S): 200 INJECTION, SOLUTION INTRAMUSCULAR at 21:45

## 2017-07-28 RX ADMIN — Medication 3 MILLILITER(S): at 20:38

## 2017-07-28 NOTE — PROGRESS NOTE ADULT - SUBJECTIVE AND OBJECTIVE BOX
[facility]  INFECTI[facility] DISEAS[facility] [place] [facility]  =======================================================  Bethel Bernard MD [facility]   Darryl Uribe MD  Diplomates American Board of Internal Medicine and Infectious Diseases  =======================================================    SULEIMAN GREGORY [id number]  chief complaint: follow up for Pneumonia    patient seen and examined in follow up.  Chart and labs reviewed.   Pleural fluid sent to pathology on 7/26/17 - negative malignant cells.     =======================================================  Allergies:  macrolide antibiotics (Urticaria; Rash; Hives)  penicillin (Urticaria; Rash)  Zithromax Z-Christian (Urticaria; Rash; Hives)      =======================================================  Medications:  amiodarone    Tablet 200 milliGRAM(s) Oral daily  atorvastatin 40 milliGRAM(s) Oral at bedtime  fenofibrate Tablet 48 milliGRAM(s) Oral daily  metoprolol succinate  milliGRAM(s) Oral daily  famotidine    Tablet 20 milliGRAM(s) Oral daily  levothyroxine 75 MICROGram(s) Oral daily  multivitamin/minerals 1 Tablet(s) Oral daily  ALBUTerol/ipratropium for Nebulization 3 milliLiter(s) Nebulizer every 6 hours  senna 2 Tablet(s) Oral at bedtime  docusate sodium 100 milliGRAM(s) Oral three times a day  oxyCODONE    5 mG/acetaminophen 325 mG 2 Tablet(s) Oral every 4 hours PRN  potassium chloride    Tablet ER 30 milliEquivalent(s) Oral two times a day  insulin lispro (HumaLOG) corrective regimen sliding scale   SubCutaneous three times a day before meals  insulin lispro (HumaLOG) corrective regimen sliding scale   SubCutaneous at bedtime  dextrose 5%. 1000 milliLiter(s) IV Continuous <Continuous>  dextrose Gel 1 Dose(s) Oral once PRN  dextrose 50% Injectable 12.5 Gram(s) IV Push once  dextrose 50% Injectable 25 Gram(s) IV Push once  dextrose 50% Injectable 25 Gram(s) IV Push once  glucagon  Injectable 1 milliGRAM(s) IntraMuscular once PRN  sodium chloride 0.9%. 1000 milliLiter(s) IV Continuous <Continuous>  HYDROmorphone  Injectable 2 milliGRAM(s) IV Push every 4 hours PRN  HYDROmorphone  Injectable 1 milliGRAM(s) IV Push every 4 hours PRN  calcitonin Injectable 390 International Unit(s) IntraMuscular every 12 hours  levoFLOXacin  Tablet 500 milliGRAM(s) Oral every 24 hours       =======================================================     REVIEW OF SYSTEMS:  CONSTITUTIONAL:  No Fever or chills  HEENT:   No diplopia or blurred vision.  No earache, sore throat or runny nose.  CARDIOVASCULAR:  No pressure, squeezing, strangling, tightness, heaviness or aching about the chest, neck, axilla or epigastrium.  RESPIRATORY:  No cough, shortness of breath, PND or orthopnea.  GASTROINTESTINAL:  No nausea, vomiting or diarrhea.  GENITOURINARY:  No dysuria, frequency or urgency. No Blood in urine  MUSCULOSKELETAL:  no joint aches, no muscle pain  SKIN:  No change in skin, hair or nails.  NEUROLOGIC:  No paresthesias, fasciculations, seizures or weakness.  PSYCHIATRIC:  No disorder of thought or mood.  ENDOCRINE:  No heat or cold intolerance, polyuria or polydipsia.  HEMATOLOGICAL:  No easy bruising or bleeding.     =======================================================     Physical Exam:  ICU Vital Signs Last 24 Hrs  T(C): 37.2 (28 Jul 2017 04:43), Max: 37.2 (28 Jul 2017 04:43)  T(F): 99 (28 Jul 2017 04:43), Max: 99 (28 Jul 2017 04:43)  HR: 103 (28 Jul 2017 04:43) (88 - 103)  BP: 129/77 (28 Jul 2017 04:43) (102/60 - 129/77)   RR: 20 (28 Jul 2017 04:43) (19 - 20)  SpO2: 88% (28 Jul 2017 04:43) (88% - 94%)    GEN: NAD, pleasant  HEENT: normocephalic and atraumatic. EOMI. BONNIE.    NECK: Supple. No carotid bruits.  No lymphadenopathy or thyromegaly.  LUNGS: fair air entry anteriorly; left sided chest tube with bloody fluid.   HEART: Regular rate and rhythm without murmur.  ABDOMEN: Soft, DISTENDED , non tender Positive bowel sounds.    : No CVA tenderness  EXTREMITIES: Without any cyanosis, clubbing, rash, lesions.  TRACEL LE edema.  MSK: no joint swelling  NEUROLOGIC: Cranial nerves II through XII are grossly intact.  PSYCHIATRIC: Appropriate affect .  SKIN: No ulceration or induration present.    =======================================================    Labs:  07-28    134<L>  |  96<L>  |  22.0<H>  ----------------------------<  168<H>  4.2   |  26.0  |  1.33<H>    Ca    10.4<H>      28 Jul 2017 07:17  Phos  2.6     07-28  Mg     1.7     07-28    TPro  5.8<L>  /  Alb  2.6<L>  /  TBili  0.5  /  DBili  x   /  AST  28  /  ALT  36  /  AlkPhos  207<H>  07-28                          8.5    17.8  )-----------( 447      ( 28 Jul 2017 07:17 )             26.7           LIVER FUNCTIONS - ( 28 Jul 2017 07:17 )  Alb: 2.6 g/dL / Pro: 5.8 g/dL / ALK PHOS: 207 U/L / ALT: 36 U/L / AST: 28 U/L / GGT: x               CAPILLARY BLOOD GLUCOSE  168 (28 Jul 2017 08:12)  155 (27 Jul 2017 21:27)  174 (27 Jul 2017 17:00)  221 (27 Jul 2017 11:00)            RECENT CULTURES:

## 2017-07-28 NOTE — PROGRESS NOTE ADULT - SUBJECTIVE AND OBJECTIVE BOX
SULEIMAN GREGORY    [id number]    71y      Male    INTERVAL HPI/OVERNIGHT EVENTS: No events on. Continues to have pain, but does not want to increase pain medication. Still with hemoptysis.    Hospital course:  72 y/o male w/PMHx of CAD, s/p stent in June 2017, HTN, HLD, w/recent hx of LLL mass discovered here after he was treated here for hemoptysis and pleural effusion. However, he then had a PET scan which confirmed a LLL mass with areas of metastasis including left hilar lymphadenopathy, left pleural mets, left sacrum lesion. He was supposed to have a CT guided biopsy of the lung but he was found to be ill-appearing, with cough and a high white count, prompting an admission for pneumonia. He was started as an outpatient on levaquin but his white count remains high. On 7/24, started radiation therapy at [facility]. Hgb trended down to 7.6, and received 1u PRBC with appropriate response. Developed RUE swelling, US showed superficial thrombosis of right cephalic vein. Received RUE PICC on 7/25. Repeat CXR showing worsening left pleural effusion with possible loculation. CT chest showing pleural metastases with large left loculated pleural effusion. On 7/26, received left chest tube. Noted to have hypercalcemia, received pamidronate and calcitonin.     REVIEW OF SYSTEMS:    CONSTITUTIONAL: No fever   RESPIRATORY: No cough, wheezing, hemoptysis; No shortness of breath       Vital Signs Last 24 Hrs  T(C): 37.2 (28 Jul 2017 04:43), Max: 37.2 (28 Jul 2017 04:43)  T(F): 99 (28 Jul 2017 04:43), Max: 99 (28 Jul 2017 04:43)  HR: 103 (28 Jul 2017 04:43) (88 - 103)  BP: 129/77 (28 Jul 2017 04:43) (102/60 - 129/77)  BP(mean): --  RR: 20 (28 Jul 2017 04:43) (19 - 20)  SpO2: 88% (28 Jul 2017 04:43) (88% - 94%)    PHYSICAL EXAM:    GENERAL: NAD, chronically ill appearing,pale  HEENT: PERRL, +EOMI, MMM  NECK: soft, Supple, No JVD,   CHEST/LUNG: coarse breath sounds  HEART: S1S2+, Regular rate and rhythm   ABDOMEN: Soft, Nontender, Nondistended; Bowel sounds present    LABS:                        8.5    17.8  )-----------( 447      ( 28 Jul 2017 07:17 )             26.7     07-28    134<L>  |  96<L>  |  22.0<H>  ----------------------------<  168<H>  4.2   |  26.0  |  1.33<H>    Ca    10.4<H>      28 Jul 2017 07:17  Phos  2.6     07-28  Mg     1.7     07-28    TPro  5.8<L>  /  Alb  2.6<L>  /  TBili  0.5  /  DBili  x   /  AST  28  /  ALT  36  /  AlkPhos  207<H>  07-28            MEDICATIONS  (STANDING):  amiodarone    Tablet 200 milliGRAM(s) Oral daily  atorvastatin 40 milliGRAM(s) Oral at bedtime  fenofibrate Tablet 48 milliGRAM(s) Oral daily  metoprolol succinate  milliGRAM(s) Oral daily  famotidine    Tablet 20 milliGRAM(s) Oral daily  levothyroxine 75 MICROGram(s) Oral daily  multivitamin/minerals 1 Tablet(s) Oral daily  ALBUTerol/ipratropium for Nebulization 3 milliLiter(s) Nebulizer every 6 hours  senna 2 Tablet(s) Oral at bedtime  docusate sodium 100 milliGRAM(s) Oral three times a day  potassium chloride    Tablet ER 30 milliEquivalent(s) Oral two times a day  insulin lispro (HumaLOG) corrective regimen sliding scale   SubCutaneous three times a day before meals  insulin lispro (HumaLOG) corrective regimen sliding scale   SubCutaneous at bedtime  dextrose 5%. 1000 milliLiter(s) (50 mL/Hr) IV Continuous <Continuous>  dextrose 50% Injectable 12.5 Gram(s) IV Push once  dextrose 50% Injectable 25 Gram(s) IV Push once  dextrose 50% Injectable 25 Gram(s) IV Push once  sodium chloride 0.9%. 1000 milliLiter(s) (100 mL/Hr) IV Continuous <Continuous>  calcitonin Injectable 390 International Unit(s) IntraMuscular every 12 hours  levoFLOXacin  Tablet 500 milliGRAM(s) Oral every 24 hours    MEDICATIONS  (PRN):  oxyCODONE    5 mG/acetaminophen 325 mG 2 Tablet(s) Oral every 4 hours PRN breakthrough pain  dextrose Gel 1 Dose(s) Oral once PRN Blood Glucose LESS THAN 70 milliGRAM(s)/deciliter  glucagon  Injectable 1 milliGRAM(s) IntraMuscular once PRN Glucose LESS THAN 70 milligrams/deciliter  HYDROmorphone  Injectable 2 milliGRAM(s) IV Push every 4 hours PRN Severe Pain (7 - 10)  HYDROmorphone  Injectable 1 milliGRAM(s) IV Push every 4 hours PRN Moderate Pain (4 - 6)      RADIOLOGY & ADDITIONAL TESTS:

## 2017-07-28 NOTE — PROGRESS NOTE ADULT - SUBJECTIVE AND OBJECTIVE BOX
NEPHROLOGY INTERVAL HPI/OVERNIGHT EVENTS:    Examined earlier  Looks comfortable    MEDICATIONS  (STANDING):  amiodarone    Tablet 200 milliGRAM(s) Oral daily  atorvastatin 40 milliGRAM(s) Oral at bedtime  fenofibrate Tablet 48 milliGRAM(s) Oral daily  metoprolol succinate  milliGRAM(s) Oral daily  famotidine    Tablet 20 milliGRAM(s) Oral daily  levothyroxine 75 MICROGram(s) Oral daily  multivitamin/minerals 1 Tablet(s) Oral daily  ALBUTerol/ipratropium for Nebulization 3 milliLiter(s) Nebulizer every 6 hours  senna 2 Tablet(s) Oral at bedtime  docusate sodium 100 milliGRAM(s) Oral three times a day  potassium chloride    Tablet ER 30 milliEquivalent(s) Oral two times a day  insulin lispro (HumaLOG) corrective regimen sliding scale   SubCutaneous three times a day before meals  insulin lispro (HumaLOG) corrective regimen sliding scale   SubCutaneous at bedtime  dextrose 5%. 1000 milliLiter(s) (50 mL/Hr) IV Continuous <Continuous>  dextrose 50% Injectable 12.5 Gram(s) IV Push once  dextrose 50% Injectable 25 Gram(s) IV Push once  dextrose 50% Injectable 25 Gram(s) IV Push once  sodium chloride 0.9%. 1000 milliLiter(s) (100 mL/Hr) IV Continuous <Continuous>  calcitonin Injectable 390 International Unit(s) IntraMuscular every 12 hours  levoFLOXacin  Tablet 500 milliGRAM(s) Oral every 24 hours    MEDICATIONS  (PRN):  oxyCODONE    5 mG/acetaminophen 325 mG 2 Tablet(s) Oral every 4 hours PRN breakthrough pain  dextrose Gel 1 Dose(s) Oral once PRN Blood Glucose LESS THAN 70 milliGRAM(s)/deciliter  glucagon  Injectable 1 milliGRAM(s) IntraMuscular once PRN Glucose LESS THAN 70 milligrams/deciliter  HYDROmorphone  Injectable 2 milliGRAM(s) IV Push every 4 hours PRN Severe Pain (7 - 10)  HYDROmorphone  Injectable 1 milliGRAM(s) IV Push every 4 hours PRN Moderate Pain (4 - 6)      Allergies    macrolide antibiotics (Urticaria; Rash; Hives)  penicillin (Urticaria; Rash)  Zithromax Z-Christian (Urticaria; Rash; Hives)    Intolerances        Vital Signs Last 24 Hrs  T(C): 37.2 (28 Jul 2017 04:43), Max: 37.2 (28 Jul 2017 04:43)  T(F): 99 (28 Jul 2017 04:43), Max: 99 (28 Jul 2017 04:43)  HR: 103 (28 Jul 2017 04:43) (91 - 103)  BP: 129/77 (28 Jul 2017 04:43) (127/75 - 129/77)  BP(mean): --  RR: 20 (28 Jul 2017 04:43) (20 - 20)  SpO2: 88% (28 Jul 2017 04:43) (88% - 94%)  Daily     Daily     PHYSICAL EXAM:  GENERAL: appears acutely  ill, oriented.  HEAD:  Atraumatic, Normocephalic  EYES: EOMI, PERRLA, conjunctiva and sclera clear  NECK: Supple,no jvd  NERVOUS SYSTEM:  Alert & Oriented X3, non focal  CHEST/LUNG: Clear to percussion bilaterally  HEART: Regular rate; No rub  ABDOMEN: Soft, Nontender, + distension; + BS  EXTREMITIES:  trace leg edema       LABS:                        8.5    17.8  )-----------( 447      ( 28 Jul 2017 07:17 )             26.7     07-28    134<L>  |  96<L>  |  22.0<H>  ----------------------------<  168<H>  4.2   |  26.0  |  1.33<H>    Ca    10.4<H>      28 Jul 2017 07:17  Phos  2.6     07-28  Mg     1.7     07-28    TPro  5.8<L>  /  Alb  2.6<L>  /  TBili  0.5  /  DBili  x   /  AST  28  /  ALT  36  /  AlkPhos  207<H>  07-28        Phosphorus Level, Serum: 2.6 mg/dL (07-28 @ 07:17)  Magnesium, Serum: 1.7 mg/dL (07-28 @ 07:17)          RADIOLOGY & ADDITIONAL TESTS:

## 2017-07-28 NOTE — PROGRESS NOTE ADULT - SUBJECTIVE AND OBJECTIVE BOX
VIR Procedure Note    Dx: left lung mass  Procedure: left lung mass CT needle biopsy. No complications. 5cc blood loss. 2 cores sent to pathology.    Raquel Medrano MD  [facility]

## 2017-07-28 NOTE — PROGRESS NOTE ADULT - SUBJECTIVE AND OBJECTIVE BOX
Subjective: "Not doing great today." Patient seen an examined at bedside. Currently without complaints.    Tele:                                                              T(C): 37.2 (07-28-17 @ 04:43), Max: 37.2 (07-28-17 @ 04:43)  HR: 103 (07-28-17 @ 04:43) (88 - 103)  BP: 129/77 (07-28-17 @ 04:43) (102/60 - 129/77)  RR: 20 (07-28-17 @ 04:43) (19 - 20)  SpO2: 88% (07-28-17 @ 04:43) (88% - 94%)      07-28    134<L>  |  96<L>  |  22.0<H>  ----------------------------<  168<H>  4.2   |  26.0  |  1.33<H>    Ca    10.4<H>      28 Jul 2017 07:17  Phos  2.6     07-28  Mg     1.7     07-28    TPro  5.8<L>  /  Alb  2.6<L>  /  TBili  0.5  /  DBili  x   /  AST  28  /  ALT  36  /  AlkPhos  207<H>  07-28                               8.5    17.8  )-----------( 447      ( 28 Jul 2017 07:17 )             26.7          CAPILLARY BLOOD GLUCOSE  168 (28 Jul 2017 08:12)  155 (27 Jul 2017 21:27)  174 (27 Jul 2017 17:00)  221 (27 Jul 2017 11:00)               CXR: official read pending. Left atelectasis vs. effusion noted. Left CT in place.     I&O's Detail    27 Jul 2017 07:01  -  28 Jul 2017 07:00  --------------------------------------------------------  IN:    Oral Fluid: 480 mL    sodium chloride 0.9%.: 1200 mL  Total IN: 1680 mL    OUT:    Chest Tube: 1100 mL    Voided: 2152 mL  Total OUT: 3252 mL    Total NET: -1572 mL    CHEST TUBE:  [X] YES [ ] NO  OUTPUT: 1100ml per 24 hours    AIR LEAK:  [ ] YES [X] NO      Drug Dosing Weight  Height (cm): 182.88 (20 Jul 2017 14:45)  Weight (kg): 98 (26 Jul 2017 06:33)  BMI (kg/m2): 29.3 (26 Jul 2017 06:33)  BSA (m2): 2.2 (26 Jul 2017 06:33)    amiodarone    Tablet 200 milliGRAM(s) Oral daily  atorvastatin 40 milliGRAM(s) Oral at bedtime  fenofibrate Tablet 48 milliGRAM(s) Oral daily  metoprolol succinate  milliGRAM(s) Oral daily  famotidine    Tablet 20 milliGRAM(s) Oral daily  levothyroxine 75 MICROGram(s) Oral daily  multivitamin/minerals 1 Tablet(s) Oral daily  ALBUTerol/ipratropium for Nebulization 3 milliLiter(s) Nebulizer every 6 hours  senna 2 Tablet(s) Oral at bedtime  docusate sodium 100 milliGRAM(s) Oral three times a day  potassium chloride    Tablet ER 30 milliEquivalent(s) Oral two times a day  insulin lispro (HumaLOG) corrective regimen sliding scale   SubCutaneous three times a day before meals  insulin lispro (HumaLOG) corrective regimen sliding scale   SubCutaneous at bedtime   sodium chloride 0.9%. 1000 milliLiter(s) IV Continuous <Continuous>  calcitonin Injectable 390 International Unit(s) IntraMuscular every 12 hours      Physical Exam:  Neuro: Patient A&O  Pulm: CTA b/l with diminished breath sounds noted on left.  CV: RRR S1+S2 noted.  Abd: BS+, NT, ND

## 2017-07-29 LAB
ALBUMIN SERPL ELPH-MCNC: 2.5 G/DL — LOW (ref 3.3–5.2)
ALP SERPL-CCNC: 204 U/L — HIGH (ref 40–120)
ALT FLD-CCNC: 32 U/L — SIGNIFICANT CHANGE UP
ANION GAP SERPL CALC-SCNC: 10 MMOL/L — SIGNIFICANT CHANGE UP (ref 5–17)
AST SERPL-CCNC: 26 U/L — SIGNIFICANT CHANGE UP
BILIRUB SERPL-MCNC: 0.6 MG/DL — SIGNIFICANT CHANGE UP (ref 0.4–2)
BUN SERPL-MCNC: 21 MG/DL — HIGH (ref 8–20)
CALCIUM SERPL-MCNC: 10.5 MG/DL — HIGH (ref 8.6–10.2)
CHLORIDE SERPL-SCNC: 101 MMOL/L — SIGNIFICANT CHANGE UP (ref 98–107)
CO2 SERPL-SCNC: 24 MMOL/L — SIGNIFICANT CHANGE UP (ref 22–29)
CREAT SERPL-MCNC: 1.09 MG/DL — SIGNIFICANT CHANGE UP (ref 0.5–1.3)
CULTURE RESULTS: SIGNIFICANT CHANGE UP
CULTURE RESULTS: SIGNIFICANT CHANGE UP
GLUCOSE SERPL-MCNC: 153 MG/DL — HIGH (ref 70–115)
HCT VFR BLD CALC: 28.4 % — LOW (ref 42–52)
HGB BLD-MCNC: 9.1 G/DL — LOW (ref 14–18)
MCHC RBC-ENTMCNC: 28.3 PG — SIGNIFICANT CHANGE UP (ref 27–31)
MCHC RBC-ENTMCNC: 32 G/DL — SIGNIFICANT CHANGE UP (ref 32–36)
MCV RBC AUTO: 88.2 FL — SIGNIFICANT CHANGE UP (ref 80–94)
PLATELET # BLD AUTO: 425 K/UL — HIGH (ref 150–400)
POTASSIUM SERPL-MCNC: 4.8 MMOL/L — SIGNIFICANT CHANGE UP (ref 3.5–5.3)
POTASSIUM SERPL-SCNC: 4.8 MMOL/L — SIGNIFICANT CHANGE UP (ref 3.5–5.3)
PROT SERPL-MCNC: 6.1 G/DL — LOW (ref 6.6–8.7)
PTH RELATED PROT SERPL-MCNC: 5.3 PMOL/L — HIGH
RBC # BLD: 3.22 M/UL — LOW (ref 4.6–6.2)
RBC # FLD: 15.6 % — SIGNIFICANT CHANGE UP (ref 11–15.6)
SODIUM SERPL-SCNC: 135 MMOL/L — SIGNIFICANT CHANGE UP (ref 135–145)
SPECIMEN SOURCE: SIGNIFICANT CHANGE UP
SPECIMEN SOURCE: SIGNIFICANT CHANGE UP
WBC # BLD: 18.5 K/UL — HIGH (ref 4.8–10.8)
WBC # FLD AUTO: 18.5 K/UL — HIGH (ref 4.8–10.8)

## 2017-07-29 PROCEDURE — 99233 SBSQ HOSP IP/OBS HIGH 50: CPT

## 2017-07-29 RX ORDER — ASPIRIN/CALCIUM CARB/MAGNESIUM 324 MG
81 TABLET ORAL DAILY
Qty: 0 | Refills: 0 | Status: DISCONTINUED | OUTPATIENT
Start: 2017-07-29 | End: 2017-07-21

## 2017-07-29 RX ORDER — CLOPIDOGREL BISULFATE 75 MG/1
75 TABLET, FILM COATED ORAL DAILY
Qty: 0 | Refills: 0 | Status: DISCONTINUED | OUTPATIENT
Start: 2017-07-29 | End: 2017-07-21

## 2017-07-29 RX ADMIN — HYDROMORPHONE HYDROCHLORIDE 2 MILLIGRAM(S): 2 INJECTION INTRAMUSCULAR; INTRAVENOUS; SUBCUTANEOUS at 20:03

## 2017-07-29 RX ADMIN — HYDROMORPHONE HYDROCHLORIDE 2 MILLIGRAM(S): 2 INJECTION INTRAMUSCULAR; INTRAVENOUS; SUBCUTANEOUS at 08:49

## 2017-07-29 RX ADMIN — Medication 3 MILLILITER(S): at 08:22

## 2017-07-29 RX ADMIN — Medication 3 MILLILITER(S): at 15:13

## 2017-07-29 RX ADMIN — HYDROMORPHONE HYDROCHLORIDE 2 MILLIGRAM(S): 2 INJECTION INTRAMUSCULAR; INTRAVENOUS; SUBCUTANEOUS at 20:30

## 2017-07-29 RX ADMIN — CLOPIDOGREL BISULFATE 75 MILLIGRAM(S): 75 TABLET, FILM COATED ORAL at 11:50

## 2017-07-29 RX ADMIN — AMIODARONE HYDROCHLORIDE 200 MILLIGRAM(S): 400 TABLET ORAL at 05:21

## 2017-07-29 RX ADMIN — FAMOTIDINE 20 MILLIGRAM(S): 10 INJECTION INTRAVENOUS at 05:21

## 2017-07-29 RX ADMIN — Medication 100 MILLIGRAM(S): at 22:10

## 2017-07-29 RX ADMIN — Medication 3 MILLILITER(S): at 21:05

## 2017-07-29 RX ADMIN — HYDROMORPHONE HYDROCHLORIDE 2 MILLIGRAM(S): 2 INJECTION INTRAMUSCULAR; INTRAVENOUS; SUBCUTANEOUS at 14:08

## 2017-07-29 RX ADMIN — OXYCODONE AND ACETAMINOPHEN 2 TABLET(S): 5; 325 TABLET ORAL at 22:20

## 2017-07-29 RX ADMIN — HYDROMORPHONE HYDROCHLORIDE 2 MILLIGRAM(S): 2 INJECTION INTRAMUSCULAR; INTRAVENOUS; SUBCUTANEOUS at 14:23

## 2017-07-29 RX ADMIN — Medication 100 MILLIGRAM(S): at 05:21

## 2017-07-29 RX ADMIN — Medication 3 MILLILITER(S): at 03:20

## 2017-07-29 RX ADMIN — Medication 1 TABLET(S): at 11:50

## 2017-07-29 RX ADMIN — Medication 75 MICROGRAM(S): at 05:21

## 2017-07-29 RX ADMIN — SODIUM CHLORIDE 100 MILLILITER(S): 9 INJECTION INTRAMUSCULAR; INTRAVENOUS; SUBCUTANEOUS at 06:19

## 2017-07-29 RX ADMIN — HYDROMORPHONE HYDROCHLORIDE 2 MILLIGRAM(S): 2 INJECTION INTRAMUSCULAR; INTRAVENOUS; SUBCUTANEOUS at 04:45

## 2017-07-29 RX ADMIN — SENNA PLUS 2 TABLET(S): 8.6 TABLET ORAL at 22:10

## 2017-07-29 RX ADMIN — Medication 100 MILLIGRAM(S): at 14:08

## 2017-07-29 RX ADMIN — Medication 30 MILLIEQUIVALENT(S): at 05:21

## 2017-07-29 RX ADMIN — ATORVASTATIN CALCIUM 40 MILLIGRAM(S): 80 TABLET, FILM COATED ORAL at 22:10

## 2017-07-29 RX ADMIN — Medication 81 MILLIGRAM(S): at 11:50

## 2017-07-29 RX ADMIN — Medication 48 MILLIGRAM(S): at 22:10

## 2017-07-29 RX ADMIN — OXYCODONE AND ACETAMINOPHEN 2 TABLET(S): 5; 325 TABLET ORAL at 23:50

## 2017-07-29 RX ADMIN — CALCITONIN SALMON 390 INTERNATIONAL UNIT(S): 200 INJECTION, SOLUTION INTRAMUSCULAR at 08:42

## 2017-07-29 RX ADMIN — HYDROMORPHONE HYDROCHLORIDE 2 MILLIGRAM(S): 2 INJECTION INTRAMUSCULAR; INTRAVENOUS; SUBCUTANEOUS at 05:20

## 2017-07-29 RX ADMIN — HYDROMORPHONE HYDROCHLORIDE 2 MILLIGRAM(S): 2 INJECTION INTRAMUSCULAR; INTRAVENOUS; SUBCUTANEOUS at 09:04

## 2017-07-29 NOTE — PROGRESS NOTE ADULT - SUBJECTIVE AND OBJECTIVE BOX
NEPHROLOGY INTERVAL HPI/OVERNIGHT EVENTS:    Consultant follow up noted   Meds reviewed   OOB to chair  + left chest tube     MEDICATIONS  (STANDING):  amiodarone    Tablet 200 milliGRAM(s) Oral daily  atorvastatin 40 milliGRAM(s) Oral at bedtime  fenofibrate Tablet 48 milliGRAM(s) Oral daily  metoprolol succinate  milliGRAM(s) Oral daily  famotidine    Tablet 20 milliGRAM(s) Oral daily  levothyroxine 75 MICROGram(s) Oral daily  multivitamin/minerals 1 Tablet(s) Oral daily  ALBUTerol/ipratropium for Nebulization 3 milliLiter(s) Nebulizer every 6 hours  senna 2 Tablet(s) Oral at bedtime  docusate sodium 100 milliGRAM(s) Oral three times a day  potassium chloride    Tablet ER 30 milliEquivalent(s) Oral two times a day  insulin lispro (HumaLOG) corrective regimen sliding scale   SubCutaneous three times a day before meals  insulin lispro (HumaLOG) corrective regimen sliding scale   SubCutaneous at bedtime  dextrose 5%. 1000 milliLiter(s) (50 mL/Hr) IV Continuous <Continuous>  dextrose 50% Injectable 12.5 Gram(s) IV Push once  dextrose 50% Injectable 25 Gram(s) IV Push once  dextrose 50% Injectable 25 Gram(s) IV Push once  calcitonin Injectable 390 International Unit(s) IntraMuscular every 12 hours  levoFLOXacin  Tablet 500 milliGRAM(s) Oral every 24 hours  clopidogrel Tablet 75 milliGRAM(s) Oral daily  aspirin  chewable 81 milliGRAM(s) Oral daily    MEDICATIONS  (PRN):  oxyCODONE    5 mG/acetaminophen 325 mG 2 Tablet(s) Oral every 4 hours PRN breakthrough pain  dextrose Gel 1 Dose(s) Oral once PRN Blood Glucose LESS THAN 70 milliGRAM(s)/deciliter  glucagon  Injectable 1 milliGRAM(s) IntraMuscular once PRN Glucose LESS THAN 70 milligrams/deciliter  HYDROmorphone  Injectable 2 milliGRAM(s) IV Push every 4 hours PRN Severe Pain (7 - 10)  HYDROmorphone  Injectable 1 milliGRAM(s) IV Push every 4 hours PRN Moderate Pain (4 - 6)      Allergies    macrolide antibiotics (Urticaria; Rash; Hives)  penicillin (Urticaria; Rash)  Zithromax Z-Christian (Urticaria; Rash; Hives)    Intolerances          Vital Signs Last 24 Hrs  T(C): 36.7 (29 Jul 2017 12:12), Max: 36.8 (28 Jul 2017 22:39)  T(F): 98.1 (29 Jul 2017 12:12), Max: 98.3 (28 Jul 2017 22:39)  HR: 86 (29 Jul 2017 12:12) (83 - 94)  BP: 116/69 (29 Jul 2017 12:12) (116/69 - 127/81)  BP(mean): --  RR: 20 (29 Jul 2017 12:12) (19 - 20)  SpO2: 98% (29 Jul 2017 08:02) (94% - 98%)  Daily     Daily   I&O's Detail    28 Jul 2017 07:01  -  29 Jul 2017 07:00  --------------------------------------------------------  IN:    Oral Fluid: 380 mL    sodium chloride 0.9%: 2100 mL  Total IN: 2480 mL    OUT:    Chest Tube: 1000 mL    Voided: 2200 mL  Total OUT: 3200 mL    Total NET: -720 mL      29 Jul 2017 07:01  -  29 Jul 2017 13:15  --------------------------------------------------------  IN:  Total IN: 0 mL    OUT:    Voided: 500 mL  Total OUT: 500 mL    Total NET: -500 mL        I&O's Summary    28 Jul 2017 07:01  -  29 Jul 2017 07:00  --------------------------------------------------------  IN: 2480 mL / OUT: 3200 mL / NET: -720 mL    29 Jul 2017 07:01  -  29 Jul 2017 13:15  --------------------------------------------------------  IN: 0 mL / OUT: 500 mL / NET: -500 mL        PHYSICAL EXAM:  GENERAL: appears acutely  ill, oriented.  HEAD:  Atraumatic, Normocephalic  NECK: No JVP   CHEST/LUNG: Dec on Left , + Chest tube   HEART: Regular rate; No rub  ABDOMEN: Soft, Nontender, + distension; + BS  EXTREMITIES:  trace leg edema   LABS:                        9.1    18.5  )-----------( 425      ( 29 Jul 2017 05:09 )             28.4     07-29    135  |  101  |  21.0<H>  ----------------------------<  153<H>  4.8   |  24.0  |  1.09    Ca    10.5<H>      29 Jul 2017 05:09  Phos  2.6     07-28  Mg     1.7     07-28    TPro  6.1<L>  /  Alb  2.5<L>  /  TBili  0.6  /  DBili  x   /  AST  26  /  ALT  32  /  AlkPhos  204<H>  07-29                RADIOLOGY & ADDITIONAL TESTS:

## 2017-07-29 NOTE — PROGRESS NOTE ADULT - SUBJECTIVE AND OBJECTIVE BOX
[facility] - [facility], [facility]                                   [address] [place] [address]                                                      PHONE: [phone]                                                         FAX: [phone]  [email or web address]  ---------------------------------------------------------------------------------------------------------------------------------    Overnight events/patient complaints:  NAD feeling ok     macrolide antibiotics (Urticaria; Rash; Hives)  penicillin (Urticaria; Rash)  Zithromax Z-Christian (Urticaria; Rash; Hives)    MEDICATIONS  (STANDING):  amiodarone    Tablet 200 milliGRAM(s) Oral daily  atorvastatin 40 milliGRAM(s) Oral at bedtime  fenofibrate Tablet 48 milliGRAM(s) Oral daily  metoprolol succinate  milliGRAM(s) Oral daily  famotidine    Tablet 20 milliGRAM(s) Oral daily  levothyroxine 75 MICROGram(s) Oral daily  multivitamin/minerals 1 Tablet(s) Oral daily  ALBUTerol/ipratropium for Nebulization 3 milliLiter(s) Nebulizer every 6 hours  senna 2 Tablet(s) Oral at bedtime  docusate sodium 100 milliGRAM(s) Oral three times a day  potassium chloride    Tablet ER 30 milliEquivalent(s) Oral two times a day  insulin lispro (HumaLOG) corrective regimen sliding scale   SubCutaneous three times a day before meals  insulin lispro (HumaLOG) corrective regimen sliding scale   SubCutaneous at bedtime  dextrose 5%. 1000 milliLiter(s) (50 mL/Hr) IV Continuous <Continuous>  dextrose 50% Injectable 12.5 Gram(s) IV Push once  dextrose 50% Injectable 25 Gram(s) IV Push once  dextrose 50% Injectable 25 Gram(s) IV Push once  sodium chloride 0.9%. 1000 milliLiter(s) (100 mL/Hr) IV Continuous <Continuous>  calcitonin Injectable 390 International Unit(s) IntraMuscular every 12 hours  levoFLOXacin  Tablet 500 milliGRAM(s) Oral every 24 hours    MEDICATIONS  (PRN):  oxyCODONE    5 mG/acetaminophen 325 mG 2 Tablet(s) Oral every 4 hours PRN breakthrough pain  dextrose Gel 1 Dose(s) Oral once PRN Blood Glucose LESS THAN 70 milliGRAM(s)/deciliter  glucagon  Injectable 1 milliGRAM(s) IntraMuscular once PRN Glucose LESS THAN 70 milligrams/deciliter  HYDROmorphone  Injectable 2 milliGRAM(s) IV Push every 4 hours PRN Severe Pain (7 - 10)  HYDROmorphone  Injectable 1 milliGRAM(s) IV Push every 4 hours PRN Moderate Pain (4 - 6)      Vital Signs Last 24 Hrs  T(C): 36.2 (29 Jul 2017 04:54), Max: 36.8 (28 Jul 2017 22:39)  T(F): 97.2 (29 Jul 2017 04:54), Max: 98.3 (28 Jul 2017 22:39)  HR: 83 (29 Jul 2017 08:02) (83 - 94)  BP: 124/79 (29 Jul 2017 04:54) (124/79 - 127/81)  BP(mean): --  RR: 19 (29 Jul 2017 04:54) (19 - 20)  SpO2: 98% (29 Jul 2017 08:02) (94% - 98%)  ICU Vital Signs Last 24 Hrs  SULEIMAN GREGORY  I&O's Detail    28 Jul 2017 07:01  -  29 Jul 2017 07:00  --------------------------------------------------------  IN:    Oral Fluid: 380 mL    sodium chloride 0.9%.: 2100 mL  Total IN: 2480 mL    OUT:    Chest Tube: 1000 mL    Voided: 2200 mL  Total OUT: 3200 mL    Total NET: -720 mL        I&O's Summary    28 Jul 2017 07:01  -  29 Jul 2017 07:00  --------------------------------------------------------  IN: 2480 mL / OUT: 3200 mL / NET: -720 mL      Drug Dosing Weight  SULEIMAN GREGORY      PHYSICAL EXAM:  General: Appears well developed, well nourished alert and cooperative.  HEENT: Head; normocephalic, atraumatic.  Eyes: Pupils reactive, cornea wnl.  Neck: Supple, no nodes adenopathy, no NVD or carotid bruit or thyromegaly.  CARDIOVASCULAR: Normal S1 and S2, 2/6 murmur, rub, gallop or lift.   LUNGS: Decrease BS B/L   ABDOMEN: Soft, nontender without mass or organomegaly. bowel sounds normoactive.  EXTREMITIES: No clubbing, cyanosis or edema. Distal pulses wnl.   SKIN: warm and dry with normal turgor.  NEURO: Alert/oriented x 3/normal motor exam. No pathologic reflexes.    PSYCH: normal affect.        LABS:                        9.1    18.5  )-----------( 425      ( 29 Jul 2017 05:09 )             28.4     07-29    135  |  101  |  21.0<H>  ----------------------------<  153<H>  4.8   |  24.0  |  1.09    Ca    10.5<H>      29 Jul 2017 05:09  Phos  2.6     07-28  Mg     1.7     07-28    TPro  6.1<L>  /  Alb  2.5<L>  /  TBili  0.6  /  DBili  x   /  AST  26  /  ALT  32  /  AlkPhos  204<H>  07-29    SULEIMAN WILSONO            RADIOLOGY & ADDITIONAL STUDIES:    INTERPRETATION OF TELEMETRY (personally reviewed):    ECG:    ECHO:      CARDIAC CATHETERIZATION:  COMPLICATIONS: There were no complications. No complications occurred  during the cath lab visit.  DIAGNOSTIC IMPRESSIONS: Prior GRAY to LAD is patent with severe disease of  SVG to RPDA. (SVG to ramus/om is known to be occluded) The SVG to PP DA  was treated with ZARINA (resolute) with distal protection.  DIAGNOSTIC RECOMMENDATIONS: ASA and Plavix  INTERVENTIONAL IMPRESSIONS: Prior GRAY to LAD is patent with severe disease  of SVG to RPDA. (SVG to ramus/om is known to be occluded) The SVG to PP DA  was treated with ZARINA (resolute) with distal protection.  INTERVENTIONAL RECOMMENDATIONS: ASA and Plavix      ASSESSMENT AND PLAN:  In summary, SULEIMAN GREGORY is an 71y Male with past medical history significant for HTN HLD CAD s/p CABG recent PCI SVG to PDA was on DAPT ICM AICD NSVT COPD ex smoker now with lung cancer with mets hemoptysis improving     Plan  1.  Restart ASA when ok with pulmonary  2.  Continue other CV medications       poor prognosis

## 2017-07-30 LAB
ALBUMIN SERPL ELPH-MCNC: 2.3 G/DL — LOW (ref 3.3–5.2)
ALP SERPL-CCNC: 234 U/L — HIGH (ref 40–120)
ALT FLD-CCNC: 37 U/L — SIGNIFICANT CHANGE UP
ANION GAP SERPL CALC-SCNC: 10 MMOL/L — SIGNIFICANT CHANGE UP (ref 5–17)
AST SERPL-CCNC: 37 U/L — SIGNIFICANT CHANGE UP
BILIRUB SERPL-MCNC: 0.5 MG/DL — SIGNIFICANT CHANGE UP (ref 0.4–2)
BUN SERPL-MCNC: 23 MG/DL — HIGH (ref 8–20)
CALCIUM SERPL-MCNC: 10.2 MG/DL — SIGNIFICANT CHANGE UP (ref 8.6–10.2)
CHLORIDE SERPL-SCNC: 102 MMOL/L — SIGNIFICANT CHANGE UP (ref 98–107)
CO2 SERPL-SCNC: 25 MMOL/L — SIGNIFICANT CHANGE UP (ref 22–29)
CREAT SERPL-MCNC: 1.06 MG/DL — SIGNIFICANT CHANGE UP (ref 0.5–1.3)
GLUCOSE SERPL-MCNC: 126 MG/DL — HIGH (ref 70–115)
HCT VFR BLD CALC: 27.5 % — LOW (ref 42–52)
HGB BLD-MCNC: 8.4 G/DL — LOW (ref 14–18)
MCHC RBC-ENTMCNC: 27.6 PG — SIGNIFICANT CHANGE UP (ref 27–31)
MCHC RBC-ENTMCNC: 30.5 G/DL — LOW (ref 32–36)
MCV RBC AUTO: 90.5 FL — SIGNIFICANT CHANGE UP (ref 80–94)
PLATELET # BLD AUTO: 422 K/UL — HIGH (ref 150–400)
POTASSIUM SERPL-MCNC: 4.6 MMOL/L — SIGNIFICANT CHANGE UP (ref 3.5–5.3)
POTASSIUM SERPL-SCNC: 4.6 MMOL/L — SIGNIFICANT CHANGE UP (ref 3.5–5.3)
PROT SERPL-MCNC: 5.7 G/DL — LOW (ref 6.6–8.7)
RBC # BLD: 3.04 M/UL — LOW (ref 4.6–6.2)
RBC # FLD: 15.4 % — SIGNIFICANT CHANGE UP (ref 11–15.6)
SODIUM SERPL-SCNC: 137 MMOL/L — SIGNIFICANT CHANGE UP (ref 135–145)
WBC # BLD: 14.9 K/UL — HIGH (ref 4.8–10.8)
WBC # FLD AUTO: 14.9 K/UL — HIGH (ref 4.8–10.8)

## 2017-07-30 PROCEDURE — 99233 SBSQ HOSP IP/OBS HIGH 50: CPT

## 2017-07-30 RX ORDER — POLYETHYLENE GLYCOL 3350 17 G/17G
17 POWDER, FOR SOLUTION ORAL
Qty: 0 | Refills: 0 | Status: COMPLETED | OUTPATIENT
Start: 2017-07-30 | End: 2017-08-04

## 2017-07-30 RX ADMIN — SENNA PLUS 2 TABLET(S): 8.6 TABLET ORAL at 21:36

## 2017-07-30 RX ADMIN — AMIODARONE HYDROCHLORIDE 200 MILLIGRAM(S): 400 TABLET ORAL at 05:45

## 2017-07-30 RX ADMIN — Medication 100 MILLIGRAM(S): at 13:03

## 2017-07-30 RX ADMIN — FAMOTIDINE 20 MILLIGRAM(S): 10 INJECTION INTRAVENOUS at 05:45

## 2017-07-30 RX ADMIN — OXYCODONE AND ACETAMINOPHEN 2 TABLET(S): 5; 325 TABLET ORAL at 22:06

## 2017-07-30 RX ADMIN — HYDROMORPHONE HYDROCHLORIDE 2 MILLIGRAM(S): 2 INJECTION INTRAMUSCULAR; INTRAVENOUS; SUBCUTANEOUS at 06:51

## 2017-07-30 RX ADMIN — HYDROMORPHONE HYDROCHLORIDE 2 MILLIGRAM(S): 2 INJECTION INTRAMUSCULAR; INTRAVENOUS; SUBCUTANEOUS at 09:50

## 2017-07-30 RX ADMIN — Medication 3 MILLILITER(S): at 20:31

## 2017-07-30 RX ADMIN — Medication 100 MILLIGRAM(S): at 21:36

## 2017-07-30 RX ADMIN — CALCITONIN SALMON 390 INTERNATIONAL UNIT(S): 200 INJECTION, SOLUTION INTRAMUSCULAR at 07:09

## 2017-07-30 RX ADMIN — Medication 100 MILLIGRAM(S): at 05:45

## 2017-07-30 RX ADMIN — OXYCODONE AND ACETAMINOPHEN 2 TABLET(S): 5; 325 TABLET ORAL at 21:36

## 2017-07-30 RX ADMIN — Medication 81 MILLIGRAM(S): at 12:52

## 2017-07-30 RX ADMIN — HYDROMORPHONE HYDROCHLORIDE 2 MILLIGRAM(S): 2 INJECTION INTRAMUSCULAR; INTRAVENOUS; SUBCUTANEOUS at 05:47

## 2017-07-30 RX ADMIN — Medication 3 MILLILITER(S): at 08:17

## 2017-07-30 RX ADMIN — Medication 3 MILLILITER(S): at 02:33

## 2017-07-30 RX ADMIN — Medication 30 MILLIEQUIVALENT(S): at 18:27

## 2017-07-30 RX ADMIN — CALCITONIN SALMON 390 INTERNATIONAL UNIT(S): 200 INJECTION, SOLUTION INTRAMUSCULAR at 05:46

## 2017-07-30 RX ADMIN — Medication 75 MICROGRAM(S): at 05:45

## 2017-07-30 RX ADMIN — HYDROMORPHONE HYDROCHLORIDE 2 MILLIGRAM(S): 2 INJECTION INTRAMUSCULAR; INTRAVENOUS; SUBCUTANEOUS at 14:18

## 2017-07-30 RX ADMIN — HYDROMORPHONE HYDROCHLORIDE 2 MILLIGRAM(S): 2 INJECTION INTRAMUSCULAR; INTRAVENOUS; SUBCUTANEOUS at 18:27

## 2017-07-30 RX ADMIN — Medication 30 MILLIEQUIVALENT(S): at 05:46

## 2017-07-30 RX ADMIN — Medication 3 MILLILITER(S): at 15:15

## 2017-07-30 RX ADMIN — HYDROMORPHONE HYDROCHLORIDE 2 MILLIGRAM(S): 2 INJECTION INTRAMUSCULAR; INTRAVENOUS; SUBCUTANEOUS at 18:42

## 2017-07-30 RX ADMIN — Medication 48 MILLIGRAM(S): at 21:36

## 2017-07-30 RX ADMIN — ATORVASTATIN CALCIUM 40 MILLIGRAM(S): 80 TABLET, FILM COATED ORAL at 21:36

## 2017-07-30 RX ADMIN — CLOPIDOGREL BISULFATE 75 MILLIGRAM(S): 75 TABLET, FILM COATED ORAL at 12:53

## 2017-07-30 RX ADMIN — HYDROMORPHONE HYDROCHLORIDE 2 MILLIGRAM(S): 2 INJECTION INTRAMUSCULAR; INTRAVENOUS; SUBCUTANEOUS at 09:35

## 2017-07-30 RX ADMIN — POLYETHYLENE GLYCOL 3350 17 GRAM(S): 17 POWDER, FOR SOLUTION ORAL at 18:26

## 2017-07-30 RX ADMIN — Medication 2: at 12:52

## 2017-07-30 RX ADMIN — Medication 1 TABLET(S): at 12:52

## 2017-07-30 RX ADMIN — HYDROMORPHONE HYDROCHLORIDE 2 MILLIGRAM(S): 2 INJECTION INTRAMUSCULAR; INTRAVENOUS; SUBCUTANEOUS at 14:03

## 2017-07-30 RX ADMIN — CALCITONIN SALMON 390 INTERNATIONAL UNIT(S): 200 INJECTION, SOLUTION INTRAMUSCULAR at 18:28

## 2017-07-30 NOTE — PROGRESS NOTE ADULT - SUBJECTIVE AND OBJECTIVE BOX
NEPHROLOGY INTERVAL HPI/OVERNIGHT EVENTS:    Feels the same   off IVF   Meds noted   No new complaints     MEDICATIONS  (STANDING):  amiodarone    Tablet 200 milliGRAM(s) Oral daily  atorvastatin 40 milliGRAM(s) Oral at bedtime  fenofibrate Tablet 48 milliGRAM(s) Oral daily  metoprolol succinate  milliGRAM(s) Oral daily  famotidine    Tablet 20 milliGRAM(s) Oral daily  levothyroxine 75 MICROGram(s) Oral daily  multivitamin/minerals 1 Tablet(s) Oral daily  ALBUTerol/ipratropium for Nebulization 3 milliLiter(s) Nebulizer every 6 hours  senna 2 Tablet(s) Oral at bedtime  docusate sodium 100 milliGRAM(s) Oral three times a day  potassium chloride    Tablet ER 30 milliEquivalent(s) Oral two times a day  insulin lispro (HumaLOG) corrective regimen sliding scale   SubCutaneous three times a day before meals  insulin lispro (HumaLOG) corrective regimen sliding scale   SubCutaneous at bedtime  dextrose 5%. 1000 milliLiter(s) (50 mL/Hr) IV Continuous <Continuous>  dextrose 50% Injectable 12.5 Gram(s) IV Push once  dextrose 50% Injectable 25 Gram(s) IV Push once  dextrose 50% Injectable 25 Gram(s) IV Push once  calcitonin Injectable 390 International Unit(s) IntraMuscular every 12 hours  levoFLOXacin  Tablet 500 milliGRAM(s) Oral every 24 hours  clopidogrel Tablet 75 milliGRAM(s) Oral daily  aspirin  chewable 81 milliGRAM(s) Oral daily  polyethylene glycol 3350 17 Gram(s) Oral two times a day    MEDICATIONS  (PRN):  oxyCODONE    5 mG/acetaminophen 325 mG 2 Tablet(s) Oral every 4 hours PRN breakthrough pain  dextrose Gel 1 Dose(s) Oral once PRN Blood Glucose LESS THAN 70 milliGRAM(s)/deciliter  glucagon  Injectable 1 milliGRAM(s) IntraMuscular once PRN Glucose LESS THAN 70 milligrams/deciliter  HYDROmorphone  Injectable 2 milliGRAM(s) IV Push every 4 hours PRN Severe Pain (7 - 10)  HYDROmorphone  Injectable 1 milliGRAM(s) IV Push every 4 hours PRN Moderate Pain (4 - 6)      Allergies    macrolide antibiotics (Urticaria; Rash; Hives)  penicillin (Urticaria; Rash)  Zithromax Z-Christian (Urticaria; Rash; Hives)    Intolerances          Vital Signs Last 24 Hrs  T(C): 36.1 (30 Jul 2017 05:08), Max: 36.8 (29 Jul 2017 22:16)  T(F): 97 (30 Jul 2017 05:08), Max: 98.2 (29 Jul 2017 22:16)  HR: 90 (30 Jul 2017 08:15) (85 - 90)  BP: 111/66 (30 Jul 2017 05:08) (111/66 - 120/75)  BP(mean): --  RR: 16 (30 Jul 2017 05:08) (16 - 20)  SpO2: 95% (30 Jul 2017 08:15) (95% - 97%)  Daily     Daily   I&O's Detail    29 Jul 2017 07:01  -  30 Jul 2017 07:00  --------------------------------------------------------  IN:    Oral Fluid: 340 mL    sodium chloride 0.9%: 300 mL  Total IN: 640 mL    OUT:    Chest Tube: 615 mL    Voided: 2000 mL  Total OUT: 2615 mL    Total NET: -1975 mL      30 Jul 2017 07:01  -  30 Jul 2017 13:25  --------------------------------------------------------  IN:  Total IN: 0 mL    OUT:    Voided: 1050 mL  Total OUT: 1050 mL    Total NET: -1050 mL        I&O's Summary    29 Jul 2017 07:01  -  30 Jul 2017 07:00  --------------------------------------------------------  IN: 640 mL / OUT: 2615 mL / NET: -1975 mL    30 Jul 2017 07:01  -  30 Jul 2017 13:25  --------------------------------------------------------  IN: 0 mL / OUT: 1050 mL / NET: -1050 mL        PHYSICAL EXAM:    PHYSICAL EXAM:  GENERAL: NAD   HEAD:  Atraumatic, Normocephalic  NECK: No JVP   CHEST/LUNG: Dec on Left , + Chest tube   HEART: Regular rate; No rub  ABDOMEN: Soft, Nontender, + distension; + BS  EXTREMITIES:  trace leg edema       LABS:                        8.4    14.9  )-----------( 422      ( 30 Jul 2017 06:52 )             27.5     07-30    137  |  102  |  23.0<H>  ----------------------------<  126<H>  4.6   |  25.0  |  1.06    Ca    10.2      30 Jul 2017 06:52    TPro  5.7<L>  /  Alb  2.3<L>  /  TBili  0.5  /  DBili  x   /  AST  37  /  ALT  37  /  AlkPhos  234<H>  07-30                RADIOLOGY & ADDITIONAL TESTS:

## 2017-07-30 NOTE — PROGRESS NOTE ADULT - SUBJECTIVE AND OBJECTIVE BOX
SULEIMAN GREGORY    [id number]    71y      Male    INTERVAL HPI/OVERNIGHT EVENTS: No events on. Was able to eat breakfast today. Pain well controlled. Still with some hemoptysis.    Hospital course:  70 y/o male w/PMHx of CAD, s/p stent in June 2017, HTN, HLD, w/recent hx of LLL mass discovered here after he was treated here for hemoptysis and pleural effusion. However, he then had a PET scan which confirmed a LLL mass with areas of metastasis including left hilar lymphadenopathy, left pleural mets, left sacrum lesion. He was supposed to have a CT guided biopsy of the lung but he was found to be ill-appearing, with cough and a high white count, prompting an admission for pneumonia. He was started as an outpatient on levaquin but his white count remains high. On 7/24, started radiation therapy at [facility]. Hgb trended down to 7.6, and received 1u PRBC with appropriate response. Developed RUE swelling, US showed superficial thrombosis of right cephalic vein. Received RUE PICC on 7/25. Repeat CXR showing worsening left pleural effusion with possible loculation. CT chest showing pleural metastases with large left loculated pleural effusion. On 7/26, received left chest tube. Noted to have hypercalcemia, received pamidronate and calcitonin. On 7/28 had IR guided lung biopsy.      REVIEW OF SYSTEMS:    CONSTITUTIONAL: No fever   CARDIOVASCULAR: No chest pain, palpitations         Vital Signs Last 24 Hrs  T(C): 36.1 (30 Jul 2017 05:08), Max: 36.8 (29 Jul 2017 22:16)  T(F): 97 (30 Jul 2017 05:08), Max: 98.2 (29 Jul 2017 22:16)  HR: 90 (30 Jul 2017 08:15) (85 - 90)  BP: 111/66 (30 Jul 2017 05:08) (111/66 - 120/75)  BP(mean): --  RR: 16 (30 Jul 2017 05:08) (16 - 20)  SpO2: 95% (30 Jul 2017 08:15) (95% - 97%)     PHYSICAL EXAM:    GENERAL: NAD, frail   HEENT: PERRL, +EOMI, MMM  CHEST/LUNG: +coarse breath sounds  HEART: S1S2+, Regular rate and rhythm   ABDOMEN: Soft, Nontender, Nondistended; Bowel sounds present  EXTREMITIES: improving right upper extremity swelling      LABS:                        8.4    14.9  )-----------( 422      ( 30 Jul 2017 06:52 )             27.5     07-30    137  |  102  |  23.0<H>  ----------------------------<  126<H>  4.6   |  25.0  |  1.06    Ca    10.2      30 Jul 2017 06:52    TPro  5.7<L>  /  Alb  2.3<L>  /  TBili  0.5  /  DBili  x   /  AST  37  /  ALT  37  /  AlkPhos  234<H>  07-30            MEDICATIONS  (STANDING):  amiodarone    Tablet 200 milliGRAM(s) Oral daily  atorvastatin 40 milliGRAM(s) Oral at bedtime  fenofibrate Tablet 48 milliGRAM(s) Oral daily  metoprolol succinate  milliGRAM(s) Oral daily  famotidine    Tablet 20 milliGRAM(s) Oral daily  levothyroxine 75 MICROGram(s) Oral daily  multivitamin/minerals 1 Tablet(s) Oral daily  ALBUTerol/ipratropium for Nebulization 3 milliLiter(s) Nebulizer every 6 hours  senna 2 Tablet(s) Oral at bedtime  docusate sodium 100 milliGRAM(s) Oral three times a day  potassium chloride    Tablet ER 30 milliEquivalent(s) Oral two times a day  insulin lispro (HumaLOG) corrective regimen sliding scale   SubCutaneous three times a day before meals  insulin lispro (HumaLOG) corrective regimen sliding scale   SubCutaneous at bedtime  dextrose 5%. 1000 milliLiter(s) (50 mL/Hr) IV Continuous <Continuous>  dextrose 50% Injectable 12.5 Gram(s) IV Push once  dextrose 50% Injectable 25 Gram(s) IV Push once  dextrose 50% Injectable 25 Gram(s) IV Push once  calcitonin Injectable 390 International Unit(s) IntraMuscular every 12 hours  levoFLOXacin  Tablet 500 milliGRAM(s) Oral every 24 hours  clopidogrel Tablet 75 milliGRAM(s) Oral daily  aspirin  chewable 81 milliGRAM(s) Oral daily    MEDICATIONS  (PRN):  oxyCODONE    5 mG/acetaminophen 325 mG 2 Tablet(s) Oral every 4 hours PRN breakthrough pain  dextrose Gel 1 Dose(s) Oral once PRN Blood Glucose LESS THAN 70 milliGRAM(s)/deciliter  glucagon  Injectable 1 milliGRAM(s) IntraMuscular once PRN Glucose LESS THAN 70 milligrams/deciliter  HYDROmorphone  Injectable 2 milliGRAM(s) IV Push every 4 hours PRN Severe Pain (7 - 10)  HYDROmorphone  Injectable 1 milliGRAM(s) IV Push every 4 hours PRN Moderate Pain (4 - 6)      RADIOLOGY & ADDITIONAL TESTS:

## 2017-07-31 ENCOUNTER — APPOINTMENT (OUTPATIENT)
Dept: HEMATOLOGY ONCOLOGY | Facility: CLINIC | Age: 72
End: 2017-07-31

## 2017-07-31 VITALS
HEIGHT: 72 IN | HEART RATE: 117 BPM | SYSTOLIC BLOOD PRESSURE: 110 MMHG | RESPIRATION RATE: 16 BRPM | OXYGEN SATURATION: 97 % | DIASTOLIC BLOOD PRESSURE: 76 MMHG

## 2017-07-31 LAB
CULTURE RESULTS: SIGNIFICANT CHANGE UP
HCT VFR BLD CALC: 26 % — LOW (ref 42–52)
HGB BLD-MCNC: 8 G/DL — LOW (ref 14–18)
MCHC RBC-ENTMCNC: 27.7 PG — SIGNIFICANT CHANGE UP (ref 27–31)
MCHC RBC-ENTMCNC: 30.8 G/DL — LOW (ref 32–36)
MCV RBC AUTO: 90 FL — SIGNIFICANT CHANGE UP (ref 80–94)
PLATELET # BLD AUTO: 392 K/UL — SIGNIFICANT CHANGE UP (ref 150–400)
RBC # BLD: 2.89 M/UL — LOW (ref 4.6–6.2)
RBC # FLD: 15.2 % — SIGNIFICANT CHANGE UP (ref 11–15.6)
SPECIMEN SOURCE: SIGNIFICANT CHANGE UP
WBC # BLD: 15.4 K/UL — HIGH (ref 4.8–10.8)
WBC # FLD AUTO: 15.4 K/UL — HIGH (ref 4.8–10.8)

## 2017-07-31 PROCEDURE — 99233 SBSQ HOSP IP/OBS HIGH 50: CPT

## 2017-07-31 PROCEDURE — 99232 SBSQ HOSP IP/OBS MODERATE 35: CPT

## 2017-07-31 RX ADMIN — HYDROMORPHONE HYDROCHLORIDE 2 MILLIGRAM(S): 2 INJECTION INTRAMUSCULAR; INTRAVENOUS; SUBCUTANEOUS at 22:03

## 2017-07-31 RX ADMIN — Medication 3 MILLILITER(S): at 03:27

## 2017-07-31 RX ADMIN — HYDROMORPHONE HYDROCHLORIDE 2 MILLIGRAM(S): 2 INJECTION INTRAMUSCULAR; INTRAVENOUS; SUBCUTANEOUS at 04:05

## 2017-07-31 RX ADMIN — HYDROMORPHONE HYDROCHLORIDE 2 MILLIGRAM(S): 2 INJECTION INTRAMUSCULAR; INTRAVENOUS; SUBCUTANEOUS at 21:32

## 2017-07-31 RX ADMIN — Medication 3 MILLILITER(S): at 08:43

## 2017-07-31 RX ADMIN — Medication 100 MILLIGRAM(S): at 11:56

## 2017-07-31 RX ADMIN — Medication 3 MILLILITER(S): at 21:23

## 2017-07-31 RX ADMIN — Medication 30 MILLIEQUIVALENT(S): at 05:38

## 2017-07-31 RX ADMIN — Medication 1 TABLET(S): at 11:54

## 2017-07-31 RX ADMIN — SENNA PLUS 2 TABLET(S): 8.6 TABLET ORAL at 21:23

## 2017-07-31 RX ADMIN — HYDROMORPHONE HYDROCHLORIDE 2 MILLIGRAM(S): 2 INJECTION INTRAMUSCULAR; INTRAVENOUS; SUBCUTANEOUS at 14:44

## 2017-07-31 RX ADMIN — Medication 75 MICROGRAM(S): at 05:37

## 2017-07-31 RX ADMIN — HYDROMORPHONE HYDROCHLORIDE 2 MILLIGRAM(S): 2 INJECTION INTRAMUSCULAR; INTRAVENOUS; SUBCUTANEOUS at 09:43

## 2017-07-31 RX ADMIN — Medication 100 MILLIGRAM(S): at 05:37

## 2017-07-31 RX ADMIN — FAMOTIDINE 20 MILLIGRAM(S): 10 INJECTION INTRAVENOUS at 05:37

## 2017-07-31 RX ADMIN — POLYETHYLENE GLYCOL 3350 17 GRAM(S): 17 POWDER, FOR SOLUTION ORAL at 17:25

## 2017-07-31 RX ADMIN — HYDROMORPHONE HYDROCHLORIDE 2 MILLIGRAM(S): 2 INJECTION INTRAMUSCULAR; INTRAVENOUS; SUBCUTANEOUS at 15:14

## 2017-07-31 RX ADMIN — CALCITONIN SALMON 390 INTERNATIONAL UNIT(S): 200 INJECTION, SOLUTION INTRAMUSCULAR at 21:20

## 2017-07-31 RX ADMIN — POLYETHYLENE GLYCOL 3350 17 GRAM(S): 17 POWDER, FOR SOLUTION ORAL at 05:37

## 2017-07-31 RX ADMIN — HYDROMORPHONE HYDROCHLORIDE 1 MILLIGRAM(S): 2 INJECTION INTRAMUSCULAR; INTRAVENOUS; SUBCUTANEOUS at 18:15

## 2017-07-31 RX ADMIN — Medication 3 MILLILITER(S): at 16:08

## 2017-07-31 RX ADMIN — ATORVASTATIN CALCIUM 40 MILLIGRAM(S): 80 TABLET, FILM COATED ORAL at 21:23

## 2017-07-31 RX ADMIN — Medication 48 MILLIGRAM(S): at 21:23

## 2017-07-31 RX ADMIN — HYDROMORPHONE HYDROCHLORIDE 2 MILLIGRAM(S): 2 INJECTION INTRAMUSCULAR; INTRAVENOUS; SUBCUTANEOUS at 05:35

## 2017-07-31 RX ADMIN — Medication 30 MILLIEQUIVALENT(S): at 17:25

## 2017-07-31 RX ADMIN — AMIODARONE HYDROCHLORIDE 200 MILLIGRAM(S): 400 TABLET ORAL at 05:37

## 2017-07-31 RX ADMIN — Medication 100 MILLIGRAM(S): at 21:23

## 2017-07-31 RX ADMIN — Medication 81 MILLIGRAM(S): at 11:57

## 2017-07-31 RX ADMIN — Medication 2: at 11:57

## 2017-07-31 RX ADMIN — HYDROMORPHONE HYDROCHLORIDE 1 MILLIGRAM(S): 2 INJECTION INTRAMUSCULAR; INTRAVENOUS; SUBCUTANEOUS at 17:31

## 2017-07-31 RX ADMIN — HYDROMORPHONE HYDROCHLORIDE 2 MILLIGRAM(S): 2 INJECTION INTRAMUSCULAR; INTRAVENOUS; SUBCUTANEOUS at 09:53

## 2017-07-31 RX ADMIN — CLOPIDOGREL BISULFATE 75 MILLIGRAM(S): 75 TABLET, FILM COATED ORAL at 11:56

## 2017-07-31 RX ADMIN — CALCITONIN SALMON 390 INTERNATIONAL UNIT(S): 200 INJECTION, SOLUTION INTRAMUSCULAR at 12:14

## 2017-07-31 NOTE — PROGRESS NOTE ADULT - SUBJECTIVE AND OBJECTIVE BOX
Patient: SULEIMAN GREGORY [id number] 71y Male                 Internal Medicine Hospitalist Progress Note - Dr. Craig Degroot    Chief Complaint: Patient is a 71y old  Male who presents with a chief complaint of "I was feeling tired, unwell at home." (20 Jul 2017 22:29)    HPI:  72 y/o male w/PMHx of CAD, s/p stent in June 2017, HTN, HLD, w/recent hx of LLL mass discovered here after he was treated here for hemoptysis and pleural effusion. However, he then had a PET scan which confirmed a LLL mass with areas of metastasis including left hilar lymphadenopathy, left pleural mets, left sacrum lesion. He was supposed to have a CT guided biopsy of the lung but he was found to be ill-appearing, with cough and a high white count, prompting an admission for pneumonia. He was started as an outpatient on levaquin but his white count remains high. On 7/24, started radiation therapy at [facility] ([facility] Hgb trended down to 7.6, and received 1u PRBC with appropriate response. Developed RUE swelling, US showed superficial thrombosis of right cephalic vein. Received RUE PICC on 7/25. Repeat CXR showing worsening left pleural effusion with possible loculation. CT chest showing pleural metastases with large left loculated pleural effusion. On 7/26, received left chest tube. Noted to have hypercalcemia, received pamidronate and calcitonin. On 7/28 had IR guided lung biopsy.      s/p RT.  Denies complaints.  Hemoptysis improved.  No SOB.  No additional complaints.     ____________________PHYSICAL EXAM:  Vitals reviewed as indicated below  GENERAL:  NAD Alert and Oriented x 3   HEENT: NCAT  CARDIOVASCULAR:  S1, S2  LUNGS: coarse BS b/l.   ABDOMEN:  soft, (-) tenderness, (-) distension, (+) bowel sounds, (-) guarding, (-) rebound (-) rigidity  EXTREMITIES:  no cyanosis / clubbing / edema.   ____________________    BACKGROUND:  HEALTH ISSUES - PROBLEM Dx:  Hypercalcemia: Hypercalcemia  Pleural effusion on left: Pleural effusion on left  CAD (coronary artery disease): CAD (coronary artery disease)  VT (ventricular tachycardia): VT (ventricular tachycardia)  Anemia due to blood loss: Anemia due to blood loss  Pneumonia: Pneumonia  Lung mass: Lung mass  Coronary artery disease involving native heart without angina pectoris, unspecified vessel or lesion type: Coronary artery disease involving native heart without angina pectoris, unspecified vessel or lesion type  Chronic systolic CHF (congestive heart failure), NYHA class 2: Chronic systolic CHF (congestive heart failure), NYHA class 2  Acute renal failure with acute cortical necrosis: Acute renal failure with acute cortical necrosis  Pneumonia of left lower lobe due to infectious organism: Pneumonia of left lower lobe due to infectious organism        MEDICATIONS  (STANDING):  amiodarone    Tablet 200 milliGRAM(s) Oral daily  atorvastatin 40 milliGRAM(s) Oral at bedtime  fenofibrate Tablet 48 milliGRAM(s) Oral daily  metoprolol succinate  milliGRAM(s) Oral daily  famotidine    Tablet 20 milliGRAM(s) Oral daily  levothyroxine 75 MICROGram(s) Oral daily  multivitamin/minerals 1 Tablet(s) Oral daily  ALBUTerol/ipratropium for Nebulization 3 milliLiter(s) Nebulizer every 6 hours  senna 2 Tablet(s) Oral at bedtime  docusate sodium 100 milliGRAM(s) Oral three times a day  potassium chloride    Tablet ER 30 milliEquivalent(s) Oral two times a day  insulin lispro (HumaLOG) corrective regimen sliding scale   SubCutaneous three times a day before meals  insulin lispro (HumaLOG) corrective regimen sliding scale   SubCutaneous at bedtime  dextrose 5%. 1000 milliLiter(s) (50 mL/Hr) IV Continuous <Continuous>  dextrose 50% Injectable 12.5 Gram(s) IV Push once  dextrose 50% Injectable 25 Gram(s) IV Push once  dextrose 50% Injectable 25 Gram(s) IV Push once  calcitonin Injectable 390 International Unit(s) IntraMuscular every 12 hours  levoFLOXacin  Tablet 500 milliGRAM(s) Oral every 24 hours  clopidogrel Tablet 75 milliGRAM(s) Oral daily  aspirin  chewable 81 milliGRAM(s) Oral daily  polyethylene glycol 3350 17 Gram(s) Oral two times a day    MEDICATIONS  (PRN):  oxyCODONE    5 mG/acetaminophen 325 mG 2 Tablet(s) Oral every 4 hours PRN breakthrough pain  dextrose Gel 1 Dose(s) Oral once PRN Blood Glucose LESS THAN 70 milliGRAM(s)/deciliter  glucagon  Injectable 1 milliGRAM(s) IntraMuscular once PRN Glucose LESS THAN 70 milligrams/deciliter  HYDROmorphone  Injectable 2 milliGRAM(s) IV Push every 4 hours PRN Severe Pain (7 - 10)  HYDROmorphone  Injectable 1 milliGRAM(s) IV Push every 4 hours PRN Moderate Pain (4 - 6)    Allergies    macrolide antibiotics (Urticaria; Rash; Hives)  penicillin (Urticaria; Rash)  Zithromax Z-Christian (Urticaria; Rash; Hives)    Intolerances      PAST MEDICAL & SURGICAL HISTORY:  Psoriasis    PSVT (paroxysmal supraventricular tachycardia)  Chronic systolic CHF (congestive heart failure), NYHA class 2  Former smoker  Hypokinesis: apical  Mitral regurgitation  Bronchitis  Hypertension  PSVT (paroxysmal supraventricular tachycardia)  Ischemic cardiomyopathy  AICD (automatic cardioverter/defibrillator) present: 2010 [employer]  VT (ventricular tachycardia): May 2017 no ICD shock  Vitamin D deficiency  Tricuspid regurgitation  RBBB  Prostate cancer: s/p surgery no RT/CT  Mitral regurgitation  Hypothyroidism  HLD (hyperlipidemia)  Arrhythmia  Angina pectoris  CAD (coronary artery disease)  History of bronchoscopy: 2017  History of prostate surgery: davinci surgery in 2008  Cardiac disorder: triple bypass may 1997 [facility]  History of electrophysiologic study: 2009 positive study (AICD placement  2010)  H/O cardiac catheterization: stenting of SVG TO PDA IN 2010 2017 one stent      VITALS:  Vital Signs Last 24 Hrs  T(C): 36.6 (31 Jul 2017 16:17), Max: 37.4 (31 Jul 2017 05:25)  T(F): 97.9 (31 Jul 2017 16:17), Max: 99.4 (31 Jul 2017 05:25)  HR: 86 (31 Jul 2017 16:17) (86 - 98)  BP: 103/65 (31 Jul 2017 16:17) (103/65 - 128/75)  BP(mean): --  RR: 18 (31 Jul 2017 08:38) (16 - 18)  SpO2: 93% (31 Jul 2017 16:17) (93% - 96%) Daily     Daily   CAPILLARY BLOOD GLUCOSE  115 (31 Jul 2017 17:13)  177 (31 Jul 2017 11:47)  165 (31 Jul 2017 08:11)  173 (30 Jul 2017 22:28)        I&O's Summary    30 Jul 2017 07:01  -  31 Jul 2017 07:00  --------------------------------------------------------  IN: 340 mL / OUT: 2580 mL / NET: -2240 mL    31 Jul 2017 07:01  -  31 Jul 2017 17:31  --------------------------------------------------------  IN: 600 mL / OUT: 400 mL / NET: 200 mL        LABS:                        8.0    15.4  )-----------( 392      ( 31 Jul 2017 05:23 )             26.0     07-30    137  |  102  |  23.0<H>  ----------------------------<  126<H>  4.6   |  25.0  |  1.06    Ca    10.2      30 Jul 2017 06:52    TPro  5.7<L>  /  Alb  2.3<L>  /  TBili  0.5  /  DBili  x   /  AST  37  /  ALT  37  /  AlkPhos  234<H>  07-30      RECENT CULTURES:      LIVER FUNCTIONS - ( 30 Jul 2017 06:52 )  Alb: 2.3 g/dL / Pro: 5.7 g/dL / ALK PHOS: 234 U/L / ALT: 37 U/L / AST: 37 U/L / GGT: x

## 2017-07-31 NOTE — PROGRESS NOTE ADULT - SUBJECTIVE AND OBJECTIVE BOX
[facility]  INFECTIOUS DISEASES AND [facility] [place] [facility]  =======================================================  Bethel Bernard MD [facility]   Darryl Uribe MD  Diplomates American Board of Internal Medicine and Infectious Diseases  =======================================================    SULIEMAN GREGORY [id number]  chief complaint: follow up for Pneumonia    patient seen and examined in follow up.  Chart and labs reviewed.   Pleural fluid sent to pathology on 7/26/17 - negative malignant cells.   PT REMAINS AFEBRILE     =======================================================  Allergies:  macrolide antibiotics (Urticaria; Rash; Hives)  penicillin (Urticaria; Rash)  Zithromax Z-Christian (Urticaria; Rash; Hives)      =======================================================  Medications:  amiodarone    Tablet 200 milliGRAM(s) Oral daily  atorvastatin 40 milliGRAM(s) Oral at bedtime  fenofibrate Tablet 48 milliGRAM(s) Oral daily  metoprolol succinate  milliGRAM(s) Oral daily  famotidine    Tablet 20 milliGRAM(s) Oral daily  levothyroxine 75 MICROGram(s) Oral daily  multivitamin/minerals 1 Tablet(s) Oral daily  ALBUTerol/ipratropium for Nebulization 3 milliLiter(s) Nebulizer every 6 hours  senna 2 Tablet(s) Oral at bedtime  docusate sodium 100 milliGRAM(s) Oral three times a day  oxyCODONE    5 mG/acetaminophen 325 mG 2 Tablet(s) Oral every 4 hours PRN  potassium chloride    Tablet ER 30 milliEquivalent(s) Oral two times a day  insulin lispro (HumaLOG) corrective regimen sliding scale   SubCutaneous three times a day before meals  insulin lispro (HumaLOG) corrective regimen sliding scale   SubCutaneous at bedtime  dextrose 5%. 1000 milliLiter(s) IV Continuous <Continuous>  dextrose Gel 1 Dose(s) Oral once PRN  dextrose 50% Injectable 12.5 Gram(s) IV Push once  dextrose 50% Injectable 25 Gram(s) IV Push once  dextrose 50% Injectable 25 Gram(s) IV Push once  glucagon  Injectable 1 milliGRAM(s) IntraMuscular once PRN  sodium chloride 0.9%. 1000 milliLiter(s) IV Continuous <Continuous>  HYDROmorphone  Injectable 2 milliGRAM(s) IV Push every 4 hours PRN  HYDROmorphone  Injectable 1 milliGRAM(s) IV Push every 4 hours PRN  calcitonin Injectable 390 International Unit(s) IntraMuscular every 12 hours  levoFLOXacin  Tablet 500 milliGRAM(s) Oral every 24 hours       =======================================================     REVIEW OF SYSTEMS:  CONSTITUTIONAL:  No Fever or chills  HEENT:   No diplopia or blurred vision.  No earache, sore throat or runny nose.  CARDIOVASCULAR:  No pressure, squeezing, strangling, tightness, heaviness or aching about the chest, neck, axilla or epigastrium.  RESPIRATORY:  No cough, shortness of breath, PND or orthopnea.  GASTROINTESTINAL:  No nausea, vomiting or diarrhea.  GENITOURINARY:  No dysuria, frequency or urgency. No Blood in urine  MUSCULOSKELETAL:  no joint aches, no muscle pain  SKIN:  No change in skin, hair or nails.  NEUROLOGIC:  No paresthesias, fasciculations, seizures or weakness.  PSYCHIATRIC:  No disorder of thought or mood.  ENDOCRINE:  No heat or cold intolerance, polyuria or polydipsia.  HEMATOLOGICAL:  No easy bruising or bleeding.     =======================================================     Physical Exam:  IVital Signs Last 24 Hrs  T(C): 36.9 (31 Jul 2017 08:38), Max: 37.4 (31 Jul 2017 05:25)  T(F): 98.4 (31 Jul 2017 08:38), Max: 99.4 (31 Jul 2017 05:25)  HR: 87 (31 Jul 2017 08:38) (87 - 98)  BP: 128/75 (31 Jul 2017 08:38) (113/63 - 128/75)  BP(mean): --  RR: 18 (31 Jul 2017 08:38) (16 - 18)  SpO2: 96% (31 Jul 2017 05:25) (93% - 96%)    GEN: NAD, pleasant  HEENT: normocephalic and atraumatic. EOMI. BONNIE.    NECK: Supple. No carotid bruits.  No lymphadenopathy or thyromegaly.  LUNGS: fair air entry anteriorly; left sided chest tube with bloody fluid.   HEART: Regular rate and rhythm without murmur.  ABDOMEN: Soft, DISTENDED , non tender Positive bowel sounds.    : No CVA tenderness  EXTREMITIES: Without any cyanosis, clubbing, rash, lesions.  TRACEL LE edema.  MSK: no joint swelling  NEUROLOGIC: Cranial nerves II through XII are grossly intact.  PSYCHIATRIC: Appropriate affect .  SKIN: No ulceration or induration present.    =======================================================    L                       8.0    15.4  )-----------( 392      ( 31 Jul 2017 05:23 )             26.0       C 07-30    137  |  102  |  23.0<H>  ----------------------------<  126<H>  4.6   |  25.0  |  1.06    Ca    10.2      30 Jul 2017 06:52    TPro  5.7<L>  /  Alb  2.3<L>  /  TBili  0.5  /  DBili  x   /  AST  37  /  ALT  37  /  AlkPhos  234<H>  07-30        RECENT CULTURES:

## 2017-07-31 NOTE — PHYSICAL THERAPY INITIAL EVALUATION ADULT - GAIT DEVIATIONS NOTED, PT EVAL
decreased weight-shifting ability/decreased step length/decreased jessica/+ flexed posture, + LOB x2 requiring Min to Mod A to recover, pt required chair to be brought to him 2*2 c/o dizziness, 5 min seated rest break taken, then pt transferred to bedside recliner with assist.

## 2017-07-31 NOTE — PHYSICAL THERAPY INITIAL EVALUATION ADULT - GENERAL OBSERVATIONS, REHAB EVAL
pt received supine in bed, NAD, agreeable to PT, O2 line, cardiac monitor
pt received supine in bed, son at bedside, + telemetry + chest tube + O2 3Lnc. Pt without c/o pain at this time, agreeable to PT

## 2017-07-31 NOTE — PROGRESS NOTE ADULT - SUBJECTIVE AND OBJECTIVE BOX
[place] [facility] - [facility], THE HEART CENTER                                   [address] [place] [address]                                                      PHONE: [phone]                                                         FAX: [phone]  [email or web address]  ---------------------------------------------------------------------------------------------------------------------------------    FU for  Pt seen and examined.     Overnight events/patient complaints:    macrolide antibiotics (Urticaria; Rash; Hives)  penicillin (Urticaria; Rash)  Zithromax Z-Christian (Urticaria; Rash; Hives)    MEDICATIONS  (STANDING):  amiodarone    Tablet 200 milliGRAM(s) Oral daily  atorvastatin 40 milliGRAM(s) Oral at bedtime  fenofibrate Tablet 48 milliGRAM(s) Oral daily  metoprolol succinate  milliGRAM(s) Oral daily  famotidine    Tablet 20 milliGRAM(s) Oral daily  levothyroxine 75 MICROGram(s) Oral daily  multivitamin/minerals 1 Tablet(s) Oral daily  ALBUTerol/ipratropium for Nebulization 3 milliLiter(s) Nebulizer every 6 hours  senna 2 Tablet(s) Oral at bedtime  docusate sodium 100 milliGRAM(s) Oral three times a day  potassium chloride    Tablet ER 30 milliEquivalent(s) Oral two times a day  insulin lispro (HumaLOG) corrective regimen sliding scale   SubCutaneous three times a day before meals  insulin lispro (HumaLOG) corrective regimen sliding scale   SubCutaneous at bedtime  dextrose 5%. 1000 milliLiter(s) (50 mL/Hr) IV Continuous <Continuous>  dextrose 50% Injectable 12.5 Gram(s) IV Push once  dextrose 50% Injectable 25 Gram(s) IV Push once  dextrose 50% Injectable 25 Gram(s) IV Push once  calcitonin Injectable 390 International Unit(s) IntraMuscular every 12 hours  levoFLOXacin  Tablet 500 milliGRAM(s) Oral every 24 hours  clopidogrel Tablet 75 milliGRAM(s) Oral daily  aspirin  chewable 81 milliGRAM(s) Oral daily  polyethylene glycol 3350 17 Gram(s) Oral two times a day    MEDICATIONS  (PRN):  oxyCODONE    5 mG/acetaminophen 325 mG 2 Tablet(s) Oral every 4 hours PRN breakthrough pain  dextrose Gel 1 Dose(s) Oral once PRN Blood Glucose LESS THAN 70 milliGRAM(s)/deciliter  glucagon  Injectable 1 milliGRAM(s) IntraMuscular once PRN Glucose LESS THAN 70 milligrams/deciliter  HYDROmorphone  Injectable 2 milliGRAM(s) IV Push every 4 hours PRN Severe Pain (7 - 10)  HYDROmorphone  Injectable 1 milliGRAM(s) IV Push every 4 hours PRN Moderate Pain (4 - 6)      Vital Signs Last 24 Hrs  T(C): 36.9 (31 Jul 2017 08:38), Max: 37.4 (31 Jul 2017 05:25)  T(F): 98.4 (31 Jul 2017 08:38), Max: 99.4 (31 Jul 2017 05:25)  HR: 87 (31 Jul 2017 08:38) (87 - 98)  BP: 128/75 (31 Jul 2017 08:38) (113/63 - 128/75)  BP(mean): --  RR: 18 (31 Jul 2017 08:38) (16 - 18)  SpO2: 96% (31 Jul 2017 05:25) (93% - 96%)  ICU Vital Signs Last 24 Hrs  I&O's Summary    30 Jul 2017 07:01  -  31 Jul 2017 07:00  --------------------------------------------------------  IN: 340 mL / OUT: 2580 mL / NET: -2240 mL        PHYSICAL EXAM:  HEENT: no JVD  CARDIOVASCULAR: Normal S1 and S2, No murmur, rub, gallop or lift.   LUNGS: No rales, rhonchi or wheeze. Normal breath sounds bilaterally.  ABDOMEN: Soft, nontender without mass or organomegaly. bowel sounds normoactive.  EXTREMITIES: no edema          LABS:                        8.0    15.4  )-----------( 392      ( 31 Jul 2017 05:23 )             26.0     07-30    137  |  102  |  23.0<H>  ----------------------------<  126<H>  4.6   |  25.0  |  1.06    Ca    10.2      30 Jul 2017 06:52    TPro  5.7<L>  /  Alb  2.3<L>  /  TBili  0.5  /  DBili  x   /  AST  37  /  ALT  37  /  AlkPhos  234<H>  07-30    SULEIMAN GREGORY            RADIOLOGY & ADDITIONAL STUDIES:    LABS:                        8.0    15.4  )-----------( 392      ( 31 Jul 2017 05:23 )             26.0     07-30    137  |  102  |  23.0<H>  ----------------------------<  126<H>  4.6   |  25.0  |  1.06    Ca    10.2      30 Jul 2017 06:52    TPro  5.7<L>  /  Alb  2.3<L>  /  TBili  0.5  /  DBili  x   /  AST  37  /  ALT  37  /  AlkPhos  234<H>  07-30    Assessment and Plan:  In summary, SULEIMAN GREGORY is an 71y Male with past medical history significant for HTN HLD CAD s/p CABG recent PCI SVG to PDA was on DAPT ICM AICD NSVT COPD ex smoker now with lung cancer with mets hemoptysis improving [place] [facility] - [facility], [facility]                                   [address] [place] [address]                                                      PHONE: [phone]                                                         FAX: [phone]  [email or web address]  ---------------------------------------------------------------------------------------------------------------------------------    FU for  Pt seen and examined.     Overnight events/patient complaints: denies cp/sob    macrolide antibiotics (Urticaria; Rash; Hives)  penicillin (Urticaria; Rash)  Zithromax Z-Christian (Urticaria; Rash; Hives)    MEDICATIONS  (STANDING):  amiodarone    Tablet 200 milliGRAM(s) Oral daily  atorvastatin 40 milliGRAM(s) Oral at bedtime  fenofibrate Tablet 48 milliGRAM(s) Oral daily  metoprolol succinate  milliGRAM(s) Oral daily  famotidine    Tablet 20 milliGRAM(s) Oral daily  levothyroxine 75 MICROGram(s) Oral daily  multivitamin/minerals 1 Tablet(s) Oral daily  ALBUTerol/ipratropium for Nebulization 3 milliLiter(s) Nebulizer every 6 hours  senna 2 Tablet(s) Oral at bedtime  docusate sodium 100 milliGRAM(s) Oral three times a day  potassium chloride    Tablet ER 30 milliEquivalent(s) Oral two times a day  insulin lispro (HumaLOG) corrective regimen sliding scale   SubCutaneous three times a day before meals  insulin lispro (HumaLOG) corrective regimen sliding scale   SubCutaneous at bedtime  dextrose 5%. 1000 milliLiter(s) (50 mL/Hr) IV Continuous <Continuous>  dextrose 50% Injectable 12.5 Gram(s) IV Push once  dextrose 50% Injectable 25 Gram(s) IV Push once  dextrose 50% Injectable 25 Gram(s) IV Push once  calcitonin Injectable 390 International Unit(s) IntraMuscular every 12 hours  levoFLOXacin  Tablet 500 milliGRAM(s) Oral every 24 hours  clopidogrel Tablet 75 milliGRAM(s) Oral daily  aspirin  chewable 81 milliGRAM(s) Oral daily  polyethylene glycol 3350 17 Gram(s) Oral two times a day    MEDICATIONS  (PRN):  oxyCODONE    5 mG/acetaminophen 325 mG 2 Tablet(s) Oral every 4 hours PRN breakthrough pain  dextrose Gel 1 Dose(s) Oral once PRN Blood Glucose LESS THAN 70 milliGRAM(s)/deciliter  glucagon  Injectable 1 milliGRAM(s) IntraMuscular once PRN Glucose LESS THAN 70 milligrams/deciliter  HYDROmorphone  Injectable 2 milliGRAM(s) IV Push every 4 hours PRN Severe Pain (7 - 10)  HYDROmorphone  Injectable 1 milliGRAM(s) IV Push every 4 hours PRN Moderate Pain (4 - 6)      Vital Signs Last 24 Hrs  T(C): 36.9 (31 Jul 2017 08:38), Max: 37.4 (31 Jul 2017 05:25)  T(F): 98.4 (31 Jul 2017 08:38), Max: 99.4 (31 Jul 2017 05:25)  HR: 87 (31 Jul 2017 08:38) (87 - 98)  BP: 128/75 (31 Jul 2017 08:38) (113/63 - 128/75)  BP(mean): --  RR: 18 (31 Jul 2017 08:38) (16 - 18)  SpO2: 96% (31 Jul 2017 05:25) (93% - 96%)  ICU Vital Signs Last 24 Hrs  I&O's Summary    30 Jul 2017 07:01  -  31 Jul 2017 07:00  --------------------------------------------------------  IN: 340 mL / OUT: 2580 mL / NET: -2240 mL        PHYSICAL EXAM:  HEENT: no JVD  CARDIOVASCULAR: Normal S1 and S2, No murmur, rub, gallop or lift.   LUNGS: No rales, rhonchi or wheeze. Normal breath sounds bilaterally.  ABDOMEN: Soft, nontender without mass or organomegaly. bowel sounds normoactive.  EXTREMITIES: no edema          LABS:                        8.0    15.4  )-----------( 392      ( 31 Jul 2017 05:23 )             26.0     07-30    137  |  102  |  23.0<H>  ----------------------------<  126<H>  4.6   |  25.0  |  1.06    Ca    10.2      30 Jul 2017 06:52    TPro  5.7<L>  /  Alb  2.3<L>  /  TBili  0.5  /  DBili  x   /  AST  37  /  ALT  37  /  AlkPhos  234<H>  07-30    SULEIMAN GREGORY            RADIOLOGY & ADDITIONAL STUDIES:    LABS:                        8.0    15.4  )-----------( 392      ( 31 Jul 2017 05:23 )             26.0     07-30    137  |  102  |  23.0<H>  ----------------------------<  126<H>  4.6   |  25.0  |  1.06    Ca    10.2      30 Jul 2017 06:52    TPro  5.7<L>  /  Alb  2.3<L>  /  TBili  0.5  /  DBili  x   /  AST  37  /  ALT  37  /  AlkPhos  234<H>  07-30    Assessment and Plan:  In summary, SULEIMAN GREGORY is an 71y Male with past medical history significant for HTN HLD CAD s/p CABG recent PCI SVG to PDA was on DAPT ICM AICD NSVT COPD ex smoker now with lung cancer with mets hemoptysis improving   Lung mass: on XRT, hemoptysis resolving, HB stable  CAD s/p recent ZARINA 6/17: c/w asa and plavix  Please reconsult if need arises. will follow in office in 2-3 weeks

## 2017-07-31 NOTE — PHYSICAL THERAPY INITIAL EVALUATION ADULT - RANGE OF MOTION EXAMINATION, REHAB EVAL
no ROM deficits were identified
bilateral lower extremity ROM was WFL (within functional limits)/except bilateral knees lacking approx 20-25 degrees active extension/bilateral upper extremity ROM was WFL (within functional limits)/deficits as listed below

## 2017-07-31 NOTE — PROGRESS NOTE ADULT - SUBJECTIVE AND OBJECTIVE BOX
Patient seen and examined    Feels tired, NS change  oob/ch  no c/o CP SOB NV   has swelling feet    Patient without any significant change  no specific c/o    Vital Signs Last 24 Hrs  T(C): 36.9 (07-31-17 @ 08:38), Max: 37.4 (07-31-17 @ 05:25)  T(F): 98.4 (07-31-17 @ 08:38), Max: 99.4 (07-31-17 @ 05:25)  HR: 87 (07-31-17 @ 08:38) (87 - 98)  BP: 128/75 (07-31-17 @ 08:38) (113/63 - 128/75)  BP(mean): --  RR: 18 (07-31-17 @ 08:38) (16 - 18)  SpO2: 96% (07-31-17 @ 05:25) (93% - 96%)    Awake/alert; NAD  chest Clear ex few rhonchi  No JVD  No murmer  abd soft, obese, NT BS +  + edema      30 Jul 2017 06:52    137    |  102    |  23.0   ----------------------------<  126    4.6     |  25.0   |  1.06     Ca    10.2       30 Jul 2017 06:52    TPro  5.7    /  Alb  2.3    /  TBili  0.5    /  DBili  x      /  AST  37     /  ALT  37     /  AlkPhos  234    30 Jul 2017 06:52                          8.0    15.4  )-----------( 392      ( 31 Jul 2017 05:23 )             26.0       Impression  patient stable  Ca 10.2 but Alb also 2.3  cont Calcitonin for now  Decrease dose if Ca any lower    Continue same treatment

## 2017-07-31 NOTE — PHYSICAL THERAPY INITIAL EVALUATION ADULT - PERTINENT HX OF CURRENT PROBLEM, REHAB EVAL
pt presents to [facility] due to PNA, hx of lung cancer c mets
h/o CAD, s/p stent 6/17, HTN, HLD, + mass found during tx for hemoptysis and pleural effusion. PET scan confirms a LLL mass w mets. hilar lymphadenopathy, left pleural mets, left sacrum lesion. 7/24, started RT. + RUE swelling, US: superficial thrombosis of right cephalic vein. RUE PICC 7/25. CXR: worsening left pleural effusion with possible loculation. CT chest showing pleural mets w large left loculated pleural effusion. On 7/26, received left chest tube. On 7/28 had IR guided lung biopsy

## 2017-07-31 NOTE — PHYSICAL THERAPY INITIAL EVALUATION ADULT - IMPAIRMENTS FOUND, PT EVAL
aerobic capacity/endurance/ventilation and respiration/gas exchange/muscle strength/posture/gait, locomotion, and balance

## 2017-07-31 NOTE — PHYSICAL THERAPY INITIAL EVALUATION ADULT - LIVES WITH, PROFILE
children/someone will always be home throughout the day to assist prn
someone will always be home throughout the day to assist prn/children

## 2017-08-01 LAB
HCT VFR BLD CALC: 28.7 % — LOW (ref 42–52)
HGB BLD-MCNC: 8.8 G/DL — LOW (ref 14–18)
MCHC RBC-ENTMCNC: 27.4 PG — SIGNIFICANT CHANGE UP (ref 27–31)
MCHC RBC-ENTMCNC: 30.7 G/DL — LOW (ref 32–36)
MCV RBC AUTO: 89.4 FL — SIGNIFICANT CHANGE UP (ref 80–94)
PLATELET # BLD AUTO: 397 K/UL — SIGNIFICANT CHANGE UP (ref 150–400)
RBC # BLD: 3.21 M/UL — LOW (ref 4.6–6.2)
RBC # FLD: 15.5 % — SIGNIFICANT CHANGE UP (ref 11–15.6)
WBC # BLD: 17.7 K/UL — HIGH (ref 4.8–10.8)
WBC # FLD AUTO: 17.7 K/UL — HIGH (ref 4.8–10.8)

## 2017-08-01 PROCEDURE — 99233 SBSQ HOSP IP/OBS HIGH 50: CPT

## 2017-08-01 PROCEDURE — 99232 SBSQ HOSP IP/OBS MODERATE 35: CPT

## 2017-08-01 RX ORDER — ALTEPLASE 100 MG
2 KIT INTRAVENOUS ONCE
Qty: 0 | Refills: 0 | Status: COMPLETED | OUTPATIENT
Start: 2017-08-01 | End: 2017-08-01

## 2017-08-01 RX ORDER — CEFAZOLIN SODIUM 1 G
1000 VIAL (EA) INJECTION ONCE
Qty: 0 | Refills: 0 | Status: DISCONTINUED | OUTPATIENT
Start: 2017-08-01 | End: 2017-07-21

## 2017-08-01 RX ADMIN — SENNA PLUS 2 TABLET(S): 8.6 TABLET ORAL at 22:32

## 2017-08-01 RX ADMIN — Medication 3 MILLILITER(S): at 16:03

## 2017-08-01 RX ADMIN — CALCITONIN SALMON 390 INTERNATIONAL UNIT(S): 200 INJECTION, SOLUTION INTRAMUSCULAR at 17:53

## 2017-08-01 RX ADMIN — Medication 3 MILLILITER(S): at 08:35

## 2017-08-01 RX ADMIN — Medication 81 MILLIGRAM(S): at 11:41

## 2017-08-01 RX ADMIN — HYDROMORPHONE HYDROCHLORIDE 2 MILLIGRAM(S): 2 INJECTION INTRAMUSCULAR; INTRAVENOUS; SUBCUTANEOUS at 11:37

## 2017-08-01 RX ADMIN — Medication 3 MILLILITER(S): at 21:01

## 2017-08-01 RX ADMIN — HYDROMORPHONE HYDROCHLORIDE 2 MILLIGRAM(S): 2 INJECTION INTRAMUSCULAR; INTRAVENOUS; SUBCUTANEOUS at 22:31

## 2017-08-01 RX ADMIN — Medication 30 MILLIEQUIVALENT(S): at 06:16

## 2017-08-01 RX ADMIN — Medication 100 MILLIGRAM(S): at 11:41

## 2017-08-01 RX ADMIN — Medication 1 TABLET(S): at 11:41

## 2017-08-01 RX ADMIN — HYDROMORPHONE HYDROCHLORIDE 2 MILLIGRAM(S): 2 INJECTION INTRAMUSCULAR; INTRAVENOUS; SUBCUTANEOUS at 16:51

## 2017-08-01 RX ADMIN — HYDROMORPHONE HYDROCHLORIDE 2 MILLIGRAM(S): 2 INJECTION INTRAMUSCULAR; INTRAVENOUS; SUBCUTANEOUS at 23:06

## 2017-08-01 RX ADMIN — HYDROMORPHONE HYDROCHLORIDE 2 MILLIGRAM(S): 2 INJECTION INTRAMUSCULAR; INTRAVENOUS; SUBCUTANEOUS at 03:42

## 2017-08-01 RX ADMIN — AMIODARONE HYDROCHLORIDE 200 MILLIGRAM(S): 400 TABLET ORAL at 06:15

## 2017-08-01 RX ADMIN — HYDROMORPHONE HYDROCHLORIDE 2 MILLIGRAM(S): 2 INJECTION INTRAMUSCULAR; INTRAVENOUS; SUBCUTANEOUS at 17:55

## 2017-08-01 RX ADMIN — Medication 100 MILLIGRAM(S): at 22:32

## 2017-08-01 RX ADMIN — HYDROMORPHONE HYDROCHLORIDE 2 MILLIGRAM(S): 2 INJECTION INTRAMUSCULAR; INTRAVENOUS; SUBCUTANEOUS at 04:14

## 2017-08-01 RX ADMIN — ATORVASTATIN CALCIUM 40 MILLIGRAM(S): 80 TABLET, FILM COATED ORAL at 22:32

## 2017-08-01 RX ADMIN — CALCITONIN SALMON 390 INTERNATIONAL UNIT(S): 200 INJECTION, SOLUTION INTRAMUSCULAR at 06:14

## 2017-08-01 RX ADMIN — Medication 100 MILLIGRAM(S): at 06:15

## 2017-08-01 RX ADMIN — Medication 30 MILLIEQUIVALENT(S): at 13:20

## 2017-08-01 RX ADMIN — POLYETHYLENE GLYCOL 3350 17 GRAM(S): 17 POWDER, FOR SOLUTION ORAL at 17:53

## 2017-08-01 RX ADMIN — HYDROMORPHONE HYDROCHLORIDE 2 MILLIGRAM(S): 2 INJECTION INTRAMUSCULAR; INTRAVENOUS; SUBCUTANEOUS at 10:41

## 2017-08-01 RX ADMIN — Medication 75 MICROGRAM(S): at 06:15

## 2017-08-01 RX ADMIN — ALTEPLASE 2 MILLIGRAM(S): KIT at 18:59

## 2017-08-01 RX ADMIN — CLOPIDOGREL BISULFATE 75 MILLIGRAM(S): 75 TABLET, FILM COATED ORAL at 11:41

## 2017-08-01 RX ADMIN — FAMOTIDINE 20 MILLIGRAM(S): 10 INJECTION INTRAVENOUS at 06:15

## 2017-08-01 RX ADMIN — Medication 30 MILLIEQUIVALENT(S): at 17:52

## 2017-08-01 RX ADMIN — Medication 48 MILLIGRAM(S): at 22:32

## 2017-08-01 NOTE — PROGRESS NOTE ADULT - SUBJECTIVE AND OBJECTIVE BOX
Patient without any significant change  no specific c/o  Lying quietly; tired  family present    Vital Signs Last 24 Hrs  T(C): 36.9 (08-01-17 @ 16:40), Max: 37.3 (07-31-17 @ 21:13)  T(F): 98.4 (08-01-17 @ 16:40), Max: 99.1 (07-31-17 @ 21:13)  HR: 86 (08-01-17 @ 16:40) (82 - 92)  BP: 113/55 (08-01-17 @ 16:40) (103/75 - 124/76)  BP(mean): --  RR: 18 (08-01-17 @ 16:40) (18 - 19)  SpO2: 98% (08-01-17 @ 08:40) (94% - 98%)    Chest   clear; L BS poor; L CT noted  CV   no murmur  Abd   soft, NT BS+  Extr   mild edema  Neuro  grossly iintact motor    Patient overall stable; L Lung mass/PNA  Labs and Meds reviewed - no CMP since 7/30  last Ca 10.2     Continue same treatment  recheck CMP Phos am  YOSELYN resolved Patient without any significant change  no specific c/o  Lying quietly; tired  family present    Vital Signs Last 24 Hrs  T(C): 36.9 (08-01-17 @ 16:40), Max: 37.3 (07-31-17 @ 21:13)  T(F): 98.4 (08-01-17 @ 16:40), Max: 99.1 (07-31-17 @ 21:13)  HR: 86 (08-01-17 @ 16:40) (82 - 92)  BP: 113/55 (08-01-17 @ 16:40) (103/75 - 124/76)  BP(mean): --  RR: 18 (08-01-17 @ 16:40) (18 - 19)  SpO2: 98% (08-01-17 @ 08:40) (94% - 98%)    Chest   clear; L BS poor; L CT noted  CV   no murmur  Abd   soft, NT BS+  Extr   mild edema  Neuro  grossly iintact motor    Patient overall stable; L Lung mass/PNA  Labs and Meds reviewed - no CMP since 7/30  last Ca 10.2     Continue same treatment  recheck CMP Phos am  YOSELYN resolved- shall sign off   Please call with any questions

## 2017-08-01 NOTE — CHART NOTE - NSCHARTNOTEFT_GEN_A_CORE
Asked to obtain the blood work from PICC. The nursing staff couldn't get it from any port. The phlebotomist didn't want to stick the pt.  Multiple NS flushes administered. only 1 cc of blood was able to draw back.   Cathflo ordered. Administered 0.3ml in each port as per protocol.  Aseptic technique used. The nurse was notified to withdraw few cc from each port as per protocol and discard,   Then flush each port with multiple 5cc NS flushes.  Pt. tolerated it well.

## 2017-08-01 NOTE — PROGRESS NOTE ADULT - SUBJECTIVE AND OBJECTIVE BOX
[facility]  INFECTIOUS DISEASES AND [facility] [place] [facility]  =======================================================  Bethel Bernard MD [facility]   Darryl Uribe MD  Diplomates American Board of Internal Medicine and Infectious Diseases  =======================================================    SULEIMAN GREGORY [id number]  chief complaint: follow up for Pneumonia    patient seen and examined in follow up.  Chart and labs reviewed.   Pleural fluid sent to pathology on 7/26/17 - negative malignant cells.   PT REMAINS AFEBRILE   AWAKE FAMILY AT BEDSIDE    =======================================================  Allergies:  macrolide antibiotics (Urticaria; Rash; Hives)  penicillin (Urticaria; Rash)  Zithromax Z-Christian (Urticaria; Rash; Hives)      =======================================================  Medications:  amiodarone    Tablet 200 milliGRAM(s) Oral daily  atorvastatin 40 milliGRAM(s) Oral at bedtime  fenofibrate Tablet 48 milliGRAM(s) Oral daily  metoprolol succinate  milliGRAM(s) Oral daily  famotidine    Tablet 20 milliGRAM(s) Oral daily  levothyroxine 75 MICROGram(s) Oral daily  multivitamin/minerals 1 Tablet(s) Oral daily  ALBUTerol/ipratropium for Nebulization 3 milliLiter(s) Nebulizer every 6 hours  senna 2 Tablet(s) Oral at bedtime  docusate sodium 100 milliGRAM(s) Oral three times a day  oxyCODONE    5 mG/acetaminophen 325 mG 2 Tablet(s) Oral every 4 hours PRN  potassium chloride    Tablet ER 30 milliEquivalent(s) Oral two times a day  insulin lispro (HumaLOG) corrective regimen sliding scale   SubCutaneous three times a day before meals  insulin lispro (HumaLOG) corrective regimen sliding scale   SubCutaneous at bedtime  dextrose 5%. 1000 milliLiter(s) IV Continuous <Continuous>  dextrose Gel 1 Dose(s) Oral once PRN  dextrose 50% Injectable 12.5 Gram(s) IV Push once  dextrose 50% Injectable 25 Gram(s) IV Push once  dextrose 50% Injectable 25 Gram(s) IV Push once  glucagon  Injectable 1 milliGRAM(s) IntraMuscular once PRN  sodium chloride 0.9%. 1000 milliLiter(s) IV Continuous <Continuous>  HYDROmorphone  Injectable 2 milliGRAM(s) IV Push every 4 hours PRN  HYDROmorphone  Injectable 1 milliGRAM(s) IV Push every 4 hours PRN  calcitonin Injectable 390 International Unit(s) IntraMuscular every 12 hours  levoFLOXacin  Tablet 500 milliGRAM(s) Oral every 24 hours       =======================================================     REVIEW OF SYSTEMS:  CONSTITUTIONAL:  No Fever or chills  HEENT:   No diplopia or blurred vision.  No earache, sore throat or runny nose.  CARDIOVASCULAR:  No pressure, squeezing, strangling, tightness, heaviness or aching about the chest, neck, axilla or epigastrium.  RESPIRATORY:  No cough, shortness of breath, PND or orthopnea.  GASTROINTESTINAL:  No nausea, vomiting or diarrhea.  GENITOURINARY:  No dysuria, frequency or urgency. No Blood in urine  MUSCULOSKELETAL:  no joint aches, no muscle pain  SKIN:  No change in skin, hair or nails.  NEUROLOGIC:  No paresthesias, fasciculations, seizures or weakness.  PSYCHIATRIC:  No disorder of thought or mood.  ENDOCRINE:  No heat or cold intolerance, polyuria or polydipsia.  HEMATOLOGICAL:  No easy bruising or bleeding.     =======================================================     Physical Exam:  I Vital Signs Last 24 Hrs  T(C): 36.9 (01 Aug 2017 05:00), Max: 37.3 (31 Jul 2017 21:13)  T(F): 98.4 (01 Aug 2017 05:00), Max: 99.1 (31 Jul 2017 21:13)  HR: 82 (01 Aug 2017 08:40) (82 - 92)  BP: 105/67 (01 Aug 2017 05:00) (103/65 - 124/76)  BP(mean): --  RR: 18 (01 Aug 2017 05:00) (18 - 18)  SpO2: 98% (01 Aug 2017 08:40) (93% - 98%)    GEN: NAD, pleasant  HEENT: normocephalic and atraumatic. EOMI. BONNIE.    NECK: Supple. No carotid bruits.  No lymphadenopathy or thyromegaly.  LUNGS: fair air entry anteriorly; left sided chest tube with bloody fluid.   HEART: Regular rate and rhythm without murmur.  ABDOMEN: Soft, DISTENDED , non tender Positive bowel sounds.    : No CVA tenderness  EXTREMITIES: Without any cyanosis, clubbing, rash, lesions.  TRACEL LE edema.  MSK: no joint swelling  NEUROLOGIC: Cranial nerves II through XII are grossly intact.  PSYCHIATRIC: Appropriate affect .  SKIN: No ulceration or induration present.    =======================================================    L                                                  8.8    17.7  )-----------( 397      ( 01 Aug 2017 08:04 )             28.7

## 2017-08-01 NOTE — PROGRESS NOTE ADULT - SUBJECTIVE AND OBJECTIVE BOX
Patient: SULEIMAN GERGORY [id number] 71y Male                 Internal Medicine Hospitalist Progress Note - Dr. Craig Degroot    Chief Complaint: Patient is a 71y old  Male who presents with a chief complaint of "I was feeling tired, unwell at home." (20 Jul 2017 22:29)    HPI:  72 y/o male w/PMHx of CAD, s/p stent in June 2017, HTN, HLD, w/recent hx of LLL mass discovered here after he was treated here for hemoptysis and pleural effusion. However, he then had a PET scan which confirmed a LLL mass with areas of metastasis including left hilar lymphadenopathy, left pleural mets, left sacrum lesion. He was supposed to have a CT guided biopsy of the lung but he was found to be ill-appearing, with cough and a high white count, prompting an admission for pneumonia. He was started as an outpatient on levaquin but his white count remains high. On 7/24, started radiation therapy at [facility]. Hgb trended down to 7.6, and received 1u PRBC with appropriate response. Developed RUE swelling, US showed superficial thrombosis of right cephalic vein. Received RUE PICC on 7/25. Repeat CXR showing worsening left pleural effusion with possible loculation. CT chest showing pleural metastases with large left loculated pleural effusion. On 7/26, received left chest tube. Noted to have hypercalcemia, received pamidronate and calcitonin. On 7/28 had IR guided lung biopsy.  S/p 5 treatments of RT.      Seen with son at bedside.  No complaints.  Pain controlled.  No hemoptysis.  ~ 0.5L of drainage via Chest Tube.  No SOB.      ____________________PHYSICAL EXAM:  Vitals reviewed as indicated below  GENERAL:  NAD Alert and Oriented x 3   HEENT: NCAT  CARDIOVASCULAR:  S1, S2  LUNGS: coarse BS b/l. sl Decreased BS L base.   ABDOMEN:  soft, (-) tenderness, (-) distension, (+) bowel sounds, (-) guarding, (-) rebound (-) rigidity  EXTREMITIES:  no cyanosis / clubbing / edema.   ____________________      MEDICATIONS:  amiodarone    Tablet 200 milliGRAM(s) Oral daily  atorvastatin 40 milliGRAM(s) Oral at bedtime  fenofibrate Tablet 48 milliGRAM(s) Oral daily  metoprolol succinate  milliGRAM(s) Oral daily  famotidine    Tablet 20 milliGRAM(s) Oral daily  levothyroxine 75 MICROGram(s) Oral daily  multivitamin/minerals 1 Tablet(s) Oral daily  ALBUTerol/ipratropium for Nebulization 3 milliLiter(s) Nebulizer every 6 hours  senna 2 Tablet(s) Oral at bedtime  docusate sodium 100 milliGRAM(s) Oral three times a day  oxyCODONE    5 mG/acetaminophen 325 mG 2 Tablet(s) Oral every 4 hours PRN  potassium chloride    Tablet ER 30 milliEquivalent(s) Oral two times a day  insulin lispro (HumaLOG) corrective regimen sliding scale   SubCutaneous three times a day before meals  insulin lispro (HumaLOG) corrective regimen sliding scale   SubCutaneous at bedtime  dextrose 5%. 1000 milliLiter(s) IV Continuous <Continuous>  dextrose Gel 1 Dose(s) Oral once PRN  dextrose 50% Injectable 12.5 Gram(s) IV Push once  dextrose 50% Injectable 25 Gram(s) IV Push once  dextrose 50% Injectable 25 Gram(s) IV Push once  glucagon  Injectable 1 milliGRAM(s) IntraMuscular once PRN  HYDROmorphone  Injectable 2 milliGRAM(s) IV Push every 4 hours PRN  HYDROmorphone  Injectable 1 milliGRAM(s) IV Push every 4 hours PRN  calcitonin Injectable 390 International Unit(s) IntraMuscular every 12 hours  levoFLOXacin  Tablet 500 milliGRAM(s) Oral every 24 hours  clopidogrel Tablet 75 milliGRAM(s) Oral daily  aspirin  chewable 81 milliGRAM(s) Oral daily  polyethylene glycol 3350 17 Gram(s) Oral two times a day      VITALS:  Vital Signs Last 24 Hrs  T(C): 36.9 (01 Aug 2017 05:00), Max: 37.3 (31 Jul 2017 21:13)  T(F): 98.4 (01 Aug 2017 05:00), Max: 99.1 (31 Jul 2017 21:13)  HR: 82 (01 Aug 2017 08:40) (82 - 92)  BP: 105/67 (01 Aug 2017 05:00) (103/65 - 124/76)  BP(mean): --  RR: 18 (01 Aug 2017 05:00) (18 - 18)  SpO2: 98% (01 Aug 2017 08:40) (93% - 98%) Daily     Daily   CAPILLARY BLOOD GLUCOSE  136 (01 Aug 2017 11:19)  121 (01 Aug 2017 07:34)  144 (31 Jul 2017 21:13)  115 (31 Jul 2017 17:13)        I&O's Summary    31 Jul 2017 07:01  -  01 Aug 2017 07:00  --------------------------------------------------------  IN: 720 mL / OUT: 1905 mL / NET: -1185 mL    01 Aug 2017 07:01  -  01 Aug 2017 14:26  --------------------------------------------------------  IN: 360 mL / OUT: 0 mL / NET: 360 mL        LABS:                        8.8    17.7  )-----------( 397      ( 01 Aug 2017 08:04 )             28.7             RECENT CULTURES:

## 2017-08-02 LAB
ALBUMIN SERPL ELPH-MCNC: 1.8 G/DL — LOW (ref 3.3–5.2)
ALP SERPL-CCNC: 158 U/L — HIGH (ref 40–120)
ALT FLD-CCNC: 30 U/L — SIGNIFICANT CHANGE UP
ANION GAP SERPL CALC-SCNC: 10 MMOL/L — SIGNIFICANT CHANGE UP (ref 5–17)
APTT BLD: 32.3 SEC — SIGNIFICANT CHANGE UP (ref 27.5–37.4)
AST SERPL-CCNC: 25 U/L — SIGNIFICANT CHANGE UP
BILIRUB SERPL-MCNC: 0.4 MG/DL — SIGNIFICANT CHANGE UP (ref 0.4–2)
BLD GP AB SCN SERPL QL: SIGNIFICANT CHANGE UP
BUN SERPL-MCNC: 19 MG/DL — SIGNIFICANT CHANGE UP (ref 8–20)
CALCIUM SERPL-MCNC: 9 MG/DL — SIGNIFICANT CHANGE UP (ref 8.6–10.2)
CHLORIDE SERPL-SCNC: 106 MMOL/L — SIGNIFICANT CHANGE UP (ref 98–107)
CO2 SERPL-SCNC: 23 MMOL/L — SIGNIFICANT CHANGE UP (ref 22–29)
CREAT SERPL-MCNC: 1.01 MG/DL — SIGNIFICANT CHANGE UP (ref 0.5–1.3)
GLUCOSE SERPL-MCNC: 125 MG/DL — HIGH (ref 70–115)
INR BLD: 1.26 RATIO — HIGH (ref 0.88–1.16)
PHOSPHATE SERPL-MCNC: 2.6 MG/DL — SIGNIFICANT CHANGE UP (ref 2.4–4.7)
POTASSIUM SERPL-MCNC: 4 MMOL/L — SIGNIFICANT CHANGE UP (ref 3.5–5.3)
POTASSIUM SERPL-SCNC: 4 MMOL/L — SIGNIFICANT CHANGE UP (ref 3.5–5.3)
PROT SERPL-MCNC: 4.9 G/DL — LOW (ref 6.6–8.7)
PROTHROM AB SERPL-ACNC: 13.9 SEC — HIGH (ref 9.8–12.7)
SODIUM SERPL-SCNC: 139 MMOL/L — SIGNIFICANT CHANGE UP (ref 135–145)
TYPE + AB SCN PNL BLD: SIGNIFICANT CHANGE UP

## 2017-08-02 PROCEDURE — 71010: CPT | Mod: 26

## 2017-08-02 PROCEDURE — 99233 SBSQ HOSP IP/OBS HIGH 50: CPT

## 2017-08-02 RX ORDER — IPRATROPIUM/ALBUTEROL SULFATE 18-103MCG
3 AEROSOL WITH ADAPTER (GRAM) INHALATION EVERY 6 HOURS
Qty: 0 | Refills: 0 | Status: DISCONTINUED | OUTPATIENT
Start: 2017-08-02 | End: 2017-07-21

## 2017-08-02 RX ADMIN — Medication 81 MILLIGRAM(S): at 11:43

## 2017-08-02 RX ADMIN — HYDROMORPHONE HYDROCHLORIDE 2 MILLIGRAM(S): 2 INJECTION INTRAMUSCULAR; INTRAVENOUS; SUBCUTANEOUS at 05:49

## 2017-08-02 RX ADMIN — CLOPIDOGREL BISULFATE 75 MILLIGRAM(S): 75 TABLET, FILM COATED ORAL at 11:43

## 2017-08-02 RX ADMIN — Medication 100 MILLIGRAM(S): at 06:07

## 2017-08-02 RX ADMIN — HYDROMORPHONE HYDROCHLORIDE 2 MILLIGRAM(S): 2 INJECTION INTRAMUSCULAR; INTRAVENOUS; SUBCUTANEOUS at 03:29

## 2017-08-02 RX ADMIN — HYDROMORPHONE HYDROCHLORIDE 2 MILLIGRAM(S): 2 INJECTION INTRAMUSCULAR; INTRAVENOUS; SUBCUTANEOUS at 11:40

## 2017-08-02 RX ADMIN — Medication 1 TABLET(S): at 11:43

## 2017-08-02 RX ADMIN — CALCITONIN SALMON 390 INTERNATIONAL UNIT(S): 200 INJECTION, SOLUTION INTRAMUSCULAR at 18:22

## 2017-08-02 RX ADMIN — Medication 3 MILLILITER(S): at 09:30

## 2017-08-02 RX ADMIN — HYDROMORPHONE HYDROCHLORIDE 2 MILLIGRAM(S): 2 INJECTION INTRAMUSCULAR; INTRAVENOUS; SUBCUTANEOUS at 17:35

## 2017-08-02 RX ADMIN — Medication 3 MILLILITER(S): at 03:49

## 2017-08-02 RX ADMIN — Medication 75 MICROGRAM(S): at 06:07

## 2017-08-02 RX ADMIN — CALCITONIN SALMON 390 INTERNATIONAL UNIT(S): 200 INJECTION, SOLUTION INTRAMUSCULAR at 07:50

## 2017-08-02 RX ADMIN — HYDROMORPHONE HYDROCHLORIDE 2 MILLIGRAM(S): 2 INJECTION INTRAMUSCULAR; INTRAVENOUS; SUBCUTANEOUS at 17:22

## 2017-08-02 RX ADMIN — ATORVASTATIN CALCIUM 40 MILLIGRAM(S): 80 TABLET, FILM COATED ORAL at 21:13

## 2017-08-02 RX ADMIN — HYDROMORPHONE HYDROCHLORIDE 2 MILLIGRAM(S): 2 INJECTION INTRAMUSCULAR; INTRAVENOUS; SUBCUTANEOUS at 20:51

## 2017-08-02 RX ADMIN — HYDROMORPHONE HYDROCHLORIDE 2 MILLIGRAM(S): 2 INJECTION INTRAMUSCULAR; INTRAVENOUS; SUBCUTANEOUS at 20:00

## 2017-08-02 RX ADMIN — HYDROMORPHONE HYDROCHLORIDE 2 MILLIGRAM(S): 2 INJECTION INTRAMUSCULAR; INTRAVENOUS; SUBCUTANEOUS at 12:20

## 2017-08-02 RX ADMIN — SENNA PLUS 2 TABLET(S): 8.6 TABLET ORAL at 21:13

## 2017-08-02 RX ADMIN — Medication 100 MILLIGRAM(S): at 21:13

## 2017-08-02 RX ADMIN — Medication 48 MILLIGRAM(S): at 21:13

## 2017-08-02 RX ADMIN — AMIODARONE HYDROCHLORIDE 200 MILLIGRAM(S): 400 TABLET ORAL at 06:08

## 2017-08-02 NOTE — PROGRESS NOTE ADULT - SUBJECTIVE AND OBJECTIVE BOX
Anesthesia pre op evaluation:    Patient is a 71y old  Male who presents with a chief complaint of "I was feeling tired, unwell at home." (20 Jul 2017 22:29)      PAST MEDICAL HISTORY:  Psoriasis    PSVT (paroxysmal supraventricular tachycardia)  Chronic systolic CHF (congestive heart failure), NYHA class 2  Former smoker  Hypokinesis  Mitral regurgitation  Bronchitis  Hypertension  PSVT (paroxysmal supraventricular tachycardia)  Ischemic cardiomyopathy  AICD (automatic cardioverter/defibrillator) present  VT (ventricular tachycardia)  Vitamin D deficiency  Tricuspid regurgitation  RBBB  Prostate cancer  Mitral regurgitation  Hypothyroidism  HLD (hyperlipidemia)  Arrhythmia  Angina pectoris  CAD (coronary artery disease)      PAST SURGICAL HISTORY:  History of bronchoscopy  History of prostate surgery  Cardiac disorder  History of electrophysiologic study  H/O cardiac catheterization      MEDICATIONS  (STANDING):  amiodarone    Tablet 200 milliGRAM(s) Oral daily  atorvastatin 40 milliGRAM(s) Oral at bedtime  fenofibrate Tablet 48 milliGRAM(s) Oral daily  metoprolol succinate  milliGRAM(s) Oral daily  famotidine    Tablet 20 milliGRAM(s) Oral daily  levothyroxine 75 MICROGram(s) Oral daily  multivitamin/minerals 1 Tablet(s) Oral daily  senna 2 Tablet(s) Oral at bedtime  docusate sodium 100 milliGRAM(s) Oral three times a day  potassium chloride    Tablet ER 30 milliEquivalent(s) Oral two times a day  insulin lispro (HumaLOG) corrective regimen sliding scale   SubCutaneous three times a day before meals  insulin lispro (HumaLOG) corrective regimen sliding scale   SubCutaneous at bedtime  dextrose 5%. 1000 milliLiter(s) (50 mL/Hr) IV Continuous <Continuous>  dextrose 50% Injectable 12.5 Gram(s) IV Push once  dextrose 50% Injectable 25 Gram(s) IV Push once  dextrose 50% Injectable 25 Gram(s) IV Push once  calcitonin Injectable 390 International Unit(s) IntraMuscular every 12 hours  levoFLOXacin  Tablet 500 milliGRAM(s) Oral every 24 hours  clopidogrel Tablet 75 milliGRAM(s) Oral daily  aspirin  chewable 81 milliGRAM(s) Oral daily  polyethylene glycol 3350 17 Gram(s) Oral two times a day  ceFAZolin   IVPB 1000 milliGRAM(s) IV Intermittent once    MEDICATIONS  (PRN):  dextrose Gel 1 Dose(s) Oral once PRN Blood Glucose LESS THAN 70 milliGRAM(s)/deciliter  glucagon  Injectable 1 milliGRAM(s) IntraMuscular once PRN Glucose LESS THAN 70 milligrams/deciliter  HYDROmorphone  Injectable 2 milliGRAM(s) IV Push every 4 hours PRN Severe Pain (7 - 10)  HYDROmorphone  Injectable 1 milliGRAM(s) IV Push every 4 hours PRN Moderate Pain (4 - 6)  ALBUTerol/ipratropium for Nebulization 3 milliLiter(s) Nebulizer every 6 hours PRN Shortness of Breath and/or Wheezing      Allergies    macrolide antibiotics (Urticaria; Rash; Hives)  penicillin (Urticaria; Rash)  Zithromax Z-Christian (Urticaria; Rash; Hives)    Intolerances        SOCIAL HISTORY:                          8.8    17.7  )-----------( 397      ( 01 Aug 2017 08:04 )             28.7       PT/INR - ( 02 Aug 2017 08:21 )   PT: 13.9 sec;   INR: 1.26 ratio         PTT - ( 02 Aug 2017 08:21 )  PTT:32.3 sec    08-02    139  |  106  |  19.0  ----------------------------<  125<H>  4.0   |  23.0  |  1.01    Ca    9.0      02 Aug 2017 08:21  Phos  2.6     08-02    TPro  4.9<L>  /  Alb  1.8<L>  /  TBili  0.4  /  DBili  x   /  AST  25  /  ALT  30  /  AlkPhos  158<H>  08-02        Weight (kg): 174.2 (08-02 @ 05:00)    EKG: RBBB    TT ECHO: EF 46 %    RADIOLOGY:    ASA # 4=             Mallampati # =  2 Full dentures

## 2017-08-02 NOTE — PROGRESS NOTE ADULT - PROBLEM/PLAN-5
DISPLAY PLAN FREE TEXT
DISPLAY PLAN FREE TEXT
Breath Sounds equal & clear to percussion & auscultation, no accessory muscle use

## 2017-08-02 NOTE — PROGRESS NOTE ADULT - PROVIDER SPECIALTY LIST ADULT
"Subjective:    Patient is a 70 year old female presenting with rehab back hpi.   Trish Wu is a 70 year old female with a lumbar condition.  Condition occurred with:  Degenerative joint disease and insidious onset.  Condition occurred: for unknown reasons.  This is a chronic condition  Patient is referred with a 5 year hx of left LBP and L hip/buttock pain. PMH includes several falls and lumbar compression fracture. She notes gradually worsening pain and decreased mobility over the past several months. She tends to be better when sitting and at rest and notes decreased ability to walk. She is limited to about one block and prolonged standing produces numbness in L thigh. Transitions from sit to stand and rolling over in bed increase pain. She reports a hx of her back \"going out\". Recent x-rays and an MRI of L hip revealed superior labral tearing..    Patient reports pain:  Lumbar spine left.  Radiates to:  Gluteals left and thigh left.    and reported as 1/10.  Associated symptoms:  Loss of strength and loss of motion/stiffness. Pain is worse during the day.  Symptoms are exacerbated by bending, walking and standing and relieved by rest.  Since onset symptoms are gradually worsening.  Special tests:  MRI and x-ray.      General health as reported by patient is good.  Pertinent medical history includes:  History of fractures, osteoarthritis and high blood pressure (abdominal aneurysm).      Current medications:  None as reported by the patient.          Barriers include:  None as reported by the patient.    Red flags:  None as reported by the patient.                        Objective:    System                                           Hip Evaluation  HIP AROM:  AROM:    Left Hip:     Normal    Right Hip:   Normal                  Hip PROM:  Hip PROM:  Left Hip:    Normal  Right Hip:  Normal                          Hip Strength:    Flexion:   Left: 5/5   Pain:  Right: 5/5   Pain:                    Extension:  " Hospitalist Left: 4/5  Pain:Right: 4/5    Pain:    Abduction:  Left: 3/5    +   Pain:Right: 3/5    Pain:  Adduction:  Left: 5/5    Pain:Right: 5/5   Pain:                Hip Special Testing:    Left hip positive for the following special tests:  Fadir/Labrum      Hip Palpation:  Not Assessed         Functional Testing:  not assessed                         Irene Lumbar Evaluation    Posture:  Sitting: fair  Standing: good  Lordosis: Reduced  Lateral Shift: no  Correction of Posture: no effect    Movement Loss:  Flexion (Flex): mod and pain  Extension (EXT): min  Side Glide R (SG R): min and pain  Side [place] L (SG L): min and pain  Test Movements:      LOI: During: no effect  After: no effect  Mechanical Response: no effect  Repeat LOI: During: produces  After: no worse  Mechanical Response: no effect  EIL: During: produces  After: no worse  Mechanical Response: no effect  Repeat EIL: During: produces  After: no effect  Mechanical Response: IncROM        Conclusion lumbar: lumbar DDD.  Principle of Treatment:    Flexion: LOI, FISitting, prayer stretch x 10/ 3-4 x daily      Other: core strengthening                                       ROS    Assessment/Plan:      Patient is a 70 year old female with lumbar and left side hip complaints.    Patient has the following significant findings with corresponding treatment plan.                Diagnosis 1:  LBP, L hip pain  Pain -  hot/cold therapy, self management, education, directional preference exercise and home program  Decreased strength - therapeutic exercise, therapeutic activities and home program  Impaired balance - neuro re-education, therapeutic activities and home program  Impaired gait - gait training and home program  Impaired muscle performance - neuro re-education and home program  Decreased function - therapeutic activities and home program    Therapy Evaluation Codes:   1) History comprised of:   Personal factors that impact the plan of care:      Time since onset  of symptoms.    Comorbidity factors that impact the plan of care are:      High blood pressure.     Medications impacting care: None.  2) Examination of Body Systems comprised of:   Body structures and functions that impact the plan of care:      Hip and Lumbar spine.   Activity limitations that impact the plan of care are:      Bending, Lifting, Stairs, Standing, Walking and Sleeping.  3) Clinical presentation characteristics are:   Stable/Uncomplicated.  4) Decision-Making    Low complexity using standardized patient assessment instrument and/or measureable assessment of functional outcome.  Cumulative Therapy Evaluation is: Low complexity.    Previous and current functional limitations:  (See Goal Flow Sheet for this information)    Short term and Long term goals: (See Goal Flow Sheet for this information)     Communication ability:  Patient appears to be able to clearly communicate and understand verbal and written communication and follow directions correctly.  Treatment Explanation - The following has been discussed with the patient:   RX ordered/plan of care  Anticipated outcomes  Possible risks and side effects  This patient would benefit from PT intervention to resume normal activities.   Rehab potential is good.    Frequency:  2 X week, once daily  Duration:  for 3 weeks  Discharge Plan:  Achieve all LTG.  Independent in home treatment program.  Reach maximal therapeutic benefit.    Please refer to the daily flowsheet for treatment today, total treatment time and time spent performing 1:1 timed codes.

## 2017-08-02 NOTE — PROGRESS NOTE ADULT - SUBJECTIVE AND OBJECTIVE BOX
Patient: SULEIMAN GREGORY [id number] 71y Male                 Internal Medicine Hospitalist Progress Note - Dr. Craig Degroot    Chief Complaint: Patient is a 71y old  Male who presents with a chief complaint of "I was feeling tired, unwell at home." (20 Jul 2017 22:29)    HPI:  70 y/o male w/PMHx of CAD, s/p stent in June 2017, HTN, HLD, w/recent hx of LLL mass discovered here after he was treated here for hemoptysis and pleural effusion. However, he then had a PET scan which confirmed a LLL mass with areas of metastasis including left hilar lymphadenopathy, left pleural mets, left sacrum lesion. He was supposed to have a CT guided biopsy of the lung but he was found to be ill-appearing, with cough and a high white count, prompting an admission for pneumonia. He was started as an outpatient on levaquin but his white count remains high. On 7/24, started radiation therapy at [facility]. Hgb trended down to 7.6, and received 1u PRBC with appropriate response. Developed RUE swelling, US showed superficial thrombosis of right cephalic vein. Received RUE PICC on 7/25. Repeat CXR showing worsening left pleural effusion with possible loculation. CT chest showing pleural metastases with large left loculated pleural effusion. On 7/26, received left chest tube. Noted to have hypercalcemia, received pamidronate and calcitonin. On 7/28 had IR guided lung biopsy.  S/p 5 treatments of RT.      Seen with son at bedside.  No complaints.  Pain controlled.  No hemoptysis.  Chest tube clamped. No SOB.  On Tele, pt had episode of 7 beats of NSVT, followed by pacing spike.  Pt asymptomatic during episode.  No chest pain / sob / Palpitations.  No additional complaints.     ____________________PHYSICAL EXAM:  Vitals reviewed as indicated below  GENERAL:  NAD Alert and Oriented x 3   HEENT: NCAT  CARDIOVASCULAR:  S1, S2  LUNGS: coarse BS b/l. sl Decreased BS L base.   ABDOMEN:  soft, (-) tenderness, (-) distension, (+) bowel sounds, (-) guarding, (-) rebound (-) rigidity  EXTREMITIES:  no cyanosis / clubbing / edema.   ____________________      MEDICATIONS:  amiodarone    Tablet 200 milliGRAM(s) Oral daily  atorvastatin 40 milliGRAM(s) Oral at bedtime  fenofibrate Tablet 48 milliGRAM(s) Oral daily  metoprolol succinate  milliGRAM(s) Oral daily  famotidine    Tablet 20 milliGRAM(s) Oral daily  levothyroxine 75 MICROGram(s) Oral daily  multivitamin/minerals 1 Tablet(s) Oral daily  ALBUTerol/ipratropium for Nebulization 3 milliLiter(s) Nebulizer every 6 hours  senna 2 Tablet(s) Oral at bedtime  docusate sodium 100 milliGRAM(s) Oral three times a day  potassium chloride    Tablet ER 30 milliEquivalent(s) Oral two times a day  insulin lispro (HumaLOG) corrective regimen sliding scale   SubCutaneous three times a day before meals  insulin lispro (HumaLOG) corrective regimen sliding scale   SubCutaneous at bedtime  dextrose 5%. 1000 milliLiter(s) IV Continuous <Continuous>  dextrose Gel 1 Dose(s) Oral once PRN  dextrose 50% Injectable 12.5 Gram(s) IV Push once  dextrose 50% Injectable 25 Gram(s) IV Push once  dextrose 50% Injectable 25 Gram(s) IV Push once  glucagon  Injectable 1 milliGRAM(s) IntraMuscular once PRN  HYDROmorphone  Injectable 2 milliGRAM(s) IV Push every 4 hours PRN  HYDROmorphone  Injectable 1 milliGRAM(s) IV Push every 4 hours PRN  calcitonin Injectable 390 International Unit(s) IntraMuscular every 12 hours  levoFLOXacin  Tablet 500 milliGRAM(s) Oral every 24 hours  clopidogrel Tablet 75 milliGRAM(s) Oral daily  aspirin  chewable 81 milliGRAM(s) Oral daily  polyethylene glycol 3350 17 Gram(s) Oral two times a day  ceFAZolin   IVPB 1000 milliGRAM(s) IV Intermittent once      VITALS:  Vital Signs Last 24 Hrs  T(C): 37.2 (02 Aug 2017 05:00), Max: 37.2 (02 Aug 2017 05:00)  T(F): 98.9 (02 Aug 2017 05:00), Max: 98.9 (02 Aug 2017 05:00)  HR: 95 (02 Aug 2017 06:07) (46 - 95)  BP: 121/74 (02 Aug 2017 05:00) (103/57 - 121/74)  BP(mean): --  RR: 20 (02 Aug 2017 05:00) (18 - 20)  SpO2: 98% (01 Aug 2017 21:30) (97% - 98%) Daily     Daily   CAPILLARY BLOOD GLUCOSE  143 (01 Aug 2017 23:23)  129 (01 Aug 2017 16:56)        I&O's Summary    01 Aug 2017 07:01  -  02 Aug 2017 07:00  --------------------------------------------------------  IN: 360 mL / OUT: 690 mL / NET: -330 mL        LABS:                        8.8    17.7  )-----------( 397      ( 01 Aug 2017 08:04 )             28.7     08-02    139  |  106  |  19.0  ----------------------------<  125<H>  4.0   |  23.0  |  1.01    Ca    9.0      02 Aug 2017 08:21  Phos  2.6     08-02    TPro  4.9<L>  /  Alb  1.8<L>  /  TBili  0.4  /  DBili  x   /  AST  25  /  ALT  30  /  AlkPhos  158<H>  08-02    PT/INR - ( 02 Aug 2017 08:21 )   PT: 13.9 sec;   INR: 1.26 ratio         PTT - ( 02 Aug 2017 08:21 )  PTT:32.3 sec  RECENT CULTURES:      LIVER FUNCTIONS - ( 02 Aug 2017 08:21 )  Alb: 1.8 g/dL / Pro: 4.9 g/dL / ALK PHOS: 158 U/L / ALT: 30 U/L / AST: 25 U/L / GGT: x

## 2017-08-03 ENCOUNTER — APPOINTMENT (OUTPATIENT)
Dept: THORACIC SURGERY | Facility: HOSPITAL | Age: 72
End: 2017-08-03

## 2017-08-03 LAB
HCT VFR BLD CALC: 24.3 % — LOW (ref 42–52)
HCT VFR BLD CALC: 26.4 % — LOW (ref 42–52)
HGB BLD-MCNC: 7.7 G/DL — LOW (ref 14–18)
HGB BLD-MCNC: 8.2 G/DL — LOW (ref 14–18)
MCHC RBC-ENTMCNC: 27.2 PG — SIGNIFICANT CHANGE UP (ref 27–31)
MCHC RBC-ENTMCNC: 27.5 PG — SIGNIFICANT CHANGE UP (ref 27–31)
MCHC RBC-ENTMCNC: 31.1 G/DL — LOW (ref 32–36)
MCHC RBC-ENTMCNC: 31.7 G/DL — LOW (ref 32–36)
MCV RBC AUTO: 86.8 FL — SIGNIFICANT CHANGE UP (ref 80–94)
MCV RBC AUTO: 87.4 FL — SIGNIFICANT CHANGE UP (ref 80–94)
PLATELET # BLD AUTO: 303 K/UL — SIGNIFICANT CHANGE UP (ref 150–400)
PLATELET # BLD AUTO: 365 K/UL — SIGNIFICANT CHANGE UP (ref 150–400)
RBC # BLD: 2.8 M/UL — LOW (ref 4.6–6.2)
RBC # BLD: 3.02 M/UL — LOW (ref 4.6–6.2)
RBC # FLD: 16 % — HIGH (ref 11–15.6)
RBC # FLD: 16 % — HIGH (ref 11–15.6)
WBC # BLD: 13.6 K/UL — HIGH (ref 4.8–10.8)
WBC # BLD: 16.7 K/UL — HIGH (ref 4.8–10.8)
WBC # FLD AUTO: 13.6 K/UL — HIGH (ref 4.8–10.8)
WBC # FLD AUTO: 16.7 K/UL — HIGH (ref 4.8–10.8)

## 2017-08-03 PROCEDURE — 99233 SBSQ HOSP IP/OBS HIGH 50: CPT

## 2017-08-03 PROCEDURE — 71010: CPT | Mod: 26

## 2017-08-03 PROCEDURE — 32555 ASPIRATE PLEURA W/ IMAGING: CPT

## 2017-08-03 RX ORDER — FENTANYL CITRATE 50 UG/ML
25 INJECTION INTRAVENOUS ONCE
Qty: 0 | Refills: 0 | Status: DISCONTINUED | OUTPATIENT
Start: 2017-08-03 | End: 2017-08-03

## 2017-08-03 RX ORDER — SODIUM CHLORIDE 9 MG/ML
1000 INJECTION, SOLUTION INTRAVENOUS
Qty: 0 | Refills: 0 | Status: DISCONTINUED | OUTPATIENT
Start: 2017-08-03 | End: 2017-08-03

## 2017-08-03 RX ORDER — ONDANSETRON 8 MG/1
4 TABLET, FILM COATED ORAL ONCE
Qty: 0 | Refills: 0 | Status: DISCONTINUED | OUTPATIENT
Start: 2017-08-03 | End: 2017-08-03

## 2017-08-03 RX ADMIN — Medication 75 MICROGRAM(S): at 06:11

## 2017-08-03 RX ADMIN — CALCITONIN SALMON 390 INTERNATIONAL UNIT(S): 200 INJECTION, SOLUTION INTRAMUSCULAR at 06:11

## 2017-08-03 RX ADMIN — HYDROMORPHONE HYDROCHLORIDE 2 MILLIGRAM(S): 2 INJECTION INTRAMUSCULAR; INTRAVENOUS; SUBCUTANEOUS at 06:18

## 2017-08-03 RX ADMIN — AMIODARONE HYDROCHLORIDE 200 MILLIGRAM(S): 400 TABLET ORAL at 06:11

## 2017-08-03 RX ADMIN — Medication 30 MILLIEQUIVALENT(S): at 06:55

## 2017-08-03 RX ADMIN — Medication: at 22:10

## 2017-08-03 RX ADMIN — Medication 48 MILLIGRAM(S): at 22:09

## 2017-08-03 RX ADMIN — Medication 100 MILLIGRAM(S): at 22:09

## 2017-08-03 RX ADMIN — HYDROMORPHONE HYDROCHLORIDE 2 MILLIGRAM(S): 2 INJECTION INTRAMUSCULAR; INTRAVENOUS; SUBCUTANEOUS at 13:39

## 2017-08-03 RX ADMIN — ATORVASTATIN CALCIUM 40 MILLIGRAM(S): 80 TABLET, FILM COATED ORAL at 22:09

## 2017-08-03 RX ADMIN — HYDROMORPHONE HYDROCHLORIDE 2 MILLIGRAM(S): 2 INJECTION INTRAMUSCULAR; INTRAVENOUS; SUBCUTANEOUS at 17:38

## 2017-08-03 RX ADMIN — FAMOTIDINE 20 MILLIGRAM(S): 10 INJECTION INTRAVENOUS at 06:11

## 2017-08-03 RX ADMIN — HYDROMORPHONE HYDROCHLORIDE 2 MILLIGRAM(S): 2 INJECTION INTRAMUSCULAR; INTRAVENOUS; SUBCUTANEOUS at 22:51

## 2017-08-03 RX ADMIN — Medication 30 MILLIEQUIVALENT(S): at 17:28

## 2017-08-03 RX ADMIN — POLYETHYLENE GLYCOL 3350 17 GRAM(S): 17 POWDER, FOR SOLUTION ORAL at 06:55

## 2017-08-03 RX ADMIN — HYDROMORPHONE HYDROCHLORIDE 2 MILLIGRAM(S): 2 INJECTION INTRAMUSCULAR; INTRAVENOUS; SUBCUTANEOUS at 18:20

## 2017-08-03 RX ADMIN — HYDROMORPHONE HYDROCHLORIDE 2 MILLIGRAM(S): 2 INJECTION INTRAMUSCULAR; INTRAVENOUS; SUBCUTANEOUS at 03:15

## 2017-08-03 RX ADMIN — Medication 100 MILLIGRAM(S): at 06:11

## 2017-08-03 RX ADMIN — HYDROMORPHONE HYDROCHLORIDE 2 MILLIGRAM(S): 2 INJECTION INTRAMUSCULAR; INTRAVENOUS; SUBCUTANEOUS at 22:18

## 2017-08-03 RX ADMIN — HYDROMORPHONE HYDROCHLORIDE 2 MILLIGRAM(S): 2 INJECTION INTRAMUSCULAR; INTRAVENOUS; SUBCUTANEOUS at 06:21

## 2017-08-03 RX ADMIN — Medication 6: at 17:28

## 2017-08-03 RX ADMIN — SENNA PLUS 2 TABLET(S): 8.6 TABLET ORAL at 22:09

## 2017-08-03 RX ADMIN — CALCITONIN SALMON 390 INTERNATIONAL UNIT(S): 200 INJECTION, SOLUTION INTRAMUSCULAR at 17:31

## 2017-08-03 RX ADMIN — HYDROMORPHONE HYDROCHLORIDE 2 MILLIGRAM(S): 2 INJECTION INTRAMUSCULAR; INTRAVENOUS; SUBCUTANEOUS at 01:01

## 2017-08-03 RX ADMIN — HYDROMORPHONE HYDROCHLORIDE 2 MILLIGRAM(S): 2 INJECTION INTRAMUSCULAR; INTRAVENOUS; SUBCUTANEOUS at 13:54

## 2017-08-03 NOTE — BRIEF OPERATIVE NOTE - PROCEDURE
Thoracostomy, tube  08/03/2017  PLeurex tube placement aborted after advancement of guidwire was unsuccessfull  Active  ALFONSO

## 2017-08-03 NOTE — PROGRESS NOTE ADULT - SUBJECTIVE AND OBJECTIVE BOX
Patient: SULEIMAN GREGORY [id number] 71y Male                 Internal Medicine Hospitalist Progress Note - Dr. Craig Degroot    Chief Complaint: Patient is a 71y old  Male who presents with a chief complaint of "I was feeling tired, unwell at home." (20 Jul 2017 22:29)    HPI:  70 y/o male w/PMHx of CAD, s/p stent in June 2017, HTN, HLD, w/recent hx of LLL mass discovered here after he was treated here for hemoptysis and pleural effusion. However, he then had a PET scan which confirmed a LLL mass with areas of metastasis including left hilar lymphadenopathy, left pleural mets, left sacrum lesion. He was supposed to have a CT guided biopsy of the lung but he was found to be ill-appearing, with cough and a high white count, prompting an admission for pneumonia. He was started as an outpatient on levaquin but his white count remains high. On 7/24, started radiation therapy at [facility]. Hgb trended down to 7.6, and received 1u PRBC with appropriate response. Developed RUE swelling, US showed superficial thrombosis of right cephalic vein. Received RUE PICC on 7/25. Repeat CXR showing worsening left pleural effusion with possible loculation. CT chest showing pleural metastases with large left loculated pleural effusion. On 7/26, received left chest tube. Noted to have hypercalcemia, received pamidronate and calcitonin. On 7/28 had IR guided lung biopsy.  S/p 5 treatments of RT.  Noted to have significant Chest tube drainage.  Seen by CT surgery for replacement of chest tube with Pleurx catheter.      No complaints.  Per CT Surgery, only scant drainage via chest tube.  No SOB.  No additional complaints. Son at bedside.     ____________________PHYSICAL EXAM:  Vitals reviewed as indicated below  GENERAL:  NAD Alert and Oriented x 3   HEENT: NCAT  CARDIOVASCULAR:  S1, S2  LUNGS: coarse BS b/l. sl Decreased BS L base.   ABDOMEN:  soft, (-) tenderness, (-) distension, (+) bowel sounds, (-) guarding, (-) rebound (-) rigidity  EXTREMITIES:  no cyanosis / clubbing / edema.   ____________________      MEDICATIONS:  amiodarone    Tablet 200 milliGRAM(s) Oral daily  atorvastatin 40 milliGRAM(s) Oral at bedtime  fenofibrate Tablet 48 milliGRAM(s) Oral daily  metoprolol succinate  milliGRAM(s) Oral daily  famotidine    Tablet 20 milliGRAM(s) Oral daily  levothyroxine 75 MICROGram(s) Oral daily  multivitamin/minerals 1 Tablet(s) Oral daily  senna 2 Tablet(s) Oral at bedtime  docusate sodium 100 milliGRAM(s) Oral three times a day  potassium chloride    Tablet ER 30 milliEquivalent(s) Oral two times a day  insulin lispro (HumaLOG) corrective regimen sliding scale   SubCutaneous three times a day before meals  insulin lispro (HumaLOG) corrective regimen sliding scale   SubCutaneous at bedtime  dextrose 5%. 1000 milliLiter(s) IV Continuous <Continuous>  dextrose Gel 1 Dose(s) Oral once PRN  dextrose 50% Injectable 12.5 Gram(s) IV Push once  dextrose 50% Injectable 25 Gram(s) IV Push once  dextrose 50% Injectable 25 Gram(s) IV Push once  glucagon  Injectable 1 milliGRAM(s) IntraMuscular once PRN  HYDROmorphone  Injectable 2 milliGRAM(s) IV Push every 4 hours PRN  HYDROmorphone  Injectable 1 milliGRAM(s) IV Push every 4 hours PRN  calcitonin Injectable 390 International Unit(s) IntraMuscular every 12 hours  levoFLOXacin  Tablet 500 milliGRAM(s) Oral every 24 hours  clopidogrel Tablet 75 milliGRAM(s) Oral daily  aspirin  chewable 81 milliGRAM(s) Oral daily  polyethylene glycol 3350 17 Gram(s) Oral two times a day  ceFAZolin   IVPB 1000 milliGRAM(s) IV Intermittent once  ALBUTerol/ipratropium for Nebulization 3 milliLiter(s) Nebulizer every 6 hours PRN      VITALS:  Vital Signs Last 24 Hrs  T(C): 36.4 (03 Aug 2017 13:33), Max: 37.2 (03 Aug 2017 04:38)  T(F): 97.5 (03 Aug 2017 13:33), Max: 98.9 (03 Aug 2017 04:38)  HR: 71 (03 Aug 2017 13:33) (70 - 95)  BP: 98/57 (03 Aug 2017 13:33) (89/52 - 123/74)  BP(mean): --  RR: 14 (03 Aug 2017 13:33) (11 - 18)  SpO2: 96% (03 Aug 2017 12:40) (94% - 99%) Daily Height in cm: 182 (03 Aug 2017 08:39)    Daily   CAPILLARY BLOOD GLUCOSE  114 (03 Aug 2017 11:05)  124 (03 Aug 2017 08:00)  124 (02 Aug 2017 22:29)  153 (02 Aug 2017 16:00)        I&O's Summary    02 Aug 2017 07:01  -  03 Aug 2017 07:00  --------------------------------------------------------  IN: 720 mL / OUT: 1400 mL / NET: -680 mL        LABS:                        7.7    13.6  )-----------( 303      ( 03 Aug 2017 12:25 )             24.3     08-02    139  |  106  |  19.0  ----------------------------<  125<H>  4.0   |  23.0  |  1.01    Ca    9.0      02 Aug 2017 08:21  Phos  2.6     08-02    TPro  4.9<L>  /  Alb  1.8<L>  /  TBili  0.4  /  DBili  x   /  AST  25  /  ALT  30  /  AlkPhos  158<H>  08-02    PT/INR - ( 02 Aug 2017 08:21 )   PT: 13.9 sec;   INR: 1.26 ratio         PTT - ( 02 Aug 2017 08:21 )  PTT:32.3 sec  RECENT CULTURES:      LIVER FUNCTIONS - ( 02 Aug 2017 08:21 )  Alb: 1.8 g/dL / Pro: 4.9 g/dL / ALK PHOS: 158 U/L / ALT: 30 U/L / AST: 25 U/L / GGT: x

## 2017-08-03 NOTE — PROGRESS NOTE ADULT - ATTENDING COMMENTS
plan for [facility] d/w pt and son, in agreement.
Reviewed above note. D/w GI Garrett.  Patient admitted for PNA. C/w vanc and aztreonam. Awaiting for infection to improve prior to CT guided biopsy. D/w daughter at bedside.

## 2017-08-04 ENCOUNTER — TRANSCRIPTION ENCOUNTER (OUTPATIENT)
Age: 72
End: 2017-08-04

## 2017-08-04 LAB
ANION GAP SERPL CALC-SCNC: 11 MMOL/L — SIGNIFICANT CHANGE UP (ref 5–17)
BUN SERPL-MCNC: 21 MG/DL — HIGH (ref 8–20)
CALCIUM SERPL-MCNC: 9.8 MG/DL — SIGNIFICANT CHANGE UP (ref 8.6–10.2)
CHLORIDE SERPL-SCNC: 100 MMOL/L — SIGNIFICANT CHANGE UP (ref 98–107)
CO2 SERPL-SCNC: 24 MMOL/L — SIGNIFICANT CHANGE UP (ref 22–29)
CREAT SERPL-MCNC: 1.05 MG/DL — SIGNIFICANT CHANGE UP (ref 0.5–1.3)
GLUCOSE SERPL-MCNC: 105 MG/DL — SIGNIFICANT CHANGE UP (ref 70–115)
HCT VFR BLD CALC: 25.9 % — LOW (ref 42–52)
HGB BLD-MCNC: 7.9 G/DL — LOW (ref 14–18)
MCHC RBC-ENTMCNC: 27.1 PG — SIGNIFICANT CHANGE UP (ref 27–31)
MCHC RBC-ENTMCNC: 30.5 G/DL — LOW (ref 32–36)
MCV RBC AUTO: 89 FL — SIGNIFICANT CHANGE UP (ref 80–94)
PLATELET # BLD AUTO: 379 K/UL — SIGNIFICANT CHANGE UP (ref 150–400)
POTASSIUM SERPL-MCNC: 4.6 MMOL/L — SIGNIFICANT CHANGE UP (ref 3.5–5.3)
POTASSIUM SERPL-SCNC: 4.6 MMOL/L — SIGNIFICANT CHANGE UP (ref 3.5–5.3)
RBC # BLD: 2.91 M/UL — LOW (ref 4.6–6.2)
RBC # FLD: 15.5 % — SIGNIFICANT CHANGE UP (ref 11–15.6)
SODIUM SERPL-SCNC: 135 MMOL/L — SIGNIFICANT CHANGE UP (ref 135–145)
WBC # BLD: 22.2 K/UL — HIGH (ref 4.8–10.8)
WBC # FLD AUTO: 22.2 K/UL — HIGH (ref 4.8–10.8)

## 2017-08-04 PROCEDURE — 71010: CPT | Mod: 26,59

## 2017-08-04 PROCEDURE — 71020: CPT | Mod: 26

## 2017-08-04 PROCEDURE — 99233 SBSQ HOSP IP/OBS HIGH 50: CPT

## 2017-08-04 PROCEDURE — 99232 SBSQ HOSP IP/OBS MODERATE 35: CPT

## 2017-08-04 RX ORDER — OXYCODONE HYDROCHLORIDE 5 MG/1
5 TABLET ORAL EVERY 4 HOURS
Qty: 0 | Refills: 0 | Status: DISCONTINUED | OUTPATIENT
Start: 2017-08-04 | End: 2017-07-21

## 2017-08-04 RX ORDER — ZOLPIDEM TARTRATE 10 MG/1
5 TABLET ORAL AT BEDTIME
Qty: 0 | Refills: 0 | Status: DISCONTINUED | OUTPATIENT
Start: 2017-08-04 | End: 2017-07-21

## 2017-08-04 RX ORDER — OXYCODONE HYDROCHLORIDE 5 MG/1
10 TABLET ORAL EVERY 4 HOURS
Qty: 0 | Refills: 0 | Status: DISCONTINUED | OUTPATIENT
Start: 2017-08-04 | End: 2017-07-21

## 2017-08-04 RX ADMIN — Medication 2: at 17:05

## 2017-08-04 RX ADMIN — Medication 75 MICROGRAM(S): at 06:19

## 2017-08-04 RX ADMIN — AMIODARONE HYDROCHLORIDE 200 MILLIGRAM(S): 400 TABLET ORAL at 06:19

## 2017-08-04 RX ADMIN — CLOPIDOGREL BISULFATE 75 MILLIGRAM(S): 75 TABLET, FILM COATED ORAL at 12:24

## 2017-08-04 RX ADMIN — CALCITONIN SALMON 390 INTERNATIONAL UNIT(S): 200 INJECTION, SOLUTION INTRAMUSCULAR at 08:36

## 2017-08-04 RX ADMIN — Medication 100 MILLIGRAM(S): at 06:19

## 2017-08-04 RX ADMIN — CALCITONIN SALMON 390 INTERNATIONAL UNIT(S): 200 INJECTION, SOLUTION INTRAMUSCULAR at 22:08

## 2017-08-04 RX ADMIN — FAMOTIDINE 20 MILLIGRAM(S): 10 INJECTION INTRAVENOUS at 06:19

## 2017-08-04 RX ADMIN — Medication 81 MILLIGRAM(S): at 12:24

## 2017-08-04 RX ADMIN — OXYCODONE HYDROCHLORIDE 10 MILLIGRAM(S): 5 TABLET ORAL at 12:45

## 2017-08-04 RX ADMIN — Medication 30 MILLIEQUIVALENT(S): at 17:05

## 2017-08-04 RX ADMIN — OXYCODONE HYDROCHLORIDE 10 MILLIGRAM(S): 5 TABLET ORAL at 11:55

## 2017-08-04 RX ADMIN — Medication 30 MILLIEQUIVALENT(S): at 06:19

## 2017-08-04 RX ADMIN — Medication 48 MILLIGRAM(S): at 22:25

## 2017-08-04 RX ADMIN — Medication 100 MILLIGRAM(S): at 06:17

## 2017-08-04 RX ADMIN — Medication 2: at 12:24

## 2017-08-04 RX ADMIN — SENNA PLUS 2 TABLET(S): 8.6 TABLET ORAL at 22:23

## 2017-08-04 RX ADMIN — Medication 100 MILLIGRAM(S): at 17:05

## 2017-08-04 RX ADMIN — ATORVASTATIN CALCIUM 40 MILLIGRAM(S): 80 TABLET, FILM COATED ORAL at 22:23

## 2017-08-04 RX ADMIN — Medication 1 TABLET(S): at 17:05

## 2017-08-04 RX ADMIN — ZOLPIDEM TARTRATE 5 MILLIGRAM(S): 10 TABLET ORAL at 23:36

## 2017-08-04 RX ADMIN — Medication 100 MILLIGRAM(S): at 22:24

## 2017-08-04 RX ADMIN — POLYETHYLENE GLYCOL 3350 17 GRAM(S): 17 POWDER, FOR SOLUTION ORAL at 06:19

## 2017-08-04 NOTE — PROGRESS NOTE ADULT - SUBJECTIVE AND OBJECTIVE BOX
Patient: SULEIMAN GREGORY [id number] 71y Male                 Internal Medicine Hospitalist Progress Note - Dr. Craig Degroot    Chief Complaint: Patient is a 71y old  Male who presents with a chief complaint of "I was feeling tired, unwell at home." (20 Jul 2017 22:29)    HPI:  72 y/o male w/PMHx of CAD, s/p stent in June 2017, HTN, HLD, w/recent hx of LLL mass discovered here after he was treated here for hemoptysis and pleural effusion. However, he then had a PET scan which confirmed a LLL mass with areas of metastasis including left hilar lymphadenopathy, left pleural mets, left sacrum lesion. He was supposed to have a CT guided biopsy of the lung but he was found to be ill-appearing, with cough and a high white count, prompting an admission for pneumonia. He was started as an outpatient on levaquin but his white count remains high. On 7/24, started radiation therapy at [facility]. Hgb trended down to 7.6, and received 1u PRBC with appropriate response. Developed RUE swelling, US showed superficial thrombosis of right cephalic vein. Received RUE PICC on 7/25. Repeat CXR showing worsening left pleural effusion with possible loculation. CT chest showing pleural metastases with large left loculated pleural effusion. On 7/26, received left chest tube. Noted to have hypercalcemia, received pamidronate and calcitonin. On 7/28 had IR guided lung biopsy.  S/p 5 treatments of RT.  Noted to have significant Chest tube drainage.  Seen by CT surgery for replacement of chest tube with Pleurx catheter.  because of improvement in effusion, chest tube was discontinued.  On 8/4, WBC noted to increase to 22.  Seen by ID for followup, blood cultures were drawn.   Plan likely for d/c home vs [facility] - pt and family to decide.    No complaints.  Pain controlled.  No SOB.  Denies fever / chills.     ____________________PHYSICAL EXAM:  Vitals reviewed as indicated below  GENERAL:  NAD Alert and Oriented x 3   HEENT: NCAT  CARDIOVASCULAR:  S1, S2  LUNGS: coarse BS b/l. sl Decreased BS L base.   ABDOMEN:  soft, (-) tenderness, (-) distension, (+) bowel sounds, (-) guarding, (-) rebound (-) rigidity  EXTREMITIES:  no cyanosis / clubbing / edema.   ____________________      MEDICATIONS:  amiodarone    Tablet 200 milliGRAM(s) Oral daily  atorvastatin 40 milliGRAM(s) Oral at bedtime  fenofibrate Tablet 48 milliGRAM(s) Oral daily  metoprolol succinate  milliGRAM(s) Oral daily  famotidine    Tablet 20 milliGRAM(s) Oral daily  levothyroxine 75 MICROGram(s) Oral daily  multivitamin/minerals 1 Tablet(s) Oral daily  senna 2 Tablet(s) Oral at bedtime  docusate sodium 100 milliGRAM(s) Oral three times a day  potassium chloride    Tablet ER 30 milliEquivalent(s) Oral two times a day  insulin lispro (HumaLOG) corrective regimen sliding scale   SubCutaneous three times a day before meals  insulin lispro (HumaLOG) corrective regimen sliding scale   SubCutaneous at bedtime  dextrose 5%. 1000 milliLiter(s) IV Continuous <Continuous>  dextrose Gel 1 Dose(s) Oral once PRN  dextrose 50% Injectable 12.5 Gram(s) IV Push once  dextrose 50% Injectable 25 Gram(s) IV Push once  dextrose 50% Injectable 25 Gram(s) IV Push once  glucagon  Injectable 1 milliGRAM(s) IntraMuscular once PRN  calcitonin Injectable 390 International Unit(s) IntraMuscular every 12 hours  levoFLOXacin  Tablet 500 milliGRAM(s) Oral every 24 hours  clopidogrel Tablet 75 milliGRAM(s) Oral daily  aspirin  chewable 81 milliGRAM(s) Oral daily  ceFAZolin   IVPB 1000 milliGRAM(s) IV Intermittent once  ALBUTerol/ipratropium for Nebulization 3 milliLiter(s) Nebulizer every 6 hours PRN  oxyCODONE    IR 5 milliGRAM(s) Oral every 4 hours PRN  oxyCODONE    IR 10 milliGRAM(s) Oral every 4 hours PRN      VITALS:  Vital Signs Last 24 Hrs  T(C): 36.3 (04 Aug 2017 04:31), Max: 36.3 (03 Aug 2017 16:00)  T(F): 97.4 (04 Aug 2017 04:31), Max: 97.4 (04 Aug 2017 04:31)  HR: 76 (04 Aug 2017 04:31) (76 - 78)  BP: 107/67 (04 Aug 2017 04:31) (93/57 - 114/69)  BP(mean): --  RR: 18 (04 Aug 2017 04:31) (18 - 20)  SpO2: 95% (04 Aug 2017 04:31) (95% - 95%) Daily     Daily   CAPILLARY BLOOD GLUCOSE  124 (04 Aug 2017 08:34)  258 (03 Aug 2017 21:34)  272 (03 Aug 2017 17:20)        I&O's Summary    03 Aug 2017 07:01  -  04 Aug 2017 07:00  --------------------------------------------------------  IN: 0 mL / OUT: 1110 mL / NET: -1110 mL    04 Aug 2017 07:01  -  04 Aug 2017 14:44  --------------------------------------------------------  IN: 0 mL / OUT: 700 mL / NET: -700 mL        LABS:                        7.9    22.2  )-----------( 379      ( 04 Aug 2017 05:37 )             25.9     08-04    135  |  100  |  21.0<H>  ----------------------------<  105  4.6   |  24.0  |  1.05    Ca    9.8      04 Aug 2017 05:37        RECENT CULTURES:

## 2017-08-04 NOTE — PROGRESS NOTE ADULT - SUBJECTIVE AND OBJECTIVE BOX
[facility]  INFECTIOUS DISEASES AND [facility]IC[facility]  =======================================================  Bethel Bernard MD [facility]   Darryl Uribe MD  Diplomates American Board of Internal Medicine and Infectious Diseases  =======================================================    SULEIMAN GREGORY [id number]  chief complaint: follow up for Pneumonia    patient seen and examined in follow up.  Chart and labs reviewed.   Pleural fluid sent to pathology on 7/26/17 - negative malignant cells.   PT REMAINS AFEBRILE    FOR CHEST TUBE REMOVAL    =======================================================  Allergies:  macrolide antibiotics (Urticaria; Rash; Hives)  penicillin (Urticaria; Rash)  Zithromax Z-Christian (Urticaria; Rash; Hives)      =======================================================  Medications:  amiodarone    Tablet 200 milliGRAM(s) Oral daily  atorvastatin 40 milliGRAM(s) Oral at bedtime  fenofibrate Tablet 48 milliGRAM(s) Oral daily  metoprolol succinate  milliGRAM(s) Oral daily  famotidine    Tablet 20 milliGRAM(s) Oral daily  levothyroxine 75 MICROGram(s) Oral daily  multivitamin/minerals 1 Tablet(s) Oral daily  ALBUTerol/ipratropium for Nebulization 3 milliLiter(s) Nebulizer every 6 hours  senna 2 Tablet(s) Oral at bedtime  docusate sodium 100 milliGRAM(s) Oral three times a day  oxyCODONE    5 mG/acetaminophen 325 mG 2 Tablet(s) Oral every 4 hours PRN  potassium chloride    Tablet ER 30 milliEquivalent(s) Oral two times a day  insulin lispro (HumaLOG) corrective regimen sliding scale   SubCutaneous three times a day before meals  insulin lispro (HumaLOG) corrective regimen sliding scale   SubCutaneous at bedtime  dextrose 5%. 1000 milliLiter(s) IV Continuous <Continuous>  dextrose Gel 1 Dose(s) Oral once PRN  dextrose 50% Injectable 12.5 Gram(s) IV Push once  dextrose 50% Injectable 25 Gram(s) IV Push once  dextrose 50% Injectable 25 Gram(s) IV Push once  glucagon  Injectable 1 milliGRAM(s) IntraMuscular once PRN  sodium chloride 0.9%. 1000 milliLiter(s) IV Continuous <Continuous>  HYDROmorphone  Injectable 2 milliGRAM(s) IV Push every 4 hours PRN  HYDROmorphone  Injectable 1 milliGRAM(s) IV Push every 4 hours PRN  calcitonin Injectable 390 International Unit(s) IntraMuscular every 12 hours  levoFLOXacin  Tablet 500 milliGRAM(s) Oral every 24 hours       =======================================================        =======================================================     Physical Exam:   Vital Signs Last 24 Hrs  T(C): 36.3 (04 Aug 2017 04:31), Max: 36.4 (03 Aug 2017 12:25)  T(F): 97.4 (04 Aug 2017 04:31), Max: 97.5 (03 Aug 2017 12:25)  HR: 76 (04 Aug 2017 04:31) (70 - 78)  BP: 107/67 (04 Aug 2017 04:31) (89/52 - 114/69)  BP(mean): --  RR: 18 (04 Aug 2017 04:31) (11 - 20)  SpO2: 95% (04 Aug 2017 04:31) (94% - 98%)    GEN: NAD, pleasant  HEENT: normocephalic and atraumatic. EOMI. BONNIE.    NECK: Supple. No carotid bruits.  No lymphadenopathy or thyromegaly.  LUNGS: fair air entry anteriorly; left sided chest tube with bloody fluid.   HEART: Regular rate and rhythm without murmur.  ABDOMEN: Soft, DISTENDED , non tender Positive bowel sounds.    : No CVA tenderness  EXTREMITIES: Without any cyanosis, clubbing, rash, lesions.  TRACEL LE edema.  MSK: no joint swelling  NEUROLOGIC: Cranial nerves II through XII are grossly intact.  PSYCHIATRIC: Appropriate affect .  SKIN: No ulceration or induration present.    =======================================================    L                                                                7.9    22.2  )-----------( 379      ( 04 Aug 2017 05:37 )             25.9   08-04    135  |  100  |  21.0<H>  ----------------------------<  105  4.6   |  24.0  |  1.05    Ca    9.8      04 Aug 2017 05:37

## 2017-08-05 LAB
ANION GAP SERPL CALC-SCNC: 13 MMOL/L — SIGNIFICANT CHANGE UP (ref 5–17)
BUN SERPL-MCNC: 22 MG/DL — HIGH (ref 8–20)
CALCIUM SERPL-MCNC: 9.4 MG/DL — SIGNIFICANT CHANGE UP (ref 8.6–10.2)
CHLORIDE SERPL-SCNC: 101 MMOL/L — SIGNIFICANT CHANGE UP (ref 98–107)
CO2 SERPL-SCNC: 22 MMOL/L — SIGNIFICANT CHANGE UP (ref 22–29)
CREAT SERPL-MCNC: 1 MG/DL — SIGNIFICANT CHANGE UP (ref 0.5–1.3)
CULTURE RESULTS: SIGNIFICANT CHANGE UP
GLUCOSE SERPL-MCNC: 101 MG/DL — SIGNIFICANT CHANGE UP (ref 70–115)
HCT VFR BLD CALC: 26.7 % — LOW (ref 42–52)
HGB BLD-MCNC: 8.2 G/DL — LOW (ref 14–18)
MCHC RBC-ENTMCNC: 26.5 PG — LOW (ref 27–31)
MCHC RBC-ENTMCNC: 30.7 G/DL — LOW (ref 32–36)
MCV RBC AUTO: 86.4 FL — SIGNIFICANT CHANGE UP (ref 80–94)
PLATELET # BLD AUTO: 298 K/UL — SIGNIFICANT CHANGE UP (ref 150–400)
POTASSIUM SERPL-MCNC: 5 MMOL/L — SIGNIFICANT CHANGE UP (ref 3.5–5.3)
POTASSIUM SERPL-SCNC: 5 MMOL/L — SIGNIFICANT CHANGE UP (ref 3.5–5.3)
RBC # BLD: 3.09 M/UL — LOW (ref 4.6–6.2)
RBC # FLD: 16.2 % — HIGH (ref 11–15.6)
SODIUM SERPL-SCNC: 136 MMOL/L — SIGNIFICANT CHANGE UP (ref 135–145)
SPECIMEN SOURCE: SIGNIFICANT CHANGE UP
WBC # BLD: 13.3 K/UL — HIGH (ref 4.8–10.8)
WBC # FLD AUTO: 13.3 K/UL — HIGH (ref 4.8–10.8)

## 2017-08-05 PROCEDURE — 99233 SBSQ HOSP IP/OBS HIGH 50: CPT

## 2017-08-05 RX ORDER — POLYETHYLENE GLYCOL 3350 17 G/17G
17 POWDER, FOR SOLUTION ORAL
Qty: 0 | Refills: 0 | Status: DISCONTINUED | OUTPATIENT
Start: 2017-08-05 | End: 2017-07-21

## 2017-08-05 RX ADMIN — ZOLPIDEM TARTRATE 5 MILLIGRAM(S): 10 TABLET ORAL at 22:36

## 2017-08-05 RX ADMIN — Medication 75 MICROGRAM(S): at 06:42

## 2017-08-05 RX ADMIN — Medication 1 TABLET(S): at 17:38

## 2017-08-05 RX ADMIN — AMIODARONE HYDROCHLORIDE 200 MILLIGRAM(S): 400 TABLET ORAL at 06:41

## 2017-08-05 RX ADMIN — OXYCODONE HYDROCHLORIDE 10 MILLIGRAM(S): 5 TABLET ORAL at 13:00

## 2017-08-05 RX ADMIN — FAMOTIDINE 20 MILLIGRAM(S): 10 INJECTION INTRAVENOUS at 06:41

## 2017-08-05 RX ADMIN — OXYCODONE HYDROCHLORIDE 10 MILLIGRAM(S): 5 TABLET ORAL at 12:06

## 2017-08-05 RX ADMIN — Medication 30 MILLIEQUIVALENT(S): at 17:38

## 2017-08-05 RX ADMIN — Medication 2: at 17:37

## 2017-08-05 RX ADMIN — Medication 100 MILLIGRAM(S): at 06:42

## 2017-08-05 RX ADMIN — Medication 30 MILLIEQUIVALENT(S): at 06:43

## 2017-08-05 RX ADMIN — POLYETHYLENE GLYCOL 3350 17 GRAM(S): 17 POWDER, FOR SOLUTION ORAL at 17:38

## 2017-08-05 RX ADMIN — Medication 100 MILLIGRAM(S): at 06:43

## 2017-08-05 RX ADMIN — Medication 81 MILLIGRAM(S): at 12:07

## 2017-08-05 RX ADMIN — SENNA PLUS 2 TABLET(S): 8.6 TABLET ORAL at 22:36

## 2017-08-05 RX ADMIN — OXYCODONE HYDROCHLORIDE 10 MILLIGRAM(S): 5 TABLET ORAL at 18:29

## 2017-08-05 RX ADMIN — Medication 48 MILLIGRAM(S): at 22:36

## 2017-08-05 RX ADMIN — ATORVASTATIN CALCIUM 40 MILLIGRAM(S): 80 TABLET, FILM COATED ORAL at 22:36

## 2017-08-05 RX ADMIN — OXYCODONE HYDROCHLORIDE 10 MILLIGRAM(S): 5 TABLET ORAL at 17:36

## 2017-08-05 RX ADMIN — Medication 100 MILLIGRAM(S): at 22:36

## 2017-08-05 RX ADMIN — CLOPIDOGREL BISULFATE 75 MILLIGRAM(S): 75 TABLET, FILM COATED ORAL at 12:07

## 2017-08-05 NOTE — PROGRESS NOTE ADULT - SUBJECTIVE AND OBJECTIVE BOX
SULEIMAN GREGORY    [id number]    71y      Male    INTERVAL HPI/OVERNIGHT EVENTS: No events on. Daughter and son at bedside. Offers no complaints.    Hospital course:  70 y/o male w/PMHx of CAD, s/p stent in June 2017, HTN, HLD, w/recent hx of LLL mass discovered here after he was treated here for hemoptysis and pleural effusion. However, he then had a PET scan which confirmed a LLL mass with areas of metastasis including left hilar lymphadenopathy, left pleural mets, left sacrum lesion. He was supposed to have a CT guided biopsy of the lung but he was found to be ill-appearing, with cough and a high white count, prompting an admission for pneumonia. He was started as an outpatient on levaquin but his white count remains high. On 7/24, started radiation therapy at [facility]. Hgb trended down to 7.6, and received 1u PRBC with appropriate response. Developed RUE swelling, US showed superficial thrombosis of right cephalic vein. Received RUE PICC on 7/25. Repeat CXR showing worsening left pleural effusion with possible loculation. CT chest showing pleural metastases with large left loculated pleural effusion. On 7/26, received left chest tube. Noted to have hypercalcemia, received pamidronate and calcitonin. On 7/28 had IR guided lung biopsy.  S/p 5 treatments of RT.  Noted to have significant Chest tube drainage.  Seen by CT surgery for replacement of chest tube with Pleurx catheter.  Because of improvement in effusion, chest tube was discontinued.  On 8/4, WBC noted to increase to 22.  Seen by ID for followup, blood cultures were drawn. Plan likely for d/c home vs [facility] - pt and family to decide.      REVIEW OF SYSTEMS:    CONSTITUTIONAL: No fever  CARDIOVASCULAR: No chest pain, palpitations      Vital Signs Last 24 Hrs  T(C): 36.7 (05 Aug 2017 04:05), Max: 36.7 (04 Aug 2017 11:20)  T(F): 98.1 (05 Aug 2017 04:05), Max: 98.1 (05 Aug 2017 04:05)  HR: 86 (05 Aug 2017 04:05) (68 - 86)  BP: 108/70 (05 Aug 2017 06:19) (90/57 - 113/72)  BP(mean): --  RR: 18 (05 Aug 2017 04:05) (16 - 18)  SpO2: 98% (04 Aug 2017 22:12) (96% - 98%)    PHYSICAL EXAM:    GENERAL: NAD, frail, resting comfortably  HEENT: PERRL, +EOMI, MMM  CHEST/LUNG: No wheezing  HEART: S1S2+, Regular rate and rhythm   ABDOMEN: Soft, Nontender, Nondistended; Bowel sounds present  EXTREMITIES:  2+ Peripheral Pulses  LABS:                        8.2    13.3  )-----------( 298      ( 05 Aug 2017 07:55 )             26.7     08-05    136  |  101  |  22.0<H>  ----------------------------<  101  5.0   |  22.0  |  1.00    Ca    9.4      05 Aug 2017 07:55              MEDICATIONS  (STANDING):  amiodarone    Tablet 200 milliGRAM(s) Oral daily  atorvastatin 40 milliGRAM(s) Oral at bedtime  fenofibrate Tablet 48 milliGRAM(s) Oral daily  metoprolol succinate  milliGRAM(s) Oral daily  famotidine    Tablet 20 milliGRAM(s) Oral daily  levothyroxine 75 MICROGram(s) Oral daily  multivitamin/minerals 1 Tablet(s) Oral daily  senna 2 Tablet(s) Oral at bedtime  docusate sodium 100 milliGRAM(s) Oral three times a day  potassium chloride    Tablet ER 30 milliEquivalent(s) Oral two times a day  insulin lispro (HumaLOG) corrective regimen sliding scale   SubCutaneous three times a day before meals  insulin lispro (HumaLOG) corrective regimen sliding scale   SubCutaneous at bedtime  dextrose 5%. 1000 milliLiter(s) (50 mL/Hr) IV Continuous <Continuous>  dextrose 50% Injectable 12.5 Gram(s) IV Push once  dextrose 50% Injectable 25 Gram(s) IV Push once  dextrose 50% Injectable 25 Gram(s) IV Push once  calcitonin Injectable 390 International Unit(s) IntraMuscular every 12 hours  levoFLOXacin  Tablet 500 milliGRAM(s) Oral every 24 hours  clopidogrel Tablet 75 milliGRAM(s) Oral daily  aspirin  chewable 81 milliGRAM(s) Oral daily  ceFAZolin   IVPB 1000 milliGRAM(s) IV Intermittent once    MEDICATIONS  (PRN):  dextrose Gel 1 Dose(s) Oral once PRN Blood Glucose LESS THAN 70 milliGRAM(s)/deciliter  glucagon  Injectable 1 milliGRAM(s) IntraMuscular once PRN Glucose LESS THAN 70 milligrams/deciliter  ALBUTerol/ipratropium for Nebulization 3 milliLiter(s) Nebulizer every 6 hours PRN Shortness of Breath and/or Wheezing  oxyCODONE    IR 5 milliGRAM(s) Oral every 4 hours PRN mild - moderate pain  oxyCODONE    IR 10 milliGRAM(s) Oral every 4 hours PRN Severe Pain (7 - 10)  zolpidem 5 milliGRAM(s) Oral at bedtime PRN Insomnia      RADIOLOGY & ADDITIONAL TESTS:

## 2017-08-06 LAB
ANION GAP SERPL CALC-SCNC: 14 MMOL/L — SIGNIFICANT CHANGE UP (ref 5–17)
BUN SERPL-MCNC: 19 MG/DL — SIGNIFICANT CHANGE UP (ref 8–20)
CALCIUM SERPL-MCNC: 9.6 MG/DL — SIGNIFICANT CHANGE UP (ref 8.6–10.2)
CHLORIDE SERPL-SCNC: 98 MMOL/L — SIGNIFICANT CHANGE UP (ref 98–107)
CO2 SERPL-SCNC: 20 MMOL/L — LOW (ref 22–29)
CREAT SERPL-MCNC: 1.06 MG/DL — SIGNIFICANT CHANGE UP (ref 0.5–1.3)
GLUCOSE SERPL-MCNC: 139 MG/DL — HIGH (ref 70–115)
HCT VFR BLD CALC: 28.4 % — LOW (ref 42–52)
HGB BLD-MCNC: 9 G/DL — LOW (ref 14–18)
MCHC RBC-ENTMCNC: 27.1 PG — SIGNIFICANT CHANGE UP (ref 27–31)
MCHC RBC-ENTMCNC: 31.7 G/DL — LOW (ref 32–36)
MCV RBC AUTO: 85.5 FL — SIGNIFICANT CHANGE UP (ref 80–94)
PLATELET # BLD AUTO: 325 K/UL — SIGNIFICANT CHANGE UP (ref 150–400)
POTASSIUM SERPL-MCNC: 4.6 MMOL/L — SIGNIFICANT CHANGE UP (ref 3.5–5.3)
POTASSIUM SERPL-SCNC: 4.6 MMOL/L — SIGNIFICANT CHANGE UP (ref 3.5–5.3)
RBC # BLD: 3.32 M/UL — LOW (ref 4.6–6.2)
RBC # FLD: 16.3 % — HIGH (ref 11–15.6)
SODIUM SERPL-SCNC: 132 MMOL/L — LOW (ref 135–145)
WBC # BLD: 16.5 K/UL — HIGH (ref 4.8–10.8)
WBC # FLD AUTO: 16.5 K/UL — HIGH (ref 4.8–10.8)

## 2017-08-06 PROCEDURE — 99233 SBSQ HOSP IP/OBS HIGH 50: CPT

## 2017-08-06 RX ADMIN — POLYETHYLENE GLYCOL 3350 17 GRAM(S): 17 POWDER, FOR SOLUTION ORAL at 17:16

## 2017-08-06 RX ADMIN — Medication 48 MILLIGRAM(S): at 21:10

## 2017-08-06 RX ADMIN — Medication 2: at 17:16

## 2017-08-06 RX ADMIN — Medication 81 MILLIGRAM(S): at 11:46

## 2017-08-06 RX ADMIN — FAMOTIDINE 20 MILLIGRAM(S): 10 INJECTION INTRAVENOUS at 06:40

## 2017-08-06 RX ADMIN — Medication 30 MILLIEQUIVALENT(S): at 17:17

## 2017-08-06 RX ADMIN — OXYCODONE HYDROCHLORIDE 10 MILLIGRAM(S): 5 TABLET ORAL at 21:14

## 2017-08-06 RX ADMIN — OXYCODONE HYDROCHLORIDE 10 MILLIGRAM(S): 5 TABLET ORAL at 21:45

## 2017-08-06 RX ADMIN — CLOPIDOGREL BISULFATE 75 MILLIGRAM(S): 75 TABLET, FILM COATED ORAL at 11:46

## 2017-08-06 RX ADMIN — OXYCODONE HYDROCHLORIDE 10 MILLIGRAM(S): 5 TABLET ORAL at 12:20

## 2017-08-06 RX ADMIN — Medication 30 MILLIEQUIVALENT(S): at 06:40

## 2017-08-06 RX ADMIN — SENNA PLUS 2 TABLET(S): 8.6 TABLET ORAL at 21:11

## 2017-08-06 RX ADMIN — Medication 100 MILLIGRAM(S): at 06:40

## 2017-08-06 RX ADMIN — OXYCODONE HYDROCHLORIDE 10 MILLIGRAM(S): 5 TABLET ORAL at 11:46

## 2017-08-06 RX ADMIN — Medication 75 MICROGRAM(S): at 06:40

## 2017-08-06 RX ADMIN — AMIODARONE HYDROCHLORIDE 200 MILLIGRAM(S): 400 TABLET ORAL at 06:40

## 2017-08-06 RX ADMIN — ATORVASTATIN CALCIUM 40 MILLIGRAM(S): 80 TABLET, FILM COATED ORAL at 21:11

## 2017-08-06 RX ADMIN — Medication 1 TABLET(S): at 11:46

## 2017-08-06 NOTE — CHART NOTE - NSCHARTNOTEFT_GEN_A_CORE
70 y/o male w/ lung mass S/P IR lung bx 7/26 seen by thoracic service for effusion S//P pigtail  removal, also with failed pleurex cath placement (? no collection accessible)   Dr Foster agrees with discharge when medically stable, will no longer follow

## 2017-08-06 NOTE — PROGRESS NOTE ADULT - SUBJECTIVE AND OBJECTIVE BOX
SULEIMAN GREGORY    [id number]    71y      Male    INTERVAL HPI/OVERNIGHT EVENTS: No events on. Family at bedside. Pt sitting in chair. "When can I go home?"    Hospital course:  70 y/o male w/PMHx of CAD, s/p stent in June 2017, HTN, HLD, w/recent hx of LLL mass discovered here after he was treated here for hemoptysis and pleural effusion. However, he then had a PET scan which confirmed a LLL mass with areas of metastasis including left hilar lymphadenopathy, left pleural mets, left sacrum lesion. He was supposed to have a CT guided biopsy of the lung but he was found to be ill-appearing, with cough and a high white count, prompting an admission for pneumonia. He was started as an outpatient on levaquin but his white count remains high. On 7/24, started radiation therapy at [facility]. Hgb trended down to 7.6, and received 1u PRBC with appropriate response. Developed RUE swelling, US showed superficial thrombosis of right cephalic vein. Received RUE PICC on 7/25. Repeat CXR showing worsening left pleural effusion with possible loculation. CT chest showing pleural metastases with large left loculated pleural effusion. On 7/26, received left chest tube. Noted to have hypercalcemia, received pamidronate and calcitonin. On 7/28 had IR guided lung biopsy.  S/p 5 treatments of RT.  Noted to have significant Chest tube drainage.  Seen by CT surgery for replacement of chest tube with Pleurx catheter.  Because of improvement in effusion, chest tube was discontinued.  On 8/4, WBC noted to increase to 22.  Seen by ID for followup, blood cultures were drawn. Plan likely for d/c home vs [facility] - pt and family to decide.    REVIEW OF SYSTEMS:    CONSTITUTIONAL: No fever  RESPIRATORY: No cough, wheezing, hemoptysis; No shortness of breath  CARDIOVASCULAR: No chest pain, palpitations       Vital Signs Last 24 Hrs  T(C): 37.2 (06 Aug 2017 08:06), Max: 37.2 (05 Aug 2017 22:25)  T(F): 98.9 (06 Aug 2017 08:06), Max: 98.9 (05 Aug 2017 22:25)  HR: 81 (06 Aug 2017 08:06) (72 - 85)  BP: 104/84 (06 Aug 2017 08:06) (102/64 - 107/61)  BP(mean): --  RR: 14 (06 Aug 2017 08:06) (14 - 18)  SpO2: 94% (06 Aug 2017 08:06) (94% - 99%) RA    PHYSICAL EXAM:    GENERAL: NAD, frail  HEENT: PERRL, +EOMI, MMM  CHEST/LUNG: Clear to percussion bilaterally   HEART: S1S2+, Regular rate and rhythm   ABDOMEN: Soft, Nontender, Nondistended; Bowel sounds present   d      LABS:                        8.2    13.3  )-----------( 298      ( 05 Aug 2017 07:55 )             26.7     08-05    136  |  101  |  22.0<H>  ----------------------------<  101  5.0   |  22.0  |  1.00    Ca    9.4      05 Aug 2017 07:55              MEDICATIONS  (STANDING):  amiodarone    Tablet 200 milliGRAM(s) Oral daily  atorvastatin 40 milliGRAM(s) Oral at bedtime  fenofibrate Tablet 48 milliGRAM(s) Oral daily  metoprolol succinate  milliGRAM(s) Oral daily  famotidine    Tablet 20 milliGRAM(s) Oral daily  levothyroxine 75 MICROGram(s) Oral daily  multivitamin/minerals 1 Tablet(s) Oral daily  senna 2 Tablet(s) Oral at bedtime  docusate sodium 100 milliGRAM(s) Oral three times a day  potassium chloride    Tablet ER 30 milliEquivalent(s) Oral two times a day  insulin lispro (HumaLOG) corrective regimen sliding scale   SubCutaneous three times a day before meals  insulin lispro (HumaLOG) corrective regimen sliding scale   SubCutaneous at bedtime  dextrose 5%. 1000 milliLiter(s) (50 mL/Hr) IV Continuous <Continuous>  dextrose 50% Injectable 12.5 Gram(s) IV Push once  dextrose 50% Injectable 25 Gram(s) IV Push once  dextrose 50% Injectable 25 Gram(s) IV Push once  levoFLOXacin  Tablet 500 milliGRAM(s) Oral every 24 hours  clopidogrel Tablet 75 milliGRAM(s) Oral daily  aspirin  chewable 81 milliGRAM(s) Oral daily  ceFAZolin   IVPB 1000 milliGRAM(s) IV Intermittent once  polyethylene glycol 3350 17 Gram(s) Oral two times a day    MEDICATIONS  (PRN):  dextrose Gel 1 Dose(s) Oral once PRN Blood Glucose LESS THAN 70 milliGRAM(s)/deciliter  glucagon  Injectable 1 milliGRAM(s) IntraMuscular once PRN Glucose LESS THAN 70 milligrams/deciliter  ALBUTerol/ipratropium for Nebulization 3 milliLiter(s) Nebulizer every 6 hours PRN Shortness of Breath and/or Wheezing  oxyCODONE    IR 5 milliGRAM(s) Oral every 4 hours PRN mild - moderate pain  oxyCODONE    IR 10 milliGRAM(s) Oral every 4 hours PRN Severe Pain (7 - 10)  zolpidem 5 milliGRAM(s) Oral at bedtime PRN Insomnia      RADIOLOGY & ADDITIONAL TESTS:

## 2017-08-07 LAB — SURGICAL PATHOLOGY FINAL REPORT - CH: SIGNIFICANT CHANGE UP

## 2017-08-07 PROCEDURE — 99232 SBSQ HOSP IP/OBS MODERATE 35: CPT

## 2017-08-07 PROCEDURE — 99233 SBSQ HOSP IP/OBS HIGH 50: CPT

## 2017-08-07 RX ADMIN — OXYCODONE HYDROCHLORIDE 10 MILLIGRAM(S): 5 TABLET ORAL at 12:07

## 2017-08-07 RX ADMIN — OXYCODONE HYDROCHLORIDE 10 MILLIGRAM(S): 5 TABLET ORAL at 11:39

## 2017-08-07 RX ADMIN — Medication 30 MILLIEQUIVALENT(S): at 05:26

## 2017-08-07 RX ADMIN — OXYCODONE HYDROCHLORIDE 10 MILLIGRAM(S): 5 TABLET ORAL at 05:33

## 2017-08-07 RX ADMIN — Medication 48 MILLIGRAM(S): at 21:28

## 2017-08-07 RX ADMIN — Medication 100 MILLIGRAM(S): at 05:29

## 2017-08-07 RX ADMIN — OXYCODONE HYDROCHLORIDE 10 MILLIGRAM(S): 5 TABLET ORAL at 06:00

## 2017-08-07 RX ADMIN — Medication 75 MICROGRAM(S): at 05:27

## 2017-08-07 RX ADMIN — CLOPIDOGREL BISULFATE 75 MILLIGRAM(S): 75 TABLET, FILM COATED ORAL at 11:38

## 2017-08-07 RX ADMIN — ATORVASTATIN CALCIUM 40 MILLIGRAM(S): 80 TABLET, FILM COATED ORAL at 21:28

## 2017-08-07 RX ADMIN — AMIODARONE HYDROCHLORIDE 200 MILLIGRAM(S): 400 TABLET ORAL at 05:27

## 2017-08-07 RX ADMIN — FAMOTIDINE 20 MILLIGRAM(S): 10 INJECTION INTRAVENOUS at 05:27

## 2017-08-07 RX ADMIN — Medication 1 TABLET(S): at 11:38

## 2017-08-07 RX ADMIN — Medication 100 MILLIGRAM(S): at 11:38

## 2017-08-07 RX ADMIN — OXYCODONE HYDROCHLORIDE 10 MILLIGRAM(S): 5 TABLET ORAL at 22:11

## 2017-08-07 RX ADMIN — Medication 81 MILLIGRAM(S): at 11:38

## 2017-08-07 RX ADMIN — OXYCODONE HYDROCHLORIDE 10 MILLIGRAM(S): 5 TABLET ORAL at 21:37

## 2017-08-07 RX ADMIN — Medication 2: at 17:00

## 2017-08-07 NOTE — PROGRESS NOTE ADULT - SUBJECTIVE AND OBJECTIVE BOX
SULEIMAN GREGORY    [id number]    71y      Male    INTERVAL HPI/OVERNIGHT EVENTS: No events on. Son at bedside. Currently wants to be discharged. Awaiting auth.     Hospital course:  70 y/o male w/PMHx of CAD, s/p stent in June 2017, HTN, HLD, w/recent hx of LLL mass discovered here after he was treated here for hemoptysis and pleural effusion. However, he then had a PET scan which confirmed a LLL mass with areas of metastasis including left hilar lymphadenopathy, left pleural mets, left sacrum lesion. He was supposed to have a CT guided biopsy of the lung but he was found to be ill-appearing, with cough and a high white count, prompting an admission for pneumonia. He was started as an outpatient on levaquin but his white count remains high. On 7/24, started radiation therapy at [facility]. Hgb trended down to 7.6, and received 1u PRBC with appropriate response. Developed RUE swelling, US showed superficial thrombosis of right cephalic vein. Received RUE PICC on 7/25. Repeat CXR showing worsening left pleural effusion with possible loculation. CT chest showing pleural metastases with large left loculated pleural effusion. On 7/26, received left chest tube. Noted to have hypercalcemia, received pamidronate and calcitonin. On 7/28 had IR guided lung biopsy.  S/p 5 treatments of RT.  Noted to have significant Chest tube drainage.  Seen by CT surgery for replacement of chest tube with Pleurx catheter.  Because of improvement in effusion, chest tube was discontinued.  On 8/4, WBC noted to increase to 22.  Seen by ID for followup, blood cultures were drawn. Plan likely for d/c [facility].       REVIEW OF SYSTEMS:    CONSTITUTIONAL: No fever   CARDIOVASCULAR: No chest pain, palpitations    Vital Signs Last 24 Hrs  T(C): 36.7 (07 Aug 2017 08:35), Max: 36.9 (06 Aug 2017 17:20)  T(F): 98 (07 Aug 2017 08:35), Max: 98.4 (06 Aug 2017 17:20)  HR: 88 (07 Aug 2017 08:35) (78 - 90)  BP: 109/70 (07 Aug 2017 08:35) (100/61 - 109/70)  BP(mean): --  RR: 20 (07 Aug 2017 08:35) (16 - 20)  SpO2: 96% (07 Aug 2017 05:22) (96% - 97%)    PHYSICAL EXAM:    GENERAL: NAD, frail, comfortable  HEENT: PERRL, +EOMI, MMM  CHEST/LUNG: Clear to percussion bilaterally   HEART: S1S2+, Regular rate and rhythm   ABDOMEN: Soft, Nontender, Nondistended; Bowel sounds present         LABS:                        9.0    16.5  )-----------( 325      ( 06 Aug 2017 12:04 )             28.4     08-06    132<L>  |  98  |  19.0  ----------------------------<  139<H>  4.6   |  20.0<L>  |  1.06    Ca    9.6      06 Aug 2017 12:04              MEDICATIONS  (STANDING):  amiodarone    Tablet 200 milliGRAM(s) Oral daily  atorvastatin 40 milliGRAM(s) Oral at bedtime  fenofibrate Tablet 48 milliGRAM(s) Oral daily  metoprolol succinate  milliGRAM(s) Oral daily  famotidine    Tablet 20 milliGRAM(s) Oral daily  levothyroxine 75 MICROGram(s) Oral daily  multivitamin/minerals 1 Tablet(s) Oral daily  senna 2 Tablet(s) Oral at bedtime  docusate sodium 100 milliGRAM(s) Oral three times a day  insulin lispro (HumaLOG) corrective regimen sliding scale   SubCutaneous three times a day before meals  insulin lispro (HumaLOG) corrective regimen sliding scale   SubCutaneous at bedtime  dextrose 5%. 1000 milliLiter(s) (50 mL/Hr) IV Continuous <Continuous>  dextrose 50% Injectable 12.5 Gram(s) IV Push once  dextrose 50% Injectable 25 Gram(s) IV Push once  dextrose 50% Injectable 25 Gram(s) IV Push once  levoFLOXacin  Tablet 500 milliGRAM(s) Oral every 24 hours  clopidogrel Tablet 75 milliGRAM(s) Oral daily  aspirin  chewable 81 milliGRAM(s) Oral daily  ceFAZolin   IVPB 1000 milliGRAM(s) IV Intermittent once  polyethylene glycol 3350 17 Gram(s) Oral two times a day    MEDICATIONS  (PRN):  dextrose Gel 1 Dose(s) Oral once PRN Blood Glucose LESS THAN 70 milliGRAM(s)/deciliter  glucagon  Injectable 1 milliGRAM(s) IntraMuscular once PRN Glucose LESS THAN 70 milligrams/deciliter  ALBUTerol/ipratropium for Nebulization 3 milliLiter(s) Nebulizer every 6 hours PRN Shortness of Breath and/or Wheezing  oxyCODONE    IR 5 milliGRAM(s) Oral every 4 hours PRN mild - moderate pain  oxyCODONE    IR 10 milliGRAM(s) Oral every 4 hours PRN Severe Pain (7 - 10)  zolpidem 5 milliGRAM(s) Oral at bedtime PRN Insomnia      RADIOLOGY & ADDITIONAL TESTS:

## 2017-08-07 NOTE — PROGRESS NOTE ADULT - SUBJECTIVE AND OBJECTIVE BOX
HPI:   is a 72 y/o male w/ a recently diagnosed 6 cm left lower lobe infrahilar mass which extends into the left hilum.  He is s/p cardiac stent in June 2017.  Patient presented with hemoptysis and pleural effusion. A PET scan confirmed LLL mass with left hilar lymphadenopathy, pleural effusion and possible osseous metatastases.  He underwent a CT guided biopsy of the lung 7/28 followingf a/w pneumonia. He is currently a non-smoker, quit ~20 years ago, with a 30 PY history.  He was recently stented last month for CAD and is on asa/statin/plavix/and toprol. He presents in acute renal failure.       PAST MEDICAL & SURGICAL HISTORY:  Psoriasis    PSVT (paroxysmal supraventricular tachycardia)  Chronic systolic CHF (congestive heart failure), NYHA class 2  Former smoker  Hypokinesis: apical  Mitral regurgitation  Bronchitis  Hypertension  PSVT (paroxysmal supraventricular tachycardia)  Ischemic cardiomyopathy  AICD (automatic cardioverter/defibrillator) present: 2010 [employer]  VT (ventricular tachycardia): May 2017 no ICD shock  Vitamin D deficiency  Tricuspid regurgitation  RBBB  Prostate cancer: s/p surgery no RT/CT  Mitral regurgitation  Hypothyroidism  HLD (hyperlipidemia)  Arrhythmia  Angina pectoris  CAD (coronary artery disease)  History of bronchoscopy: 2017  History of prostate surgery: davinci surgery in 2008  Cardiac disorder: triple bypass may 1997 [facility]  History of electrophysiologic study: 2009 positive study (AICD placement  2010)  H/O cardiac catheterization: stenting of SVG TO PDA IN 2010 2017 one stent      MEDICATIONS  (STANDING):  amiodarone    Tablet 200 milliGRAM(s) Oral daily  atorvastatin 40 milliGRAM(s) Oral at bedtime  fenofibrate Tablet 48 milliGRAM(s) Oral daily  metoprolol succinate  milliGRAM(s) Oral daily  famotidine    Tablet 20 milliGRAM(s) Oral daily  levothyroxine 75 MICROGram(s) Oral daily  multivitamin/minerals 1 Tablet(s) Oral daily  senna 2 Tablet(s) Oral at bedtime  docusate sodium 100 milliGRAM(s) Oral three times a day  insulin lispro (HumaLOG) corrective regimen sliding scale   SubCutaneous three times a day before meals  insulin lispro (HumaLOG) corrective regimen sliding scale   SubCutaneous at bedtime  dextrose 5%. 1000 milliLiter(s) (50 mL/Hr) IV Continuous <Continuous>  dextrose 50% Injectable 12.5 Gram(s) IV Push once  dextrose 50% Injectable 25 Gram(s) IV Push once  dextrose 50% Injectable 25 Gram(s) IV Push once  levoFLOXacin  Tablet 500 milliGRAM(s) Oral every 24 hours  clopidogrel Tablet 75 milliGRAM(s) Oral daily  aspirin  chewable 81 milliGRAM(s) Oral daily  ceFAZolin   IVPB 1000 milliGRAM(s) IV Intermittent once  polyethylene glycol 3350 17 Gram(s) Oral two times a day    MEDICATIONS  (PRN):  dextrose Gel 1 Dose(s) Oral once PRN Blood Glucose LESS THAN 70 milliGRAM(s)/deciliter  glucagon  Injectable 1 milliGRAM(s) IntraMuscular once PRN Glucose LESS THAN 70 milligrams/deciliter  ALBUTerol/ipratropium for Nebulization 3 milliLiter(s) Nebulizer every 6 hours PRN Shortness of Breath and/or Wheezing  oxyCODONE    IR 5 milliGRAM(s) Oral every 4 hours PRN mild - moderate pain  oxyCODONE    IR 10 milliGRAM(s) Oral every 4 hours PRN Severe Pain (7 - 10)  zolpidem 5 milliGRAM(s) Oral at bedtime PRN Insomnia      Allergies    macrolide antibiotics (Urticaria; Rash; Hives)  penicillin (Urticaria; Rash)  Zithromax Z-Christian (Urticaria; Rash; Hives)    Intolerances        FAMILY HISTORY:  Family history of heart disease (Sibling)  Family history of diabetes mellitus (Sibling)  Family history of lung cancer (Sibling)  Family history of prostate cancer (Sibling)      Review of Systems    Constitutional, Eyes, ENT, Cardiovascular, Respiratory, Gastrointestinal, Genitourinary, Musculoskeletal, Integumentary, Neurological, Psychiatric, Endocrine, Heme/Lymph and Allergic/Immunologic review of systems are otherwise negative except as noted in HPI.     Vital Signs Last 24 Hrs  T(C): 36.7 (07 Aug 2017 21:33), Max: 36.8 (07 Aug 2017 05:22)  T(F): 98.1 (07 Aug 2017 21:33), Max: 98.2 (07 Aug 2017 05:22)  HR: 80 (07 Aug 2017 21:33) (79 - 90)  BP: 106/60 (07 Aug 2017 21:33) (99/66 - 109/70)  BP(mean): --  RR: 20 (07 Aug 2017 21:33) (20 - 20)  SpO2: 96% (07 Aug 2017 21:33) (96% - 96%)    Physical Exam  Constitutional: well developed, well nourished, in no acute distress and thin.   Eyes: PERRL, EOMI, no conjunctival infection, anicteric.   ENT: pharynx is unremarkable, moist mucus membrane, no oral lesions.   Neck: supple without JVD, no thyromegaly or masses appreciated.   Pulmonary: clear to auscultation bilaterally, no dullness, no wheezing.   Cardiac: RRR, normal S1S2, no murmurs, rubs, gallops.   Vascular: no JVD, no calf tenderness, venous stasis changes, varices.   Abdomen: normoactive bowel sounds, soft and nontender, no hepatosplenomegaly or masses appreciated.   Lymphatic: no peripheral adenopathy appreciated.   Musculoskeletal: full range of motion and no deformities appreciated.   Skin: normal appearance, no rash, nodules, vesicles, ulcers, erythema.   Neurology: grossly intact.   Psychiatric: affect appropriate.       LABS:  CBC Full  -  ( 06 Aug 2017 12:04 )  WBC Count : 16.5 K/uL  Hemoglobin : 9.0 g/dL  Hematocrit : 28.4 %  Platelet Count - Automated : 325 K/uL  Mean Cell Volume : 85.5 fl  Mean Cell Hemoglobin : 27.1 pg  Mean Cell Hemoglobin Concentration : 31.7 g/dL  Auto Neutrophil # : x  Auto Lymphocyte # : x  Auto Monocyte # : x  Auto Eosinophil # : x  Auto Basophil # : x  Auto Neutrophil % : x  Auto Lymphocyte % : x  Auto Monocyte % : x  Auto Eosinophil % : x  Auto Basophil % : x    08-06    132<L>  |  98  |  19.0  ----------------------------<  139<H>  4.6   |  20.0<L>  |  1.06    Ca    9.6      06 Aug 2017 12:04            RADIOLOGY & ADDITIONAL STUDIES:

## 2017-08-08 ENCOUNTER — TRANSCRIPTION ENCOUNTER (OUTPATIENT)
Age: 72
End: 2017-08-08

## 2017-08-08 VITALS — WEIGHT: 175.27 LBS

## 2017-08-08 LAB
ANION GAP SERPL CALC-SCNC: 13 MMOL/L — SIGNIFICANT CHANGE UP (ref 5–17)
BUN SERPL-MCNC: 19 MG/DL — SIGNIFICANT CHANGE UP (ref 8–20)
CALCIUM SERPL-MCNC: 9.4 MG/DL — SIGNIFICANT CHANGE UP (ref 8.6–10.2)
CHLORIDE SERPL-SCNC: 95 MMOL/L — LOW (ref 98–107)
CO2 SERPL-SCNC: 22 MMOL/L — SIGNIFICANT CHANGE UP (ref 22–29)
CREAT SERPL-MCNC: 1.06 MG/DL — SIGNIFICANT CHANGE UP (ref 0.5–1.3)
GLUCOSE SERPL-MCNC: 186 MG/DL — HIGH (ref 70–115)
MAGNESIUM SERPL-MCNC: 1.8 MG/DL — SIGNIFICANT CHANGE UP (ref 1.6–2.6)
POTASSIUM SERPL-MCNC: 4 MMOL/L — SIGNIFICANT CHANGE UP (ref 3.5–5.3)
POTASSIUM SERPL-SCNC: 4 MMOL/L — SIGNIFICANT CHANGE UP (ref 3.5–5.3)
SODIUM SERPL-SCNC: 130 MMOL/L — LOW (ref 135–145)

## 2017-08-08 PROCEDURE — 85027 COMPLETE CBC AUTOMATED: CPT

## 2017-08-08 PROCEDURE — 86850 RBC ANTIBODY SCREEN: CPT

## 2017-08-08 PROCEDURE — 83735 ASSAY OF MAGNESIUM: CPT

## 2017-08-08 PROCEDURE — 74020: CPT

## 2017-08-08 PROCEDURE — P9016: CPT

## 2017-08-08 PROCEDURE — 83970 ASSAY OF PARATHORMONE: CPT

## 2017-08-08 PROCEDURE — 80048 BASIC METABOLIC PNL TOTAL CA: CPT

## 2017-08-08 PROCEDURE — 88112 CYTOPATH CELL ENHANCE TECH: CPT

## 2017-08-08 PROCEDURE — 86920 COMPATIBILITY TEST SPIN: CPT

## 2017-08-08 PROCEDURE — 80053 COMPREHEN METABOLIC PANEL: CPT

## 2017-08-08 PROCEDURE — 71045 X-RAY EXAM CHEST 1 VIEW: CPT

## 2017-08-08 PROCEDURE — 87899 AGENT NOS ASSAY W/OPTIC: CPT

## 2017-08-08 PROCEDURE — 88342 IMHCHEM/IMCYTCHM 1ST ANTB: CPT

## 2017-08-08 PROCEDURE — 82945 GLUCOSE OTHER FLUID: CPT

## 2017-08-08 PROCEDURE — 87075 CULTR BACTERIA EXCEPT BLOOD: CPT

## 2017-08-08 PROCEDURE — 85730 THROMBOPLASTIN TIME PARTIAL: CPT

## 2017-08-08 PROCEDURE — 85610 PROTHROMBIN TIME: CPT

## 2017-08-08 PROCEDURE — 87116 MYCOBACTERIA CULTURE: CPT

## 2017-08-08 PROCEDURE — 93005 ELECTROCARDIOGRAM TRACING: CPT

## 2017-08-08 PROCEDURE — 84145 PROCALCITONIN (PCT): CPT

## 2017-08-08 PROCEDURE — 82330 ASSAY OF CALCIUM: CPT

## 2017-08-08 PROCEDURE — 84100 ASSAY OF PHOSPHORUS: CPT

## 2017-08-08 PROCEDURE — 87102 FUNGUS ISOLATION CULTURE: CPT

## 2017-08-08 PROCEDURE — 86901 BLOOD TYPING SEROLOGIC RH(D): CPT

## 2017-08-08 PROCEDURE — 86900 BLOOD TYPING SEROLOGIC ABO: CPT

## 2017-08-08 PROCEDURE — 36415 COLL VENOUS BLD VENIPUNCTURE: CPT

## 2017-08-08 PROCEDURE — 36430 TRANSFUSION BLD/BLD COMPNT: CPT

## 2017-08-08 PROCEDURE — 77012 CT SCAN FOR NEEDLE BIOPSY: CPT

## 2017-08-08 PROCEDURE — 71046 X-RAY EXAM CHEST 2 VIEWS: CPT

## 2017-08-08 PROCEDURE — 81003 URINALYSIS AUTO W/O SCOPE: CPT

## 2017-08-08 PROCEDURE — 84300 ASSAY OF URINE SODIUM: CPT

## 2017-08-08 PROCEDURE — 97530 THERAPEUTIC ACTIVITIES: CPT

## 2017-08-08 PROCEDURE — 84443 ASSAY THYROID STIM HORMONE: CPT

## 2017-08-08 PROCEDURE — 87205 SMEAR GRAM STAIN: CPT

## 2017-08-08 PROCEDURE — 97110 THERAPEUTIC EXERCISES: CPT

## 2017-08-08 PROCEDURE — 99285 EMERGENCY DEPT VISIT HI MDM: CPT | Mod: 25

## 2017-08-08 PROCEDURE — 83986 ASSAY PH BODY FLUID NOS: CPT

## 2017-08-08 PROCEDURE — 87070 CULTURE OTHR SPECIMN AEROBIC: CPT

## 2017-08-08 PROCEDURE — 83615 LACTATE (LD) (LDH) ENZYME: CPT

## 2017-08-08 PROCEDURE — 87186 SC STD MICRODIL/AGAR DIL: CPT

## 2017-08-08 PROCEDURE — 86140 C-REACTIVE PROTEIN: CPT

## 2017-08-08 PROCEDURE — 88341 IMHCHEM/IMCYTCHM EA ADD ANTB: CPT

## 2017-08-08 PROCEDURE — 82310 ASSAY OF CALCIUM: CPT

## 2017-08-08 PROCEDURE — 83519 RIA NONANTIBODY: CPT

## 2017-08-08 PROCEDURE — 99239 HOSP IP/OBS DSCHRG MGMT >30: CPT

## 2017-08-08 PROCEDURE — 83605 ASSAY OF LACTIC ACID: CPT

## 2017-08-08 PROCEDURE — 97163 PT EVAL HIGH COMPLEX 45 MIN: CPT

## 2017-08-08 PROCEDURE — 87449 NOS EACH ORGANISM AG IA: CPT

## 2017-08-08 PROCEDURE — 87040 BLOOD CULTURE FOR BACTERIA: CPT

## 2017-08-08 PROCEDURE — 71250 CT THORAX DX C-: CPT

## 2017-08-08 PROCEDURE — 87015 SPECIMEN INFECT AGNT CONCNTJ: CPT

## 2017-08-08 PROCEDURE — 87206 SMEAR FLUORESCENT/ACID STAI: CPT

## 2017-08-08 PROCEDURE — 80202 ASSAY OF VANCOMYCIN: CPT

## 2017-08-08 PROCEDURE — 89051 BODY FLUID CELL COUNT: CPT

## 2017-08-08 PROCEDURE — 88305 TISSUE EXAM BY PATHOLOGIST: CPT

## 2017-08-08 PROCEDURE — 94760 N-INVAS EAR/PLS OXIMETRY 1: CPT

## 2017-08-08 PROCEDURE — 97116 GAIT TRAINING THERAPY: CPT

## 2017-08-08 PROCEDURE — 99232 SBSQ HOSP IP/OBS MODERATE 35: CPT

## 2017-08-08 PROCEDURE — 94640 AIRWAY INHALATION TREATMENT: CPT

## 2017-08-08 PROCEDURE — 93971 EXTREMITY STUDY: CPT

## 2017-08-08 PROCEDURE — 80076 HEPATIC FUNCTION PANEL: CPT

## 2017-08-08 PROCEDURE — 87086 URINE CULTURE/COLONY COUNT: CPT

## 2017-08-08 PROCEDURE — 82042 OTHER SOURCE ALBUMIN QUAN EA: CPT

## 2017-08-08 PROCEDURE — 96374 THER/PROPH/DIAG INJ IV PUSH: CPT

## 2017-08-08 PROCEDURE — 84157 ASSAY OF PROTEIN OTHER: CPT

## 2017-08-08 RX ORDER — MULTIVIT-MIN/FERROUS GLUCONATE 9 MG/15 ML
1 LIQUID (ML) ORAL
Qty: 0 | Refills: 0 | COMMUNITY

## 2017-08-08 RX ORDER — ATORVASTATIN CALCIUM 80 MG/1
1 TABLET, FILM COATED ORAL
Qty: 0 | Refills: 0 | COMMUNITY
Start: 2017-08-08

## 2017-08-08 RX ORDER — FENOFIBRATE,MICRONIZED 130 MG
1 CAPSULE ORAL
Qty: 0 | Refills: 0 | COMMUNITY
Start: 2017-08-08

## 2017-08-08 RX ORDER — ZOLPIDEM TARTRATE 10 MG/1
1 TABLET ORAL
Qty: 0 | Refills: 0 | COMMUNITY
Start: 2017-08-08

## 2017-08-08 RX ORDER — POTASSIUM CHLORIDE 20 MEQ
1 PACKET (EA) ORAL
Qty: 0 | Refills: 0 | COMMUNITY
Start: 2017-08-08

## 2017-08-08 RX ORDER — AMIODARONE HYDROCHLORIDE 400 MG/1
1 TABLET ORAL
Qty: 0 | Refills: 0 | COMMUNITY

## 2017-08-08 RX ORDER — AMIODARONE HYDROCHLORIDE 400 MG/1
1 TABLET ORAL
Qty: 0 | Refills: 0 | COMMUNITY
Start: 2017-08-08

## 2017-08-08 RX ORDER — NIZATIDINE 150 MG/1
0 CAPSULE ORAL
Qty: 0 | Refills: 0 | COMMUNITY

## 2017-08-08 RX ORDER — DOCUSATE SODIUM 100 MG
1 CAPSULE ORAL
Qty: 0 | Refills: 0 | COMMUNITY
Start: 2017-08-08

## 2017-08-08 RX ORDER — FAMOTIDINE 10 MG/ML
1 INJECTION INTRAVENOUS
Qty: 0 | Refills: 0 | COMMUNITY
Start: 2017-08-08

## 2017-08-08 RX ORDER — METOPROLOL TARTRATE 50 MG
1 TABLET ORAL
Qty: 0 | Refills: 0 | COMMUNITY

## 2017-08-08 RX ORDER — CLOPIDOGREL BISULFATE 75 MG/1
1 TABLET, FILM COATED ORAL
Qty: 0 | Refills: 0 | COMMUNITY

## 2017-08-08 RX ORDER — SENNA PLUS 8.6 MG/1
2 TABLET ORAL
Qty: 0 | Refills: 0 | COMMUNITY
Start: 2017-08-08

## 2017-08-08 RX ORDER — ATORVASTATIN CALCIUM 80 MG/1
1 TABLET, FILM COATED ORAL
Qty: 0 | Refills: 0 | COMMUNITY

## 2017-08-08 RX ORDER — LEVOTHYROXINE SODIUM 125 MCG
1 TABLET ORAL
Qty: 0 | Refills: 0 | COMMUNITY
Start: 2017-08-08

## 2017-08-08 RX ORDER — CLOPIDOGREL BISULFATE 75 MG/1
1 TABLET, FILM COATED ORAL
Qty: 0 | Refills: 0 | COMMUNITY
Start: 2017-08-08

## 2017-08-08 RX ORDER — FENOFIBRATE,MICRONIZED 130 MG
1 CAPSULE ORAL
Qty: 0 | Refills: 0 | COMMUNITY

## 2017-08-08 RX ORDER — LEVOTHYROXINE SODIUM 125 MCG
1 TABLET ORAL
Qty: 0 | Refills: 0 | COMMUNITY

## 2017-08-08 RX ORDER — POLYETHYLENE GLYCOL 3350 17 G/17G
17 POWDER, FOR SOLUTION ORAL
Qty: 0 | Refills: 0 | COMMUNITY
Start: 2017-08-08

## 2017-08-08 RX ORDER — POTASSIUM CHLORIDE 20 MEQ
2 PACKET (EA) ORAL
Qty: 0 | Refills: 0 | COMMUNITY
Start: 2017-08-08

## 2017-08-08 RX ORDER — MULTIVIT-MIN/FERROUS GLUCONATE 9 MG/15 ML
1 LIQUID (ML) ORAL
Qty: 0 | Refills: 0 | COMMUNITY
Start: 2017-08-08

## 2017-08-08 RX ORDER — ASPIRIN/CALCIUM CARB/MAGNESIUM 324 MG
1 TABLET ORAL
Qty: 0 | Refills: 0 | COMMUNITY

## 2017-08-08 RX ORDER — METOPROLOL TARTRATE 50 MG
1 TABLET ORAL
Qty: 0 | Refills: 0 | COMMUNITY
Start: 2017-08-08

## 2017-08-08 RX ORDER — POTASSIUM CHLORIDE 20 MEQ
30 PACKET (EA) ORAL
Qty: 0 | Refills: 0 | Status: DISCONTINUED | OUTPATIENT
Start: 2017-08-08 | End: 2017-07-21

## 2017-08-08 RX ORDER — SODIUM CHLORIDE 9 MG/ML
500 INJECTION INTRAMUSCULAR; INTRAVENOUS; SUBCUTANEOUS ONCE
Qty: 0 | Refills: 0 | Status: COMPLETED | OUTPATIENT
Start: 2017-08-08 | End: 2017-08-08

## 2017-08-08 RX ORDER — IPRATROPIUM/ALBUTEROL SULFATE 18-103MCG
3 AEROSOL WITH ADAPTER (GRAM) INHALATION
Qty: 0 | Refills: 0 | COMMUNITY
Start: 2017-08-08

## 2017-08-08 RX ORDER — ASPIRIN/CALCIUM CARB/MAGNESIUM 324 MG
1 TABLET ORAL
Qty: 0 | Refills: 0 | COMMUNITY
Start: 2017-08-08

## 2017-08-08 RX ADMIN — Medication 100 MILLIGRAM(S): at 10:08

## 2017-08-08 RX ADMIN — FAMOTIDINE 20 MILLIGRAM(S): 10 INJECTION INTRAVENOUS at 06:20

## 2017-08-08 RX ADMIN — OXYCODONE HYDROCHLORIDE 10 MILLIGRAM(S): 5 TABLET ORAL at 06:22

## 2017-08-08 RX ADMIN — OXYCODONE HYDROCHLORIDE 10 MILLIGRAM(S): 5 TABLET ORAL at 11:35

## 2017-08-08 RX ADMIN — SODIUM CHLORIDE 1000 MILLILITER(S): 9 INJECTION INTRAMUSCULAR; INTRAVENOUS; SUBCUTANEOUS at 07:16

## 2017-08-08 RX ADMIN — Medication 2: at 08:10

## 2017-08-08 RX ADMIN — Medication 30 MILLIEQUIVALENT(S): at 11:36

## 2017-08-08 RX ADMIN — CLOPIDOGREL BISULFATE 75 MILLIGRAM(S): 75 TABLET, FILM COATED ORAL at 11:35

## 2017-08-08 RX ADMIN — Medication 81 MILLIGRAM(S): at 11:35

## 2017-08-08 RX ADMIN — AMIODARONE HYDROCHLORIDE 200 MILLIGRAM(S): 400 TABLET ORAL at 06:20

## 2017-08-08 RX ADMIN — Medication 1 TABLET(S): at 11:35

## 2017-08-08 RX ADMIN — Medication 2: at 11:36

## 2017-08-08 RX ADMIN — OXYCODONE HYDROCHLORIDE 10 MILLIGRAM(S): 5 TABLET ORAL at 06:56

## 2017-08-08 RX ADMIN — Medication 75 MICROGRAM(S): at 06:20

## 2017-08-08 RX ADMIN — Medication 100 MILLIGRAM(S): at 11:35

## 2017-08-08 RX ADMIN — OXYCODONE HYDROCHLORIDE 10 MILLIGRAM(S): 5 TABLET ORAL at 12:24

## 2017-08-08 NOTE — PROGRESS NOTE ADULT - SUBJECTIVE AND OBJECTIVE BOX
SULEIMAN GREGORY    [id number]    71y      Male    INTERVAL HPI/OVERNIGHT EVENTS: Noted to have multiple episodes of NSVT. Currently without complaints. Son at bedside.    Hospital course:  70 y/o male w/PMHx of CAD, s/p stent in June 2017, HTN, HLD, w/recent hx of LLL mass discovered here after he was treated here for hemoptysis and pleural effusion. However, he then had a PET scan which confirmed a LLL mass with areas of metastasis including left hilar lymphadenopathy, left pleural mets, left sacrum lesion. He was supposed to have a CT guided biopsy of the lung but he was found to be ill-appearing, with cough and a high white count, prompting an admission for pneumonia. He was started as an outpatient on levaquin but his white count remains high. On 7/24, started radiation therapy at [facility]. Hgb trended down to 7.6, and received 1u PRBC with appropriate response. Developed RUE swelling, US showed superficial thrombosis of right cephalic vein. Received RUE PICC on 7/25. Repeat CXR showing worsening left pleural effusion with possible loculation. CT chest showing pleural metastases with large left loculated pleural effusion. On 7/26, received left chest tube. Noted to have hypercalcemia, received pamidronate and calcitonin. On 7/28 had IR guided lung biopsy.  S/p 5 treatments of RT.  Noted to have significant Chest tube drainage.  Seen by CT surgery for replacement of chest tube with Pleurx catheter.  Because of improvement in effusion, chest tube was discontinued.  On 8/4, WBC noted to increase to 22.  Seen by ID for followup, blood cultures were drawn. Plan likely for d/c [facility].       REVIEW OF SYSTEMS:    CONSTITUTIONAL: No fever   RESPIRATORY: still with mild hemoptysis  CARDIOVASCULAR: No chest pain, palpitations     Vital Signs Last 24 Hrs  T(C): 36.3 (08 Aug 2017 08:08), Max: 37.2 (08 Aug 2017 02:06)  T(F): 97.4 (08 Aug 2017 08:08), Max: 99 (08 Aug 2017 02:06)  HR: 90 (08 Aug 2017 10:09) (78 - 93)  BP: 102/60 (08 Aug 2017 10:09) (91/60 - 106/60)  BP(mean): --  RR: 20 (08 Aug 2017 08:08) (18 - 20)  SpO2: 96% (08 Aug 2017 05:07) (96% - 96%)    PHYSICAL EXAM:    GENERAL: NAD, well-groomed, frail, pale  HEENT: PERRL, +EOMI, MMM  CHEST/LUNG: No wheezing  HEART: S1S2+  ABDOMEN: Soft, Nontender, Nondistended; Bowel sounds present      LABS:                        9.0    16.5  )-----------( 325      ( 06 Aug 2017 12:04 )             28.4     08-08    130<L>  |  95<L>  |  19.0  ----------------------------<  186<H>  4.0   |  22.0  |  1.06    Ca    9.4      08 Aug 2017 09:19  Mg     1.8     08-08              MEDICATIONS  (STANDING):  amiodarone    Tablet 200 milliGRAM(s) Oral daily  atorvastatin 40 milliGRAM(s) Oral at bedtime  fenofibrate Tablet 48 milliGRAM(s) Oral daily  metoprolol succinate  milliGRAM(s) Oral daily  famotidine    Tablet 20 milliGRAM(s) Oral daily  levothyroxine 75 MICROGram(s) Oral daily  multivitamin/minerals 1 Tablet(s) Oral daily  senna 2 Tablet(s) Oral at bedtime  docusate sodium 100 milliGRAM(s) Oral three times a day  insulin lispro (HumaLOG) corrective regimen sliding scale   SubCutaneous three times a day before meals  insulin lispro (HumaLOG) corrective regimen sliding scale   SubCutaneous at bedtime  dextrose 5%. 1000 milliLiter(s) (50 mL/Hr) IV Continuous <Continuous>  dextrose 50% Injectable 12.5 Gram(s) IV Push once  dextrose 50% Injectable 25 Gram(s) IV Push once  dextrose 50% Injectable 25 Gram(s) IV Push once  levoFLOXacin  Tablet 500 milliGRAM(s) Oral every 24 hours  clopidogrel Tablet 75 milliGRAM(s) Oral daily  aspirin  chewable 81 milliGRAM(s) Oral daily  ceFAZolin   IVPB 1000 milliGRAM(s) IV Intermittent once  polyethylene glycol 3350 17 Gram(s) Oral two times a day  potassium chloride    Tablet ER 30 milliEquivalent(s) Oral two times a day    MEDICATIONS  (PRN):  dextrose Gel 1 Dose(s) Oral once PRN Blood Glucose LESS THAN 70 milliGRAM(s)/deciliter  glucagon  Injectable 1 milliGRAM(s) IntraMuscular once PRN Glucose LESS THAN 70 milligrams/deciliter  ALBUTerol/ipratropium for Nebulization 3 milliLiter(s) Nebulizer every 6 hours PRN Shortness of Breath and/or Wheezing  oxyCODONE    IR 5 milliGRAM(s) Oral every 4 hours PRN mild - moderate pain  oxyCODONE    IR 10 milliGRAM(s) Oral every 4 hours PRN Severe Pain (7 - 10)  zolpidem 5 milliGRAM(s) Oral at bedtime PRN Insomnia      RADIOLOGY & ADDITIONAL TESTS:

## 2017-08-08 NOTE — DISCHARGE NOTE ADULT - PATIENT PORTAL LINK FT
“You can access the [employer] Patient Portal, offered by [facility], by registering with the following website: [email or web address]”

## 2017-08-08 NOTE — PROGRESS NOTE ADULT - PROBLEM SELECTOR PROBLEM 5
Acute renal failure with acute cortical necrosis
Acute renal failure with acute cortical necrosis
Anemia due to blood loss
Coronary artery disease involving native heart without angina pectoris, unspecified vessel or lesion type
Lung mass
VT (ventricular tachycardia)
Lung mass

## 2017-08-08 NOTE — DISCHARGE NOTE ADULT - MEDICATION SUMMARY - MEDICATIONS TO STOP TAKING
I will STOP taking the medications listed below when I get home from the hospital:    Axid Pulvules 150 mg oral capsule  --  by mouth once a day    Lipofen 150 mg oral capsule  -- 1 cap(s) by mouth once a day

## 2017-08-08 NOTE — DISCHARGE NOTE ADULT - SECONDARY DIAGNOSIS.
Pneumonia of left lower lobe due to infectious organism VT (ventricular tachycardia) Chronic systolic CHF (congestive heart failure), NYHA class 2 Coronary artery disease involving native heart without angina pectoris, unspecified vessel or lesion type Anemia due to blood loss

## 2017-08-08 NOTE — PROGRESS NOTE ADULT - PROBLEM SELECTOR PROBLEM 2
Lung mass
Pneumonia
Pneumonia
Acute renal failure with acute cortical necrosis
Lung mass
Pleural effusion on left
Pneumonia of left lower lobe due to infectious organism
Acute renal failure with acute cortical necrosis
Lung mass

## 2017-08-08 NOTE — PROGRESS NOTE ADULT - PROBLEM SELECTOR PROBLEM 3
CAD (coronary artery disease)
CAD (coronary artery disease)
Anemia due to blood loss
CAD (coronary artery disease)
CAD (coronary artery disease)
Chronic systolic CHF (congestive heart failure), NYHA class 2
Pneumonia of left lower lobe due to infectious organism
Chronic systolic CHF (congestive heart failure), NYHA class 2

## 2017-08-08 NOTE — PROGRESS NOTE ADULT - ASSESSMENT
This 71 y.o. man with lung mass, suspected Cancer, workup in process, now here for SOB, chills, sweats, suspected Left sided obstructive Pneumonia.   Initial sputum culture with respiratory pina.
70 y/o male w left lung mass, admitted for PNA.
70 yo M with h/o CAD s/p ZARINA to SVG to RCA (6/16/17), AICD for VT, HTN, HLD, recently diagnosed LLL mass here with postobstructive pneumonia.
71 year old male with left sided pigtail catheter to water seal.
72 y/o male w left lung mass, admitted for PNA.
72 y/o male w left lung mass, admitted for PNA.
72 y/o male w left lung mass, admitted for PNA. Was scheduled for lung biopsy this upcoming Monday 7/24/17.
72 yo M with h/o CAD s/p ZARINA to SVG to RCA (6/16/17), AICD for VT, HTN, HLD, recently diagnosed LLL mass here with postobstructive pneumonia.
72 yo male with a 6 cm left lower lobe infrahilar mass which extends into the left hilum.  He is s/p cardiac stent in June 2017.  Patient presented with hemoptysis and is s/p 5 fractions RT without further hemoptysis.  - Adenocarcinoma of Lung, poorly differentiated. reflex molecular testing pending for treatment planning.  Patient and son aware.  Will follow up with pathology today.    -discharge planning underway when medically stable.
Hypercalcemia of malignancy: Ca had been improving   Would cont IVF/ diuretics/ calcitonin  -s/p pamidronate  -AM labs
Hypercalcemia of malignancy: improved with treatment  - continue calcitonin and diuretics  - s/p aredia  - monitor labs
Hypercalcemia of malignancy: improving   s/p aredia  - IVF  - monitor labs
Hypercalcemia of malignancy: improving   s/p aredia  Remains on calcitonin  Await results of biopsy 7-28  PTH suppressed
This 71 y.o. man with lung mass, suspected Cancer, workup in process, now here for SOB, chills, sweats, suspected Left sided obstructive Pneumonia.   Initial sputum culture with respiratory pina.   ON ORAL LEVAQUIN
This 71 y.o. man with lung mass, suspected Cancer, workup in process, now here for SOB, chills, sweats, suspected Left sided obstructive Pneumonia.   Initial sputum culture with respiratory pina.   ON ORAL LEVAQUIN   HAD SURGERY YESTERDAY   WHICH MAY HAVE CAUSED INCREASED WBC   WILL CHECK BLOOD CX X2 SETS SPOKE TO CXT SURGERY PA PLAN IS REMOVAL OF CHEST TUBE
This 71 y.o. man with lung mass, suspected Cancer, workup in process, now here for SOB, chills, sweats, suspected Left sided obstructive Pneumonia.   Initial sputum culture with respiratory pina.   ON ORAL LEVAQUIN  D/W FAMILY AT [facility] WILL CONTIMUE FOR NWO WILL NEED TO D/W CT SURGERY
· Assessment		  Hypercalcemia of malignancy: improving   s/p aredia  Remains on calcitonin  Await results of biopsy 7-28  Follow up PTH
72 yo male with a 6 cm left lower lobe infrahilar mass which extends into the left hilum.  He is s/p cardiac stent in June 2017.  Patient presented with hemoptysis and is s/p 5 fractions RT without further hemoptysis.  -await pathology for treatment planning.  Patient and son aware.  Will follow up with pathology today.    -discharge planning underway.

## 2017-08-08 NOTE — PROGRESS NOTE ADULT - PROBLEM SELECTOR PLAN 1
- post obstructive.
- post obstructive.   - sputum cx with MSSA only.  Candida likely oral pina  - ON levaquin a
- post obstructive.   - sputum cx with MSSA only.  Candida likely oral pina  - ON levaquin and observe for
D/w Dr. James - Pt with aggressive mass and may benefit from immediate radiation therapy.   Radiation oncology eval pending  - plan for radiation tomorrow as per Dr. Tobin Turcios d/w IR re: biopsy  OOB to chair  PT eval pending
D/w Dr. James - Pt with aggressive mass and may benefit from immediate radiation therapy.   Radiation oncology following - plan for radiation therapy today  D/w IR re: lung biopsy. Will need to be off ASA and plavix for 5 days  OOB to chair  PT eval home
D/w Dr. James - Pt with aggressive mass and may benefit from immediate radiation therapy.   Radiation oncology following - plan for radiation therapy today  Will d/w IR re: biopsy  OOB to chair  PT eval pending
Hemoptysis improving  Radiation oncology following - radiation therapy (day 5/5)  D/w IR re: lung biopsy. Will need to be off ASA and plavix for 5 days. D/w Dr. Espinosa - pt is high cardiac risk, but cleared for lung biopsy. Likely biopsy today.  OOB to chair  PT eval home  Heme/onc following - awaiting tissue sample for diagnosis
Hemoptysis now BID  Radiation oncology following - radiation therapy (day 4/5)  D/w IR re: lung biopsy. Will need to be off ASA and plavix for 5 days. D/w Dr. Espinosa - pt is high cardiac risk, but cleared for lung biopsy.  OOB to chair  PT eval home  Heme/onc following - awaiting tissue sample for diagnosis
Pathology - poorly differentiated lung adenocarcinoma  S/p radiation therapy   S/p lung biopsy 7/28  OOB to chair  PT recommending CASTILLO  Oncology made aware - immunostains pending. Plan for chemotherapy as outpt.
Reports improving hemoptysis  Radiation oncology following - radiation therapy (day 3/5)  D/w IR re: lung biopsy. Will need to be off ASA and plavix for 5 days  OOB to chair  PT eval home
S/p 5 days of radiation therapy  S/p lung biopsy 7/28  OOB to chair  PT eval [place]  Heme/onc following - awaiting tissue sample for diagnosis. Plan for f/u with Dr. Cunningham
S/p RT.  Likely CASTILLO placement..    PT initially recommending home. Awaiting PT reevaluation.
S/p RT.  Likely CASTILLO placement..    PT initially recommending home. Will have PT reeval.
S/p RT.  Per pathology, awaiting final results, preliminary adenocarcinoma vs squamous cell carcinoma unclear origin.  Pt and family aware.
S/p RT.  Pt and son aware of likely malignancy.  Awaiting biopsy results.
S/p RT.  Pt and son aware of likely malignancy.  Awaiting biopsy results.
S/p radiation therapy   S/p lung biopsy 7/28  OOB to chair  PT recommending CASTILLO  Heme/onc following - awaiting tissue sample for diagnosis. Plan for f/u with Dr. Cunningham
Slow improvement  WBC trending down  Afebrile  C/w vanc and aztreonam  Vanc trough tonight  Sputum cultures pending  Legionella pending
Will need one more day of radiation for total of 5 day course. Plan for radiation therapy tomorrow at [facility]  S/p lung biopsy 7/28  OOB to chair  PT initially recommending home. Will have PT reeval.   Heme/onc following - awaiting tissue sample for diagnosis. Plan for f/u with Dr. Cunningham
Continue to maintain Left pigtail to water seal. Continues to drain with 1100cc for the 24hr. No plan to d/c tube from a thoracic standpoint at this time.
cont monitored bed, cont vanco/aztreonam, follow cultures, legionella, strep, trend leukocytosis.
- post obstructive.   - sputum cx with MSSA only.  Candida likely oral pina  - de-escalate to levaquin and observe for 24- 48 hours

## 2017-08-08 NOTE — PROGRESS NOTE ADULT - PROBLEM SELECTOR PROBLEM 7
Acute renal failure with acute cortical necrosis
Chronic systolic CHF (congestive heart failure), NYHA class 2
Coronary artery disease involving native heart without angina pectoris, unspecified vessel or lesion type
VT (ventricular tachycardia)
Coronary artery disease involving native heart without angina pectoris, unspecified vessel or lesion type

## 2017-08-08 NOTE — PROGRESS NOTE ADULT - PROBLEM SELECTOR PROBLEM 1
Lung mass
Pneumonia of left lower lobe due to infectious organism
Lung mass
Pneumonia of left lower lobe due to infectious organism
Pleural effusion on left
Pneumonia of left lower lobe due to infectious organism

## 2017-08-08 NOTE — PROGRESS NOTE ADULT - PROBLEM SELECTOR PROBLEM 4
Anemia due to blood loss
Coronary artery disease involving native heart without angina pectoris, unspecified vessel or lesion type
Coronary artery disease involving native heart without angina pectoris, unspecified vessel or lesion type
Hypercalcemia
VT (ventricular tachycardia)
VT (ventricular tachycardia)
Coronary artery disease involving native heart without angina pectoris, unspecified vessel or lesion type

## 2017-08-08 NOTE — PROGRESS NOTE ADULT - PROBLEM SELECTOR PLAN 9
not in exacerbation.

## 2017-08-08 NOTE — DISCHARGE NOTE ADULT - CARE PROVIDERS DIRECT ADDRESSES
[email or web address]

## 2017-08-08 NOTE — DISCHARGE NOTE ADULT - CARE PROVIDER_API CALL
Camilla Cunningham), Medical Oncology  [facility]  [address] [place] [address]  Phone: [phone]  Fax: [phone]    Brendan Pavon), Cardiology; Internal Medicine  [address] [place] [address]  Phone: [phone]  Fax: [phone]

## 2017-08-08 NOTE — DISCHARGE NOTE ADULT - CARE PLAN
Principal Discharge DX:	Lung mass  Secondary Diagnosis:	Pneumonia of left lower lobe due to infectious organism  Secondary Diagnosis:	VT (ventricular tachycardia)  Secondary Diagnosis:	Chronic systolic CHF (congestive heart failure), NYHA class 2  Secondary Diagnosis:	Coronary artery disease involving native heart without angina pectoris, unspecified vessel or lesion type  Secondary Diagnosis:	Anemia due to blood loss Principal Discharge DX:	Lung mass  Goal:	Therapeutic optimization  Instructions for follow-up, activity and diet:	Follow up with Dr. Cunningham regarding chemotherapy.  Secondary Diagnosis:	Pneumonia of left lower lobe due to infectious organism  Instructions for follow-up, activity and diet:	Continue with levaquin until August 11.  Secondary Diagnosis:	VT (ventricular tachycardia)  Instructions for follow-up, activity and diet:	Continue with metoprolol and potassium supplement.  Secondary Diagnosis:	Chronic systolic CHF (congestive heart failure), NYHA class 2  Instructions for follow-up, activity and diet:	Continue with aspirin and plavix. Follow up with your cardiologist.  Secondary Diagnosis:	Coronary artery disease involving native heart without angina pectoris, unspecified vessel or lesion type  Instructions for follow-up, activity and diet:	Continue with aspirin and plavix. Follow up with your cardiologist.  Secondary Diagnosis:	Anemia due to blood loss  Instructions for follow-up, activity and diet:	Continue with iron supplements.

## 2017-08-08 NOTE — PROGRESS NOTE ADULT - PROBLEM SELECTOR PLAN 3
2/2 active hemoptysis  Hgb 9.4  S/p 1u PRBC
2/2 active hemoptysis  Hgb trending down to 7.6  Plan for 2u PRBC today  Type and screen  Blood consent in chart
2/2 active hemoptysis  Hgb trending down to 8.0  Type and screen  Blood consent in chart
ID followup appreciated, continue Abx.
ID input noted, discussed with Dr. Galvan, continue Abx.
ID input noted, discussed with Dr. Galvan, continue Abx.
ID input noted, discussed with Dr. Galvan, continue Abx.  F/u cultures.
Repeat sputum culture - MSSA  Legionella negative  Streptococcus negative  Appreciate ID consult - c/w levaquin until August 11.
Repeat sputum culture pending  Legionella negative  Streptococcus negative  Appreciate ID consult - c/w levaquin
Repeat sputum culture pending  Legionella negative  Streptococcus negative  Appreciate ID consult - c/w levaquin until August 11.
Repeat sputum culture pending  Legionella negative  Streptococcus negative  C/w vanc and meropenem  Appreciate ID consult
Repeat sputum culture pending  Legionella negative  Streptococcus negative  C/w vanc and meropenem  Appreciate ID consult
management per cardiology and primary team.
unknown EF, ECHO pending
unknown EF, ECHO pending
management per cardiology and primary team.

## 2017-08-08 NOTE — PROGRESS NOTE ADULT - PROBLEM SELECTOR PLAN 4
-cont asa/plavix/statin/toprol.
2/2 active hemoptysis  Hgb 9.0  S/p 1u PRBC
NSVT  AICD  On metoprolol ER 100mg  Appreciate cardiology consult
NSVT  AICD  On metoprolol ER 100mg  [facility] notified
On calcitonin again  S/p aredia
On calcitonin again  S/p aredia  Appreciate renal consult
On calcitonin again  S/p aredia.  Check BMP
Recently placed ZARINA to SVG to RCA 6/16/17  Will require to be off ASA and plavix for biopsy  Awaiting cardiology reeval for holding medications
Resolved  Ca 9.4  S/p aredia  Appreciate renal consult
Resolved  S/p aredia  Appreciate renal consult
S/p pamidronate and calcitonin  Appreciate renal consult
Worsening in setting of lung mass  S/p pamidronate and calcitonin  C/w IVF hydration  Appreciate renal consult
Worsening in setting of lung mass  S/p pamidronate and calcitonin  C/w IVF hydration  Appreciate renal consult
s/p Aredia.  Monitor Ca.  Renal followup.
-cont asa/plavix/statin/toprol.

## 2017-08-08 NOTE — PROGRESS NOTE ADULT - PROBLEM SELECTOR PROBLEM 6
Acute renal failure with acute cortical necrosis
Chronic systolic CHF (congestive heart failure), NYHA class 2
Coronary artery disease involving native heart without angina pectoris, unspecified vessel or lesion type
VT (ventricular tachycardia)
Chronic systolic CHF (congestive heart failure), NYHA class 2

## 2017-08-08 NOTE — PROGRESS NOTE ADULT - PROBLEM SELECTOR PLAN 6
NSVT  AICD  On metoprolol ER 100mg  Appreciate cardiology consult
Prerenal azotemia   REsolved  D/c IVF
Recently placed ZARINA to SVG to RCA 6/16/17  C/w asa and plavix
Recently placed ZARINA to SVG to RCA 6/16/17  To resume asa and plavix
Recently placed ZARINA to SVG to RCA 6/16/17  Will require to be off ASA and plavix for biopsy  Appreciate cardiology consult - high risk for lung biopsy
Recently placed ZARINA to SVG to RCA 6/16/17  Will require to be off ASA and plavix for biopsy  Appreciate cardiology consult - high risk for lung biopsy
not in exacerbation.
not in exacerbation.

## 2017-08-08 NOTE — DISCHARGE NOTE ADULT - PLAN OF CARE
Therapeutic optimization Follow up with Dr. Cunningham regarding chemotherapy. Continue with levaquin until August 11. Continue with metoprolol and potassium supplement. Continue with aspirin and plavix. Follow up with your cardiologist. Continue with iron supplements.

## 2017-08-08 NOTE — PROGRESS NOTE ADULT - PROBLEM SELECTOR PLAN 7
-cont asa/plavix/statin/toprol.
NSVT  AICD  On metoprolol ER 100mg  Appreciate cardiology consult
NSVT d/w cardiology.  No episodes of defibrillation.  S/p AICD  On metoprolol ER 100mg
Prerenal azotemia   REsolved  D/c IVF
not in exacerbation.
-cont asa/plavix/statin/toprol.

## 2017-08-08 NOTE — DISCHARGE NOTE ADULT - HOSPITAL COURSE
72 y/o male w/PMHx of CAD, s/p stent in June 2017, HTN, HLD, w/recent hx of LLL mass discovered here after he was treated here for hemoptysis and pleural effusion. However, he then had a PET scan which confirmed a LLL mass with areas of metastasis including left hilar lymphadenopathy, left pleural mets, left sacrum lesion. He was supposed to have a CT guided biopsy of the lung but he was found to be ill-appearing, with cough and a high white count, prompting an admission for pneumonia. He was started as an outpatient on levaquin but his white count remains high. On 7/24, started radiation therapy at [facility]. Hgb trended down to 7.6, and received 1u PRBC with appropriate response. Developed RUE swelling, US showed superficial thrombosis of right cephalic vein. Received RUE PICC on 7/25. Repeat CXR showing worsening left pleural effusion with possible loculation. CT chest showing pleural metastases with large left loculated pleural effusion. On 7/26, received left chest tube. Noted to have hypercalcemia, received pamidronate and calcitonin. On 7/28 had IR guided lung biopsy.  S/p 5 treatments of RT.  Noted to have significant Chest tube drainage.  Seen by CT surgery for replacement of chest tube with Pleurx catheter.  Because of improvement in effusion, chest tube was discontinued.  On 8/4, WBC noted to increase to 22.  Seen by ID for followup, blood cultures were drawn. Plan likely for d/c CASTILLO.     Time to discharge: >35 minutes spent coordinating care.

## 2017-08-08 NOTE — DISCHARGE NOTE ADULT - MEDICATION SUMMARY - MEDICATIONS TO TAKE
I will START or STAY ON the medications listed below when I get home from the hospital:    aspirin 81 mg oral tablet, chewable  -- 1 tab(s) by mouth once a day  -- Indication: For CAD (coronary artery disease)    amiodarone 200 mg oral tablet  -- 1 tab(s) by mouth once a day  -- Indication: For VT (ventricular tachycardia)    atorvastatin 40 mg oral tablet  -- 1 tab(s) by mouth once a day (at bedtime)  -- Indication: For HLD    fenofibrate 48 mg oral tablet  -- 1 tab(s) by mouth once a day  -- Indication: For HLD    clopidogrel 75 mg oral tablet  -- 1 tab(s) by mouth once a day  -- Indication: For CAD (coronary artery disease)    zolpidem 5 mg oral tablet  -- 1 tab(s) by mouth once a day (at bedtime), As needed, Insomnia  -- Indication: For Insomnia    metoprolol succinate 100 mg oral tablet, extended release  -- 1 tab(s) by mouth once a day  -- Indication: For VT (ventricular tachycardia)    albuterol-ipratropium 2.5 mg-0.5 mg/3 mL inhalation solution  -- 3 milliliter(s) inhaled every 6 hours, As needed, Shortness of Breath and/or Wheezing  -- Indication: For Chronic systolic CHF (congestive heart failure), NYHA class 2    famotidine 20 mg oral tablet  -- 1 tab(s) by mouth once a day  -- Indication: For GERD    senna oral tablet  -- 2 tab(s) by mouth once a day (at bedtime)  -- Indication: For Constipation    polyethylene glycol 3350 oral powder for reconstitution  -- 17 gram(s) by mouth 2 times a day  -- Indication: For Constipation    docusate sodium 100 mg oral capsule  -- 1 cap(s) by mouth 3 times a day  -- Indication: For Constipation    potassium chloride 15 mEq oral tablet, extended release  -- 2 tab(s) by mouth 2 times a day  -- Indication: For VT (ventricular tachycardia)    CoQ10  -- 100 milligram(s) by mouth once a day  -- Indication: For Supplement    levoFLOXacin 500 mg oral tablet  -- 1 tab(s) by mouth every 24 hours  -- Indication: For Empyema    levothyroxine 75 mcg (0.075 mg) oral tablet  -- 1 tab(s) by mouth once a day  -- Indication: For Hypothyroidism    Multiple Vitamins with Minerals oral tablet  -- 1 tab(s) by mouth once a day  -- Indication: For Supplement    Vitamin D3 2000 intl units oral tablet  -- 1 tab(s) by mouth once a day  -- Indication: For Supplement

## 2017-08-08 NOTE — PROGRESS NOTE ADULT - PROBLEM SELECTOR PLAN 2
CT chest showing large loculated pleural effusion.  Chest tube remains.  ? role for indwelling catheter.    Pain control PRN  CTS following
CT chest showing large loculated pleural effusion.  S/p chest tube   Pain control PRN  CTS following
CT chest showing large loculated pleural effusion.  S/p chest tube   Pain control PRN  CTS following
CT chest showing large loculated pleural effusion.  S/p chest tube removal   Pain control PRN  CTS consult appreciated
CT chest showing large loculated pleural effusion.  S/p chest tube removal   Pain control PRN  CTS following
CT chest showing large loculated pleural effusion.  S/p chest tube with approximately 1200cc output of unclear timing  Pain control PRN
CT chest showing large loculated pleural effusion.  S/p chest tube with approximately 600cc overnight  Pain control PRN
Prerenal azotemia  Improving with NS @100cc/hr
Repeat sputum culture pending  Legionella negative  Streptococcus negative  C/w vanc and meropenem  Appreciate ID consult
Slow improvement  WBC trending down  Afebrile  C/w vanc and aztreonam  Sputum cultures pending  Legionella pending
Slow improvement  WBC trending up  Afebrile  C/w vanc and aztreonam  ID consult pending  Sputum cultures pending  Legionella pending
Still with significant chest tube drainage.  Per discussion with CT surgery, planning for pigtail catheter placement for outpatient drainage.
Still with significant chest tube drainage.  Tube on water seal.  If significant drainage in am, CT surgery plans replacement with pigtail catheter.  Otherwise, will d/c chest tube.
Still with significant chest tube drainage.  Will d/w CT surgery regarding discharge plan.
Worsening on left pleural effusion  CT chest pending  D/w Dr. James, will likely need chest tube
chest tube discontinued.
lung cancer      - continue management per radiation oncology.
lung cancer   workup in process  - continue management per radiation oncology.
Plan for patient to leave to cancer center at 12 for radiation treatment returning to [facility] after treatment.   IR lung biopsy planned for today 7/28.
likely prerenal, cont LR @ 100 cc/hr
lung cancer   workup in process  - continue management per radiation oncology.
lung cancer   workup in process  - continue management per radiation oncology.

## 2017-08-08 NOTE — PROGRESS NOTE ADULT - PROBLEM SELECTOR PLAN 8
Cr stable.  Monitor BMP
Cr. 1.09  Likely due to short course of lasix  D/c lasix  Monitor Cr
Cr. 1.33  Likely due to short course of lasix  D/c lasix  Monitor Cr
Prerenal azotemia   worsened likely due to lasix   D/c IVF
REsolved    Likely due to short course of lasix  D/c lasix  Monitor Cr
REsolved Cr. 1.06  Likely due to short course of lasix  D/c lasix  Monitor Cr
not in exacerbation.

## 2017-08-09 ENCOUNTER — RESULT REVIEW (OUTPATIENT)
Age: 72
End: 2017-08-09

## 2017-08-09 LAB
CULTURE RESULTS: SIGNIFICANT CHANGE UP
CULTURE RESULTS: SIGNIFICANT CHANGE UP
SPECIMEN SOURCE: SIGNIFICANT CHANGE UP
SPECIMEN SOURCE: SIGNIFICANT CHANGE UP

## 2017-08-14 LAB — PATH REPORT ADDENDUM.SYNOPTIC DOC: SIGNIFICANT CHANGE UP

## 2017-08-15 LAB
CULTURE RESULTS: SIGNIFICANT CHANGE UP
SPECIMEN SOURCE: SIGNIFICANT CHANGE UP

## 2017-08-17 ENCOUNTER — LABORATORY RESULT (OUTPATIENT)
Age: 72
End: 2017-08-17

## 2017-08-17 ENCOUNTER — APPOINTMENT (OUTPATIENT)
Dept: HEMATOLOGY ONCOLOGY | Facility: CLINIC | Age: 72
End: 2017-08-17
Payer: MEDICARE

## 2017-08-17 ENCOUNTER — RESULT REVIEW (OUTPATIENT)
Age: 72
End: 2017-08-17

## 2017-08-17 VITALS
SYSTOLIC BLOOD PRESSURE: 98 MMHG | HEIGHT: 70.47 IN | DIASTOLIC BLOOD PRESSURE: 66 MMHG | HEART RATE: 101 BPM | BODY MASS INDEX: 25.92 KG/M2 | OXYGEN SATURATION: 98 % | TEMPERATURE: 97.7 F | WEIGHT: 183.09 LBS

## 2017-08-17 DIAGNOSIS — R04.2 HEMOPTYSIS: ICD-10-CM

## 2017-08-17 DIAGNOSIS — R52 PAIN, UNSPECIFIED: ICD-10-CM

## 2017-08-17 DIAGNOSIS — C34.92 MALIGNANT NEOPLASM OF UNSPECIFIED PART OF LEFT BRONCHUS OR LUNG: ICD-10-CM

## 2017-08-17 LAB
BASOPHILS # BLD AUTO: 0 K/UL — SIGNIFICANT CHANGE UP (ref 0–0.2)
BASOPHILS NFR BLD AUTO: 0.2 % — SIGNIFICANT CHANGE UP (ref 0–2)
EOSINOPHIL # BLD AUTO: 0.1 K/UL — SIGNIFICANT CHANGE UP (ref 0–0.5)
EOSINOPHIL NFR BLD AUTO: 0.4 % — SIGNIFICANT CHANGE UP (ref 0–6)
HCT VFR BLD CALC: 26 % — LOW (ref 39–50)
HGB BLD-MCNC: 8.2 G/DL — LOW (ref 13–17)
LYMPHOCYTES # BLD AUTO: 1.2 K/UL — SIGNIFICANT CHANGE UP (ref 1–3.3)
LYMPHOCYTES # BLD AUTO: 6.7 % — LOW (ref 13–44)
MCHC RBC-ENTMCNC: 26.1 PG — LOW (ref 27–34)
MCHC RBC-ENTMCNC: 31.6 GM/DL — LOW (ref 32–36)
MCV RBC AUTO: 82.5 FL — SIGNIFICANT CHANGE UP (ref 80–100)
MONOCYTES # BLD AUTO: 0.9 K/UL — SIGNIFICANT CHANGE UP (ref 0–0.9)
MONOCYTES NFR BLD AUTO: 5.1 % — SIGNIFICANT CHANGE UP (ref 2–14)
NEUTROPHILS # BLD AUTO: 15.8 K/UL — HIGH (ref 1.8–7.4)
NEUTROPHILS NFR BLD AUTO: 87.6 % — HIGH (ref 43–77)
PATH REPORT ADDENDUM.SYNOPTIC DOC: SIGNIFICANT CHANGE UP
PLATELET # BLD AUTO: 295 K/UL — SIGNIFICANT CHANGE UP (ref 150–400)
RBC # BLD: 3.15 M/UL — LOW (ref 4.2–5.8)
RBC # FLD: 16 % — HIGH (ref 10.3–14.5)
WBC # BLD: 18 K/UL — HIGH (ref 3.8–10.5)
WBC # FLD AUTO: 18 K/UL — HIGH (ref 3.8–10.5)

## 2017-08-17 PROCEDURE — 99215 OFFICE O/P EST HI 40 MIN: CPT

## 2017-08-17 PROCEDURE — 99205 OFFICE O/P NEW HI 60 MIN: CPT

## 2017-08-17 RX ORDER — PROCHLORPERAZINE MALEATE 10 MG/1
10 TABLET ORAL EVERY 6 HOURS
Qty: 120 | Refills: 0 | Status: ACTIVE | COMMUNITY
Start: 2017-08-17 | End: 1900-01-01

## 2017-08-17 RX ORDER — ONDANSETRON 8 MG/1
8 TABLET, ORALLY DISINTEGRATING ORAL EVERY 8 HOURS
Qty: 90 | Refills: 3 | Status: ACTIVE | COMMUNITY
Start: 2017-08-17 | End: 1900-01-01

## 2017-08-18 ENCOUNTER — INPATIENT (INPATIENT)
Facility: HOSPITAL | Age: 72
LOS: 20 days | Discharge: HOSPICE | DRG: 180 | End: 2017-09-08
Attending: HOSPITALIST | Admitting: FAMILY MEDICINE
Payer: MEDICARE

## 2017-08-18 VITALS
HEART RATE: 78 BPM | OXYGEN SATURATION: 98 % | DIASTOLIC BLOOD PRESSURE: 73 MMHG | RESPIRATION RATE: 18 BRPM | WEIGHT: 199.96 LBS | HEIGHT: 69 IN | TEMPERATURE: 98 F | SYSTOLIC BLOOD PRESSURE: 108 MMHG

## 2017-08-18 DIAGNOSIS — I51.9 HEART DISEASE, UNSPECIFIED: Chronic | ICD-10-CM

## 2017-08-18 DIAGNOSIS — Z98.890 OTHER SPECIFIED POSTPROCEDURAL STATES: Chronic | ICD-10-CM

## 2017-08-18 PROBLEM — R52 PAIN: Status: ACTIVE | Noted: 2017-08-17

## 2017-08-18 PROBLEM — R04.2 COUGH WITH HEMOPTYSIS: Status: ACTIVE | Noted: 2017-06-15

## 2017-08-18 PROBLEM — C34.92 ADENOCARCINOMA OF LEFT LUNG: Status: ACTIVE | Noted: 2017-08-17

## 2017-08-18 LAB
ALBUMIN SERPL ELPH-MCNC: 2.6 G/DL — LOW (ref 3.3–5.2)
ALBUMIN SERPL ELPH-MCNC: 2.7 G/DL
ALP BLD-CCNC: 202 U/L
ALP SERPL-CCNC: 190 U/L — HIGH (ref 40–120)
ALT FLD-CCNC: 17 U/L — SIGNIFICANT CHANGE UP
ALT SERPL-CCNC: 24 U/L
ANION GAP SERPL CALC-SCNC: 12 MMOL/L
ANION GAP SERPL CALC-SCNC: 8 MMOL/L — SIGNIFICANT CHANGE UP (ref 5–17)
APTT BLD: 32.2 SEC — SIGNIFICANT CHANGE UP (ref 27.5–37.4)
AST SERPL-CCNC: 19 U/L — SIGNIFICANT CHANGE UP
AST SERPL-CCNC: 23 U/L
BILIRUB SERPL-MCNC: 0.4 MG/DL
BILIRUB SERPL-MCNC: 0.4 MG/DL — SIGNIFICANT CHANGE UP (ref 0.4–2)
BUN SERPL-MCNC: 17 MG/DL
BUN SERPL-MCNC: 19 MG/DL — SIGNIFICANT CHANGE UP (ref 8–20)
CALCIUM SERPL-MCNC: 11.7 MG/DL
CALCIUM SERPL-MCNC: 13.1 MG/DL — CRITICAL HIGH (ref 8.6–10.2)
CHLORIDE SERPL-SCNC: 97 MMOL/L — LOW (ref 98–107)
CHLORIDE SERPL-SCNC: 99 MMOL/L
CO2 SERPL-SCNC: 25 MMOL/L
CO2 SERPL-SCNC: 26 MMOL/L — SIGNIFICANT CHANGE UP (ref 22–29)
CREAT SERPL-MCNC: 1.25 MG/DL
CREAT SERPL-MCNC: 1.28 MG/DL — SIGNIFICANT CHANGE UP (ref 0.5–1.3)
GLUCOSE SERPL-MCNC: 158 MG/DL
GLUCOSE SERPL-MCNC: 176 MG/DL — HIGH (ref 70–115)
HBV CORE IGG+IGM SER QL: NONREACTIVE
HBV SURFACE AB SER QL: NONREACTIVE
HBV SURFACE AG SER QL: NONREACTIVE
HCT VFR BLD CALC: 25.6 % — LOW (ref 42–52)
HCV AB SER QL: NONREACTIVE
HCV S/CO RATIO: 0.14 S/CO
HGB BLD-MCNC: 7.8 G/DL — LOW (ref 14–18)
INR BLD: 1.31 RATIO — HIGH (ref 0.88–1.16)
INR PPP: 1.23 RATIO
LACTATE BLDV-MCNC: 1.7 MMOL/L — SIGNIFICANT CHANGE UP (ref 0.5–2)
LIDOCAIN IGE QN: 15 U/L — LOW (ref 22–51)
MAGNESIUM SERPL-MCNC: 2.4 MG/DL
MCHC RBC-ENTMCNC: 25.9 PG — LOW (ref 27–31)
MCHC RBC-ENTMCNC: 30.5 G/DL — LOW (ref 32–36)
MCV RBC AUTO: 85 FL — SIGNIFICANT CHANGE UP (ref 80–94)
PLATELET # BLD AUTO: 233 K/UL — SIGNIFICANT CHANGE UP (ref 150–400)
POTASSIUM SERPL-MCNC: 5 MMOL/L — SIGNIFICANT CHANGE UP (ref 3.5–5.3)
POTASSIUM SERPL-SCNC: 5 MMOL/L
POTASSIUM SERPL-SCNC: 5 MMOL/L — SIGNIFICANT CHANGE UP (ref 3.5–5.3)
PROT SERPL-MCNC: 6.3 G/DL
PROT SERPL-MCNC: 6.4 G/DL — LOW (ref 6.6–8.7)
PROTHROM AB SERPL-ACNC: 14.5 SEC — HIGH (ref 9.8–12.7)
PT BLD: 14 SEC
RBC # BLD: 3.01 M/UL — LOW (ref 4.6–6.2)
RBC # FLD: 17 % — HIGH (ref 11–15.6)
SODIUM SERPL-SCNC: 131 MMOL/L — LOW (ref 135–145)
SODIUM SERPL-SCNC: 136 MMOL/L
WBC # BLD: 19 K/UL — HIGH (ref 4.8–10.8)
WBC # FLD AUTO: 19 K/UL — HIGH (ref 4.8–10.8)

## 2017-08-18 PROCEDURE — 71010: CPT | Mod: 26

## 2017-08-18 RX ORDER — FUROSEMIDE 40 MG
40 TABLET ORAL ONCE
Qty: 0 | Refills: 0 | Status: COMPLETED | OUTPATIENT
Start: 2017-08-18 | End: 2017-08-18

## 2017-08-18 RX ORDER — OXYCODONE AND ACETAMINOPHEN 5; 325 MG/1; MG/1
2 TABLET ORAL ONCE
Qty: 0 | Refills: 0 | Status: DISCONTINUED | OUTPATIENT
Start: 2017-08-18 | End: 2017-08-18

## 2017-08-18 RX ORDER — SODIUM CHLORIDE 9 MG/ML
3 INJECTION INTRAMUSCULAR; INTRAVENOUS; SUBCUTANEOUS ONCE
Qty: 0 | Refills: 0 | Status: COMPLETED | OUTPATIENT
Start: 2017-08-18 | End: 2017-08-18

## 2017-08-18 RX ORDER — SODIUM CHLORIDE 9 MG/ML
500 INJECTION INTRAMUSCULAR; INTRAVENOUS; SUBCUTANEOUS ONCE
Qty: 0 | Refills: 0 | Status: COMPLETED | OUTPATIENT
Start: 2017-08-18 | End: 2017-08-18

## 2017-08-18 RX ORDER — ALBUMIN HUMAN 25 %
50 VIAL (ML) INTRAVENOUS ONCE
Qty: 0 | Refills: 0 | Status: COMPLETED | OUTPATIENT
Start: 2017-08-18 | End: 2017-08-18

## 2017-08-18 RX ADMIN — OXYCODONE AND ACETAMINOPHEN 2 TABLET(S): 5; 325 TABLET ORAL at 23:00

## 2017-08-18 RX ADMIN — OXYCODONE AND ACETAMINOPHEN 2 TABLET(S): 5; 325 TABLET ORAL at 00:00

## 2017-08-18 RX ADMIN — SODIUM CHLORIDE 3 MILLILITER(S): 9 INJECTION INTRAMUSCULAR; INTRAVENOUS; SUBCUTANEOUS at 22:08

## 2017-08-18 NOTE — ED ADULT TRIAGE NOTE - CHIEF COMPLAINT QUOTE
pt alert and awake x3, BIBA from [facility] for abnormal labs, pt has no complaints.

## 2017-08-18 NOTE — ED ADULT NURSE REASSESSMENT NOTE - NS ED NURSE REASSESS COMMENT FT1
Pt is A&Ox3 in NAD on 3L NC, NSR on cardiac monitor. Pt complains of some back pain but has 75 mcg on Fentanyl on since this a.m. and good for 72 hours.  IV clean dry and intact, flushes without difficulty. Safety maintained, Bed locked in lowest position. Call bell in reach. Pt awaiting lab results, family at bedside and aware of the plan of care. [facility] called regarding pt status and is aware we are pending labs. Will continue to monitor.

## 2017-08-18 NOTE — ED ADULT NURSE NOTE - OBJECTIVE STATEMENT
Assumed pt care @ 1930. Pt received sitting on stretcher in NAD. Pt AOx3 C/O 7/10 Left back pain behind lung. Pt has stage IV lung CA. Pt was sent from rehab for elevated WBC and CA levels. Pt has soft and distended abdomen at this time. No pain + bowel sounds in all 4 quadrants. Neuro WNL. Lungs CTA, RR even unlabored. Denies Nausea, Vomiting, Diarrhea. Skin warm, dry, color appropriate for age and race. Pt has AICD to left anterior chest wall and 75 mcg of fentanyl patch to left chest wall. Pt last took oxycodone 10mg @ 1730 and takes q4h as per family. Pt was recently d/c here on 8/8/17 when dx with the lung mass and had chest tubes placed in left lung. Pt is due to start immunotherapy next Friday. Pt and family aware of the plan of care.

## 2017-08-18 NOTE — ED PROVIDER NOTE - OBJECTIVE STATEMENT
29 y/o M PMHx Defibrillator, Lung CA, CHF, HTN, Stents (Last one in May) presents to ED c/o elevated Calcium and WBC. Family member reports WBC  was 12, Albumin 1.8 and calcium 10.6 at rehab today. Pt has received calcitonin shots previously. Family reports nursing home was also concerned about his abdomen being distended for the last 2-3 days. Has L sided back pain which is chronic. ED nurse reports pt was here on the 8th and has stage 4 lung CA. Pt was a heavy drinker in the past. Bowel movements have been normal, not black. Denies hx Cirrhosis, N/V/D, fever, chills, SOB, CP, difficulty breathing, HA, numbness, tingling and abd pain.

## 2017-08-18 NOTE — ED PROVIDER NOTE - PSH
Cardiac disorder  triple bypass may 1997 [facility]  H/O cardiac catheterization  stenting of SVG TO PDA IN 2010 2017 one stent  History of bronchoscopy  2017  History of electrophysiologic study  2009 positive study (AICD placement  2010)  History of prostate surgery  davinci surgery in 2008

## 2017-08-18 NOTE — ED ADULT NURSE REASSESSMENT NOTE - NS ED NURSE REASSESS COMMENT FT1
2 different RNs attempted pts IV 5x and were unsuccessful. Dr. Marquez made aware and will attempt an IV under ultrasound. Will continue to monitor.

## 2017-08-18 NOTE — ED PROVIDER NOTE - CARE PLAN
Principal Discharge DX:	Hypercalcemia  Secondary Diagnosis:	Malignant neoplasm of lung, unspecified laterality, unspecified part of lung

## 2017-08-19 DIAGNOSIS — J90 PLEURAL EFFUSION, NOT ELSEWHERE CLASSIFIED: ICD-10-CM

## 2017-08-19 DIAGNOSIS — E83.52 HYPERCALCEMIA: ICD-10-CM

## 2017-08-19 DIAGNOSIS — D72.823 LEUKEMOID REACTION: ICD-10-CM

## 2017-08-19 DIAGNOSIS — C34.90 MALIGNANT NEOPLASM OF UNSPECIFIED PART OF UNSPECIFIED BRONCHUS OR LUNG: ICD-10-CM

## 2017-08-19 LAB
ANION GAP SERPL CALC-SCNC: 11 MMOL/L — SIGNIFICANT CHANGE UP (ref 5–17)
ANISOCYTOSIS BLD QL: SLIGHT — SIGNIFICANT CHANGE UP
APPEARANCE UR: CLEAR — SIGNIFICANT CHANGE UP
BASE EXCESS BLDV CALC-SCNC: 3.6 MMOL/L — HIGH (ref -3–3)
BASOPHILS # BLD AUTO: 0 K/UL — SIGNIFICANT CHANGE UP (ref 0–0.2)
BASOPHILS NFR BLD AUTO: 0.1 % — SIGNIFICANT CHANGE UP (ref 0–2)
BASOPHILS NFR BLD AUTO: 1 % — SIGNIFICANT CHANGE UP (ref 0–2)
BILIRUB UR-MCNC: NEGATIVE — SIGNIFICANT CHANGE UP
BLD GP AB SCN SERPL QL: SIGNIFICANT CHANGE UP
BUN SERPL-MCNC: 19 MG/DL — SIGNIFICANT CHANGE UP (ref 8–20)
CA-I SERPL-SCNC: 1.78 MMOL/L — CRITICAL HIGH (ref 1.12–1.3)
CALCIUM SERPL-MCNC: 12.1 MG/DL — HIGH (ref 8.6–10.2)
CHLORIDE BLDV-SCNC: 99 MMOL/L — SIGNIFICANT CHANGE UP (ref 98–106)
CHLORIDE SERPL-SCNC: 97 MMOL/L — LOW (ref 98–107)
CO2 SERPL-SCNC: 27 MMOL/L — SIGNIFICANT CHANGE UP (ref 22–29)
COLOR SPEC: YELLOW — SIGNIFICANT CHANGE UP
CREAT SERPL-MCNC: 1.31 MG/DL — HIGH (ref 0.5–1.3)
DIFF PNL FLD: NEGATIVE — SIGNIFICANT CHANGE UP
ELLIPTOCYTES BLD QL SMEAR: SLIGHT — SIGNIFICANT CHANGE UP
EOSINOPHIL # BLD AUTO: 0.1 K/UL — SIGNIFICANT CHANGE UP (ref 0–0.5)
EOSINOPHIL NFR BLD AUTO: 0.4 % — SIGNIFICANT CHANGE UP (ref 0–5)
EOSINOPHIL NFR BLD AUTO: 2 % — SIGNIFICANT CHANGE UP (ref 0–6)
GAS PNL BLDV: 133 MMOL/L — LOW (ref 136–146)
GAS PNL BLDV: SIGNIFICANT CHANGE UP
GAS PNL BLDV: SIGNIFICANT CHANGE UP
GLUCOSE BLDV-MCNC: 128 MG/DL — HIGH (ref 70–99)
GLUCOSE SERPL-MCNC: 146 MG/DL — HIGH (ref 70–115)
GLUCOSE UR QL: NEGATIVE MG/DL — SIGNIFICANT CHANGE UP
HCO3 BLDV-SCNC: 27 MMOL/L — HIGH (ref 20–26)
HCT VFR BLD CALC: 24.5 % — LOW (ref 42–52)
HCT VFR BLDA CALC: 23 — LOW (ref 39–50)
HGB BLD CALC-MCNC: 7.4 G/DL — LOW (ref 13–17)
HGB BLD-MCNC: 7.3 G/DL — LOW (ref 14–18)
HYPOCHROMIA BLD QL: SLIGHT — SIGNIFICANT CHANGE UP
KETONES UR-MCNC: NEGATIVE — SIGNIFICANT CHANGE UP
LACTATE BLDV-MCNC: 2.2 MMOL/L — HIGH (ref 0.5–2)
LEUKOCYTE ESTERASE UR-ACNC: NEGATIVE — SIGNIFICANT CHANGE UP
LYMPHOCYTES # BLD AUTO: 1 K/UL — SIGNIFICANT CHANGE UP (ref 1–4.8)
LYMPHOCYTES # BLD AUTO: 12 % — LOW (ref 20–55)
LYMPHOCYTES # BLD AUTO: 6.2 % — LOW (ref 20–55)
MAGNESIUM SERPL-MCNC: 2 MG/DL — SIGNIFICANT CHANGE UP (ref 1.8–2.6)
MANUAL DIF COMMENT BLD-IMP: SIGNIFICANT CHANGE UP
MCHC RBC-ENTMCNC: 25.3 PG — LOW (ref 27–31)
MCHC RBC-ENTMCNC: 29.8 G/DL — LOW (ref 32–36)
MCV RBC AUTO: 85.1 FL — SIGNIFICANT CHANGE UP (ref 80–94)
MONOCYTES # BLD AUTO: 0.8 K/UL — SIGNIFICANT CHANGE UP (ref 0–0.8)
MONOCYTES NFR BLD AUTO: 2 % — LOW (ref 3–10)
MONOCYTES NFR BLD AUTO: 5.2 % — SIGNIFICANT CHANGE UP (ref 3–10)
NEUTROPHILS # BLD AUTO: 14.2 K/UL — HIGH (ref 1.8–8)
NEUTROPHILS NFR BLD AUTO: 79 % — HIGH (ref 37–73)
NEUTROPHILS NFR BLD AUTO: 87.5 % — HIGH (ref 37–73)
NEUTS BAND # BLD: 4 % — SIGNIFICANT CHANGE UP (ref 0–8)
NITRITE UR-MCNC: NEGATIVE — SIGNIFICANT CHANGE UP
NRBC # BLD: 1 /100 — HIGH (ref 0–0)
OTHER CELLS CSF MANUAL: 7 ML/DL — LOW (ref 18–22)
OVALOCYTES BLD QL SMEAR: SLIGHT — SIGNIFICANT CHANGE UP
PCO2 BLDV: 52 MMHG — HIGH (ref 35–50)
PH BLDV: 7.36 — SIGNIFICANT CHANGE UP (ref 7.35–7.45)
PH UR: 5 — SIGNIFICANT CHANGE UP (ref 5–8)
PHOSPHATE SERPL-MCNC: 3.2 MG/DL — SIGNIFICANT CHANGE UP (ref 2.4–4.7)
PLAT MORPH BLD: SIGNIFICANT CHANGE UP
PLATELET # BLD AUTO: 205 K/UL — SIGNIFICANT CHANGE UP (ref 150–400)
PO2 BLDV: 39 MMHG — SIGNIFICANT CHANGE UP (ref 25–45)
POIKILOCYTOSIS BLD QL AUTO: SLIGHT — SIGNIFICANT CHANGE UP
POLYCHROMASIA BLD QL SMEAR: SLIGHT — SIGNIFICANT CHANGE UP
POTASSIUM BLDV-SCNC: 4.3 MMOL/L — SIGNIFICANT CHANGE UP (ref 3.4–4.5)
POTASSIUM SERPL-MCNC: 4.1 MMOL/L — SIGNIFICANT CHANGE UP (ref 3.5–5.3)
POTASSIUM SERPL-SCNC: 4.1 MMOL/L — SIGNIFICANT CHANGE UP (ref 3.5–5.3)
PROT UR-MCNC: NEGATIVE MG/DL — SIGNIFICANT CHANGE UP
RBC # BLD: 2.88 M/UL — LOW (ref 4.6–6.2)
RBC # FLD: 17 % — HIGH (ref 11–15.6)
RBC BLD AUTO: ABNORMAL
SAO2 % BLDV: 67 % — SIGNIFICANT CHANGE UP (ref 67–88)
SODIUM SERPL-SCNC: 135 MMOL/L — SIGNIFICANT CHANGE UP (ref 135–145)
SP GR SPEC: 1.01 — SIGNIFICANT CHANGE UP (ref 1.01–1.02)
STOMATOCYTES BLD QL SMEAR: SLIGHT — SIGNIFICANT CHANGE UP
TYPE + AB SCN PNL BLD: SIGNIFICANT CHANGE UP
UROBILINOGEN FLD QL: NEGATIVE MG/DL — SIGNIFICANT CHANGE UP
WBC # BLD: 16.2 K/UL — HIGH (ref 4.8–10.8)
WBC # FLD AUTO: 16.2 K/UL — HIGH (ref 4.8–10.8)

## 2017-08-19 PROCEDURE — 74176 CT ABD & PELVIS W/O CONTRAST: CPT | Mod: 26

## 2017-08-19 PROCEDURE — 99222 1ST HOSP IP/OBS MODERATE 55: CPT

## 2017-08-19 PROCEDURE — 99291 CRITICAL CARE FIRST HOUR: CPT

## 2017-08-19 PROCEDURE — 99223 1ST HOSP IP/OBS HIGH 75: CPT

## 2017-08-19 RX ORDER — MEROPENEM 1 G/30ML
500 INJECTION INTRAVENOUS ONCE
Qty: 0 | Refills: 0 | Status: DISCONTINUED | OUTPATIENT
Start: 2017-08-19 | End: 2017-08-19

## 2017-08-19 RX ORDER — IPRATROPIUM/ALBUTEROL SULFATE 18-103MCG
3 AEROSOL WITH ADAPTER (GRAM) INHALATION EVERY 6 HOURS
Qty: 0 | Refills: 0 | Status: DISCONTINUED | OUTPATIENT
Start: 2017-08-19 | End: 2017-09-08

## 2017-08-19 RX ORDER — PAMIDRONATE DISODIUM 9 MG/ML
60 INJECTION, SOLUTION INTRAVENOUS ONCE
Qty: 0 | Refills: 0 | Status: COMPLETED | OUTPATIENT
Start: 2017-08-19 | End: 2017-08-19

## 2017-08-19 RX ORDER — POLYETHYLENE GLYCOL 3350 17 G/17G
17 POWDER, FOR SOLUTION ORAL DAILY
Qty: 0 | Refills: 0 | Status: DISCONTINUED | OUTPATIENT
Start: 2017-08-19 | End: 2017-08-22

## 2017-08-19 RX ORDER — ASPIRIN/CALCIUM CARB/MAGNESIUM 324 MG
81 TABLET ORAL DAILY
Qty: 0 | Refills: 0 | Status: DISCONTINUED | OUTPATIENT
Start: 2017-08-19 | End: 2017-09-08

## 2017-08-19 RX ORDER — FENTANYL CITRATE 50 UG/ML
1 INJECTION INTRAVENOUS
Qty: 0 | Refills: 0 | Status: DISCONTINUED | OUTPATIENT
Start: 2017-08-19 | End: 2017-08-24

## 2017-08-19 RX ORDER — DOCUSATE SODIUM 100 MG
100 CAPSULE ORAL THREE TIMES A DAY
Qty: 0 | Refills: 0 | Status: DISCONTINUED | OUTPATIENT
Start: 2017-08-19 | End: 2017-08-22

## 2017-08-19 RX ORDER — VANCOMYCIN HCL 1 G
1000 VIAL (EA) INTRAVENOUS EVERY 12 HOURS
Qty: 0 | Refills: 0 | Status: DISCONTINUED | OUTPATIENT
Start: 2017-08-19 | End: 2017-08-19

## 2017-08-19 RX ORDER — SIMVASTATIN 20 MG/1
20 TABLET, FILM COATED ORAL AT BEDTIME
Qty: 0 | Refills: 0 | Status: DISCONTINUED | OUTPATIENT
Start: 2017-08-19 | End: 2017-09-08

## 2017-08-19 RX ORDER — FERROUS SULFATE 325(65) MG
325 TABLET ORAL
Qty: 0 | Refills: 0 | Status: DISCONTINUED | OUTPATIENT
Start: 2017-08-19 | End: 2017-08-25

## 2017-08-19 RX ORDER — OXYCODONE HYDROCHLORIDE 5 MG/1
0 TABLET ORAL
Qty: 0 | Refills: 0 | COMMUNITY

## 2017-08-19 RX ORDER — AMIODARONE HYDROCHLORIDE 400 MG/1
200 TABLET ORAL DAILY
Qty: 0 | Refills: 0 | Status: DISCONTINUED | OUTPATIENT
Start: 2017-08-19 | End: 2017-09-08

## 2017-08-19 RX ORDER — SODIUM CHLORIDE 9 MG/ML
1000 INJECTION INTRAMUSCULAR; INTRAVENOUS; SUBCUTANEOUS
Qty: 0 | Refills: 0 | Status: DISCONTINUED | OUTPATIENT
Start: 2017-08-19 | End: 2017-08-21

## 2017-08-19 RX ORDER — FENTANYL CITRATE 50 UG/ML
100 INJECTION INTRAVENOUS ONCE
Qty: 0 | Refills: 0 | Status: DISCONTINUED | OUTPATIENT
Start: 2017-08-19 | End: 2017-08-21

## 2017-08-19 RX ORDER — PANTOPRAZOLE SODIUM 20 MG/1
40 TABLET, DELAYED RELEASE ORAL
Qty: 0 | Refills: 0 | Status: DISCONTINUED | OUTPATIENT
Start: 2017-08-19 | End: 2017-09-08

## 2017-08-19 RX ORDER — FENOFIBRATE,MICRONIZED 130 MG
48 CAPSULE ORAL AT BEDTIME
Qty: 0 | Refills: 0 | Status: DISCONTINUED | OUTPATIENT
Start: 2017-08-19 | End: 2017-08-25

## 2017-08-19 RX ORDER — MEROPENEM 1 G/30ML
INJECTION INTRAVENOUS
Qty: 0 | Refills: 0 | Status: DISCONTINUED | OUTPATIENT
Start: 2017-08-19 | End: 2017-08-19

## 2017-08-19 RX ORDER — MULTIVIT-MIN/FERROUS GLUCONATE 9 MG/15 ML
1 LIQUID (ML) ORAL DAILY
Qty: 0 | Refills: 0 | Status: DISCONTINUED | OUTPATIENT
Start: 2017-08-19 | End: 2017-08-24

## 2017-08-19 RX ORDER — CLOPIDOGREL BISULFATE 75 MG/1
75 TABLET, FILM COATED ORAL DAILY
Qty: 0 | Refills: 0 | Status: DISCONTINUED | OUTPATIENT
Start: 2017-08-19 | End: 2017-09-08

## 2017-08-19 RX ORDER — MEROPENEM 1 G/30ML
500 INJECTION INTRAVENOUS EVERY 8 HOURS
Qty: 0 | Refills: 0 | Status: DISCONTINUED | OUTPATIENT
Start: 2017-08-19 | End: 2017-08-19

## 2017-08-19 RX ORDER — SODIUM CHLORIDE 9 MG/ML
2000 INJECTION INTRAMUSCULAR; INTRAVENOUS; SUBCUTANEOUS ONCE
Qty: 0 | Refills: 0 | Status: COMPLETED | OUTPATIENT
Start: 2017-08-19 | End: 2017-08-19

## 2017-08-19 RX ORDER — ENOXAPARIN SODIUM 100 MG/ML
40 INJECTION SUBCUTANEOUS DAILY
Qty: 0 | Refills: 0 | Status: DISCONTINUED | OUTPATIENT
Start: 2017-08-19 | End: 2017-08-20

## 2017-08-19 RX ORDER — VANCOMYCIN HCL 1 G
VIAL (EA) INTRAVENOUS
Qty: 0 | Refills: 0 | Status: DISCONTINUED | OUTPATIENT
Start: 2017-08-19 | End: 2017-08-19

## 2017-08-19 RX ORDER — OXYCODONE HYDROCHLORIDE 5 MG/1
5 TABLET ORAL EVERY 4 HOURS
Qty: 0 | Refills: 0 | Status: DISCONTINUED | OUTPATIENT
Start: 2017-08-19 | End: 2017-08-24

## 2017-08-19 RX ORDER — OXYCODONE HYDROCHLORIDE 5 MG/1
5 TABLET ORAL ONCE
Qty: 0 | Refills: 0 | Status: DISCONTINUED | OUTPATIENT
Start: 2017-08-19 | End: 2017-08-19

## 2017-08-19 RX ORDER — SENNA PLUS 8.6 MG/1
2 TABLET ORAL AT BEDTIME
Qty: 0 | Refills: 0 | Status: DISCONTINUED | OUTPATIENT
Start: 2017-08-19 | End: 2017-09-08

## 2017-08-19 RX ORDER — METOPROLOL TARTRATE 50 MG
100 TABLET ORAL DAILY
Qty: 0 | Refills: 0 | Status: DISCONTINUED | OUTPATIENT
Start: 2017-08-19 | End: 2017-09-08

## 2017-08-19 RX ORDER — CALCITONIN SALMON 200 [IU]/ML
360 INJECTION, SOLUTION INTRAMUSCULAR EVERY 12 HOURS
Qty: 0 | Refills: 0 | Status: COMPLETED | OUTPATIENT
Start: 2017-08-19 | End: 2017-08-20

## 2017-08-19 RX ORDER — LACTOBACILLUS ACIDOPHILUS 100MM CELL
1 CAPSULE ORAL
Qty: 0 | Refills: 0 | Status: DISCONTINUED | OUTPATIENT
Start: 2017-08-19 | End: 2017-09-08

## 2017-08-19 RX ORDER — LEVOTHYROXINE SODIUM 125 MCG
200 TABLET ORAL DAILY
Qty: 0 | Refills: 0 | Status: DISCONTINUED | OUTPATIENT
Start: 2017-08-19 | End: 2017-09-08

## 2017-08-19 RX ORDER — SODIUM CHLORIDE 9 MG/ML
3 INJECTION INTRAMUSCULAR; INTRAVENOUS; SUBCUTANEOUS ONCE
Qty: 0 | Refills: 0 | Status: COMPLETED | OUTPATIENT
Start: 2017-08-19 | End: 2017-08-19

## 2017-08-19 RX ORDER — VANCOMYCIN HCL 1 G
1000 VIAL (EA) INTRAVENOUS ONCE
Qty: 0 | Refills: 0 | Status: COMPLETED | OUTPATIENT
Start: 2017-08-19 | End: 2017-08-19

## 2017-08-19 RX ADMIN — SENNA PLUS 2 TABLET(S): 8.6 TABLET ORAL at 21:22

## 2017-08-19 RX ADMIN — OXYCODONE HYDROCHLORIDE 5 MILLIGRAM(S): 5 TABLET ORAL at 06:40

## 2017-08-19 RX ADMIN — SODIUM CHLORIDE 3 MILLILITER(S): 9 INJECTION INTRAMUSCULAR; INTRAVENOUS; SUBCUTANEOUS at 01:02

## 2017-08-19 RX ADMIN — OXYCODONE HYDROCHLORIDE 5 MILLIGRAM(S): 5 TABLET ORAL at 18:05

## 2017-08-19 RX ADMIN — Medication 40 MILLIGRAM(S): at 00:07

## 2017-08-19 RX ADMIN — PAMIDRONATE DISODIUM 65 MILLIGRAM(S): 9 INJECTION, SOLUTION INTRAVENOUS at 05:30

## 2017-08-19 RX ADMIN — OXYCODONE HYDROCHLORIDE 5 MILLIGRAM(S): 5 TABLET ORAL at 06:06

## 2017-08-19 RX ADMIN — CLOPIDOGREL BISULFATE 75 MILLIGRAM(S): 75 TABLET, FILM COATED ORAL at 14:15

## 2017-08-19 RX ADMIN — Medication 325 MILLIGRAM(S): at 17:32

## 2017-08-19 RX ADMIN — SIMVASTATIN 20 MILLIGRAM(S): 20 TABLET, FILM COATED ORAL at 21:22

## 2017-08-19 RX ADMIN — Medication 1 TABLET(S): at 08:57

## 2017-08-19 RX ADMIN — SODIUM CHLORIDE 1000 MILLILITER(S): 9 INJECTION INTRAMUSCULAR; INTRAVENOUS; SUBCUTANEOUS at 05:30

## 2017-08-19 RX ADMIN — CALCITONIN SALMON 360 INTERNATIONAL UNIT(S): 200 INJECTION, SOLUTION INTRAMUSCULAR at 18:18

## 2017-08-19 RX ADMIN — MEROPENEM 200 MILLIGRAM(S): 1 INJECTION INTRAVENOUS at 14:14

## 2017-08-19 RX ADMIN — Medication 81 MILLIGRAM(S): at 14:15

## 2017-08-19 RX ADMIN — Medication 100 MILLIGRAM(S): at 05:56

## 2017-08-19 RX ADMIN — Medication 250 MILLIGRAM(S): at 06:13

## 2017-08-19 RX ADMIN — AMIODARONE HYDROCHLORIDE 200 MILLIGRAM(S): 400 TABLET ORAL at 05:56

## 2017-08-19 RX ADMIN — Medication 1 TABLET(S): at 14:23

## 2017-08-19 RX ADMIN — CALCITONIN SALMON 360 INTERNATIONAL UNIT(S): 200 INJECTION, SOLUTION INTRAMUSCULAR at 03:16

## 2017-08-19 RX ADMIN — Medication 325 MILLIGRAM(S): at 14:15

## 2017-08-19 RX ADMIN — FENTANYL CITRATE 1 PATCH: 50 INJECTION INTRAVENOUS at 14:15

## 2017-08-19 RX ADMIN — Medication 48 MILLIGRAM(S): at 21:22

## 2017-08-19 RX ADMIN — PANTOPRAZOLE SODIUM 40 MILLIGRAM(S): 20 TABLET, DELAYED RELEASE ORAL at 08:56

## 2017-08-19 RX ADMIN — Medication 200 MICROGRAM(S): at 05:56

## 2017-08-19 RX ADMIN — Medication 50 MILLILITER(S): at 05:30

## 2017-08-19 RX ADMIN — Medication 1 TABLET(S): at 17:32

## 2017-08-19 RX ADMIN — OXYCODONE HYDROCHLORIDE 5 MILLIGRAM(S): 5 TABLET ORAL at 21:30

## 2017-08-19 RX ADMIN — Medication 325 MILLIGRAM(S): at 08:56

## 2017-08-19 RX ADMIN — SODIUM CHLORIDE 125 MILLILITER(S): 9 INJECTION INTRAMUSCULAR; INTRAVENOUS; SUBCUTANEOUS at 18:21

## 2017-08-19 RX ADMIN — SODIUM CHLORIDE 500 MILLILITER(S): 9 INJECTION INTRAMUSCULAR; INTRAVENOUS; SUBCUTANEOUS at 00:08

## 2017-08-19 RX ADMIN — OXYCODONE HYDROCHLORIDE 5 MILLIGRAM(S): 5 TABLET ORAL at 20:51

## 2017-08-19 RX ADMIN — OXYCODONE HYDROCHLORIDE 5 MILLIGRAM(S): 5 TABLET ORAL at 17:32

## 2017-08-19 NOTE — CONSULT NOTE ADULT - SUBJECTIVE AND OBJECTIVE BOX
Surgeon:    Consult requesting by:    HISTORY OF PRESENT ILLNESS:  71y Male admitted from NH with hypercalcemia and leukocytosis.  Found with left pleural effusion on  CT.  Denies CP, SOB, cough.  able to lay flat for central line placement in ED.      PAST MEDICAL & SURGICAL HISTORY:  Psoriasis    PSVT (paroxysmal supraventricular tachycardia)  Chronic systolic CHF (congestive heart failure), NYHA class 2  Former smoker  Hypokinesis: apical  Mitral regurgitation  Bronchitis  Hypertension  PSVT (paroxysmal supraventricular tachycardia)  Ischemic cardiomyopathy  AICD (automatic cardioverter/defibrillator) present:  b[employer]  VT (ventricular tachycardia): May 2017 no ICD shock  Vitamin D deficiency  Tricuspid regurgitation  RBBB  Prostate cancer: s/p surgery no RT/CT  Mitral regurgitation  Hypothyroidism  HLD (hyperlipidemia)  Arrhythmia  Angina pectoris  CAD (coronary artery disease)  History of bronchoscopy:   History of prostate surgery: davinci surgery in   Cardiac disorder: triple bypass may 1997 [facility]  History of electrophysiologic study:  positive study (AICD placement  )  H/O cardiac catheterization: stenting of SVG TO PDA IN 2010 one stent      MEDICATIONS  (STANDING):  enoxaparin Injectable 40 milliGRAM(s) SubCutaneous daily  senna 2 Tablet(s) Oral at bedtime  amiodarone    Tablet 200 milliGRAM(s) Oral daily  aspirin  chewable 81 milliGRAM(s) Oral daily  clopidogrel Tablet 75 milliGRAM(s) Oral daily  pantoprazole    Tablet 40 milliGRAM(s) Oral before breakfast  fenofibrate Tablet 48 milliGRAM(s) Oral at bedtime  metoprolol succinate  milliGRAM(s) Oral daily  ferrous    sulfate 325 milliGRAM(s) Oral three times a day with meals  levothyroxine 200 MICROGram(s) Oral daily  fentaNYL   Patch  75 MICROgram(s)/Hr 1 Patch Transdermal every 72 hours  simvastatin 20 milliGRAM(s) Oral at bedtime  sodium chloride 0.9%. 1000 milliLiter(s) (125 mL/Hr) IV Continuous <Continuous>  calcitonin Injectable 360 International Unit(s) IntraMuscular every 12 hours  multivitamin/minerals 1 Tablet(s) Oral daily  meropenem IVPB   IV Intermittent   meropenem IVPB 500 milliGRAM(s) IV Intermittent once  meropenem IVPB 500 milliGRAM(s) IV Intermittent every 8 hours  vancomycin  IVPB 1000 milliGRAM(s) IV Intermittent every 12 hours  vancomycin  IVPB   IV Intermittent   lactobacillus acidophilus 1 Tablet(s) Oral two times a day with meals  fentaNYL    Injectable 100 MICROGram(s) IV Push once    MEDICATIONS  (PRN):  ALBUTerol/ipratropium for Nebulization 3 milliLiter(s) Nebulizer every 6 hours PRN Shortness of Breath and/or Wheezing  docusate sodium 100 milliGRAM(s) Oral three times a day PRN Constipation  polyethylene glycol 3350 17 Gram(s) Oral daily PRN Constipation  oxyCODONE    IR 5 milliGRAM(s) Oral every 4 hours PRN Moderate Pain (4 - 6)    Allergies    macrolide antibiotics (Urticaria; Rash; Hives)  penicillin (Urticaria; Rash)  Zithromax Z-Christian (Urticaria; Rash; Hives)    Intolerances        FAMILY HISTORY:  Family history of heart disease (Sibling)  Family history of diabetes mellitus (Sibling)  Family history of lung cancer (Sibling)  Family history of prostate cancer (Sibling)        PHYSICAL EXAM  Vital Signs Last 24 Hrs  T(C): 36.8 (19 Aug 2017 05:30), Max: 36.8 (19 Aug 2017 05:30)  T(F): 98.2 (19 Aug 2017 05:30), Max: 98.2 (19 Aug 2017 05:30)  HR: 74 (19 Aug 2017 05:30) (70 - 78)  BP: 113/60 (19 Aug 2017 05:30) (96/56 - 113/60)  BP(mean): --  RR: 16 (19 Aug 2017 05:30) (15 - 18)  SpO2: 100% (19 Aug 2017 05:30) (98% - 100%)    CONSTITUTIONAL:                                                                          WNL[ ]   A&O x2  Decreased BS L base, no r/r/w b/l  S1S2 well healed midline sternal incision  Abd +BS soft nt nd  Ext:  1+ edema b/l no c/c                                                          LABS:                        7.3    16.2  )-----------( 205      ( 19 Aug 2017 06:21 )             24.5     08-    135  |  97<L>  |  19.0  ----------------------------<  146<H>  4.1   |  27.0  |  1.31<H>    Ca    12.1<H>      19 Aug 2017 06:21  Phos  3.2     08-  Mg     2.0     08    TPro  6.4<L>  /  Alb  2.6<L>  /  TBili  0.4  /  DBili  x   /  AST  19  /  ALT  17  /  AlkPhos  190<H>      PT/INR - ( 18 Aug 2017 22:22 )   PT: 14.5 sec;   INR: 1.31 ratio         PTT - ( 18 Aug 2017 22:22 )  PTT:32.2 sec  Urinalysis Basic - ( 19 Aug 2017 03:16 )    Color: Yellow / Appearance: Clear / S.015 / pH: x  Gluc: x / Ketone: Negative  / Bili: Negative / Urobili: Negative mg/dL   Blood: x / Protein: Negative mg/dL / Nitrite: Negative   Leuk Esterase: Negative / RBC: x / WBC x   Sq Epi: x / Non Sq Epi: x / Bacteria: x      CT Chest:  LOWER CHEST: Large loculated left posterior basal pleural effusion with   pleural nodularity. Left lower lobe mass. Multiple new and enlarged   nodules in the right lung the largest right medial middle lobe abutting   the pericardium 2.4 x 2.2 cm.

## 2017-08-19 NOTE — PROCEDURE NOTE - NSPOSTPRCRAD_GEN_A_CORE
oral
central line located in the superior vena cava/no pneumothorax/post procedure radiography not performed

## 2017-08-19 NOTE — ED ADULT NURSE REASSESSMENT NOTE - NS ED NURSE REASSESS COMMENT FT1
Attempted to call [facility] nurse for report, no one is answering phone. Called [facility] charge nurse and no one answered. ANM notified and contacting supervision.

## 2017-08-19 NOTE — CONSULT NOTE ADULT - SUBJECTIVE AND OBJECTIVE BOX
[facility]  =======================================================  Bethel Uribe MD  Diplomates American Board of Internal Medicine and Infectious Diseases  =======================================================    [id number]  SULEIMAN GREGORY is a 71y  Male     =======================================================  Past Medical & Surgical Hx:  =====================  PAST MEDICAL & SURGICAL HISTORY:  Psoriasis    PSVT (paroxysmal supraventricular tachycardia)  Chronic systolic CHF (congestive heart failure), NYHA class 2  Former smoker  Hypokinesis: apical  Mitral regurgitation  Bronchitis  Hypertension  PSVT (paroxysmal supraventricular tachycardia)  Ischemic cardiomyopathy  AICD (automatic cardioverter/defibrillator) present:  b[employer]  VT (ventricular tachycardia): May 2017 no ICD shock  Vitamin D deficiency  Tricuspid regurgitation  RBBB  Prostate cancer: s/p surgery no RT/CT  Mitral regurgitation  Hypothyroidism  HLD (hyperlipidemia)  Arrhythmia  Angina pectoris  CAD (coronary artery disease)  History of bronchoscopy: 2017  History of prostate surgery: davinci surgery in   Cardiac disorder: triple bypass may 1997 [facility]  History of electrophysiologic study:  positive study (AICD placement  )  H/O cardiac catheterization: stenting of SVG TO PDA IN 2010 one stent      Problem List:  ==========  HEALTH ISSUES - PROBLEM Dx:  Pleural effusion: Pleural effusion          Social Hx:  =======  no toxic habits currently      FAMILY HISTORY:  Family history of heart disease (Sibling)  Family history of diabetes mellitus (Sibling)  Family history of lung cancer (Sibling)  Family history of prostate cancer (Sibling)      Allergies    macrolide antibiotics (Urticaria; Rash; Hives)  penicillin (Urticaria; Rash)  Zithromax Z-Christian (Urticaria; Rash; Hives)    Intolerances        MEDICATIONS  (STANDING):  enoxaparin Injectable 40 milliGRAM(s) SubCutaneous daily  senna 2 Tablet(s) Oral at bedtime  amiodarone    Tablet 200 milliGRAM(s) Oral daily  aspirin  chewable 81 milliGRAM(s) Oral daily  clopidogrel Tablet 75 milliGRAM(s) Oral daily  pantoprazole    Tablet 40 milliGRAM(s) Oral before breakfast  fenofibrate Tablet 48 milliGRAM(s) Oral at bedtime  metoprolol succinate  milliGRAM(s) Oral daily  ferrous    sulfate 325 milliGRAM(s) Oral three times a day with meals  levothyroxine 200 MICROGram(s) Oral daily  fentaNYL   Patch  75 MICROgram(s)/Hr 1 Patch Transdermal every 72 hours  simvastatin 20 milliGRAM(s) Oral at bedtime  sodium chloride 0.9%. 1000 milliLiter(s) (125 mL/Hr) IV Continuous <Continuous>  calcitonin Injectable 360 International Unit(s) IntraMuscular every 12 hours  multivitamin/minerals 1 Tablet(s) Oral daily  lactobacillus acidophilus 1 Tablet(s) Oral two times a day with meals  fentaNYL    Injectable 100 MICROGram(s) IV Push once    MEDICATIONS  (PRN):  ALBUTerol/ipratropium for Nebulization 3 milliLiter(s) Nebulizer every 6 hours PRN Shortness of Breath and/or Wheezing  docusate sodium 100 milliGRAM(s) Oral three times a day PRN Constipation  polyethylene glycol 3350 17 Gram(s) Oral daily PRN Constipation  oxyCODONE    IR 5 milliGRAM(s) Oral every 4 hours PRN Moderate Pain (4 - 6)        =======================================================  REVIEW OF SYSTEMS:  Constitutional: No fever, No chills, No sweats.  Eye: No icterus, No double vision.  Ear/Nose/Mouth/Throat: No nasal congestion, No sore throat.  Respiratory: No shortness of breath, No cough, No sputum production, No wheezing.  Cardiovascular: No chest pain, No palpitations, No syncope.  Gastrointestinal: No nausea, No vomiting, No diarrhea, No abdominal pain.  Genitourinary: No dysuria, No hematuria, No change in urine stream.  Hematology/Lymphatics: No bleeding tendency.  Endocrine: No excessive thirst, No polyuria.  Immunologic: No malaise.  Musculoskeletal: No back pain, No neck pain, No joint pain, No muscle pain.  Integumentary: No rash, No pruritus, No skin lesion.  Neurologic: No numbness, No tingling, No headache.  Psychiatric: No depression.    =======================================================  I&O's Detail    19 Aug 2017 07:01  -  19 Aug 2017 14:23  --------------------------------------------------------  IN:  Total IN: 0 mL    OUT:    Voided: 300 mL  Total OUT: 300 mL    Total NET: -300 mL          Physical Exam:  ============  Vital Signs Last 24 Hrs  T(C): 36.8 (19 Aug 2017 05:30), Max: 36.8 (19 Aug 2017 05:30)  T(F): 98.2 (19 Aug 2017 05:30), Max: 98.2 (19 Aug 2017 05:30)  HR: 72 (19 Aug 2017 09:00) (70 - 78)  BP: 96/58 (19 Aug 2017 09:00) (96/56 - 113/60)  BP(mean): --  RR: 16 (19 Aug 2017 09:00) (15 - 18)  SpO2: 100% (19 Aug 2017 09:00) (98% - 100%)  Height (cm): 175.26 ( @ 18:32)  Weight (kg): 90.7 ( @ 18:32), 83 ( @ 14:32)  BMI (kg/m2): 29.5 ( @ 18:32)  BSA (m2): 2.07 ( @ 18:32)  General: Alert and oriented, No acute distress.  Eye: Pupils are equal, round and reactive to light, Extraocular movements are intact, Normal conjunctiva.  HENT: Normocephalic, Oral mucosa is moist, No pharyngeal erythema, No sinus tenderness.  Neck: Supple, No lymphadenopathy.  Respiratory: Lungs are clear to auscultation, Respirations are non-labored.  Cardiovascular: Normal rate, Regular rhythm, No murmur, Good pulses equal in all extremities, No edema.  Gastrointestinal: Soft, Non-tender, Non-distended, Normal bowel sounds.  Genitourinary: No costovertebral angle tenderness.  Lymphatics: No lymphadenopathy neck, axilla, groin.  Musculoskeletal: Normal range of motion, Normal strength.  Integumentary: No rash.  Neurologic: Alert, Oriented, No focal deficits, Cranial Nerves II-XII are grossly intact.  Psychiatric: Appropriate mood & affect.      =======================================================  Labs:  ====    Labs:      135  |  97<L>  |  19.0  ----------------------------<  146<H>  4.1   |  27.0  |  1.31<H>    Ca    12.1<H>      19 Aug 2017 06:21  Phos  3.2       Mg     2.0         TPro  6.4<L>  /  Alb  2.6<L>  /  TBili  0.4  /  DBili  x   /  AST  19  /  ALT  17  /  AlkPhos  190<H>                            7.3    16.2  )-----------( 205      ( 19 Aug 2017 06:21 )             24.5       PT/INR - ( 18 Aug 2017 22:22 )   PT: 14.5 sec;   INR: 1.31 ratio         PTT - ( 18 Aug 2017 22:22 )  PTT:32.2 sec  Urinalysis Basic - ( 19 Aug 2017 03:16 )    Color: Yellow / Appearance: Clear / S.015 / pH: x  Gluc: x / Ketone: Negative  / Bili: Negative / Urobili: Negative mg/dL   Blood: x / Protein: Negative mg/dL / Nitrite: Negative   Leuk Esterase: Negative / RBC: x / WBC x   Sq Epi: x / Non Sq Epi: x / Bacteria: x      LIVER FUNCTIONS - ( 18 Aug 2017 22:22 )  Alb: 2.6 g/dL / Pro: 6.4 g/dL / ALK PHOS: 190 U/L / ALT: 17 U/L / AST: 19 U/L / GGT: x                ================     EXAM:  CT ABDOMEN AND PELVIS                          PROCEDURE DATE:  2017          INTERPRETATION:  Clinical information: Abdominal distention    Comparison: CT chest  and PET/CT 7/3    PROCEDURE:     CT of the Abdomen and Pelvis was performed without intravenous contrast.    Evaluation of abdominal and pelvic viscera, lymph nodes and vasculature is limited without intravenous contrast.    FINDINGS:    LOWER CHEST: Large loculated left posterior basal pleural effusion with pleural nodularity. Left lower lobe mass. Multiple new and enlarged nodules in the right lung the largest right medial middle lobe abutting the pericardium 2.4 x 2.2 cm.    ABDOMEN:  LIVER: within normal limits  BILE DUCTS: normal caliber  GALLBLADDER: Sludge  PANCREAS: within normal limits  SPLEEN: within normal limits  ADRENALS: within normal limits  KIDNEYS: within normal limits    PELVIS:  REPRODUCTIVE ORGANS: no pelvic masses, probable prostatectomy.  BLADDER: within normal limits    BOWEL: Mildly distended stomach, correlate for gastroparesis. Mildly distended colon, possibly ileus. Otherwise unremarkable. Normal appendix.  PERITONEUM: no ascites or free air, no fluid collection  RETROPERITONEUM: no enlarged retroperitoneal or pelvic nodes.    VESSELS: within normal limits for a noncontrast study.  ABDOMINAL WALL: within normal limits.  MUSCULOSKELETAL: Destructive lesion S1 vertebral body, enlarged.    IMPRESSION:     Reaccumulation of large loculated left basal complex pleural effusion.   Progression of lung nodules.    Enlarged sacral osseous metastasis.    Mildly distended stomach, correlate for gastroparesis. Mildly distended colon, correlate for ileus.    Otherwise no acute findings. Incidental comments as above.        LISY CONN M.D., ATTENDING RADIOLOGIST  This document has been electronically signed. Aug 19 2017  9:15AM NPP INFECTIOUS DISEASES AND INTERNAL MEDICINE OF [place]  =======================================================  Bethel Bernard MD [facility]   Darryl Uribe MD  Diplomates American Board of Internal Medicine and Infectious Diseases  =======================================================    [id number]  SULEIMAN GREGORY is a 71y  Male     This 71 y.o. man with CAD, s/p stent, HTN, HLD, former smoker, hx of LLL mass, on PET scan with areas of metastasis including left hilar lymphadenopathy, left pleural mets, left sacrum lesion, WORKUP in process.  Previously admitted from 17 to  He was admitted on 17 to 17 for WBC elevation, thought to be pneumonia   he was treated for presumed postobstructive PNA.  Cultures from all sources, including pleural fluid were negative.   he completed a course Levaquin for presumed empyema due to elevated WBC.      He was off antibiotics for about 2 days and a CXR was done, concerning for a process in the left side, and RESTARTED on Levaquin.  He was found on labs with Hypercalcemia and WBC elevation and sent here.   At the SNF, he denies fevers, chill or Sick contacts. He has been coughing up bloody sputum.  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  VT (ventricular tachycardia): May 2017 no ICD shock  Vitamin D deficiency  Tricuspid regurgitation  RBBB  Prostate cancer: s/p surgery no RT/CT  Mitral regurgitation  Hypothyroidism  HLD (hyperlipidemia)  Arrhythmia  Angina pectoris  CAD (coronary artery disease)  History of bronchoscopy:   History of prostate surgery: DaVinci surgery in   Cardiac disorder: triple bypass may 1997 [facility]  History of electrophysiologic study:  positive study (AICD placement  )  H/O cardiac catheterization: stenting of SVG TO PDA IN 2010 one stent      Problem List:  ==========  HEALTH ISSUES - PROBLEM Dx:  Pleural effusion: Pleural effusion    Social Hx:  =======  no toxic habits currently      FAMILY HISTORY:  Family history of heart disease (Sibling)  Family history of diabetes mellitus (Sibling)  Family history of lung cancer (Sibling)  Family history of prostate cancer (Sibling)    Allergies    macrolide antibiotics (Urticaria; Rash; Hives)  penicillin (Urticaria; Rash)  Zithromax Z-Christian (Urticaria; Rash; Hives)    Intolerances    MEDICATIONS  (STANDING):  enoxaparin Injectable 40 milliGRAM(s) SubCutaneous daily  senna 2 Tablet(s) Oral at bedtime  amiodarone    Tablet 200 milliGRAM(s) Oral daily  aspirin  chewable 81 milliGRAM(s) Oral daily  clopidogrel Tablet 75 milliGRAM(s) Oral daily  pantoprazole    Tablet 40 milliGRAM(s) Oral before breakfast  fenofibrate Tablet 48 milliGRAM(s) Oral at bedtime  metoprolol succinate  milliGRAM(s) Oral daily  ferrous    sulfate 325 milliGRAM(s) Oral three times a day with meals  levothyroxine 200 MICROGram(s) Oral daily  fentaNYL   Patch  75 MICROgram(s)/Hr 1 Patch Transdermal every 72 hours  simvastatin 20 milliGRAM(s) Oral at bedtime  sodium chloride 0.9%. 1000 milliLiter(s) (125 mL/Hr) IV Continuous <Continuous>  calcitonin Injectable 360 International Unit(s) IntraMuscular every 12 hours  multivitamin/minerals 1 Tablet(s) Oral daily  lactobacillus acidophilus 1 Tablet(s) Oral two times a day with meals  fentaNYL    Injectable 100 MICROGram(s) IV Push once    MEDICATIONS  (PRN):  ALBUTerol/ipratropium for Nebulization 3 milliLiter(s) Nebulizer every 6 hours PRN Shortness of Breath and/or Wheezing  docusate sodium 100 milliGRAM(s) Oral three times a day PRN Constipation  polyethylene glycol 3350 17 Gram(s) Oral daily PRN Constipation  oxyCODONE    IR 5 milliGRAM(s) Oral every 4 hours PRN Moderate Pain (4 - 6)        =======================================================  REVIEW OF SYSTEMS:  Constitutional: No fever, No chills, No sweats.  Eye: No icterus, No double vision.  Ear/Nose/Mouth/Throat: No nasal congestion, No sore throat.  Respiratory: POSITIVE shortness of breath, No cough, POSITIVE sputum production, No wheezing.  Cardiovascular: No chest pain, No palpitations, No syncope.  Gastrointestinal: No nausea, No vomiting, No diarrhea, No abdominal pain.  Genitourinary: No dysuria, No hematuria, No change in urine stream.  Hematology/Lymphatics: No bleeding tendency.  Endocrine: No excessive thirst, No polyuria.  Immunologic: No malaise.  Musculoskeletal: No back pain, No neck pain, No joint pain, No muscle pain.  Integumentary: No rash, No pruritus, No skin lesion.  Neurologic: No numbness, No tingling, No headache.  Psychiatric: No depression.    =======================================================  I&O's Detail    19 Aug 2017 07:01  -  19 Aug 2017 14:23  --------------------------------------------------------  IN:  Total IN: 0 mL    OUT:    Voided: 300 mL  Total OUT: 300 mL    Total NET: -300 mL          Physical Exam:  ============  Vital Signs Last 24 Hrs  T(C): 36.8 (19 Aug 2017 05:30), Max: 36.8 (19 Aug 2017 05:30)  T(F): 98.2 (19 Aug 2017 05:30), Max: 98.2 (19 Aug 2017 05:30)  HR: 72 (19 Aug 2017 09:00) (70 - 78)  BP: 96/58 (19 Aug 2017 09:00) (96/56 - 113/60)  BP(mean): --  RR: 16 (19 Aug 2017 09:00) (15 - 18)  SpO2: 100% (19 Aug 2017 09:00) (98% - 100%)  Height (cm): 175.26 ( @ 18:32)  Weight (kg): 90.7 ( @ 18:32), 83 ( @ 14:32)  BMI (kg/m2): 29.5 ( @ 18:32)  BSA (m2): 2.07 ( @ 18:32)    General: Alert and oriented, No acute distress.  Eye: Pupils are equal, round and reactive to light, Extraocular movements are intact, Normal conjunctiva.  HENT: Normocephalic, Oral mucosa is moist, No pharyngeal erythema, No sinus tenderness.  Neck: Supple, No lymphadenopathy.  Respiratory: DIMINISHED BREATH SOUNDS AT LEFT LUNG; DULLNESS TO PERCUSSION FROM LEFT MID LUNG ZONE TO LEFT BASE  Cardiovascular: Normal rate, Regular rhythm, No murmur, Good pulses equal in all extremities, TRACE EDEMA  Gastrointestinal: Soft, Non-tender, MILDLY-distended, Normal bowel sounds. TYMPANETIC  Genitourinary: No costovertebral angle tenderness.  Lymphatics: No lymphadenopathy neck, axilla, groin.  Musculoskeletal: Normal range of motion, Normal strength.  Integumentary: No rash.  Neurologic: Alert, Oriented, No focal deficits, Cranial Nerves II-XII are grossly intact.  Psychiatric: Appropriate mood & affect.      =======================================================  Labs:  ====    Labs:      135  |  97<L>  |  19.0  ----------------------------<  146<H>  4.1   |  27.0  |  1.31<H>    Ca    12.1<H>      19 Aug 2017 06:21  Phos  3.2       Mg     2.0         TPro  6.4<L>  /  Alb  2.6<L>  /  TBili  0.4  /  DBili  x   /  AST  19  /  ALT  17  /  AlkPhos  190<H>                            7.3    16.2  )-----------( 205      ( 19 Aug 2017 06:21 )             24.5       PT/INR - ( 18 Aug 2017 22:22 )   PT: 14.5 sec;   INR: 1.31 ratio         PTT - ( 18 Aug 2017 22:22 )  PTT:32.2 sec  Urinalysis Basic - ( 19 Aug 2017 03:16 )    Color: Yellow / Appearance: Clear / S.015 / pH: x  Gluc: x / Ketone: Negative  / Bili: Negative / Urobili: Negative mg/dL   Blood: x / Protein: Negative mg/dL / Nitrite: Negative   Leuk Esterase: Negative / RBC: x / WBC x   Sq Epi: x / Non Sq Epi: x / Bacteria: x      LIVER FUNCTIONS - ( 18 Aug 2017 22:22 )  Alb: 2.6 g/dL / Pro: 6.4 g/dL / ALK PHOS: 190 U/L / ALT: 17 U/L / AST: 19 U/L / GGT: x                ================     EXAM:  CT ABDOMEN AND PELVIS                          PROCEDURE DATE:  2017          INTERPRETATION:  Clinical information: Abdominal distention    Comparison: CT chest  and PET/CT 7/3    PROCEDURE:     CT of the Abdomen and Pelvis was performed without intravenous contrast.    Evaluation of abdominal and pelvic viscera, lymph nodes and vasculature is limited without intravenous contrast.    FINDINGS:    LOWER CHEST: Large loculated left posterior basal pleural effusion with pleural nodularity. Left lower lobe mass. Multiple new and enlarged nodules in the right lung the largest right medial middle lobe abutting the pericardium 2.4 x 2.2 cm.    ABDOMEN:  LIVER: within normal limits  BILE DUCTS: normal caliber  GALLBLADDER: Sludge  PANCREAS: within normal limits  SPLEEN: within normal limits  ADRENALS: within normal limits  KIDNEYS: within normal limits    PELVIS:  REPRODUCTIVE ORGANS: no pelvic masses, probable prostatectomy.  BLADDER: within normal limits    BOWEL: Mildly distended stomach, correlate for gastroparesis. Mildly distended colon, possibly ileus. Otherwise unremarkable. Normal appendix.  PERITONEUM: no ascites or free air, no fluid collection  RETROPERITONEUM: no enlarged retroperitoneal or pelvic nodes.    VESSELS: within normal limits for a noncontrast study.  ABDOMINAL WALL: within normal limits.  MUSCULOSKELETAL: Destructive lesion S1 vertebral body, enlarged.    IMPRESSION:     Reaccumulation of large loculated left basal complex pleural effusion.   Progression of lung nodules.    Enlarged sacral osseous metastasis.    Mildly distended stomach, correlate for gastroparesis. Mildly distended colon, correlate for ileus.    Otherwise no acute findings. Incidental comments as above.        LISY CONN M.D., ATTENDING RADIOLOGIST  This document has been electronically signed. Aug 19 2017  9:15AM

## 2017-08-19 NOTE — ED ADULT NURSE REASSESSMENT NOTE - NS ED NURSE REASSESS COMMENT FT1
Pt still has no IV access due to EJ being positional. Dr. Del Valle and Dr. Marquez made aware that pt is not receiving any IV medication ordered at this time. Charge RN Made aware at this time. Will continue to monitor pt.

## 2017-08-19 NOTE — H&P ADULT - PSH
Cardiac disorder  triple bypass may 1997 [facility]  H/O cardiac catheterization  stenting of SVG TO PDA IN 2010 2017 one stent  History of bronchoscopy  2017  History of electrophysiologic study  2009 positive study (AICD placement  2010)  History of prostate surgery  davinci surgery in 2008

## 2017-08-19 NOTE — H&P ADULT - NSHPPHYSICALEXAM_GEN_ALL_CORE
Vital Signs   T(C): 36.7 (18 Aug 2017 18:32), Max: 36.7 (18 Aug 2017 18:32)  T(F): 98.1 (18 Aug 2017 18:32), Max: 98.1 (18 Aug 2017 18:32)  HR: 70 (19 Aug 2017 00:08) (70 - 78)  BP: 102/60 (19 Aug 2017 00:08) (102/60 - 108/73)  RR: 15 (18 Aug 2017 22:35) (15 - 18)  SpO2: 99% (18 Aug 2017 22:35) (98% - 99%)  General: Looks ill. Not in acute distress.  HEENT: PERRLA. EOMI. Clear conjunctivae. Dry mucus membrane  Neck: Supple. No JVD. No Thyromegaly   Chest: Decreased air entry on left side. No wheezing, rales or rhonchi. No chest wall tenderness.  Heart: Normal S1 & S2 with RRR.   Abdomen: Obese. Soft. Non-tender. + BS  Ext: No pedal edema. No calf tenderness   Neuro: Alert and awake, No focal deficit, CN II-XII grossly WNL and no speech disorder  Skin: Warm and Dry

## 2017-08-19 NOTE — CONSULT NOTE ADULT - ASSESSMENT
This 71 y.o. man with metastatic lung cancer, now with reaccumulation of Left loculated effusion. This 71 y.o. man with metastatic lung cancer, now with reaccumulation of Left loculated effusion.    NO CLEAR EVIDENCE OF INFECTION. Pt previously treated for extended time for presumed empyema.

## 2017-08-19 NOTE — H&P ADULT - ASSESSMENT
71 years old male with Metastatic Lung Cancer, CAD s/p stents, V. Tach, HTN and HLD sent from [facility] with hypercalcemia, low albumin and leucocytosis. Patient was recently diagnosed with Metastatic Lung Cancer in July 2017 and currently in the process of getting immunotherapy. Patient is complaining of back pain which is chronic. Denies any chest pain, palpitations, shortness of breath, abdominal pain, constipation or fever. He is alert and awake but not completely oriented to time and place (as per family, he has been confused intermittently since July).  He was hypercalcemic in July and was treated with IVF, Calcitonin and Pamidronate. He also had left loculated pleural effusion and was treated with IV antibiotics and chest tube (which was later removed).     1) Hypercalcemia  - Likely secondary to malignancy  - IVF  - Calcitonin 360 mg q 12 hours   - Pamidronate 60 mg x 1  - Monitor renal function  - Will consider Renal Consult  2) Loculated Effusion  - Meropenem and Vancomycin  - CTS Consult  3) Metastatic Lung Cancer  - Patient is currently in the process of getting immunotherapy.  - Palliative Consult  4) 71 years old male with Metastatic Lung Cancer, CAD s/p stents, V. Tach, HTN, HLD and Hypothyroidism sent from [facility] with hypercalcemia, low albumin and leucocytosis. Patient was recently diagnosed with Metastatic Lung Cancer in July 2017 and currently in the process of getting immunotherapy. Patient is complaining of back pain which is chronic. Denies any chest pain, palpitations, shortness of breath, abdominal pain, constipation or fever. He is alert and awake but not completely oriented to time and place (as per family, he has been confused intermittently since July).  He was hypercalcemic in July and was treated with IVF, Calcitonin and Pamidronate. He also had left loculated pleural effusion and was treated with IV antibiotics and chest tube (which was later removed).     1) Hypercalcemia  - Likely secondary to malignancy  - IVF  - Calcitonin 360 mg q 12 hours   - Pamidronate 60 mg x 1  - Monitor renal function  - Will consider Renal Consult  2) Loculated Effusion  - Meropenem and Vancomycin  - CTS Consult  3) Metastatic Lung Cancer  - Patient is currently in the process of getting immunotherapy.  - Palliative Consult  4) Anemia  - Chronic  - Continue Ferrous Sulfate  - Will consider transfusion if H&H drops further given cardiac history  5) CAD s/p stents, HTN and HLD  - Aspirin, Plavix, Simvastatin and Metoprolol  6) V. Tach  - Continue Amiodarone   7) Hypothyroidism  - Continue Synthroid   DVT Prophylaxis -- Lovenox 40 mg

## 2017-08-19 NOTE — H&P ADULT - REASON FOR ADMISSION
Hypercalcemia, Low Albumin and Leucocytosis Hypercalcemia (12.5), Low Albumin and Leucocytosis (14.17)

## 2017-08-19 NOTE — ED ADULT NURSE REASSESSMENT NOTE - NS ED NURSE REASSESS COMMENT FT1
Time out @ [id number] from CTICU placed a left femoral central line. Procedure was done under sterile field. Consent signed and in pts chart. VSS during procedure. Will continue to monitor pt.

## 2017-08-19 NOTE — CHART NOTE - NSCHARTNOTEFT_GEN_A_CORE
Patient is seen and evaluated, patient is looking lethargic but answering questions appropriately, he has no fever, chills, CT surgery and ID consult appreciated, antibiotics d/alex, patient has low H&H, no signs of bleeding, looks like chronic anemia, will monitor H&H, will transfused as needed.  Patient has Femoral central line, will d/c not needed later during the course

## 2017-08-19 NOTE — H&P ADULT - HISTORY OF PRESENT ILLNESS
71 years old male with Metastatic Lung Cancer, CAD s/p stents, V. Tach, HTN and HLD sent from [facility] with hypercalcemia, low albumin and leucocytosis. Patient was recently diagnosed with Metastatic Lung Cancer in July 2017 and currently in the process of getting immunotherapy. 71 years old male with Metastatic Lung Cancer, CAD s/p stents, V. Tach, HTN and HLD sent from [facility] with hypercalcemia, low albumin and leucocytosis. Patient was recently diagnosed with Metastatic Lung Cancer in July 2017 and currently in the process of getting immunotherapy. Patient is complaining of back pain which is chronic. Denies any chest pain, palpitations, shortness of breath, abdominal pain, constipation or fever. He is alert and awake but not completely oriented to time and place (as per family, he has been confused intermittently since July).  He was hypercalcemic in July and was treated with IVF, Calcitonin and Pamidronate. He also had left loculated pleural effusion and was treated with IV antibiotics and chest tube (which was later removed). 71 years old male with Metastatic Lung Cancer, CAD s/p stents, V. Tach, HTN, HLD and Hypothyroidism sent from [facility] with hypercalcemia, low albumin and leucocytosis. Patient was recently diagnosed with Metastatic Lung Cancer in July 2017 and currently in the process of getting immunotherapy. Patient is complaining of back pain which is chronic. Denies any chest pain, palpitations, shortness of breath, abdominal pain, constipation or fever. He is alert and awake but not completely oriented to time and place (as per family, he has been confused intermittently since July).  He was hypercalcemic in July and was treated with IVF, Calcitonin and Pamidronate. He also had left loculated pleural effusion and was treated with IV antibiotics and chest tube (which was later removed).

## 2017-08-19 NOTE — H&P ADULT - NSHPLABSRESULTS_GEN_ALL_CORE
LABS:                        7.8    19.0  )-----------( 233      ( 18 Aug 2017 22:22 )             25.6     08-18    131<L>  |  97<L>  |  19.0  ----------------------------<  176<H>  5.0   |  26.0  |  1.28    Ca    13.1<HH>      18 Aug 2017 22:22    TPro  6.4<L>  /  Alb  2.6<L>  /  TBili  0.4  /  DBili  x   /  AST  19  /  ALT  17  /  AlkPhos  190<H>  08-18    PT/INR - ( 18 Aug 2017 22:22 )   PT: 14.5 sec;   INR: 1.31 ratio         PTT - ( 18 Aug 2017 22:22 )  PTT:32.2 sec    EKG: NSR at 76 bpm. Normal axis. RBBB (Chronic).

## 2017-08-20 LAB
ANION GAP SERPL CALC-SCNC: 13 MMOL/L — SIGNIFICANT CHANGE UP (ref 5–17)
BLD GP AB SCN SERPL QL: SIGNIFICANT CHANGE UP
BUN SERPL-MCNC: 13 MG/DL — SIGNIFICANT CHANGE UP (ref 8–20)
CALCIUM SERPL-MCNC: 10.4 MG/DL — HIGH (ref 8.6–10.2)
CHLORIDE SERPL-SCNC: 101 MMOL/L — SIGNIFICANT CHANGE UP (ref 98–107)
CO2 SERPL-SCNC: 22 MMOL/L — SIGNIFICANT CHANGE UP (ref 22–29)
CREAT SERPL-MCNC: 1.08 MG/DL — SIGNIFICANT CHANGE UP (ref 0.5–1.3)
GLUCOSE SERPL-MCNC: 96 MG/DL — SIGNIFICANT CHANGE UP (ref 70–115)
HCT VFR BLD CALC: 27.2 % — LOW (ref 42–52)
HGB BLD-MCNC: 8 G/DL — LOW (ref 14–18)
INR BLD: 1.31 RATIO — HIGH (ref 0.88–1.16)
MCHC RBC-ENTMCNC: 25.6 PG — LOW (ref 27–31)
MCHC RBC-ENTMCNC: 29.4 G/DL — LOW (ref 32–36)
MCV RBC AUTO: 87.2 FL — SIGNIFICANT CHANGE UP (ref 80–94)
PLATELET # BLD AUTO: 272 K/UL — SIGNIFICANT CHANGE UP (ref 150–400)
POTASSIUM SERPL-MCNC: 4 MMOL/L — SIGNIFICANT CHANGE UP (ref 3.5–5.3)
POTASSIUM SERPL-SCNC: 4 MMOL/L — SIGNIFICANT CHANGE UP (ref 3.5–5.3)
PROTHROM AB SERPL-ACNC: 14.5 SEC — HIGH (ref 9.8–12.7)
RBC # BLD: 3.12 M/UL — LOW (ref 4.6–6.2)
RBC # FLD: 16.9 % — HIGH (ref 11–15.6)
SODIUM SERPL-SCNC: 136 MMOL/L — SIGNIFICANT CHANGE UP (ref 135–145)
TYPE + AB SCN PNL BLD: SIGNIFICANT CHANGE UP
WBC # BLD: 18.8 K/UL — HIGH (ref 4.8–10.8)
WBC # FLD AUTO: 18.8 K/UL — HIGH (ref 4.8–10.8)

## 2017-08-20 RX ORDER — MORPHINE SULFATE 50 MG/1
2 CAPSULE, EXTENDED RELEASE ORAL EVERY 4 HOURS
Qty: 0 | Refills: 0 | Status: DISCONTINUED | OUTPATIENT
Start: 2017-08-20 | End: 2017-08-21

## 2017-08-20 RX ADMIN — MORPHINE SULFATE 2 MILLIGRAM(S): 50 CAPSULE, EXTENDED RELEASE ORAL at 21:00

## 2017-08-20 RX ADMIN — AMIODARONE HYDROCHLORIDE 200 MILLIGRAM(S): 400 TABLET ORAL at 05:25

## 2017-08-20 RX ADMIN — MORPHINE SULFATE 2 MILLIGRAM(S): 50 CAPSULE, EXTENDED RELEASE ORAL at 20:52

## 2017-08-20 RX ADMIN — Medication 200 MICROGRAM(S): at 05:24

## 2017-08-20 RX ADMIN — MORPHINE SULFATE 2 MILLIGRAM(S): 50 CAPSULE, EXTENDED RELEASE ORAL at 14:46

## 2017-08-20 RX ADMIN — MORPHINE SULFATE 2 MILLIGRAM(S): 50 CAPSULE, EXTENDED RELEASE ORAL at 15:30

## 2017-08-20 RX ADMIN — CLOPIDOGREL BISULFATE 75 MILLIGRAM(S): 75 TABLET, FILM COATED ORAL at 11:53

## 2017-08-20 RX ADMIN — Medication 1 TABLET(S): at 18:03

## 2017-08-20 RX ADMIN — Medication 325 MILLIGRAM(S): at 08:55

## 2017-08-20 RX ADMIN — OXYCODONE HYDROCHLORIDE 5 MILLIGRAM(S): 5 TABLET ORAL at 05:25

## 2017-08-20 RX ADMIN — Medication 48 MILLIGRAM(S): at 20:54

## 2017-08-20 RX ADMIN — CALCITONIN SALMON 360 INTERNATIONAL UNIT(S): 200 INJECTION, SOLUTION INTRAMUSCULAR at 18:04

## 2017-08-20 RX ADMIN — SODIUM CHLORIDE 125 MILLILITER(S): 9 INJECTION INTRAMUSCULAR; INTRAVENOUS; SUBCUTANEOUS at 18:03

## 2017-08-20 RX ADMIN — OXYCODONE HYDROCHLORIDE 5 MILLIGRAM(S): 5 TABLET ORAL at 12:46

## 2017-08-20 RX ADMIN — Medication 1 TABLET(S): at 08:55

## 2017-08-20 RX ADMIN — SENNA PLUS 2 TABLET(S): 8.6 TABLET ORAL at 20:54

## 2017-08-20 RX ADMIN — Medication 325 MILLIGRAM(S): at 18:03

## 2017-08-20 RX ADMIN — Medication 1 TABLET(S): at 11:53

## 2017-08-20 RX ADMIN — Medication 81 MILLIGRAM(S): at 11:52

## 2017-08-20 RX ADMIN — OXYCODONE HYDROCHLORIDE 5 MILLIGRAM(S): 5 TABLET ORAL at 06:00

## 2017-08-20 RX ADMIN — OXYCODONE HYDROCHLORIDE 5 MILLIGRAM(S): 5 TABLET ORAL at 11:51

## 2017-08-20 RX ADMIN — SIMVASTATIN 20 MILLIGRAM(S): 20 TABLET, FILM COATED ORAL at 20:54

## 2017-08-20 RX ADMIN — Medication 325 MILLIGRAM(S): at 11:53

## 2017-08-20 RX ADMIN — Medication 100 MILLIGRAM(S): at 05:25

## 2017-08-20 RX ADMIN — PANTOPRAZOLE SODIUM 40 MILLIGRAM(S): 20 TABLET, DELAYED RELEASE ORAL at 05:25

## 2017-08-20 NOTE — PROGRESS NOTE ADULT - ASSESSMENT
70 yo M with h/o CAD s/p ZARINA to SVG to RCA (6/16/17), AICD for VT, HTN, HLD, recently diagnosed LLL mass here with postobstructive pneumonia. 70 yo M with h/o CAD s/p ZARINA to SVG to RCA (6/16/17), AICD for VT, HTN, HLD, recently diagnosed LLL mass here with postobstructive pneumonia with loculated pleural effusion.

## 2017-08-20 NOTE — PROGRESS NOTE ADULT - SUBJECTIVE AND OBJECTIVE BOX
CC:   HPI:  71 years old male with Metastatic Lung Cancer, CAD s/p stents, V. Tach, HTN, HLD and Hypothyroidism sent from [facility] with hypercalcemia, low albumin and leucocytosis. Patient was recently diagnosed with Metastatic Lung Cancer in 2017 and currently in the process of getting immunotherapy. Patient is complaining of back pain which is chronic. Denies any chest pain, palpitations, shortness of breath, abdominal pain, constipation or fever. He is alert and awake but not completely oriented to time and place (as per family, he has been confused intermittently since July).  He was hypercalcemic in July and was treated with IVF, Calcitonin and Pamidronate. He also had left loculated pleural effusion and was treated with IV antibiotics and chest tube (which was later removed). (19 Aug 2017 00:52)    REVIEW OF SYSTEMS:    Patient denied fever, chills, abdominal pain, nausea, vomiting, cough, shortness of breath, chest pain or palpitations    Vital Signs Last 24 Hrs  T(C): 36.8 (20 Aug 2017 05:00), Max: 36.8 (20 Aug 2017 05:00)  T(F): 98.2 (20 Aug 2017 05:00), Max: 98.2 (20 Aug 2017 05:00)  HR: 107 (20 Aug 2017 05:00) (70 - 107)  BP: 114/62 (20 Aug 2017 05:00) (97/54 - 114/62)  BP(mean): --  RR: 18 (20 Aug 2017 05:00) (18 - 18)  SpO2: 97% (20 Aug 2017 04:00) (96% - 98%)I&O's Summary    19 Aug 2017 07:01  -  20 Aug 2017 07:00  --------------------------------------------------------  IN: 2105 mL / OUT: 300 mL / NET: 1805 mL    CAPILLARY BLOOD GLUCOSE    PHYSICAL EXAM:  GENERAL: NAD, well-groomed  HEENT: PERRL, +EOMI, anicteric, no [place]  NECK: Supple, No JVD   CHEST/LUNG: CTA bilaterally; Normal effort  HEART: S1S2 Normal intensity, no murmurs, gallops or rubs noted  ABDOMEN: Soft, BS Normoactive, NT, ND, no HSM noted  EXTREMITIES:  2+ radial and DP pulses noted, no clubbing, cyanosis, or edema noted, FROM x 4  SKIN: No rashes or lesions noted  NEURO: A&Ox3, no focal deficits noted, CN II-XII intact  PSYCH: normal mood and affect; insight/judgement appropriate  LABS:                        8.0    18.8  )-----------( 272      ( 20 Aug 2017 08:00 )             27.2     08-    136  |  101  |  13.0  ----------------------------<  96  4.0   |  22.0  |  1.08    Ca    10.4<H>      20 Aug 2017 08:00  Phos  3.2     -  Mg     2.0         TPro  6.4<L>  /  Alb  2.6<L>  /  TBili  0.4  /  DBili  x   /  AST  19  /  ALT  17  /  AlkPhos  190<H>      PT/INR - ( 20 Aug 2017 08:00 )   PT: 14.5 sec;   INR: 1.31 ratio         PTT - ( 18 Aug 2017 22:22 )  PTT:32.2 sec  Urinalysis Basic - ( 19 Aug 2017 03:16 )    Color: Yellow / Appearance: Clear / S.015 / pH: x  Gluc: x / Ketone: Negative  / Bili: Negative / Urobili: Negative mg/dL   Blood: x / Protein: Negative mg/dL / Nitrite: Negative   Leuk Esterase: Negative / RBC: x / WBC x   Sq Epi: x / Non Sq Epi: x / Bacteria: x      RADIOLOGY & ADDITIONAL TESTS:    MEDICATIONS:  MEDICATIONS  (STANDING):  enoxaparin Injectable 40 milliGRAM(s) SubCutaneous daily  senna 2 Tablet(s) Oral at bedtime  amiodarone    Tablet 200 milliGRAM(s) Oral daily  aspirin  chewable 81 milliGRAM(s) Oral daily  clopidogrel Tablet 75 milliGRAM(s) Oral daily  pantoprazole    Tablet 40 milliGRAM(s) Oral before breakfast  fenofibrate Tablet 48 milliGRAM(s) Oral at bedtime  metoprolol succinate  milliGRAM(s) Oral daily  ferrous    sulfate 325 milliGRAM(s) Oral three times a day with meals  levothyroxine 200 MICROGram(s) Oral daily  fentaNYL   Patch  75 MICROgram(s)/Hr 1 Patch Transdermal every 72 hours  simvastatin 20 milliGRAM(s) Oral at bedtime  sodium chloride 0.9%. 1000 milliLiter(s) (125 mL/Hr) IV Continuous <Continuous>  calcitonin Injectable 360 International Unit(s) IntraMuscular every 12 hours  multivitamin/minerals 1 Tablet(s) Oral daily  lactobacillus acidophilus 1 Tablet(s) Oral two times a day with meals  fentaNYL    Injectable 100 MICROGram(s) IV Push once    MEDICATIONS  (PRN):  ALBUTerol/ipratropium for Nebulization 3 milliLiter(s) Nebulizer every 6 hours PRN Shortness of Breath and/or Wheezing  docusate sodium 100 milliGRAM(s) Oral three times a day PRN Constipation  polyethylene glycol 3350 17 Gram(s) Oral daily PRN Constipation  oxyCODONE    IR 5 milliGRAM(s) Oral every 4 hours PRN Moderate Pain (4 - 6) CC: Patient having difficulty with back pain and also difficulty chewing his food and family at bedside requesting stronger analgesics and pureed diet.    HPI:  71 years old male with Metastatic Lung Cancer, CAD s/p stents, V. Tach, HTN, HLD and Hypothyroidism sent from [facility] with hypercalcemia, low albumin and leucocytosis. Patient was recently diagnosed with Metastatic Lung Cancer in 2017 and currently in the process of getting immunotherapy. Patient is complaining of back pain which is chronic. Denies any chest pain, palpitations, shortness of breath, abdominal pain, constipation or fever. He is alert and awake but not completely oriented to time and place (as per family, he has been confused intermittently since July).  He was hypercalcemic in July and was treated with IVF, Calcitonin and Pamidronate. He also had left loculated pleural effusion and was treated with IV antibiotics and chest tube (which was later removed). (19 Aug 2017 00:52)    REVIEW OF SYSTEMS:    Patient denied fever, chills, abdominal pain, nausea, vomiting, cough, shortness of breath, chest pain or palpitations    Vital Signs Last 24 Hrs  T(C): 36.8 (20 Aug 2017 05:00), Max: 36.8 (20 Aug 2017 05:00)  T(F): 98.2 (20 Aug 2017 05:00), Max: 98.2 (20 Aug 2017 05:00)  HR: 107 (20 Aug 2017 05:00) (70 - 107)  BP: 114/62 (20 Aug 2017 05:00) (97/54 - 114/62)  BP(mean): --  RR: 18 (20 Aug 2017 05:00) (18 - 18)  SpO2: 97% (20 Aug 2017 04:00) (96% - 98%)I&O's Summary    19 Aug 2017 07:01  -  20 Aug 2017 07:00  --------------------------------------------------------  IN: 2105 mL / OUT: 300 mL / NET: 1805 mL    CAPILLARY BLOOD GLUCOSE    PHYSICAL EXAM:  GENERAL: NAD, well-groomed  HEENT: PERRL, +EOMI, anicteric, no [place]  NECK: Supple, No JVD   CHEST/LUNG: CTA bilaterally; Normal effort  HEART: S1S2 Normal intensity, no murmurs, gallops or rubs noted  ABDOMEN: Soft, BS Normoactive, NT, ND, no HSM noted  EXTREMITIES:  2+ radial and DP pulses noted, no clubbing, cyanosis, or edema noted, FROM x 4  SKIN: No rashes or lesions noted  NEURO: A&Ox3, no focal deficits noted, CN II-XII intact  PSYCH: normal mood and affect; insight/judgement appropriate  LABS:                        8.0    18.8  )-----------( 272      ( 20 Aug 2017 08:00 )             27.2     08-20    136  |  101  |  13.0  ----------------------------<  96  4.0   |  22.0  |  1.08    Ca    10.4<H>      20 Aug 2017 08:00  Phos  3.2     08-19  Mg     2.0     08-19    TPro  6.4<L>  /  Alb  2.6<L>  /  TBili  0.4  /  DBili  x   /  AST  19  /  ALT  17  /  AlkPhos  190<H>  08-18    PT/INR - ( 20 Aug 2017 08:00 )   PT: 14.5 sec;   INR: 1.31 ratio         PTT - ( 18 Aug 2017 22:22 )  PTT:32.2 sec  Urinalysis Basic - ( 19 Aug 2017 03:16 )    Color: Yellow / Appearance: Clear / S.015 / pH: x  Gluc: x / Ketone: Negative  / Bili: Negative / Urobili: Negative mg/dL   Blood: x / Protein: Negative mg/dL / Nitrite: Negative   Leuk Esterase: Negative / RBC: x / WBC x   Sq Epi: x / Non Sq Epi: x / Bacteria: x      RADIOLOGY & ADDITIONAL TESTS:    MEDICATIONS:  MEDICATIONS  (STANDING):  enoxaparin Injectable 40 milliGRAM(s) SubCutaneous daily  senna 2 Tablet(s) Oral at bedtime  amiodarone    Tablet 200 milliGRAM(s) Oral daily  aspirin  chewable 81 milliGRAM(s) Oral daily  clopidogrel Tablet 75 milliGRAM(s) Oral daily  pantoprazole    Tablet 40 milliGRAM(s) Oral before breakfast  fenofibrate Tablet 48 milliGRAM(s) Oral at bedtime  metoprolol succinate  milliGRAM(s) Oral daily  ferrous    sulfate 325 milliGRAM(s) Oral three times a day with meals  levothyroxine 200 MICROGram(s) Oral daily  fentaNYL   Patch  75 MICROgram(s)/Hr 1 Patch Transdermal every 72 hours  simvastatin 20 milliGRAM(s) Oral at bedtime  sodium chloride 0.9%. 1000 milliLiter(s) (125 mL/Hr) IV Continuous <Continuous>  calcitonin Injectable 360 International Unit(s) IntraMuscular every 12 hours  multivitamin/minerals 1 Tablet(s) Oral daily  lactobacillus acidophilus 1 Tablet(s) Oral two times a day with meals  fentaNYL    Injectable 100 MICROGram(s) IV Push once    MEDICATIONS  (PRN):  ALBUTerol/ipratropium for Nebulization 3 milliLiter(s) Nebulizer every 6 hours PRN Shortness of Breath and/or Wheezing  docusate sodium 100 milliGRAM(s) Oral three times a day PRN Constipation  polyethylene glycol 3350 17 Gram(s) Oral daily PRN Constipation  oxyCODONE    IR 5 milliGRAM(s) Oral every 4 hours PRN Moderate Pain (4 - 6) CC: Patient having difficulty with back pain and also difficulty chewing his food and family at bedside requesting stronger analgesics and pureed diet.    HPI:  71 years old male with Metastatic Lung Cancer, CAD s/p stents, V. Tach, HTN, HLD and Hypothyroidism sent from [facility] with hypercalcemia, low albumin and leucocytosis. Patient was recently diagnosed with Metastatic Lung Cancer in 2017 and currently in the process of getting immunotherapy. Patient is complaining of back pain which is chronic. Denies any chest pain, palpitations, shortness of breath, abdominal pain, constipation or fever. He is alert and awake but not completely oriented to time and place (as per family, he has been confused intermittently since July).  He was hypercalcemic in July and was treated with IVF, Calcitonin and Pamidronate. He also had left loculated pleural effusion and was treated with IV antibiotics and chest tube (which was later removed). (19 Aug 2017 00:52)    REVIEW OF SYSTEMS:    Patient denied fever, chills, abdominal pain, nausea, vomiting, cough, shortness of breath, chest pain or palpitations    Vital Signs Last 24 Hrs  T(C): 36.8 (20 Aug 2017 05:00), Max: 36.8 (20 Aug 2017 05:00)  T(F): 98.2 (20 Aug 2017 05:00), Max: 98.2 (20 Aug 2017 05:00)  HR: 107 (20 Aug 2017 05:00) (70 - 107)  BP: 114/62 (20 Aug 2017 05:00) (97/54 - 114/62)  BP(mean): --  RR: 18 (20 Aug 2017 05:00) (18 - 18)  SpO2: 97% (20 Aug 2017 04:00) (96% - 98%)I&O's Summary    19 Aug 2017 07:01  -  20 Aug 2017 07:00  --------------------------------------------------------  IN: 2105 mL / OUT: 300 mL / NET: 1805 mL    CAPILLARY BLOOD GLUCOSE    PHYSICAL EXAM:  GENERAL: + distress from pain  HEENT: PERRL, +EOMI, anicteric, no [place]  NECK: Supple, No JVD   CHEST/LUNG: decreased bilaterally; Normal effort  HEART: S1S2 decreased intensity, no murmurs, gallops or rubs noted  ABDOMEN: Soft, BS Normoactive, NT, ND, no HSM noted  EXTREMITIES:  1+ radial and DP pulses noted, no clubbing, cyanosis, or edema noted, FROM x 4  SKIN: No rashes or lesions noted  NEURO: A&Ox3, no focal deficits noted, CN II-XII intact  PSYCH: depressed mood and affect; insight/judgement appropriate  LABS:                        8.0    18.8  )-----------( 272      ( 20 Aug 2017 08:00 )             27.2     08-20    136  |  101  |  13.0  ----------------------------<  96  4.0   |  22.0  |  1.08    Ca    10.4<H>      20 Aug 2017 08:00  Phos  3.2     08-19  Mg     2.0     08-    TPro  6.4<L>  /  Alb  2.6<L>  /  TBili  0.4  /  DBili  x   /  AST  19  /  ALT  17  /  AlkPhos  190<H>  08-18    PT/INR - ( 20 Aug 2017 08:00 )   PT: 14.5 sec;   INR: 1.31 ratio         PTT - ( 18 Aug 2017 22:22 )  PTT:32.2 sec  Urinalysis Basic - ( 19 Aug 2017 03:16 )    Color: Yellow / Appearance: Clear / S.015 / pH: x  Gluc: x / Ketone: Negative  / Bili: Negative / Urobili: Negative mg/dL   Blood: x / Protein: Negative mg/dL / Nitrite: Negative   Leuk Esterase: Negative / RBC: x / WBC x   Sq Epi: x / Non Sq Epi: x / Bacteria: x      RADIOLOGY & ADDITIONAL TESTS:    MEDICATIONS:  MEDICATIONS  (STANDING):  enoxaparin Injectable 40 milliGRAM(s) SubCutaneous daily  senna 2 Tablet(s) Oral at bedtime  amiodarone    Tablet 200 milliGRAM(s) Oral daily  aspirin  chewable 81 milliGRAM(s) Oral daily  clopidogrel Tablet 75 milliGRAM(s) Oral daily  pantoprazole    Tablet 40 milliGRAM(s) Oral before breakfast  fenofibrate Tablet 48 milliGRAM(s) Oral at bedtime  metoprolol succinate  milliGRAM(s) Oral daily  ferrous    sulfate 325 milliGRAM(s) Oral three times a day with meals  levothyroxine 200 MICROGram(s) Oral daily  fentaNYL   Patch  75 MICROgram(s)/Hr 1 Patch Transdermal every 72 hours  simvastatin 20 milliGRAM(s) Oral at bedtime  sodium chloride 0.9%. 1000 milliLiter(s) (125 mL/Hr) IV Continuous <Continuous>  calcitonin Injectable 360 International Unit(s) IntraMuscular every 12 hours  multivitamin/minerals 1 Tablet(s) Oral daily  lactobacillus acidophilus 1 Tablet(s) Oral two times a day with meals  fentaNYL    Injectable 100 MICROGram(s) IV Push once    MEDICATIONS  (PRN):  ALBUTerol/ipratropium for Nebulization 3 milliLiter(s) Nebulizer every 6 hours PRN Shortness of Breath and/or Wheezing  docusate sodium 100 milliGRAM(s) Oral three times a day PRN Constipation  polyethylene glycol 3350 17 Gram(s) Oral daily PRN Constipation  oxyCODONE    IR 5 milliGRAM(s) Oral every 4 hours PRN Moderate Pain (4 - 6)

## 2017-08-20 NOTE — PROGRESS NOTE ADULT - PROBLEM SELECTOR PLAN 4
Resolved  S/p aredia  Appreciate renal consult Resolved - last dose of calcitonin today  S/p aredia  Appreciate renal consult

## 2017-08-20 NOTE — PROGRESS NOTE ADULT - PROBLEM SELECTOR PLAN 1
Pathology - poorly differentiated lung adenocarcinoma  S/p radiation therapy   S/p lung biopsy 7/28  OOB to chair  PT recommending CASTILLO  Oncology made aware - immunostains pending. Plan for chemotherapy as outpt. Pathology - poorly differentiated lung adenocarcinoma  S/p radiation therapy   S/p lung biopsy 7/28  OOB to chair  PT recommending CASTILLO  Oncology made aware - immunostains pending. Plan for chemotherapy/?immunotherapy as outpt - family requested Dr. Cunningham to be notified patient is admitted - message left with service

## 2017-08-20 NOTE — PROGRESS NOTE ADULT - PROBLEM SELECTOR PLAN 2
CT chest showing large loculated pleural effusion.  S/p chest tube removal   Pain control PRN  CTS consult appreciated CT chest showing large loculated pleural effusion.  appreciate CT Sx eval - recommended to hold off on placing tube until leukocytosis improves although ID recommending drainage - will discuss with Dr. James tomorrow  Pain control PRN - started morphine (dc'd percocet)

## 2017-08-21 ENCOUNTER — APPOINTMENT (OUTPATIENT)
Dept: HEMATOLOGY ONCOLOGY | Facility: CLINIC | Age: 72
End: 2017-08-21

## 2017-08-21 LAB
ANION GAP SERPL CALC-SCNC: 12 MMOL/L — SIGNIFICANT CHANGE UP (ref 5–17)
ANISOCYTOSIS BLD QL: SLIGHT — SIGNIFICANT CHANGE UP
BASOPHILS # BLD AUTO: 0 K/UL — SIGNIFICANT CHANGE UP (ref 0–0.2)
BUN SERPL-MCNC: 11 MG/DL — SIGNIFICANT CHANGE UP (ref 8–20)
CALCIUM SERPL-MCNC: 9.8 MG/DL — SIGNIFICANT CHANGE UP (ref 8.6–10.2)
CHLORIDE SERPL-SCNC: 101 MMOL/L — SIGNIFICANT CHANGE UP (ref 98–107)
CO2 SERPL-SCNC: 22 MMOL/L — SIGNIFICANT CHANGE UP (ref 22–29)
CREAT SERPL-MCNC: 0.96 MG/DL — SIGNIFICANT CHANGE UP (ref 0.5–1.3)
EOSINOPHIL # BLD AUTO: 0.1 K/UL — SIGNIFICANT CHANGE UP (ref 0–0.5)
EOSINOPHIL NFR BLD AUTO: 1 % — SIGNIFICANT CHANGE UP (ref 0–6)
GLUCOSE SERPL-MCNC: 125 MG/DL — HIGH (ref 70–115)
HCT VFR BLD CALC: 21.4 % — LOW (ref 42–52)
HGB BLD-MCNC: 6.5 G/DL — CRITICAL LOW (ref 14–18)
HYPOCHROMIA BLD QL: SLIGHT — SIGNIFICANT CHANGE UP
LYMPHOCYTES # BLD AUTO: 1.2 K/UL — SIGNIFICANT CHANGE UP (ref 1–4.8)
LYMPHOCYTES # BLD AUTO: 8 % — LOW (ref 20–55)
MACROCYTES BLD QL: SLIGHT — SIGNIFICANT CHANGE UP
MAGNESIUM SERPL-MCNC: 1.7 MG/DL — SIGNIFICANT CHANGE UP (ref 1.6–2.6)
MCHC RBC-ENTMCNC: 25.9 PG — LOW (ref 27–31)
MCHC RBC-ENTMCNC: 30.4 G/DL — LOW (ref 32–36)
MCV RBC AUTO: 85.3 FL — SIGNIFICANT CHANGE UP (ref 80–94)
MONOCYTES # BLD AUTO: 0.9 K/UL — HIGH (ref 0–0.8)
MONOCYTES NFR BLD AUTO: 6 % — SIGNIFICANT CHANGE UP (ref 3–10)
NEUTROPHILS # BLD AUTO: 12.7 K/UL — HIGH (ref 1.8–8)
NEUTROPHILS NFR BLD AUTO: 84 % — HIGH (ref 37–73)
NEUTS BAND # BLD: 1 % — SIGNIFICANT CHANGE UP (ref 0–8)
PHOSPHATE SERPL-MCNC: 1.7 MG/DL — LOW (ref 2.4–4.7)
PLAT MORPH BLD: NORMAL — SIGNIFICANT CHANGE UP
PLATELET # BLD AUTO: 263 K/UL — SIGNIFICANT CHANGE UP (ref 150–400)
POIKILOCYTOSIS BLD QL AUTO: SLIGHT — SIGNIFICANT CHANGE UP
POLYCHROMASIA BLD QL SMEAR: SLIGHT — SIGNIFICANT CHANGE UP
POTASSIUM SERPL-MCNC: 3.4 MMOL/L — LOW (ref 3.5–5.3)
POTASSIUM SERPL-SCNC: 3.4 MMOL/L — LOW (ref 3.5–5.3)
RBC # BLD: 2.51 M/UL — LOW (ref 4.6–6.2)
RBC # FLD: 16.7 % — HIGH (ref 11–15.6)
RBC BLD AUTO: ABNORMAL
SODIUM SERPL-SCNC: 135 MMOL/L — SIGNIFICANT CHANGE UP (ref 135–145)
WBC # BLD: 14.9 K/UL — HIGH (ref 4.8–10.8)
WBC # FLD AUTO: 14.9 K/UL — HIGH (ref 4.8–10.8)

## 2017-08-21 PROCEDURE — 99233 SBSQ HOSP IP/OBS HIGH 50: CPT

## 2017-08-21 RX ORDER — POTASSIUM CHLORIDE 20 MEQ
40 PACKET (EA) ORAL ONCE
Qty: 0 | Refills: 0 | Status: COMPLETED | OUTPATIENT
Start: 2017-08-21 | End: 2017-08-21

## 2017-08-21 RX ORDER — POTASSIUM PHOSPHATE, MONOBASIC POTASSIUM PHOSPHATE, DIBASIC 236; 224 MG/ML; MG/ML
15 INJECTION, SOLUTION INTRAVENOUS ONCE
Qty: 0 | Refills: 0 | Status: COMPLETED | OUTPATIENT
Start: 2017-08-21 | End: 2017-08-22

## 2017-08-21 RX ORDER — LACTULOSE 10 G/15ML
10 SOLUTION ORAL EVERY 8 HOURS
Qty: 0 | Refills: 0 | Status: DISCONTINUED | OUTPATIENT
Start: 2017-08-21 | End: 2017-09-08

## 2017-08-21 RX ORDER — HYDROMORPHONE HYDROCHLORIDE 2 MG/ML
0.5 INJECTION INTRAMUSCULAR; INTRAVENOUS; SUBCUTANEOUS
Qty: 0 | Refills: 0 | Status: DISCONTINUED | OUTPATIENT
Start: 2017-08-21 | End: 2017-08-22

## 2017-08-21 RX ADMIN — PANTOPRAZOLE SODIUM 40 MILLIGRAM(S): 20 TABLET, DELAYED RELEASE ORAL at 05:22

## 2017-08-21 RX ADMIN — OXYCODONE HYDROCHLORIDE 5 MILLIGRAM(S): 5 TABLET ORAL at 08:27

## 2017-08-21 RX ADMIN — Medication 325 MILLIGRAM(S): at 08:27

## 2017-08-21 RX ADMIN — AMIODARONE HYDROCHLORIDE 200 MILLIGRAM(S): 400 TABLET ORAL at 05:22

## 2017-08-21 RX ADMIN — MORPHINE SULFATE 2 MILLIGRAM(S): 50 CAPSULE, EXTENDED RELEASE ORAL at 10:36

## 2017-08-21 RX ADMIN — Medication 325 MILLIGRAM(S): at 17:17

## 2017-08-21 RX ADMIN — MORPHINE SULFATE 2 MILLIGRAM(S): 50 CAPSULE, EXTENDED RELEASE ORAL at 01:15

## 2017-08-21 RX ADMIN — SENNA PLUS 2 TABLET(S): 8.6 TABLET ORAL at 21:39

## 2017-08-21 RX ADMIN — HYDROMORPHONE HYDROCHLORIDE 0.5 MILLIGRAM(S): 2 INJECTION INTRAMUSCULAR; INTRAVENOUS; SUBCUTANEOUS at 17:32

## 2017-08-21 RX ADMIN — Medication 40 MILLIEQUIVALENT(S): at 17:17

## 2017-08-21 RX ADMIN — CLOPIDOGREL BISULFATE 75 MILLIGRAM(S): 75 TABLET, FILM COATED ORAL at 13:31

## 2017-08-21 RX ADMIN — SIMVASTATIN 20 MILLIGRAM(S): 20 TABLET, FILM COATED ORAL at 21:40

## 2017-08-21 RX ADMIN — OXYCODONE HYDROCHLORIDE 5 MILLIGRAM(S): 5 TABLET ORAL at 04:29

## 2017-08-21 RX ADMIN — OXYCODONE HYDROCHLORIDE 5 MILLIGRAM(S): 5 TABLET ORAL at 05:24

## 2017-08-21 RX ADMIN — OXYCODONE HYDROCHLORIDE 5 MILLIGRAM(S): 5 TABLET ORAL at 21:00

## 2017-08-21 RX ADMIN — Medication 1 TABLET(S): at 17:17

## 2017-08-21 RX ADMIN — MORPHINE SULFATE 2 MILLIGRAM(S): 50 CAPSULE, EXTENDED RELEASE ORAL at 01:00

## 2017-08-21 RX ADMIN — HYDROMORPHONE HYDROCHLORIDE 0.5 MILLIGRAM(S): 2 INJECTION INTRAMUSCULAR; INTRAVENOUS; SUBCUTANEOUS at 21:28

## 2017-08-21 RX ADMIN — Medication 200 MICROGRAM(S): at 05:22

## 2017-08-21 RX ADMIN — OXYCODONE HYDROCHLORIDE 5 MILLIGRAM(S): 5 TABLET ORAL at 20:19

## 2017-08-21 RX ADMIN — HYDROMORPHONE HYDROCHLORIDE 0.5 MILLIGRAM(S): 2 INJECTION INTRAMUSCULAR; INTRAVENOUS; SUBCUTANEOUS at 13:46

## 2017-08-21 RX ADMIN — HYDROMORPHONE HYDROCHLORIDE 0.5 MILLIGRAM(S): 2 INJECTION INTRAMUSCULAR; INTRAVENOUS; SUBCUTANEOUS at 23:02

## 2017-08-21 RX ADMIN — Medication 81 MILLIGRAM(S): at 13:30

## 2017-08-21 RX ADMIN — Medication 1 TABLET(S): at 13:31

## 2017-08-21 RX ADMIN — HYDROMORPHONE HYDROCHLORIDE 0.5 MILLIGRAM(S): 2 INJECTION INTRAMUSCULAR; INTRAVENOUS; SUBCUTANEOUS at 13:31

## 2017-08-21 RX ADMIN — Medication 1 TABLET(S): at 08:26

## 2017-08-21 RX ADMIN — OXYCODONE HYDROCHLORIDE 5 MILLIGRAM(S): 5 TABLET ORAL at 09:20

## 2017-08-21 RX ADMIN — HYDROMORPHONE HYDROCHLORIDE 0.5 MILLIGRAM(S): 2 INJECTION INTRAMUSCULAR; INTRAVENOUS; SUBCUTANEOUS at 17:17

## 2017-08-21 RX ADMIN — Medication 100 MILLIGRAM(S): at 05:22

## 2017-08-21 RX ADMIN — Medication 325 MILLIGRAM(S): at 13:30

## 2017-08-21 RX ADMIN — MORPHINE SULFATE 2 MILLIGRAM(S): 50 CAPSULE, EXTENDED RELEASE ORAL at 10:51

## 2017-08-21 RX ADMIN — Medication 48 MILLIGRAM(S): at 21:40

## 2017-08-21 NOTE — PROGRESS NOTE ADULT - SUBJECTIVE AND OBJECTIVE BOX
SULEIMAN GREGORY    [id number]    71y      Male    INTERVAL HPI/OVERNIGHT EVENTS:    Patient is a 71 yom with pmh which includes but not limited to Metastatic Lung Cancer follows at [facility], CAD s/p stents, V. Tach, HTN, HLD and Hypothyroidism sent from [facility] with hypercalcemia, low albumin and leucocytosis. Patient was recently at [facility] for loculated pleural effusions s/p chest tube and iv abx and severe hypercalcemia. Patient admitted to medicine and had ct abd which showed new large loculated pleural effusions on left and is being seen by ID, oncology and ct surgery in consult. Patient seen at bedside with family and son states that his father looks more week and continues to have mild lower back pain.     last 24 hours patient is afebrile.        REVIEW OF SYSTEMS:    CONSTITUTIONAL: back pain   RESPIRATORY: No cough, wheezing, hemoptysis; No shortness of breath  CARDIOVASCULAR: No chest pain, palpitations  GASTROINTESTINAL: No abdominal or epigastric pain. No nausea, vomiting  NEUROLOGICAL: No headaches, memory loss, loss of strength.  MISCELLANEOUS:      Vital Signs Last 24 Hrs  T(C): 37.1 (21 Aug 2017 10:52), Max: 37.3 (20 Aug 2017 16:38)  T(F): 98.8 (21 Aug 2017 10:52), Max: 99.1 (20 Aug 2017 16:38)  HR: 76 (21 Aug 2017 10:52) (74 - 86)  BP: 102/56 (21 Aug 2017 10:52) (100/57 - 112/59)  BP(mean): --  RR: 16 (21 Aug 2017 10:52) (16 - 18)  SpO2: 100% (21 Aug 2017 10:52) (96% - 100%)    PHYSICAL EXAM:    GENERAL: ill appearing, appears older than stated age, pale  HEENT: PERRL, +EOMI  NECK: soft, Supple, No JVD,   CHEST/LUNG: diminished at bases,   HEART: S1S2+, Regular rate and rhythm; No murmurs  ABDOMEN: Soft, Nontender, Nondistended  SKIN: No rashes or lesions  NEURO: AAOX3,   PSYCH: fkat affect       LABS:                        6.5    14.9  )-----------( 263      ( 21 Aug 2017 08:18 )             21.4     08-21    135  |  101  |  11.0  ----------------------------<  125<H>  3.4<L>   |  22.0  |  0.96    Ca    9.8      21 Aug 2017 08:18  Phos  1.7     08-21  Mg     1.7     08-21      PT/INR - ( 20 Aug 2017 08:00 )   PT: 14.5 sec;   INR: 1.31 ratio           MEDICATIONS  (STANDING):  senna 2 Tablet(s) Oral at bedtime  amiodarone    Tablet 200 milliGRAM(s) Oral daily  aspirin  chewable 81 milliGRAM(s) Oral daily  clopidogrel Tablet 75 milliGRAM(s) Oral daily  pantoprazole    Tablet 40 milliGRAM(s) Oral before breakfast  fenofibrate Tablet 48 milliGRAM(s) Oral at bedtime  metoprolol succinate  milliGRAM(s) Oral daily  ferrous    sulfate 325 milliGRAM(s) Oral three times a day with meals  levothyroxine 200 MICROGram(s) Oral daily  fentaNYL   Patch  75 MICROgram(s)/Hr 1 Patch Transdermal every 72 hours  simvastatin 20 milliGRAM(s) Oral at bedtime  multivitamin/minerals 1 Tablet(s) Oral daily  lactobacillus acidophilus 1 Tablet(s) Oral two times a day with meals  potassium chloride    Tablet ER 40 milliEquivalent(s) Oral once  potassium phosphate IVPB 15 milliMole(s) IV Intermittent once    MEDICATIONS  (PRN):  ALBUTerol/ipratropium for Nebulization 3 milliLiter(s) Nebulizer every 6 hours PRN Shortness of Breath and/or Wheezing  docusate sodium 100 milliGRAM(s) Oral three times a day PRN Constipation  polyethylene glycol 3350 17 Gram(s) Oral daily PRN Constipation  oxyCODONE    IR 5 milliGRAM(s) Oral every 4 hours PRN Moderate Pain (4 - 6)  bisacodyl Suppository 10 milliGRAM(s) Rectal daily PRN Constipation  lactulose Syrup 10 Gram(s) Oral every 8 hours PRN constipation  HYDROmorphone  Injectable 0.5 milliGRAM(s) IV Push every 3 hours PRN Severe Pain (7 - 10)      RADIOLOGY & ADDITIONAL TESTS:      ct abd -   Reaccumulation of large loculated left basal complex pleural effusion.   Progression of lung nodules.    Enlarged sacral osseous metastasis.    Mildly distended stomach, correlate for gastroparesis. Mildly distended   colon, correlate for ileus.    Otherwise no acute findings. Incidental comments as above.

## 2017-08-21 NOTE — PROGRESS NOTE ADULT - SUBJECTIVE AND OBJECTIVE BOX
70 y/o male who presented with hemoptysis and 6 cm LLL lung mass, (adenocarcinoma),  found to have sacral bone metastasis and admitted for hypercalcemia on 8/17. The serum calcium has corrected with therapy, but today Paul is in severe pain (back) and is bedridden. Hematocrit today 21.  Further efforts at drainage of loculated pleural effusion are unlikely to benefit patient, and would not pursue that.  Because this tumor expresses PDL-1 at 90%, pembroluzimab (Keytruda) was planned as systemic therapy as an outpatient. With the patient's rapid decline, he is unlikely to be able to present as an outpatient for treatment, and family/patient have requested consideration of starting Keytruda as an inpatient for first dose (the drug is administered q 3 weeks).  Suggest -  1) Transfuse 2 units PRBC today   2) Pain management consult  3) I am speaking to pharmacy about possibility of administering the first dose as in inpatient  4) Overall prognosis here is very poor, with short life expectancy.

## 2017-08-21 NOTE — PROGRESS NOTE ADULT - ASSESSMENT
1) Recurrent loculated left sided pleural effusions --> ct surgery following  --> currently in NAD,   --> plan as per ct surgery    2) Metastatic lung CA with mets to S1 as per ct scan as above  --> ONC consult appreciated  --> poor prognosis    3) Acute on chronic blood loss anemia --> below 7  --> will order 2 units prbcs  --> cbc in am     4) Chronic pain --> states iv morphine doesnt work  --> consulted pain management.   --> start low dose iv dilaudid prn  --> fentanyl patch    5) Possible PNA --> ID following  --> off abx    6) Severe hypercalcemia --> improved  --> bmp in am     7) hypothyroidism--> synthroid    8) cad --> home meds    9) prognosis poor     10) severe protein calorie malnutrition --> ensure with meals

## 2017-08-22 DIAGNOSIS — C34.92 MALIGNANT NEOPLASM OF UNSPECIFIED PART OF LEFT BRONCHUS OR LUNG: ICD-10-CM

## 2017-08-22 DIAGNOSIS — R52 PAIN, UNSPECIFIED: ICD-10-CM

## 2017-08-22 DIAGNOSIS — Z51.5 ENCOUNTER FOR PALLIATIVE CARE: ICD-10-CM

## 2017-08-22 DIAGNOSIS — R53.81 OTHER MALAISE: ICD-10-CM

## 2017-08-22 LAB
-  AMPICILLIN: SIGNIFICANT CHANGE UP
-  CIPROFLOXACIN: SIGNIFICANT CHANGE UP
-  NITROFURANTOIN: SIGNIFICANT CHANGE UP
-  TETRACYCLINE: SIGNIFICANT CHANGE UP
-  VANCOMYCIN: SIGNIFICANT CHANGE UP
ANION GAP SERPL CALC-SCNC: 13 MMOL/L — SIGNIFICANT CHANGE UP (ref 5–17)
BUN SERPL-MCNC: 10 MG/DL — SIGNIFICANT CHANGE UP (ref 8–20)
CALCIUM SERPL-MCNC: 10.3 MG/DL — HIGH (ref 8.6–10.2)
CHLORIDE SERPL-SCNC: 101 MMOL/L — SIGNIFICANT CHANGE UP (ref 98–107)
CO2 SERPL-SCNC: 20 MMOL/L — LOW (ref 22–29)
CREAT SERPL-MCNC: 0.88 MG/DL — SIGNIFICANT CHANGE UP (ref 0.5–1.3)
CULTURE RESULTS: SIGNIFICANT CHANGE UP
GLUCOSE SERPL-MCNC: 145 MG/DL — HIGH (ref 70–115)
HCT VFR BLD CALC: 28.6 % — LOW (ref 42–52)
HGB BLD-MCNC: 8.9 G/DL — LOW (ref 14–18)
MAGNESIUM SERPL-MCNC: 1.6 MG/DL — SIGNIFICANT CHANGE UP (ref 1.6–2.6)
MCHC RBC-ENTMCNC: 26.3 PG — LOW (ref 27–31)
MCHC RBC-ENTMCNC: 31.1 G/DL — LOW (ref 32–36)
MCV RBC AUTO: 84.4 FL — SIGNIFICANT CHANGE UP (ref 80–94)
METHOD TYPE: SIGNIFICANT CHANGE UP
ORGANISM # SPEC MICROSCOPIC CNT: SIGNIFICANT CHANGE UP
ORGANISM # SPEC MICROSCOPIC CNT: SIGNIFICANT CHANGE UP
PLATELET # BLD AUTO: 292 K/UL — SIGNIFICANT CHANGE UP (ref 150–400)
POTASSIUM SERPL-MCNC: 3.6 MMOL/L — SIGNIFICANT CHANGE UP (ref 3.5–5.3)
POTASSIUM SERPL-SCNC: 3.6 MMOL/L — SIGNIFICANT CHANGE UP (ref 3.5–5.3)
RBC # BLD: 3.39 M/UL — LOW (ref 4.6–6.2)
RBC # FLD: 16.1 % — HIGH (ref 11–15.6)
SODIUM SERPL-SCNC: 134 MMOL/L — LOW (ref 135–145)
SPECIMEN SOURCE: SIGNIFICANT CHANGE UP
WBC # BLD: 18.9 K/UL — HIGH (ref 4.8–10.8)
WBC # FLD AUTO: 18.9 K/UL — HIGH (ref 4.8–10.8)

## 2017-08-22 PROCEDURE — 74000: CPT | Mod: 26

## 2017-08-22 PROCEDURE — 99223 1ST HOSP IP/OBS HIGH 75: CPT

## 2017-08-22 PROCEDURE — 99232 SBSQ HOSP IP/OBS MODERATE 35: CPT

## 2017-08-22 PROCEDURE — 99233 SBSQ HOSP IP/OBS HIGH 50: CPT

## 2017-08-22 RX ORDER — PEMBROLIZUMAB 25 MG/ML
200 INJECTION, SOLUTION INTRAVENOUS ONCE
Qty: 0 | Refills: 0 | Status: DISCONTINUED | OUTPATIENT
Start: 2017-08-22 | End: 2017-08-22

## 2017-08-22 RX ORDER — PEMBROLIZUMAB 25 MG/ML
200 INJECTION, SOLUTION INTRAVENOUS ONCE
Qty: 0 | Refills: 0 | Status: DISCONTINUED | OUTPATIENT
Start: 2017-08-23 | End: 2017-09-08

## 2017-08-22 RX ORDER — DOCUSATE SODIUM 100 MG
100 CAPSULE ORAL
Qty: 0 | Refills: 0 | Status: DISCONTINUED | OUTPATIENT
Start: 2017-08-22 | End: 2017-09-08

## 2017-08-22 RX ORDER — HYDROMORPHONE HYDROCHLORIDE 2 MG/ML
1 INJECTION INTRAMUSCULAR; INTRAVENOUS; SUBCUTANEOUS
Qty: 0 | Refills: 0 | Status: DISCONTINUED | OUTPATIENT
Start: 2017-08-22 | End: 2017-08-25

## 2017-08-22 RX ORDER — POLYETHYLENE GLYCOL 3350 17 G/17G
17 POWDER, FOR SOLUTION ORAL DAILY
Qty: 0 | Refills: 0 | Status: DISCONTINUED | OUTPATIENT
Start: 2017-08-22 | End: 2017-08-26

## 2017-08-22 RX ADMIN — Medication 100 MILLIGRAM(S): at 06:24

## 2017-08-22 RX ADMIN — CLOPIDOGREL BISULFATE 75 MILLIGRAM(S): 75 TABLET, FILM COATED ORAL at 13:00

## 2017-08-22 RX ADMIN — HYDROMORPHONE HYDROCHLORIDE 0.5 MILLIGRAM(S): 2 INJECTION INTRAMUSCULAR; INTRAVENOUS; SUBCUTANEOUS at 10:10

## 2017-08-22 RX ADMIN — OXYCODONE HYDROCHLORIDE 5 MILLIGRAM(S): 5 TABLET ORAL at 13:01

## 2017-08-22 RX ADMIN — HYDROMORPHONE HYDROCHLORIDE 0.5 MILLIGRAM(S): 2 INJECTION INTRAMUSCULAR; INTRAVENOUS; SUBCUTANEOUS at 04:26

## 2017-08-22 RX ADMIN — SIMVASTATIN 20 MILLIGRAM(S): 20 TABLET, FILM COATED ORAL at 21:02

## 2017-08-22 RX ADMIN — HYDROMORPHONE HYDROCHLORIDE 1 MILLIGRAM(S): 2 INJECTION INTRAMUSCULAR; INTRAVENOUS; SUBCUTANEOUS at 10:16

## 2017-08-22 RX ADMIN — HYDROMORPHONE HYDROCHLORIDE 0.5 MILLIGRAM(S): 2 INJECTION INTRAMUSCULAR; INTRAVENOUS; SUBCUTANEOUS at 08:34

## 2017-08-22 RX ADMIN — Medication 325 MILLIGRAM(S): at 13:01

## 2017-08-22 RX ADMIN — HYDROMORPHONE HYDROCHLORIDE 1 MILLIGRAM(S): 2 INJECTION INTRAMUSCULAR; INTRAVENOUS; SUBCUTANEOUS at 21:01

## 2017-08-22 RX ADMIN — HYDROMORPHONE HYDROCHLORIDE 1 MILLIGRAM(S): 2 INJECTION INTRAMUSCULAR; INTRAVENOUS; SUBCUTANEOUS at 17:29

## 2017-08-22 RX ADMIN — Medication 100 MILLIGRAM(S): at 17:29

## 2017-08-22 RX ADMIN — Medication 81 MILLIGRAM(S): at 13:00

## 2017-08-22 RX ADMIN — HYDROMORPHONE HYDROCHLORIDE 1 MILLIGRAM(S): 2 INJECTION INTRAMUSCULAR; INTRAVENOUS; SUBCUTANEOUS at 13:54

## 2017-08-22 RX ADMIN — HYDROMORPHONE HYDROCHLORIDE 1 MILLIGRAM(S): 2 INJECTION INTRAMUSCULAR; INTRAVENOUS; SUBCUTANEOUS at 11:01

## 2017-08-22 RX ADMIN — Medication 1 TABLET(S): at 08:34

## 2017-08-22 RX ADMIN — FENTANYL CITRATE 1 PATCH: 50 INJECTION INTRAVENOUS at 13:59

## 2017-08-22 RX ADMIN — HYDROMORPHONE HYDROCHLORIDE 0.5 MILLIGRAM(S): 2 INJECTION INTRAMUSCULAR; INTRAVENOUS; SUBCUTANEOUS at 06:20

## 2017-08-22 RX ADMIN — HYDROMORPHONE HYDROCHLORIDE 1 MILLIGRAM(S): 2 INJECTION INTRAMUSCULAR; INTRAVENOUS; SUBCUTANEOUS at 18:14

## 2017-08-22 RX ADMIN — Medication 48 MILLIGRAM(S): at 21:02

## 2017-08-22 RX ADMIN — Medication 1 TABLET(S): at 13:01

## 2017-08-22 RX ADMIN — HYDROMORPHONE HYDROCHLORIDE 1 MILLIGRAM(S): 2 INJECTION INTRAMUSCULAR; INTRAVENOUS; SUBCUTANEOUS at 21:20

## 2017-08-22 RX ADMIN — Medication 325 MILLIGRAM(S): at 08:34

## 2017-08-22 RX ADMIN — Medication 200 MICROGRAM(S): at 06:24

## 2017-08-22 RX ADMIN — OXYCODONE HYDROCHLORIDE 5 MILLIGRAM(S): 5 TABLET ORAL at 13:59

## 2017-08-22 RX ADMIN — AMIODARONE HYDROCHLORIDE 200 MILLIGRAM(S): 400 TABLET ORAL at 06:24

## 2017-08-22 RX ADMIN — POTASSIUM PHOSPHATE, MONOBASIC POTASSIUM PHOSPHATE, DIBASIC 62.5 MILLIMOLE(S): 236; 224 INJECTION, SOLUTION INTRAVENOUS at 01:31

## 2017-08-22 RX ADMIN — Medication 325 MILLIGRAM(S): at 17:29

## 2017-08-22 RX ADMIN — OXYCODONE HYDROCHLORIDE 5 MILLIGRAM(S): 5 TABLET ORAL at 06:24

## 2017-08-22 RX ADMIN — SENNA PLUS 2 TABLET(S): 8.6 TABLET ORAL at 21:02

## 2017-08-22 RX ADMIN — POLYETHYLENE GLYCOL 3350 17 GRAM(S): 17 POWDER, FOR SOLUTION ORAL at 13:01

## 2017-08-22 RX ADMIN — Medication 1 TABLET(S): at 17:29

## 2017-08-22 RX ADMIN — FENTANYL CITRATE 1 PATCH: 50 INJECTION INTRAVENOUS at 13:54

## 2017-08-22 RX ADMIN — PANTOPRAZOLE SODIUM 40 MILLIGRAM(S): 20 TABLET, DELAYED RELEASE ORAL at 06:24

## 2017-08-22 RX ADMIN — HYDROMORPHONE HYDROCHLORIDE 1 MILLIGRAM(S): 2 INJECTION INTRAMUSCULAR; INTRAVENOUS; SUBCUTANEOUS at 14:05

## 2017-08-22 RX ADMIN — OXYCODONE HYDROCHLORIDE 5 MILLIGRAM(S): 5 TABLET ORAL at 06:57

## 2017-08-22 NOTE — PROCEDURE NOTE - NSPOSTCAREGUIDE_GEN_A_CORE
Care for catheter as per unit/ICU protocols/Instructed patient/caregiver to follow-up with primary care physician/Instructed patient/caregiver regarding signs and symptoms of infection/Verbal/written post procedure instructions were given to patient/caregiver/Keep the cast/splint/dressing clean and dry
Care for catheter as per unit/ICU protocols

## 2017-08-22 NOTE — CONSULT NOTE ADULT - SUBJECTIVE AND OBJECTIVE BOX
Chief Complaint:    HPI:  71 years old male with Metastatic Lung Cancer, CAD s/p stents, V. Tach, HTN, HLD and Hypothyroidism sent from [facility] with hypercalcemia, low albumin and leucocytosis. Patient was recently diagnosed with Metastatic Lung Cancer in July 2017 and currently in the process of getting immunotherapy. Patient is complaining of back pain which is chronic. Denies any chest pain, palpitations, shortness of breath, abdominal pain, constipation or fever. He is alert and awake but not completely oriented to time and place (as per family, he has been confused intermittently since July).  He was hypercalcemic in July and was treated with IVF, Calcitonin and Pamidronate. He also had left loculated pleural effusion and was treated with IV antibiotics and chest tube (which was later removed). (19 Aug 2017 00:52)      PAST MEDICAL & SURGICAL HISTORY:  Psoriasis    PSVT (paroxysmal supraventricular tachycardia)  Chronic systolic CHF (congestive heart failure), NYHA class 2  Former smoker  Hypokinesis: apical  Mitral regurgitation  Bronchitis  Hypertension  PSVT (paroxysmal supraventricular tachycardia)  Ischemic cardiomyopathy  AICD (automatic cardioverter/defibrillator) present: 2010 [employer]  VT (ventricular tachycardia): May 2017 no ICD shock  Vitamin D deficiency  Tricuspid regurgitation  RBBB  Prostate cancer: s/p surgery no RT/CT  Mitral regurgitation  Hypothyroidism  HLD (hyperlipidemia)  Arrhythmia  Angina pectoris  CAD (coronary artery disease)  History of bronchoscopy: 2017  History of prostate surgery: davinci surgery in 2008  Cardiac disorder: triple bypass may 1997 [facility]  History of electrophysiologic study: 2009 positive study (AICD placement  2010)  H/O cardiac catheterization: stenting of SVG TO PDA IN 2010 2017 one stent      FAMILY HISTORY:  Family history of heart disease (Sibling)  Family history of diabetes mellitus (Sibling)  Family history of lung cancer (Sibling)  Family history of prostate cancer (Sibling)      SOCIAL HISTORY:  [ ] Denies Smoking, Alcohol, or Drug Use    Allergies    macrolide antibiotics (Urticaria; Rash; Hives)  penicillin (Urticaria; Rash)  Zithromax Z-Christian (Urticaria; Rash; Hives)    Intolerances        PAIN MEDICATIONS:  oxyCODONE    IR 5 milliGRAM(s) Oral every 4 hours PRN  fentaNYL   Patch  75 MICROgram(s)/Hr 1 Patch Transdermal every 72 hours  HYDROmorphone  Injectable 0.5 milliGRAM(s) IV Push every 3 hours PRN    Heme:  aspirin  chewable 81 milliGRAM(s) Oral daily  clopidogrel Tablet 75 milliGRAM(s) Oral daily    Antibiotics:    Cardiovascular:  amiodarone    Tablet 200 milliGRAM(s) Oral daily  metoprolol succinate  milliGRAM(s) Oral daily    GI:  senna 2 Tablet(s) Oral at bedtime  docusate sodium 100 milliGRAM(s) Oral three times a day PRN  pantoprazole    Tablet 40 milliGRAM(s) Oral before breakfast  polyethylene glycol 3350 17 Gram(s) Oral daily PRN  bisacodyl Suppository 10 milliGRAM(s) Rectal daily PRN  lactulose Syrup 10 Gram(s) Oral every 8 hours PRN    Endocrine:  fenofibrate Tablet 48 milliGRAM(s) Oral at bedtime  levothyroxine 200 MICROGram(s) Oral daily  simvastatin 20 milliGRAM(s) Oral at bedtime    All Other Medications:  ferrous    sulfate 325 milliGRAM(s) Oral three times a day with meals  multivitamin/minerals 1 Tablet(s) Oral daily  lactobacillus acidophilus 1 Tablet(s) Oral two times a day with meals      REVIEW OF SYSTEMS:    CONSTITUTIONAL: No fever, weight loss, or fatigue  EYES: No eye pain, visual disturbances, or discharge  ENMT:  No difficulty hearing, tinnitus, vertigo; No sinus or throat pain  NECK: No pain or stiffness  BREASTS: No pain, masses, or nipple discharge  RESPIRATORY: No cough, wheezing, chills or hemoptysis; No shortness of breath  CARDIOVASCULAR: No chest pain, palpitations, dizziness, or leg swelling  GASTROINTESTINAL: No abdominal or epigastric pain. No nausea, vomiting, or hematemesis; No diarrhea or constipation. No melena or hematochezia.  GENITOURINARY: No dysuria, frequency, hematuria, or incontinence  NEUROLOGICAL: No headaches, memory loss, loss of strength, numbness, or tremors  SKIN: No itching, burning, rashes, or lesions   LYMPH NODES: No enlarged glands  ENDOCRINE: No heat or cold intolerance; No hair loss  MUSCULOSKELETAL: No joint pain or swelling; No muscle, back, or extremity pain  PSYCHIATRIC: No depression, anxiety, mood swings, or difficulty sleeping  HEME/LYMPH: No easy bruising, or bleeding gums  ALLERY AND IMMUNOLOGIC: No hives or eczema      Vital Signs Last 24 Hrs  T(C): 37.2 (22 Aug 2017 04:44), Max: 37.2 (22 Aug 2017 04:44)  T(F): 98.9 (22 Aug 2017 04:44), Max: 98.9 (22 Aug 2017 04:44)  HR: 82 (22 Aug 2017 04:44) (70 - 96)  BP: 129/76 (22 Aug 2017 04:44) (98/56 - 129/76)  BP(mean): --  RR: 18 (22 Aug 2017 04:44) (16 - 18)  SpO2: 97% (22 Aug 2017 04:44) (94% - 100%)    PAIN SCALE:     VNRS (Verbal Numerical Rating Scale)1-10             CONSTITUTIONAL: Well-appearing; well nourished; in no apparent distress.  HEAD: Normocephalic, atraumatic.  EYES: PERRL, EOM intact, conjunctiva and sclera WNL  NECK/LYMPH: Supple, non tender, no cervical lymphadenopathy  LUNGS: Normal chest excursion with respiration  ABD/GI: Normal bowel sounds; non-distended, non-tender, no palpable organomegaly.  Back: No evidence of deformity, or step off noted.  Mild pain to palpation of the para-spinal area.   EXT/MS: Normal ROM in all four extremities; non-tender to palpation; distal pulses are normal.  SKIN: Warm and dry; good skin turgor; no apparrent lesions or exudate.  NEURO: Awake, alert and oriented X3, no gross deficits        LABS:                          8.9    18.9  )-----------( 292      ( 22 Aug 2017 06:35 )             28.6     08-22    134<L>  |  101  |  10.0  ----------------------------<  145<H>  3.6   |  20.0<L>  |  0.88    Ca    10.3<H>      22 Aug 2017 06:33  Phos  1.7     08-21  Mg     1.6     08-22            RADIOLOGY:    Drug Screen:            [x ]  [place]  Reviewed and Copied to Chart  This report was requested by: Elvin Aguilar | Reference #: [id number]   Others' Prescriptions  Patient Name:	Paul Germain	YOB: 1945	  Address:	[place] [address]	Sex:	Male	    Rx Written	Rx Dispensed	Drug	Quantity	Days Supply	Prescriber Name			  08/14/2017	08/14/2017	oxycodone hcl 5 mg tablet 	180	30	Dahlia Lowe			  08/09/2017	08/12/2017	fentanyl 25 mcg/hr patch 	3	9	Dahlia Lowe			  08/10/2017	08/10/2017	fentanyl 50 mcg/hr patch 	10	30	Salo Lorenzana DO			  08/09/2017	08/09/2017	fentanyl 50 mcg/hr patch 	4	12	Dahlia Lowe			  08/09/2017	08/09/2017	oxycodone hcl 5 mg tablet 	60	10	Dahlia Lowe			    Patient Name:	Paul Germain	YOB: 1945	  Address:	[address] [place] [address]	Sex:	Male	    Rx Written	Rx Dispensed	Drug	Quantity	Days Supply	Prescriber Name			  06/30/2017	06/30/2017	hydrocodone-homatropine syrup 	300ml	15	Oc Raymond MD			  * - Drugs marked with an asterisk are compound drugs. If the compound drug is made up of more than one controlled substance, then each controlled substance will be a separate row in the table. Chief Complaint: Back and abdominal Pain    HPI:  71 years old male with Metastatic Lung Cancer; which was recently diagnosed 7/2017 CAD s/p stents, VT, HTN, HLD and Hypothyroidism sent from [facility] with hypercalcemia, low albumin and leucocytosis. Patient was seen with family members, complaining of severe back,  abdominal pain; pain is constant, severe, throbbing, aching, 10/10 at it's worse, and improves with current medication regimen, since adjustment. Family states patient" intermittently moans, and rubs left side of abdomen during his sleep, "  feels the medication is helpful, but patient still has pain. Per family patient is scheduled to start immunotherapy Keytruda, while admitted.       PAST MEDICAL & SURGICAL HISTORY:  Psoriasis    PSVT (paroxysmal supraventricular tachycardia)  Chronic systolic CHF (congestive heart failure), NYHA class 2  Former smoker  Hypokinesis: apical  Mitral regurgitation  Bronchitis  Hypertension  PSVT (paroxysmal supraventricular tachycardia)  Ischemic cardiomyopathy  AICD (automatic cardioverter/defibrillator) present: 2010 [employer]  VT (ventricular tachycardia): May 2017 no ICD shock  Vitamin D deficiency  Tricuspid regurgitation  RBBB  Prostate cancer: s/p surgery no RT/CT  Mitral regurgitation  Hypothyroidism  HLD (hyperlipidemia)  Arrhythmia  Angina pectoris  CAD (coronary artery disease)  History of bronchoscopy: 2017  History of prostate surgery: davinci surgery in 2008  Cardiac disorder: triple bypass may 1997 [facility]  History of electrophysiologic study: 2009 positive study (AICD placement  2010)  H/O cardiac catheterization: stenting of SVG TO PDA IN 2010 2017 one stent      FAMILY HISTORY:  Family history of heart disease (Sibling)  Family history of diabetes mellitus (Sibling)  Family history of lung cancer (Sibling)  Family history of prostate cancer (Sibling)      SOCIAL HISTORY:  [ ] Denies Smoking, Alcohol, or Drug Use    Allergies    macrolide antibiotics (Urticaria; Rash; Hives)  penicillin (Urticaria; Rash)  Zithromax Z-Christian (Urticaria; Rash; Hives)    Intolerances        PAIN MEDICATIONS:  oxyCODONE    IR 5 milliGRAM(s) Oral every 4 hours PRN  fentaNYL   Patch  75 MICROgram(s)/Hr 1 Patch Transdermal every 72 hours  HYDROmorphone  Injectable 0.5 milliGRAM(s) IV Push every 3 hours PRN    Heme:  aspirin  chewable 81 milliGRAM(s) Oral daily  clopidogrel Tablet 75 milliGRAM(s) Oral daily    Antibiotics:    Cardiovascular:  amiodarone    Tablet 200 milliGRAM(s) Oral daily  metoprolol succinate  milliGRAM(s) Oral daily    GI:  senna 2 Tablet(s) Oral at bedtime  docusate sodium 100 milliGRAM(s) Oral three times a day PRN  pantoprazole    Tablet 40 milliGRAM(s) Oral before breakfast  polyethylene glycol 3350 17 Gram(s) Oral daily PRN  bisacodyl Suppository 10 milliGRAM(s) Rectal daily PRN  lactulose Syrup 10 Gram(s) Oral every 8 hours PRN    Endocrine:  fenofibrate Tablet 48 milliGRAM(s) Oral at bedtime  levothyroxine 200 MICROGram(s) Oral daily  simvastatin 20 milliGRAM(s) Oral at bedtime    All Other Medications:  ferrous    sulfate 325 milliGRAM(s) Oral three times a day with meals  multivitamin/minerals 1 Tablet(s) Oral daily  lactobacillus acidophilus 1 Tablet(s) Oral two times a day with meals      REVIEW OF SYSTEMS:    CONSTITUTIONAL: No fever, weight loss, or fatigue  EYES: No eye pain, visual disturbances, or discharge  ENMT:  No difficulty hearing, tinnitus, vertigo; No sinus or throat pain  NECK: No pain or stiffness  BREASTS: No pain, masses, or nipple discharge  RESPIRATORY: No cough, wheezing, chills or hemoptysis; No shortness of breath  CARDIOVASCULAR: No chest pain, palpitations, dizziness, or leg swelling  GASTROINTESTINAL: No abdominal or epigastric pain. No nausea, vomiting, or hematemesis; No diarrhea or constipation. No melena or hematochezia.  GENITOURINARY: No dysuria, frequency, hematuria, or incontinence  NEUROLOGICAL: No headaches, memory loss, loss of strength, numbness, or tremors  SKIN: No itching, burning, rashes, or lesions   LYMPH NODES: No enlarged glands  ENDOCRINE: No heat or cold intolerance; No hair loss  MUSCULOSKELETAL: No joint pain or swelling; No muscle, back, or extremity pain  PSYCHIATRIC: No depression, anxiety, mood swings, or difficulty sleeping  HEME/LYMPH: No easy bruising, or bleeding gums  ALLERY AND IMMUNOLOGIC: No hives or eczema      Vital Signs Last 24 Hrs  T(C): 37.2 (22 Aug 2017 04:44), Max: 37.2 (22 Aug 2017 04:44)  T(F): 98.9 (22 Aug 2017 04:44), Max: 98.9 (22 Aug 2017 04:44)  HR: 82 (22 Aug 2017 04:44) (70 - 96)  BP: 129/76 (22 Aug 2017 04:44) (98/56 - 129/76)  BP(mean): --  RR: 18 (22 Aug 2017 04:44) (16 - 18)  SpO2: 97% (22 Aug 2017 04:44) (94% - 100%)    PAIN SCALE:     VNRS (Verbal Numerical Rating Scale)1-10             CONSTITUTIONAL: Well-appearing; well nourished; in no apparent distress.  HEAD: Normocephalic, atraumatic.  EYES: PERRL, EOM intact, conjunctiva and sclera WNL  NECK/LYMPH: Supple, non tender, no cervical lymphadenopathy  LUNGS: Normal chest excursion with respiration  ABD/GI: Normal bowel sounds; non-distended, non-tender, no palpable organomegaly.  Back: No evidence of deformity, or step off noted.  Mild pain to palpation of the para-spinal area.   EXT/MS: Normal ROM in all four extremities; non-tender to palpation; distal pulses are normal.  SKIN: Warm and dry; good skin turgor; no apparrent lesions or exudate.  NEURO: Awake, alert and oriented X3, no gross deficits        LABS:                          8.9    18.9  )-----------( 292      ( 22 Aug 2017 06:35 )             28.6     08-22    134<L>  |  101  |  10.0  ----------------------------<  145<H>  3.6   |  20.0<L>  |  0.88    Ca    10.3<H>      22 Aug 2017 06:33  Phos  1.7     08-21  Mg     1.6     08-22            RADIOLOGY:    Drug Screen:            [x ]  [place]  Reviewed and Copied to Chart  This report was requested by: Elvin Aguilar | Reference #: [id number]   Others' Prescriptions  Patient Name:	Paul Germain	YOB: 1945	  Address:	[place] [address]	Sex:	Male	    Rx Written	Rx Dispensed	Drug	Quantity	Days Supply	Prescriber Name			  08/14/2017	08/14/2017	oxycodone hcl 5 mg tablet 	180	30	LionelDahlia carlson			  08/09/2017	08/12/2017	fentanyl 25 mcg/hr patch 	3	9	Dahlia Lowe			  08/10/2017	08/10/2017	fentanyl 50 mcg/hr patch 	10	30	Salo Lorenzana 			  08/09/2017	08/09/2017	fentanyl 50 mcg/hr patch 	4	12	Dahlia Lowe			  08/09/2017	08/09/2017	oxycodone hcl 5 mg tablet 	60	10	Dahlia Lowe			    Patient Name:	Paul Germain	YOB: 1945	  Address:	[address] [place] [address]	Sex:	Male	    Rx Written	Rx Dispensed	Drug	Quantity	Days Supply	Prescriber Name			  06/30/2017	06/30/2017	hydrocodone-homatropine syrup 	300ml	15	Oc Raymond MD			  * - Drugs marked with an asterisk are compound drugs. If the compound drug is made up of more than one controlled substance, then each controlled substance will be a separate row in the table. Chief Complaint: Back and abdominal Pain    HPI:  71 years old male with Metastatic Lung Cancer; which was recently diagnosed 7/2017 CAD s/p stents, VT, HTN, HLD and Hypothyroidism sent from [facility] with hypercalcemia, low albumin and leucocytosis. Patient was seen with family members, complaining of severe back,  abdominal pain; pain is constant, severe, throbbing, aching, 10/10 at it's worse, and improves with current medication regimen, since adjustment. Family states patient" intermittently moans, and rubs left side of abdomen during his sleep, "  feels the medication is helpful, but patient still has pain. Per family patient is scheduled to start immunotherapy Keytruda, while admitted.       PAST MEDICAL & SURGICAL HISTORY:  Psoriasis    PSVT (paroxysmal supraventricular tachycardia)  Chronic systolic CHF (congestive heart failure), NYHA class 2  Former smoker  Hypokinesis: apical  Mitral regurgitation  Bronchitis  Hypertension  PSVT (paroxysmal supraventricular tachycardia)  Ischemic cardiomyopathy  AICD (automatic cardioverter/defibrillator) present: 2010 [employer]  VT (ventricular tachycardia): May 2017 no ICD shock  Vitamin D deficiency  Tricuspid regurgitation  RBBB  Prostate cancer: s/p surgery no RT/CT  Mitral regurgitation  Hypothyroidism  HLD (hyperlipidemia)  Arrhythmia  Angina pectoris  CAD (coronary artery disease)  History of bronchoscopy: 2017  History of prostate surgery: davinci surgery in 2008  Cardiac disorder: triple bypass may 1997 [facility]  History of electrophysiologic study: 2009 positive study (AICD placement  2010)  H/O cardiac catheterization: stenting of SVG TO PDA IN 2010 2017 one stent      FAMILY HISTORY:  Family history of heart disease (Sibling)  Family history of diabetes mellitus (Sibling)  Family history of lung cancer (Sibling)  Family history of prostate cancer (Sibling)      SOCIAL HISTORY:  [ ] Denies Smoking, Alcohol, or Drug Use    Allergies    macrolide antibiotics (Urticaria; Rash; Hives)  penicillin (Urticaria; Rash)  Zithromax Z-Christian (Urticaria; Rash; Hives)    Intolerances        PAIN MEDICATIONS:  oxyCODONE    IR 5 milliGRAM(s) Oral every 4 hours PRN  fentaNYL   Patch  75 MICROgram(s)/Hr 1 Patch Transdermal every 72 hours  HYDROmorphone  Injectable 0.5 milliGRAM(s) IV Push every 3 hours PRN    Heme:  aspirin  chewable 81 milliGRAM(s) Oral daily  clopidogrel Tablet 75 milliGRAM(s) Oral daily    Antibiotics:    Cardiovascular:  amiodarone    Tablet 200 milliGRAM(s) Oral daily  metoprolol succinate  milliGRAM(s) Oral daily    GI:  senna 2 Tablet(s) Oral at bedtime  docusate sodium 100 milliGRAM(s) Oral three times a day PRN  pantoprazole    Tablet 40 milliGRAM(s) Oral before breakfast  polyethylene glycol 3350 17 Gram(s) Oral daily PRN  bisacodyl Suppository 10 milliGRAM(s) Rectal daily PRN  lactulose Syrup 10 Gram(s) Oral every 8 hours PRN    Endocrine:  fenofibrate Tablet 48 milliGRAM(s) Oral at bedtime  levothyroxine 200 MICROGram(s) Oral daily  simvastatin 20 milliGRAM(s) Oral at bedtime    All Other Medications:  ferrous    sulfate 325 milliGRAM(s) Oral three times a day with meals  multivitamin/minerals 1 Tablet(s) Oral daily  lactobacillus acidophilus 1 Tablet(s) Oral two times a day with meals      REVIEW OF SYSTEMS:    CONSTITUTIONAL: No fever, weight loss, or fatigue  EYES: No eye pain, visual disturbances, or discharge  ENMT:  No difficulty hearing, tinnitus, vertigo; No sinus or throat pain  NECK: No pain or stiffness  BREASTS: No pain, masses, or nipple discharge  RESPIRATORY: No cough, wheezing, chills or hemoptysis; No shortness of breath  CARDIOVASCULAR: No chest pain, palpitations, dizziness, or leg swelling  GASTROINTESTINAL: No abdominal or epigastric pain. No nausea, vomiting, or hematemesis; No diarrhea or constipation. No melena or hematochezia.  GENITOURINARY: No dysuria, frequency, hematuria, or incontinence  NEUROLOGICAL: No headaches, memory loss, loss of strength, numbness, or tremors  SKIN: No itching, burning, rashes, or lesions   LYMPH NODES: No enlarged glands  ENDOCRINE: No heat or cold intolerance; No hair loss  MUSCULOSKELETAL: No joint pain or swelling; No muscle, back, or extremity pain  PSYCHIATRIC: No depression, anxiety, mood swings, or difficulty sleeping  HEME/LYMPH: No easy bruising, or bleeding gums  ALLERY AND IMMUNOLOGIC: No hives or eczema      Vital Signs Last 24 Hrs  T(C): 37.2 (22 Aug 2017 04:44), Max: 37.2 (22 Aug 2017 04:44)  T(F): 98.9 (22 Aug 2017 04:44), Max: 98.9 (22 Aug 2017 04:44)  HR: 82 (22 Aug 2017 04:44) (70 - 96)  BP: 129/76 (22 Aug 2017 04:44) (98/56 - 129/76)  BP(mean): --  RR: 18 (22 Aug 2017 04:44) (16 - 18)  SpO2: 97% (22 Aug 2017 04:44) (94% - 100%)    PAIN SCALE:     VNRS (Verbal Numerical Rating Scale)1-10             CONSTITUTIONAL: Ill-appearing; NAD  HEAD: Normocephalic, atraumatic.  EYES: PERRL, EOM intact, conjunctiva and sclera WNL  NECK/LYMPH: Supple, non tender, no cervical lymphadenopathy  LUNGS: Normal chest excursion with respiration  ABD/GI: Distended, + tenderness  Back: Positive mid line and paraspinal tenderness to palpation  EXT/MS: Normal ROM in all four extremities; non-tender to palpation; distal pulses are normal.  SKIN: Warm and dry; good skin turgor; no apparent lesions or exudate.  NEURO: Drowsy        LABS:                          8.9    18.9  )-----------( 292      ( 22 Aug 2017 06:35 )             28.6     08-22    134<L>  |  101  |  10.0  ----------------------------<  145<H>  3.6   |  20.0<L>  |  0.88    Ca    10.3<H>      22 Aug 2017 06:33  Phos  1.7     08-21  Mg     1.6     08-22            RADIOLOGY:    Drug Screen:            [x ]  [place]  Reviewed and Copied to Chart  This report was requested by: Elvin Aguilar | Reference #: [id number]   Others' Prescriptions  Patient Name:	Paul Germain	YOB: 1945	  Address:	[place] [address]	Sex:	Male	    Rx Written	Rx Dispensed	Drug	Quantity	Days Supply	Prescriber Name			  08/14/2017	08/14/2017	oxycodone hcl 5 mg tablet 	180	30	Penrobyn Dahlia			  08/09/2017	08/12/2017	fentanyl 25 mcg/hr patch 	3	9	Chrissy Dahlia			  08/10/2017	08/10/2017	fentanyl 50 mcg/hr patch 	10	30	Salo Lorenzana DO			  08/09/2017	08/09/2017	fentanyl 50 mcg/hr patch 	4	12	Dahlia Lowe			  08/09/2017	08/09/2017	oxycodone hcl 5 mg tablet 	60	10	Dahlia Lowe			    Patient Name:	Paul Germain	YOB: 1945	  Address:	[address] [place] [address]	Sex:	Male	    Rx Written	Rx Dispensed	Drug	Quantity	Days Supply	Prescriber Name			  06/30/2017	06/30/2017	hydrocodone-homatropine syrup 	300ml	15	Oc Raymond MD			  * - Drugs marked with an asterisk are compound drugs. If the compound drug is made up of more than one controlled substance, then each controlled substance will be a separate row in the table.

## 2017-08-22 NOTE — PROGRESS NOTE ADULT - SUBJECTIVE AND OBJECTIVE BOX
[facility]  INFECTIOUS DISEASES AND INTERNAL MEDIC[facility] OF [facility]  =======================================================  Bethel Bernard MD [facility] CenterCHAITANYA Uribe MD  Diplomates American Board of Internal Medicine and Infectious Diseases  =======================================================    SULEIMAN GREGORY [id number]  chief complaint: follow up for loculated effusion    patient seen and examined in follow up.  Chart and labs reviewed.     oncology follow up appreciated. possible start of therapy in hospital.     =======================================================  Allergies:  macrolide antibiotics (Urticaria; Rash; Hives)  penicillin (Urticaria; Rash)  Zithromax Z-Christian (Urticaria; Rash; Hives)    =======================================================  Medications:    senna 2 Tablet(s) Oral at bedtime  ALBUTerol/ipratropium for Nebulization 3 milliLiter(s) Nebulizer every 6 hours PRN  amiodarone    Tablet 200 milliGRAM(s) Oral daily  aspirin  chewable 81 milliGRAM(s) Oral daily  clopidogrel Tablet 75 milliGRAM(s) Oral daily  pantoprazole    Tablet 40 milliGRAM(s) Oral before breakfast  fenofibrate Tablet 48 milliGRAM(s) Oral at bedtime  metoprolol succinate  milliGRAM(s) Oral daily  ferrous    sulfate 325 milliGRAM(s) Oral three times a day with meals  levothyroxine 200 MICROGram(s) Oral daily  oxyCODONE    IR 5 milliGRAM(s) Oral every 4 hours PRN  fentaNYL   Patch  75 MICROgram(s)/Hr 1 Patch Transdermal every 72 hours  simvastatin 20 milliGRAM(s) Oral at bedtime  multivitamin/minerals 1 Tablet(s) Oral daily  lactobacillus acidophilus 1 Tablet(s) Oral two times a day with meals  bisacodyl Suppository 10 milliGRAM(s) Rectal daily PRN  lactulose Syrup 10 Gram(s) Oral every 8 hours PRN  HYDROmorphone  Injectable 1 milliGRAM(s) IV Push every 3 hours PRN  docusate sodium 100 milliGRAM(s) Oral two times a day  polyethylene glycol 3350 17 Gram(s) Oral daily    =======================================================     REVIEW OF SYSTEMS:  CONSTITUTIONAL:  No Fever or chills  HEENT:   No diplopia or blurred vision.  No earache, sore throat or runny nose.  CARDIOVASCULAR:  No pressure, squeezing, strangling, tightness, heaviness or aching about the chest, neck, axilla or epigastrium.  RESPIRATORY:  DIMINISHED BREATH SOUNDS AT LEFT LUNG; DULLNESS TO PERCUSSION FROM LEFT MID LUNG ZONE TO LEFT BASE  GASTROINTESTINAL:  No nausea, vomiting or diarrhea.  GENITOURINARY:  No dysuria, frequency or urgency. No Blood in urine  MUSCULOSKELETAL:  no joint aches, no muscle pain  SKIN:  No change in skin, hair or nails.  NEUROLOGIC:  No paresthesias, fasciculations, seizures or weakness.  PSYCHIATRIC:  No disorder of thought or mood.  ENDOCRINE:  No heat or cold intolerance, polyuria or polydipsia.  HEMATOLOGICAL:  No easy bruising or bleeding.     =======================================================     Physical Exam:  ICU Vital Signs Last 24 Hrs  T(C): 37.2 (22 Aug 2017 04:44), Max: 37.2 (22 Aug 2017 04:44)  T(F): 98.9 (22 Aug 2017 04:44), Max: 98.9 (22 Aug 2017 04:44)  HR: 82 (22 Aug 2017 04:44) (70 - 96)  BP: 129/76 (22 Aug 2017 04:44) (98/56 - 129/76)   RR: 18 (22 Aug 2017 04:44) (16 - 18)  SpO2: 97% (22 Aug 2017 04:44) (94% - 97%)    GEN: NAD, pleasant  HEENT: normocephalic and atraumatic. EOMI. BONNIE.    NECK: Supple. No carotid bruits.  No lymphadenopathy or thyromegaly.  LUNGS: Clear to auscultation.  HEART: Regular rate and rhythm without murmur.  ABDOMEN: Soft, nontender, and nondistended.  Positive bowel sounds.    : No CVA tenderness  EXTREMITIES: Without any cyanosis, clubbing, rash, lesions or edema.  MSK: no joint swelling  NEUROLOGIC: Cranial nerves II through XII are grossly intact.  PSYCHIATRIC: Appropriate affect .  SKIN: No ulceration or induration present.    =======================================================    Labs:  08-22    134<L>  |  101  |  10.0  ----------------------------<  145<H>  3.6   |  20.0<L>  |  0.88    Ca    10.3<H>      22 Aug 2017 06:33  Phos  1.7     08-21  Mg     1.6     08-22                            8.9    18.9  )-----------( 292      ( 22 Aug 2017 06:35 )             28.6                 CAPILLARY BLOOD GLUCOSE            RECENT CULTURES: [facility]  INFECTIOUS DISEASES AND [facility]  =======================================================  Bethel Bernard MD [facility]   Darryl Uribe MD  Diplomates American Board of Internal Medicine and Infectious Diseases  =======================================================    SULEIMAN GREGORY [id number]  chief complaint: follow up for loculated effusion    patient seen and examined in follow up.  Chart and labs reviewed.     oncology follow up appreciated. possible start of therapy in hospital.   sleepy this AM; daughter reports that strong medication given.     =======================================================  Allergies:  macrolide antibiotics (Urticaria; Rash; Hives)  penicillin (Urticaria; Rash)  Zithromax Z-Christian (Urticaria; Rash; Hives)    =======================================================  Medications:    senna 2 Tablet(s) Oral at bedtime  ALBUTerol/ipratropium for Nebulization 3 milliLiter(s) Nebulizer every 6 hours PRN  amiodarone    Tablet 200 milliGRAM(s) Oral daily  aspirin  chewable 81 milliGRAM(s) Oral daily  clopidogrel Tablet 75 milliGRAM(s) Oral daily  pantoprazole    Tablet 40 milliGRAM(s) Oral before breakfast  fenofibrate Tablet 48 milliGRAM(s) Oral at bedtime  metoprolol succinate  milliGRAM(s) Oral daily  ferrous    sulfate 325 milliGRAM(s) Oral three times a day with meals  levothyroxine 200 MICROGram(s) Oral daily  oxyCODONE    IR 5 milliGRAM(s) Oral every 4 hours PRN  fentaNYL   Patch  75 MICROgram(s)/Hr 1 Patch Transdermal every 72 hours  simvastatin 20 milliGRAM(s) Oral at bedtime  multivitamin/minerals 1 Tablet(s) Oral daily  lactobacillus acidophilus 1 Tablet(s) Oral two times a day with meals  bisacodyl Suppository 10 milliGRAM(s) Rectal daily PRN  lactulose Syrup 10 Gram(s) Oral every 8 hours PRN  HYDROmorphone  Injectable 1 milliGRAM(s) IV Push every 3 hours PRN  docusate sodium 100 milliGRAM(s) Oral two times a day  polyethylene glycol 3350 17 Gram(s) Oral daily    =======================================================     REVIEW OF SYSTEMS:  CONSTITUTIONAL:  No Fever or chills  HEENT:   No diplopia or blurred vision.  No earache, sore throat or runny nose.  CARDIOVASCULAR:  No pressure, squeezing, strangling, tightness, heaviness or aching about the chest, neck, axilla or epigastrium.  RESPIRATORY:  DIMINISHED BREATH SOUNDS AT LEFT LUNG; DULLNESS TO PERCUSSION FROM LEFT MID LUNG ZONE TO LEFT BASE  GASTROINTESTINAL:  No nausea, vomiting or diarrhea.  GENITOURINARY:  No dysuria, frequency or urgency. No Blood in urine  MUSCULOSKELETAL:  no joint aches, no muscle pain  SKIN:  No change in skin, hair or nails.  NEUROLOGIC:  No paresthesias, fasciculations, seizures or weakness.  PSYCHIATRIC:  No disorder of thought or mood.  ENDOCRINE:  No heat or cold intolerance, polyuria or polydipsia.  HEMATOLOGICAL:  No easy bruising or bleeding.     =======================================================     Physical Exam:  ICU Vital Signs Last 24 Hrs  T(C): 37.2 (22 Aug 2017 04:44), Max: 37.2 (22 Aug 2017 04:44)  T(F): 98.9 (22 Aug 2017 04:44), Max: 98.9 (22 Aug 2017 04:44)  HR: 82 (22 Aug 2017 04:44) (70 - 96)  BP: 129/76 (22 Aug 2017 04:44) (98/56 - 129/76)   RR: 18 (22 Aug 2017 04:44) (16 - 18)  SpO2: 97% (22 Aug 2017 04:44) (94% - 97%)    GEN: NAD, pleasant  HEENT: normocephalic and atraumatic. EOMI. BONNIE.    NECK: Supple. No carotid bruits.  No lymphadenopathy or thyromegaly.  LUNGS: Clear to auscultation.  HEART: Regular rate and rhythm without murmur.  ABDOMEN: Soft, nontender, and nondistended.  Positive bowel sounds.    : No CVA tenderness  EXTREMITIES: Without any cyanosis, clubbing, rash, lesions or edema.  MSK: no joint swelling  NEUROLOGIC: Cranial nerves II through XII are grossly intact.  PSYCHIATRIC: Appropriate affect .  SKIN: No ulceration or induration present.    =======================================================    Labs:  08-22    134<L>  |  101  |  10.0  ----------------------------<  145<H>  3.6   |  20.0<L>  |  0.88    Ca    10.3<H>      22 Aug 2017 06:33  Phos  1.7     08-21  Mg     1.6     08-22                            8.9    18.9  )-----------( 292      ( 22 Aug 2017 06:35 )             28.6                 CAPILLARY BLOOD GLUCOSE            RECENT CULTURES:

## 2017-08-22 NOTE — PROGRESS NOTE ADULT - ASSESSMENT
This 71 y.o. man with metastatic lung cancer, now with reaccumulation of Left loculated effusion.    NO CLEAR EVIDENCE OF INFECTION. Pt previously treated for extended time for presumed empyema.

## 2017-08-22 NOTE — PROCEDURE NOTE - PROCEDURE
Vascular procedure  08/22/2017  #20G  1.75"  ns flush good heme back to right brachial vein  Active  CEDRICTEELE

## 2017-08-22 NOTE — CONSULT NOTE ADULT - ATTENDING COMMENTS
Thank you for the opportunity to assist with the care of this patient.   [facility] [phone].

## 2017-08-22 NOTE — PROGRESS NOTE ADULT - SUBJECTIVE AND OBJECTIVE BOX
Remains fatigued, bedridden.  Back pain is chief complaint, and Dr. Trevino is following re: analgesic management.  Hematocrit now 28 post-transfusion of 2 units PRBC. Serum calcium of 10.3 in a patient with hypoalbuminemia (last serum albumin 2.6) does represent hypercalcemia (corrected serum calcium would be roughly 11.4)  I have placed an order for Keytruda (pembroluzimab) 200 mg IVPB with the pharmacy. They are in the process of obtaining  the drug and it will be administered soon after it arrives.  The patient and family understand that the prognosis here is poor, but we've agreed to try immunotherapy despite the patient's poor performance status.

## 2017-08-22 NOTE — CONSULT NOTE ADULT - PROBLEM SELECTOR RECOMMENDATION 4
-met with family, 3 children, patients siblings and in laws and grandson. I explained I would be here to support them with everything going on. He has decided to go forth with keytruda. I explained that we will have to wait to see how he tolerates the treatment, and depending on that, we can make further decisions regarding goals of care. I did express to them that while he is awake and alert and able to make his wishes clear, it may be important to discuss as a family what his wishes would be regarding advanced directives. Expressed to them my concerns that given his significantly debilitated state, he is at more risk than someone with a better functional status to poorly tolerate the aggressive therapies related to his cancer. They understand. This is tough for them and him because up until 2 months ago, he was fully independent and functional, and this is not who he is. He lost his wife about 5 years ago. I will continue to follow with him and his family while he is undergoing active treatments. Extremely guarded prognosis.

## 2017-08-22 NOTE — CONSULT NOTE ADULT - PROBLEM SELECTOR RECOMMENDATION 2
-pain management recommendations appreciated.
management per oncology.   no contraindications  from ID point of view for immunotherapy

## 2017-08-22 NOTE — PROCEDURE NOTE - NSPROCDETAILS_GEN_ALL_CORE
secured in place/sterile technique, catheter placed/blood seen on insertion/location identified, draped/prepped, sterile technique used/flushes easily/dressing applied
sterile dressing applied/guidewire recovered/sterile technique, catheter placed/lumen(s) aspirated and flushed

## 2017-08-22 NOTE — CHART NOTE - NSCHARTNOTEFT_GEN_A_CORE
patient with distended abdomen, hypoactive bs and tympany.  Xray with suspected ileus (seen on earlier CT abdomen).    NPO x 24hrs, no need for NG tube at this time as patient is asymptomatic.  NGT if nausea/vomiting ensues  continue laxatives given narcotic use.    updated family at bedside.

## 2017-08-22 NOTE — CONSULT NOTE ADULT - SUBJECTIVE AND OBJECTIVE BOX
HPI:71M with PMH as listed admitted 8/19 with hypercalcemia, deconditioning, back pain, in setting of recently diagnosed lung cancer with sacral bone metastasis. He is scheduled to be started on keytruda per Heme/onc DESPITE extremely poor prognosis and poor functional status. Course now complicated by distended abdomen and ileus.      PERTINENT PMH REVIEWED: Yes     PAST MEDICAL & SURGICAL HISTORY:  Psoriasis    PSVT (paroxysmal supraventricular tachycardia)  Chronic systolic CHF (congestive heart failure), NYHA class 2  Former smoker  Hypokinesis: apical  Mitral regurgitation  Bronchitis  Hypertension  PSVT (paroxysmal supraventricular tachycardia)  Ischemic cardiomyopathy  AICD (automatic cardioverter/defibrillator) present: 2010 [employer]  VT (ventricular tachycardia): May 2017 no ICD shock  Vitamin D deficiency  Tricuspid regurgitation  RBBB  Prostate cancer: s/p surgery no RT/CT  Mitral regurgitation  Hypothyroidism  HLD (hyperlipidemia)  Arrhythmia  Angina pectoris  CAD (coronary artery disease)  History of bronchoscopy: 2017  History of prostate surgery: davinci surgery in 2008  Cardiac disorder: triple bypass may 1997 [facility]  History of electrophysiologic study: 2009 positive study (AICD placement  2010)  H/O cardiac catheterization: stenting of SVG TO PDA IN 2010 2017 one stent      SOCIAL HISTORY:                                     Admitted from:  home  SNF  [place]     Surrogate/HCP/Guardian: Phone#:    FAMILY HISTORY:  Family history of heart disease (Sibling)  Family history of diabetes mellitus (Sibling)  Family history of lung cancer (Sibling)  Family history of prostate cancer (Sibling)      Baseline ADLs (prior to admission):  Independent/ Dependent      Allergies    macrolide antibiotics (Urticaria; Rash; Hives)  penicillin (Urticaria; Rash)  Zithromax Z-Christian (Urticaria; Rash; Hives)    Intolerances        Present Symptoms:     Dyspnea: 0 1 2 3   Nausea/Vomiting: Yes No  Anxiety:  Yes No  Depression: Yes No  Fatigue: Yes No  Loss of appetite: Yes No    Pain:             Character-            Duration-            Effect-            Factors-            Frequency-            Location-            Severity-    Review of Systems: Reviewed                     Negative:                     Positive:  Unable to obtain due to poor mentation   All others negative    MEDICATIONS  (STANDING):  senna 2 Tablet(s) Oral at bedtime  amiodarone    Tablet 200 milliGRAM(s) Oral daily  aspirin  chewable 81 milliGRAM(s) Oral daily  clopidogrel Tablet 75 milliGRAM(s) Oral daily  pantoprazole    Tablet 40 milliGRAM(s) Oral before breakfast  fenofibrate Tablet 48 milliGRAM(s) Oral at bedtime  metoprolol succinate  milliGRAM(s) Oral daily  ferrous    sulfate 325 milliGRAM(s) Oral three times a day with meals  levothyroxine 200 MICROGram(s) Oral daily  fentaNYL   Patch  75 MICROgram(s)/Hr 1 Patch Transdermal every 72 hours  simvastatin 20 milliGRAM(s) Oral at bedtime  multivitamin/minerals 1 Tablet(s) Oral daily  lactobacillus acidophilus 1 Tablet(s) Oral two times a day with meals  docusate sodium 100 milliGRAM(s) Oral two times a day  polyethylene glycol 3350 17 Gram(s) Oral daily    MEDICATIONS  (PRN):  ALBUTerol/ipratropium for Nebulization 3 milliLiter(s) Nebulizer every 6 hours PRN Shortness of Breath and/or Wheezing  oxyCODONE    IR 5 milliGRAM(s) Oral every 4 hours PRN Moderate Pain (4 - 6)  bisacodyl Suppository 10 milliGRAM(s) Rectal daily PRN Constipation  lactulose Syrup 10 Gram(s) Oral every 8 hours PRN constipation  HYDROmorphone  Injectable 1 milliGRAM(s) IV Push every 3 hours PRN Pain    PHYSICAL EXAM:    Vital Signs Last 24 Hrs  T(C): 37.2 (22 Aug 2017 04:44), Max: 37.2 (22 Aug 2017 04:44)  T(F): 98.9 (22 Aug 2017 04:44), Max: 98.9 (22 Aug 2017 04:44)  HR: 82 (22 Aug 2017 04:44) (70 - 85)  BP: 129/76 (22 Aug 2017 04:44) (107/65 - 129/76)  BP(mean): --  RR: 18 (22 Aug 2017 04:44) (16 - 18)  SpO2: 97% (22 Aug 2017 04:44) (94% - 97%)    General: alert  oriented x ____ lethargic agitated                  cachexia  nonverbal  coma    Karnofsky:  %    HEENT: normal  dry mouth  ET tube/trach    Lungs: comfortable tachypnea/labored breathing  excessive secretions    CV: normal  tachycardia    GI: normal  distended  tender  no BS               PEG/NG/OG tube  constipation  last BM:     : normal  incontinent  oliguria/anuria  howard    MSK: normal  weakness  edema             ambulatory  bedbound/wheelchair bound    Skin: normal  pressure ulcers- Stage_____  no rash    LABS:                        8.9    18.9  )-----------( 292      ( 22 Aug 2017 06:35 )             28.6     08-22    134<L>  |  101  |  10.0  ----------------------------<  145<H>  3.6   |  20.0<L>  |  0.88    Ca    10.3<H>      22 Aug 2017 06:33  Phos  1.7     08-21  Mg     1.6     08-22          I&O's Summary    21 Aug 2017 07:01  -  22 Aug 2017 07:00  --------------------------------------------------------  IN: 720 mL / OUT: 0 mL / NET: 720 mL        RADIOLOGY & ADDITIONAL STUDIES:    ADVANCE DIRECTIVES:   DNR YES NO  Completed on:                     HUSSEIN  YES NO   Completed on:  Living Will  YES NO   Completed on: HPI:71M with PMH as listed admitted 8/19 with hypercalcemia, deconditioning, back pain, in setting of recently diagnosed lung cancer with sacral bone metastasis. He is scheduled to be started on keytruda per Heme/onc DESPITE extremely poor prognosis and poor functional status. Course now complicated by distended abdomen and ileus.      PERTINENT PMH REVIEWED: Yes     PAST MEDICAL & SURGICAL HISTORY:  Psoriasis    PSVT (paroxysmal supraventricular tachycardia)  Chronic systolic CHF (congestive heart failure), NYHA class 2  Former smoker  Hypokinesis: apical  Mitral regurgitation  Bronchitis  Hypertension  PSVT (paroxysmal supraventricular tachycardia)  Ischemic cardiomyopathy  AICD (automatic cardioverter/defibrillator) present: 2010 [employer]  VT (ventricular tachycardia): May 2017 no ICD shock  Vitamin D deficiency  Tricuspid regurgitation  RBBB  Prostate cancer: s/p surgery no RT/CT  Mitral regurgitation  Hypothyroidism  HLD (hyperlipidemia)  Arrhythmia  Angina pectoris  CAD (coronary artery disease)  History of bronchoscopy: 2017  History of prostate surgery: davinci surgery in 2008  Cardiac disorder: triple bypass may 1997 [facility]  History of electrophysiologic study: 2009 positive study (AICD placement  2010)  H/O cardiac catheterization: stenting of SVG TO PDA IN 2010 2017 one stent    SOCIAL HISTORY:  nonsmoker                                   Admitted from:  [place] [id number] children.     FAMILY HISTORY:  Family history of heart disease (Sibling)  Family history of diabetes mellitus (Sibling)  Family history of lung cancer (Sibling)  Family history of prostate cancer (Sibling)    Baseline ADLs (prior to admission):  dependent; prior to 2 months ago independent     Allergies    macrolide antibiotics (Urticaria; Rash; Hives)  penicillin (Urticaria; Rash)  Zithromax Z-Christian (Urticaria; Rash; Hives)    Present Symptoms:     Dyspnea: 0   Nausea/Vomiting: No  Anxiety:  No  Depression: No  Fatigue: Yes   Loss of appetite: No    Pain: yes             Character- constant             Duration- hours            Effect- constant            Factors- nothing specific            Frequency- often            Location-back            Severity-6/10    Review of Systems: Reviewed                  Positive: back pain   All others negative    MEDICATIONS  (STANDING):  senna 2 Tablet(s) Oral at bedtime  amiodarone    Tablet 200 milliGRAM(s) Oral daily  aspirin  chewable 81 milliGRAM(s) Oral daily  clopidogrel Tablet 75 milliGRAM(s) Oral daily  pantoprazole    Tablet 40 milliGRAM(s) Oral before breakfast  fenofibrate Tablet 48 milliGRAM(s) Oral at bedtime  metoprolol succinate  milliGRAM(s) Oral daily  ferrous    sulfate 325 milliGRAM(s) Oral three times a day with meals  levothyroxine 200 MICROGram(s) Oral daily  fentaNYL   Patch  75 MICROgram(s)/Hr 1 Patch Transdermal every 72 hours  simvastatin 20 milliGRAM(s) Oral at bedtime  multivitamin/minerals 1 Tablet(s) Oral daily  lactobacillus acidophilus 1 Tablet(s) Oral two times a day with meals  docusate sodium 100 milliGRAM(s) Oral two times a day  polyethylene glycol 3350 17 Gram(s) Oral daily    MEDICATIONS  (PRN):  ALBUTerol/ipratropium for Nebulization 3 milliLiter(s) Nebulizer every 6 hours PRN Shortness of Breath and/or Wheezing  oxyCODONE    IR 5 milliGRAM(s) Oral every 4 hours PRN Moderate Pain (4 - 6)  bisacodyl Suppository 10 milliGRAM(s) Rectal daily PRN Constipation  lactulose Syrup 10 Gram(s) Oral every 8 hours PRN constipation  HYDROmorphone  Injectable 1 milliGRAM(s) IV Push every 3 hours PRN Pain    PHYSICAL EXAM:    Vital Signs Last 24 Hrs  T(C): 37.2 (22 Aug 2017 04:44), Max: 37.2 (22 Aug 2017 04:44)  T(F): 98.9 (22 Aug 2017 04:44), Max: 98.9 (22 Aug 2017 04:44)  HR: 82 (22 Aug 2017 04:44) (70 - 85)  BP: 129/76 (22 Aug 2017 04:44) (107/65 - 129/76)  BP(mean): --  RR: 18 (22 Aug 2017 04:44) (16 - 18)  SpO2: 97% (22 Aug 2017 04:44) (94% - 97%)    General: alert  and oriented    Karnofsky:  30%    HEENT: normal      Lungs: comfortable     CV: normal      GI: distended     : normal     MSK: weakness     Skin: no rash    LABS:                      8.9    18.9  )-----------( 292      ( 22 Aug 2017 06:35 )             28.6     08-22    134<L>  |  101  |  10.0  ----------------------------<  145<H>  3.6   |  20.0<L>  |  0.88    Ca    10.3<H>      22 Aug 2017 06:33  Phos  1.7     08-21  Mg     1.6     08-22    I&O's Summary    21 Aug 2017 07:01  -  22 Aug 2017 07:00  --------------------------------------------------------  IN: 720 mL / OUT: 0 mL / NET: 720 mL    RADIOLOGY & ADDITIONAL STUDIES:     ADVANCE DIRECTIVES: Full Code

## 2017-08-22 NOTE — PROGRESS NOTE ADULT - SUBJECTIVE AND OBJECTIVE BOX
seen for pleural effusion, metastatic lung ca    pain persists but dilaudid helping  ROS otherwise negative   no cough, sob stable    MEDICATIONS  (STANDING):  senna 2 Tablet(s) Oral at bedtime  amiodarone    Tablet 200 milliGRAM(s) Oral daily  aspirin  chewable 81 milliGRAM(s) Oral daily  clopidogrel Tablet 75 milliGRAM(s) Oral daily  pantoprazole    Tablet 40 milliGRAM(s) Oral before breakfast  fenofibrate Tablet 48 milliGRAM(s) Oral at bedtime  metoprolol succinate  milliGRAM(s) Oral daily  ferrous    sulfate 325 milliGRAM(s) Oral three times a day with meals  levothyroxine 200 MICROGram(s) Oral daily  fentaNYL   Patch  75 MICROgram(s)/Hr 1 Patch Transdermal every 72 hours  simvastatin 20 milliGRAM(s) Oral at bedtime  multivitamin/minerals 1 Tablet(s) Oral daily  lactobacillus acidophilus 1 Tablet(s) Oral two times a day with meals  docusate sodium 100 milliGRAM(s) Oral two times a day  polyethylene glycol 3350 17 Gram(s) Oral daily    MEDICATIONS  (PRN):  ALBUTerol/ipratropium for Nebulization 3 milliLiter(s) Nebulizer every 6 hours PRN Shortness of Breath and/or Wheezing  oxyCODONE    IR 5 milliGRAM(s) Oral every 4 hours PRN Moderate Pain (4 - 6)  bisacodyl Suppository 10 milliGRAM(s) Rectal daily PRN Constipation  lactulose Syrup 10 Gram(s) Oral every 8 hours PRN constipation  HYDROmorphone  Injectable 1 milliGRAM(s) IV Push every 3 hours PRN Pain      Allergies    macrolide antibiotics (Urticaria; Rash; Hives)  penicillin (Urticaria; Rash)  Zithromax Z-Christian (Urticaria; Rash; Hives)    Vital Signs Last 24 Hrs  T(C): 37.2 (22 Aug 2017 04:44), Max: 37.2 (22 Aug 2017 04:44)  T(F): 98.9 (22 Aug 2017 04:44), Max: 98.9 (22 Aug 2017 04:44)  HR: 82 (22 Aug 2017 04:44) (70 - 96)  BP: 129/76 (22 Aug 2017 04:44) (98/56 - 129/76)  BP(mean): --  RR: 18 (22 Aug 2017 04:44) (16 - 18)  SpO2: 97% (22 Aug 2017 04:44) (94% - 100%)    PHYSICAL EXAM:    GENERAL:  NAD ill appearing, frail   CHEST/LUNG: dec bs left base  HEART: Regular rate and rhythm; S1 S2  ABDOMEN: Soft, Nontender, distended.   EXTREMITIES:  no edema.   NERVOUS SYSTEM:  Alert & Oriented X3    LABS:                        8.9    18.9  )-----------( 292      ( 22 Aug 2017 06:35 )             28.6     08-22    134<L>  |  101  |  10.0  ----------------------------<  145<H>  3.6   |  20.0<L>  |  0.88    Ca    10.3<H>      22 Aug 2017 06:33  Phos  1.7     08-21  Mg     1.6     08-22            CAPILLARY BLOOD GLUCOSE            RADIOLOGY & ADDITIONAL TESTS:

## 2017-08-22 NOTE — CONSULT NOTE ADULT - ASSESSMENT
71M with metastatic lung cancer, with neoplasm related pain, ileus, severe debility, to be started on keytruda per heme/onc.

## 2017-08-22 NOTE — PROGRESS NOTE ADULT - ASSESSMENT
71 yom with pmh which includes but not limited to Metastatic Lung Cancer follows at [facility], CAD s/p stents, V. Tach, HTN, HLD and Hypothyroidism sent from [facility] with hypercalcemia, low albumin and leucocytosis. Patient was recently at [facility] for loculated pleural effusions s/p chest tube and iv abx and severe hypercalcemia    1) Recurrent loculated left sided pleural effusions --> ct surgery following  --> currently in NAD,   --> plan as per ct surgery--possible pleurex  --> leukocytosis prob reactive     2) Metastatic lung CA with mets to S1 as per ct scan as above  --> ONC consult appreciated  --> poor prognosis  --> palliative care     3) Acute on chronic blood loss anemia --> below 7  --> s/p  2 units prbcs  --> cbc in am     4) Chronic pain --> IV dilaudid, percocet  --> consulted pain management.   --> start low dose iv dilaudid prn  --> fentanyl patch    5) Possible PNA --> ID following  --> off abx    6) Severe hypercalcemia --> improved  --> bmp in am     7) hypothyroidism--> synthroid    8) cad --> home meds    9) prognosis poor     10) severe protein calorie malnutrition --> ensure with meals

## 2017-08-23 ENCOUNTER — OTHER (OUTPATIENT)
Age: 72
End: 2017-08-23

## 2017-08-23 DIAGNOSIS — D64.9 ANEMIA, UNSPECIFIED: ICD-10-CM

## 2017-08-23 DIAGNOSIS — E03.9 HYPOTHYROIDISM, UNSPECIFIED: ICD-10-CM

## 2017-08-23 DIAGNOSIS — E46 UNSPECIFIED PROTEIN-CALORIE MALNUTRITION: ICD-10-CM

## 2017-08-23 DIAGNOSIS — E83.52 HYPERCALCEMIA: ICD-10-CM

## 2017-08-23 DIAGNOSIS — Z29.9 ENCOUNTER FOR PROPHYLACTIC MEASURES, UNSPECIFIED: ICD-10-CM

## 2017-08-23 DIAGNOSIS — I10 ESSENTIAL (PRIMARY) HYPERTENSION: ICD-10-CM

## 2017-08-23 DIAGNOSIS — C34.90 MALIGNANT NEOPLASM OF UNSPECIFIED PART OF UNSPECIFIED BRONCHUS OR LUNG: ICD-10-CM

## 2017-08-23 LAB
ANION GAP SERPL CALC-SCNC: 13 MMOL/L — SIGNIFICANT CHANGE UP (ref 5–17)
BUN SERPL-MCNC: 11 MG/DL — SIGNIFICANT CHANGE UP (ref 8–20)
CALCIUM SERPL-MCNC: 10.5 MG/DL — HIGH (ref 8.6–10.2)
CHLORIDE SERPL-SCNC: 101 MMOL/L — SIGNIFICANT CHANGE UP (ref 98–107)
CO2 SERPL-SCNC: 21 MMOL/L — LOW (ref 22–29)
CREAT SERPL-MCNC: 0.91 MG/DL — SIGNIFICANT CHANGE UP (ref 0.5–1.3)
CULTURE RESULTS: SIGNIFICANT CHANGE UP
CULTURE RESULTS: SIGNIFICANT CHANGE UP
GLUCOSE SERPL-MCNC: 122 MG/DL — HIGH (ref 70–115)
HCT VFR BLD CALC: 27.4 % — LOW (ref 42–52)
HGB BLD-MCNC: 8.8 G/DL — LOW (ref 14–18)
MCHC RBC-ENTMCNC: 26.5 PG — LOW (ref 27–31)
MCHC RBC-ENTMCNC: 32.1 G/DL — SIGNIFICANT CHANGE UP (ref 32–36)
MCV RBC AUTO: 82.5 FL — SIGNIFICANT CHANGE UP (ref 80–94)
PLATELET # BLD AUTO: 240 K/UL — SIGNIFICANT CHANGE UP (ref 150–400)
POTASSIUM SERPL-MCNC: 3.7 MMOL/L — SIGNIFICANT CHANGE UP (ref 3.5–5.3)
POTASSIUM SERPL-SCNC: 3.7 MMOL/L — SIGNIFICANT CHANGE UP (ref 3.5–5.3)
RBC # BLD: 3.32 M/UL — LOW (ref 4.6–6.2)
RBC # FLD: 16.3 % — HIGH (ref 11–15.6)
SODIUM SERPL-SCNC: 135 MMOL/L — SIGNIFICANT CHANGE UP (ref 135–145)
SPECIMEN SOURCE: SIGNIFICANT CHANGE UP
SPECIMEN SOURCE: SIGNIFICANT CHANGE UP
WBC # BLD: 17.1 K/UL — HIGH (ref 4.8–10.8)
WBC # FLD AUTO: 17.1 K/UL — HIGH (ref 4.8–10.8)

## 2017-08-23 PROCEDURE — 99233 SBSQ HOSP IP/OBS HIGH 50: CPT

## 2017-08-23 PROCEDURE — 71010: CPT | Mod: 26

## 2017-08-23 RX ORDER — SODIUM CHLORIDE 9 MG/ML
1000 INJECTION, SOLUTION INTRAVENOUS
Qty: 0 | Refills: 0 | Status: COMPLETED | OUTPATIENT
Start: 2017-08-23 | End: 2017-08-23

## 2017-08-23 RX ADMIN — OXYCODONE HYDROCHLORIDE 5 MILLIGRAM(S): 5 TABLET ORAL at 12:32

## 2017-08-23 RX ADMIN — PANTOPRAZOLE SODIUM 40 MILLIGRAM(S): 20 TABLET, DELAYED RELEASE ORAL at 05:48

## 2017-08-23 RX ADMIN — Medication 81 MILLIGRAM(S): at 12:32

## 2017-08-23 RX ADMIN — Medication 10 MILLIGRAM(S): at 12:33

## 2017-08-23 RX ADMIN — HYDROMORPHONE HYDROCHLORIDE 1 MILLIGRAM(S): 2 INJECTION INTRAMUSCULAR; INTRAVENOUS; SUBCUTANEOUS at 11:27

## 2017-08-23 RX ADMIN — OXYCODONE HYDROCHLORIDE 5 MILLIGRAM(S): 5 TABLET ORAL at 13:10

## 2017-08-23 RX ADMIN — AMIODARONE HYDROCHLORIDE 200 MILLIGRAM(S): 400 TABLET ORAL at 05:48

## 2017-08-23 RX ADMIN — Medication 100 MILLIGRAM(S): at 18:02

## 2017-08-23 RX ADMIN — HYDROMORPHONE HYDROCHLORIDE 1 MILLIGRAM(S): 2 INJECTION INTRAMUSCULAR; INTRAVENOUS; SUBCUTANEOUS at 22:53

## 2017-08-23 RX ADMIN — Medication 1 TABLET(S): at 18:02

## 2017-08-23 RX ADMIN — Medication 100 MILLIGRAM(S): at 05:48

## 2017-08-23 RX ADMIN — SODIUM CHLORIDE 65 MILLILITER(S): 9 INJECTION, SOLUTION INTRAVENOUS at 18:03

## 2017-08-23 RX ADMIN — HYDROMORPHONE HYDROCHLORIDE 1 MILLIGRAM(S): 2 INJECTION INTRAMUSCULAR; INTRAVENOUS; SUBCUTANEOUS at 19:00

## 2017-08-23 RX ADMIN — CLOPIDOGREL BISULFATE 75 MILLIGRAM(S): 75 TABLET, FILM COATED ORAL at 12:33

## 2017-08-23 RX ADMIN — Medication 100 MILLIGRAM(S): at 05:49

## 2017-08-23 RX ADMIN — Medication 200 MICROGRAM(S): at 05:47

## 2017-08-23 RX ADMIN — HYDROMORPHONE HYDROCHLORIDE 1 MILLIGRAM(S): 2 INJECTION INTRAMUSCULAR; INTRAVENOUS; SUBCUTANEOUS at 23:15

## 2017-08-23 RX ADMIN — Medication 325 MILLIGRAM(S): at 09:46

## 2017-08-23 RX ADMIN — Medication 48 MILLIGRAM(S): at 22:47

## 2017-08-23 RX ADMIN — Medication 325 MILLIGRAM(S): at 18:02

## 2017-08-23 RX ADMIN — POLYETHYLENE GLYCOL 3350 17 GRAM(S): 17 POWDER, FOR SOLUTION ORAL at 12:34

## 2017-08-23 RX ADMIN — Medication 1 TABLET(S): at 09:45

## 2017-08-23 RX ADMIN — HYDROMORPHONE HYDROCHLORIDE 1 MILLIGRAM(S): 2 INJECTION INTRAMUSCULAR; INTRAVENOUS; SUBCUTANEOUS at 10:46

## 2017-08-23 RX ADMIN — SIMVASTATIN 20 MILLIGRAM(S): 20 TABLET, FILM COATED ORAL at 22:47

## 2017-08-23 RX ADMIN — HYDROMORPHONE HYDROCHLORIDE 1 MILLIGRAM(S): 2 INJECTION INTRAMUSCULAR; INTRAVENOUS; SUBCUTANEOUS at 06:15

## 2017-08-23 RX ADMIN — HYDROMORPHONE HYDROCHLORIDE 1 MILLIGRAM(S): 2 INJECTION INTRAMUSCULAR; INTRAVENOUS; SUBCUTANEOUS at 05:47

## 2017-08-23 RX ADMIN — SENNA PLUS 2 TABLET(S): 8.6 TABLET ORAL at 22:47

## 2017-08-23 RX ADMIN — HYDROMORPHONE HYDROCHLORIDE 1 MILLIGRAM(S): 2 INJECTION INTRAMUSCULAR; INTRAVENOUS; SUBCUTANEOUS at 18:13

## 2017-08-23 RX ADMIN — Medication 325 MILLIGRAM(S): at 12:33

## 2017-08-23 NOTE — PROGRESS NOTE ADULT - PROBLEM SELECTOR PLAN 1
-very guarded prognosis. s/p 1 dose of immunotherapy earlier today.   -unclear if he will be able to tolerate further treatments, await heme/onc recommendations.

## 2017-08-23 NOTE — PROGRESS NOTE ADULT - PROBLEM SELECTOR PLAN 2
with mets to S1 as per ct scan   Seen by Oncology   Consented to start keytruda today   zofran prn  seen by [facility], note evaluated

## 2017-08-23 NOTE — PROGRESS NOTE ADULT - SUBJECTIVE AND OBJECTIVE BOX
OVERNIGHT EVENTS: doing okay, he just received pain meds and is sleeping now. He received keytruda this AM.     Present Symptoms:     Dyspnea: 0    Nausea/Vomiting: No  Anxiety:  No  Depression: No  Fatigue: Yes   Loss of appetite: No    Pain: just received pain meds             Character-            Duration-            Effect-            Factors-            Frequency-            Location-            Severity-    Review of Systems: Reviewed                  Unable to obtain due to poor mentation   All others negative    MEDICATIONS  (STANDING):  senna 2 Tablet(s) Oral at bedtime  amiodarone    Tablet 200 milliGRAM(s) Oral daily  aspirin  chewable 81 milliGRAM(s) Oral daily  clopidogrel Tablet 75 milliGRAM(s) Oral daily  pantoprazole    Tablet 40 milliGRAM(s) Oral before breakfast  fenofibrate Tablet 48 milliGRAM(s) Oral at bedtime  metoprolol succinate  milliGRAM(s) Oral daily  ferrous    sulfate 325 milliGRAM(s) Oral three times a day with meals  levothyroxine 200 MICROGram(s) Oral daily  fentaNYL   Patch  75 MICROgram(s)/Hr 1 Patch Transdermal every 72 hours  simvastatin 20 milliGRAM(s) Oral at bedtime  multivitamin/minerals 1 Tablet(s) Oral daily  lactobacillus acidophilus 1 Tablet(s) Oral two times a day with meals  docusate sodium 100 milliGRAM(s) Oral two times a day  polyethylene glycol 3350 17 Gram(s) Oral daily  pembrolizumab IVPB 200 milliGRAM(s) IV Intermittent once    MEDICATIONS  (PRN):  ALBUTerol/ipratropium for Nebulization 3 milliLiter(s) Nebulizer every 6 hours PRN Shortness of Breath and/or Wheezing  oxyCODONE    IR 5 milliGRAM(s) Oral every 4 hours PRN Moderate Pain (4 - 6)  bisacodyl Suppository 10 milliGRAM(s) Rectal daily PRN Constipation  lactulose Syrup 10 Gram(s) Oral every 8 hours PRN constipation  HYDROmorphone  Injectable 1 milliGRAM(s) IV Push every 3 hours PRN Pain    PHYSICAL EXAM:    Vital Signs Last 24 Hrs  T(C): 36.3 (23 Aug 2017 10:40), Max: 36.8 (22 Aug 2017 20:01)  T(F): 97.4 (23 Aug 2017 10:40), Max: 98.2 (22 Aug 2017 20:01)  HR: 77 (23 Aug 2017 10:40) (77 - 88)  BP: 128/75 (23 Aug 2017 10:40) (108/70 - 128/75)  BP(mean): --  RR: 18 (23 Aug 2017 10:40) (18 - 18)  SpO2: 97% (23 Aug 2017 10:40) (95% - 99%)    General: sleepy     Karnofsky:  30%    HEENT: normal     Lungs: comfortable     CV: normal      GI: distended     : normal     MSK: weakness     Skin: no rash    LABS:                      8.8    17.1  )-----------( 240      ( 23 Aug 2017 07:10 )             27.4     08-23    135  |  101  |  11.0  ----------------------------<  122<H>  3.7   |  21.0<L>  |  0.91    Ca    10.5<H>      23 Aug 2017 07:11  Mg     1.6     08-22    I&O's Summary    22 Aug 2017 07:01  -  23 Aug 2017 07:00  --------------------------------------------------------  IN: 0 mL / OUT: 600 mL / NET: -600 mL    23 Aug 2017 07:01  -  23 Aug 2017 13:00  --------------------------------------------------------  IN: 0 mL / OUT: 350 mL / NET: -350 mL    RADIOLOGY & ADDITIONAL STUDIES:     < from: Xray Abdomen 1 View- AP Only (08.22.17 @ 12:54) >  Gaseous distention of the colon and small bowel suggesting ileus    ADVANCE DIRECTIVES: Full Code

## 2017-08-23 NOTE — PROGRESS NOTE ADULT - SUBJECTIVE AND OBJECTIVE BOX
Despite the development of ileus, and progressive decline in performance status, we've made a decision in conjunction with the Marcellus family to proceed with an initial dose of pembroluzimab. All understand that the prognosis is poor and the chance of meaningful response is low, but agree that the wishes of the family and patient are to proceed.

## 2017-08-23 NOTE — PROGRESS NOTE ADULT - SUBJECTIVE AND OBJECTIVE BOX
Patient is a 71y old  Male who presents with a chief complaint of Hypercalcemia (12.5), Low Albumin and Leucocytosis (14.17) (19 Aug 2017 00:52)    Patient seen and examined at bedside. No acute distress and no acute overnight events. Son at bedside. States that he hasn't had a BM in 5 days, no abdominal pain, + flatus (he thinks). Currently awaiting tx to start in house with keytruda     ROS: No chest pain, palpitations,  light headedness/dizziness,arcelia/chills, abdominal pain, n/v,  dysuria or increased urinary frequency.,    ICU Vital Signs Last 24 Hrs  T(C): 36.3 (23 Aug 2017 10:40), Max: 36.8 (22 Aug 2017 20:01)  T(F): 97.4 (23 Aug 2017 10:40), Max: 98.2 (22 Aug 2017 20:01)  HR: 77 (23 Aug 2017 10:40) (77 - 88)  BP: 128/75 (23 Aug 2017 10:40) (108/70 - 128/75)  BP(mean): --  ABP: --  ABP(mean): --  RR: 18 (23 Aug 2017 10:40) (18 - 18)  SpO2: 97% (23 Aug 2017 10:40) (95% - 99%)      PHYSICAL EXAM:    GENERAL:  NAD, thin, pale   HEENT: eomi, perrla, pallor   CHEST/LUNG: decreased BS b/l bases  HEART: Regular rate and rhythm; S1 S2  ABDOMEN: distended, tympanic to upper quadrants b/l. Dull to percussion at b/l LQ   EXTREMITIES:  no edema.   NERVOUS SYSTEM:  Alert & Oriented X3    LABS:                        8.8    17.1  )-----------( 240      ( 23 Aug 2017 07:10 )             27.4   08-23    135  |  101  |  11.0  ----------------------------<  122<H>  3.7   |  21.0<L>  |  0.91    Ca    10.5<H>      23 Aug 2017 07:11  Mg     1.6     08-22

## 2017-08-23 NOTE — PROGRESS NOTE ADULT - ASSESSMENT
71 yom with pmh which includes but not limited to Metastatic Lung Cancer follows at [facility], CAD s/p stents, V. Tach, HTN, HLD and Hypothyroidism sent from [facility] with hypercalcemia, low albumin and leucocytosis. Patient was recently at [facility] for loculated pleural effusions s/p chest tube and iv abx and severe hypercalcemia. Currently getting treated at this time for effusion, pending ct surgery f/up and has started keytruda via Oncology

## 2017-08-23 NOTE — PROGRESS NOTE ADULT - ASSESSMENT
71M with recently diagnosed metastatic lung cancer to bone, now with ileus, being treated for severe pain, debility, and now s/p 1 dose of Keytruda.

## 2017-08-23 NOTE — PROGRESS NOTE ADULT - PROBLEM SELECTOR PLAN 4
-patient just received Dilaudid and is sleeping now. Support provided to son, daughter, and patient's sister at bedside. Will see how his clinical course goes and determine goals with patient and his family. No hospice evaluation at this time as patient is undergoing aggressive antineoplastic treatments.

## 2017-08-23 NOTE — PROGRESS NOTE ADULT - ATTENDING COMMENTS
Thank you for the opportunity to assist with the care of this patient.   [facility] [phone].

## 2017-08-23 NOTE — PROGRESS NOTE ADULT - PROBLEM SELECTOR PLAN 2
-pain management input appreciated. on fentanyl patch and prn dilaudid, ? consider increasing fentanyl patch, but will leave decision to experts in pain management...

## 2017-08-23 NOTE — PROGRESS NOTE ADULT - PROBLEM SELECTOR PLAN 1
recurrent loculated left sided pleural effusion   CT surgery called back for possible drainage   repeat cxr ordered   Leucocytosis present and likely to be reactive to primary lung ca with mets  No e/o infection, seen by ID - no abx at this time

## 2017-08-24 ENCOUNTER — OTHER (OUTPATIENT)
Age: 72
End: 2017-08-24

## 2017-08-24 DIAGNOSIS — K56.7 ILEUS, UNSPECIFIED: ICD-10-CM

## 2017-08-24 LAB
ANION GAP SERPL CALC-SCNC: 13 MMOL/L — SIGNIFICANT CHANGE UP (ref 5–17)
BUN SERPL-MCNC: 12 MG/DL — SIGNIFICANT CHANGE UP (ref 8–20)
CALCIUM SERPL-MCNC: 10.3 MG/DL — HIGH (ref 8.6–10.2)
CHLORIDE SERPL-SCNC: 98 MMOL/L — SIGNIFICANT CHANGE UP (ref 98–107)
CO2 SERPL-SCNC: 21 MMOL/L — LOW (ref 22–29)
CREAT SERPL-MCNC: 0.81 MG/DL — SIGNIFICANT CHANGE UP (ref 0.5–1.3)
GLUCOSE SERPL-MCNC: 107 MG/DL — SIGNIFICANT CHANGE UP (ref 70–115)
HCT VFR BLD CALC: 30 % — LOW (ref 42–52)
HGB BLD-MCNC: 9.3 G/DL — LOW (ref 14–18)
MAGNESIUM SERPL-MCNC: 1.7 MG/DL — SIGNIFICANT CHANGE UP (ref 1.6–2.6)
MCHC RBC-ENTMCNC: 26.1 PG — LOW (ref 27–31)
MCHC RBC-ENTMCNC: 31 G/DL — LOW (ref 32–36)
MCV RBC AUTO: 84 FL — SIGNIFICANT CHANGE UP (ref 80–94)
PHOSPHATE SERPL-MCNC: 1.7 MG/DL — LOW (ref 2.4–4.7)
PLATELET # BLD AUTO: 258 K/UL — SIGNIFICANT CHANGE UP (ref 150–400)
POTASSIUM SERPL-MCNC: 3.7 MMOL/L — SIGNIFICANT CHANGE UP (ref 3.5–5.3)
POTASSIUM SERPL-SCNC: 3.7 MMOL/L — SIGNIFICANT CHANGE UP (ref 3.5–5.3)
RBC # BLD: 3.57 M/UL — LOW (ref 4.6–6.2)
RBC # FLD: 16.6 % — HIGH (ref 11–15.6)
SODIUM SERPL-SCNC: 132 MMOL/L — LOW (ref 135–145)
WBC # BLD: 18 K/UL — HIGH (ref 4.8–10.8)
WBC # FLD AUTO: 18 K/UL — HIGH (ref 4.8–10.8)

## 2017-08-24 PROCEDURE — 74000: CPT | Mod: 26,77

## 2017-08-24 PROCEDURE — 99233 SBSQ HOSP IP/OBS HIGH 50: CPT

## 2017-08-24 PROCEDURE — 74000: CPT | Mod: 26

## 2017-08-24 PROCEDURE — 99221 1ST HOSP IP/OBS SF/LOW 40: CPT

## 2017-08-24 RX ORDER — OXYCODONE HYDROCHLORIDE 5 MG/1
5 TABLET ORAL EVERY 4 HOURS
Qty: 0 | Refills: 0 | Status: DISCONTINUED | OUTPATIENT
Start: 2017-08-24 | End: 2017-08-25

## 2017-08-24 RX ORDER — FENTANYL CITRATE 50 UG/ML
1 INJECTION INTRAVENOUS
Qty: 0 | Refills: 0 | Status: DISCONTINUED | OUTPATIENT
Start: 2017-08-24 | End: 2017-08-28

## 2017-08-24 RX ORDER — POTASSIUM CHLORIDE 20 MEQ
40 PACKET (EA) ORAL ONCE
Qty: 0 | Refills: 0 | Status: COMPLETED | OUTPATIENT
Start: 2017-08-24 | End: 2017-08-24

## 2017-08-24 RX ORDER — FENTANYL CITRATE 50 UG/ML
1 INJECTION INTRAVENOUS
Qty: 0 | Refills: 0 | Status: DISCONTINUED | OUTPATIENT
Start: 2017-08-24 | End: 2017-08-24

## 2017-08-24 RX ORDER — POTASSIUM CHLORIDE 20 MEQ
10 PACKET (EA) ORAL
Qty: 0 | Refills: 0 | Status: DISCONTINUED | OUTPATIENT
Start: 2017-08-24 | End: 2017-08-24

## 2017-08-24 RX ORDER — LIDOCAINE 4 G/100G
1 CREAM TOPICAL DAILY
Qty: 0 | Refills: 0 | Status: DISCONTINUED | OUTPATIENT
Start: 2017-08-24 | End: 2017-09-08

## 2017-08-24 RX ORDER — SODIUM CHLORIDE 9 MG/ML
1000 INJECTION, SOLUTION INTRAVENOUS
Qty: 0 | Refills: 0 | Status: DISCONTINUED | OUTPATIENT
Start: 2017-08-24 | End: 2017-08-28

## 2017-08-24 RX ORDER — MAGNESIUM SULFATE 500 MG/ML
1 VIAL (ML) INJECTION ONCE
Qty: 0 | Refills: 0 | Status: COMPLETED | OUTPATIENT
Start: 2017-08-24 | End: 2017-08-24

## 2017-08-24 RX ORDER — CYCLOBENZAPRINE HYDROCHLORIDE 10 MG/1
5 TABLET, FILM COATED ORAL THREE TIMES A DAY
Qty: 0 | Refills: 0 | Status: DISCONTINUED | OUTPATIENT
Start: 2017-08-24 | End: 2017-08-25

## 2017-08-24 RX ORDER — CYCLOBENZAPRINE HYDROCHLORIDE 10 MG/1
10 TABLET, FILM COATED ORAL ONCE
Qty: 0 | Refills: 0 | Status: COMPLETED | OUTPATIENT
Start: 2017-08-24 | End: 2017-08-24

## 2017-08-24 RX ADMIN — PANTOPRAZOLE SODIUM 40 MILLIGRAM(S): 20 TABLET, DELAYED RELEASE ORAL at 06:08

## 2017-08-24 RX ADMIN — Medication 40 MILLIEQUIVALENT(S): at 12:32

## 2017-08-24 RX ADMIN — AMIODARONE HYDROCHLORIDE 200 MILLIGRAM(S): 400 TABLET ORAL at 06:09

## 2017-08-24 RX ADMIN — FENTANYL CITRATE 1 PATCH: 50 INJECTION INTRAVENOUS at 18:34

## 2017-08-24 RX ADMIN — Medication 48 MILLIGRAM(S): at 21:58

## 2017-08-24 RX ADMIN — HYDROMORPHONE HYDROCHLORIDE 1 MILLIGRAM(S): 2 INJECTION INTRAMUSCULAR; INTRAVENOUS; SUBCUTANEOUS at 06:16

## 2017-08-24 RX ADMIN — POLYETHYLENE GLYCOL 3350 17 GRAM(S): 17 POWDER, FOR SOLUTION ORAL at 12:32

## 2017-08-24 RX ADMIN — HYDROMORPHONE HYDROCHLORIDE 1 MILLIGRAM(S): 2 INJECTION INTRAMUSCULAR; INTRAVENOUS; SUBCUTANEOUS at 03:01

## 2017-08-24 RX ADMIN — HYDROMORPHONE HYDROCHLORIDE 1 MILLIGRAM(S): 2 INJECTION INTRAMUSCULAR; INTRAVENOUS; SUBCUTANEOUS at 09:29

## 2017-08-24 RX ADMIN — HYDROMORPHONE HYDROCHLORIDE 1 MILLIGRAM(S): 2 INJECTION INTRAMUSCULAR; INTRAVENOUS; SUBCUTANEOUS at 10:30

## 2017-08-24 RX ADMIN — OXYCODONE HYDROCHLORIDE 5 MILLIGRAM(S): 5 TABLET ORAL at 19:10

## 2017-08-24 RX ADMIN — SIMVASTATIN 20 MILLIGRAM(S): 20 TABLET, FILM COATED ORAL at 21:58

## 2017-08-24 RX ADMIN — Medication 325 MILLIGRAM(S): at 18:30

## 2017-08-24 RX ADMIN — SENNA PLUS 2 TABLET(S): 8.6 TABLET ORAL at 21:58

## 2017-08-24 RX ADMIN — HYDROMORPHONE HYDROCHLORIDE 1 MILLIGRAM(S): 2 INJECTION INTRAMUSCULAR; INTRAVENOUS; SUBCUTANEOUS at 06:30

## 2017-08-24 RX ADMIN — Medication 100 MILLIGRAM(S): at 06:08

## 2017-08-24 RX ADMIN — CYCLOBENZAPRINE HYDROCHLORIDE 10 MILLIGRAM(S): 10 TABLET, FILM COATED ORAL at 12:29

## 2017-08-24 RX ADMIN — Medication 100 GRAM(S): at 12:30

## 2017-08-24 RX ADMIN — Medication 100 MILLIGRAM(S): at 18:30

## 2017-08-24 RX ADMIN — Medication 81 MILLIGRAM(S): at 12:32

## 2017-08-24 RX ADMIN — FENTANYL CITRATE 1 PATCH: 50 INJECTION INTRAVENOUS at 12:33

## 2017-08-24 RX ADMIN — Medication 1 TABLET(S): at 18:30

## 2017-08-24 RX ADMIN — CLOPIDOGREL BISULFATE 75 MILLIGRAM(S): 75 TABLET, FILM COATED ORAL at 12:32

## 2017-08-24 RX ADMIN — SODIUM CHLORIDE 65 MILLILITER(S): 9 INJECTION, SOLUTION INTRAVENOUS at 12:30

## 2017-08-24 RX ADMIN — FENTANYL CITRATE 1 PATCH: 50 INJECTION INTRAVENOUS at 12:31

## 2017-08-24 RX ADMIN — Medication 200 MICROGRAM(S): at 06:09

## 2017-08-24 RX ADMIN — Medication 325 MILLIGRAM(S): at 12:32

## 2017-08-24 RX ADMIN — OXYCODONE HYDROCHLORIDE 5 MILLIGRAM(S): 5 TABLET ORAL at 08:58

## 2017-08-24 RX ADMIN — HYDROMORPHONE HYDROCHLORIDE 1 MILLIGRAM(S): 2 INJECTION INTRAMUSCULAR; INTRAVENOUS; SUBCUTANEOUS at 22:05

## 2017-08-24 RX ADMIN — Medication 325 MILLIGRAM(S): at 08:58

## 2017-08-24 RX ADMIN — Medication 100 MILLIGRAM(S): at 06:09

## 2017-08-24 RX ADMIN — OXYCODONE HYDROCHLORIDE 5 MILLIGRAM(S): 5 TABLET ORAL at 18:39

## 2017-08-24 RX ADMIN — Medication 1 TABLET(S): at 08:58

## 2017-08-24 RX ADMIN — SODIUM CHLORIDE 65 MILLILITER(S): 9 INJECTION, SOLUTION INTRAVENOUS at 18:31

## 2017-08-24 RX ADMIN — HYDROMORPHONE HYDROCHLORIDE 1 MILLIGRAM(S): 2 INJECTION INTRAMUSCULAR; INTRAVENOUS; SUBCUTANEOUS at 03:13

## 2017-08-24 RX ADMIN — LIDOCAINE 1 PATCH: 4 CREAM TOPICAL at 12:29

## 2017-08-24 RX ADMIN — HYDROMORPHONE HYDROCHLORIDE 1 MILLIGRAM(S): 2 INJECTION INTRAMUSCULAR; INTRAVENOUS; SUBCUTANEOUS at 21:49

## 2017-08-24 RX ADMIN — OXYCODONE HYDROCHLORIDE 5 MILLIGRAM(S): 5 TABLET ORAL at 09:45

## 2017-08-24 NOTE — PROGRESS NOTE ADULT - SUBJECTIVE AND OBJECTIVE BOX
OVERNIGHT EVENTS: per son dad had excruciating back pain     Present Symptoms:     Dyspnea: 0 1 2 3   Nausea/Vomiting: Yes No  Anxiety:  Yes No  Depression: Yes No  Fatigue: Yes No  Loss of appetite: Yes No    Pain: patient unable to characterize at this time but he has back pain             Character-            Duration-            Effect-            Factors-            Frequency-            Location-            Severity-    Review of Systems: Reviewed                     Positive: back pain   All others negative    MEDICATIONS  (STANDING):  senna 2 Tablet(s) Oral at bedtime  amiodarone    Tablet 200 milliGRAM(s) Oral daily  aspirin  chewable 81 milliGRAM(s) Oral daily  clopidogrel Tablet 75 milliGRAM(s) Oral daily  pantoprazole    Tablet 40 milliGRAM(s) Oral before breakfast  fenofibrate Tablet 48 milliGRAM(s) Oral at bedtime  metoprolol succinate  milliGRAM(s) Oral daily  ferrous    sulfate 325 milliGRAM(s) Oral three times a day with meals  levothyroxine 200 MICROGram(s) Oral daily  simvastatin 20 milliGRAM(s) Oral at bedtime  multivitamin/minerals 1 Tablet(s) Oral daily  lactobacillus acidophilus 1 Tablet(s) Oral two times a day with meals  docusate sodium 100 milliGRAM(s) Oral two times a day  polyethylene glycol 3350 17 Gram(s) Oral daily  pembrolizumab IVPB 200 milliGRAM(s) IV Intermittent once  lidocaine   Patch 1 Patch Transdermal daily  dextrose 5% + sodium chloride 0.9%. 1000 milliLiter(s) (65 mL/Hr) IV Continuous <Continuous>  fentaNYL   Patch 100 MICROgram(s)/Hr 1 Patch Transdermal every 72 hours  fentaNYL   Patch  25 MICROgram(s)/Hr 1 Patch Transdermal every 72 hours    MEDICATIONS  (PRN):  ALBUTerol/ipratropium for Nebulization 3 milliLiter(s) Nebulizer every 6 hours PRN Shortness of Breath and/or Wheezing  oxyCODONE    IR 5 milliGRAM(s) Oral every 4 hours PRN Moderate Pain (4 - 6)  bisacodyl Suppository 10 milliGRAM(s) Rectal daily PRN Constipation  lactulose Syrup 10 Gram(s) Oral every 8 hours PRN constipation  HYDROmorphone  Injectable 1 milliGRAM(s) IV Push every 3 hours PRN Pain  cyclobenzaprine 5 milliGRAM(s) Oral three times a day PRN Muscle Spasm    PHYSICAL EXAM:    Vital Signs Last 24 Hrs  T(C): 36.7 (24 Aug 2017 10:50), Max: 36.8 (23 Aug 2017 16:22)  T(F): 98.1 (24 Aug 2017 10:50), Max: 98.3 (23 Aug 2017 16:22)  HR: 80 (24 Aug 2017 10:50) (76 - 86)  BP: 104/61 (24 Aug 2017 10:50) (92/59 - 124/70)  BP(mean): --  RR: 18 (24 Aug 2017 10:50) (18 - 19)  SpO2: 96% (24 Aug 2017 10:50) (96% - 99%)    General: alert and oriented but falling asleep during speaking     Karnofsky:  30-40 %    HEENT: dry mouth    Lungs: comfortable    CV: normal      GI: distended  tender  no BS              : normal     MSK: weakness               Skin: no rash    LABS:                      9.3    18.0  )-----------( 258      ( 24 Aug 2017 08:46 )             30.0     08-24    132<L>  |  98  |  12.0  ----------------------------<  107  3.7   |  21.0<L>  |  0.81    Ca    10.3<H>      24 Aug 2017 08:46  Phos  1.7     08-24  Mg     1.7     08-24    I&O's Summary    23 Aug 2017 07:01  -  24 Aug 2017 07:00  --------------------------------------------------------  IN: 0 mL / OUT: 701 mL / NET: -701 mL    RADIOLOGY & ADDITIONAL STUDIES:    ADVANCE DIRECTIVES: Full Code OVERNIGHT EVENTS: per son dad had excruciating back pain     Present Symptoms:     Dyspnea: 0   Nausea/Vomiting: No  Anxiety:  No  Depression: No  Fatigue: Yes   Loss of appetite: No    Pain: patient unable to characterize at this time but he has back pain             Character-            Duration-            Effect-            Factors-            Frequency-            Location-            Severity-    Review of Systems: Reviewed                     Positive: back pain   All others negative    MEDICATIONS  (STANDING):  senna 2 Tablet(s) Oral at bedtime  amiodarone    Tablet 200 milliGRAM(s) Oral daily  aspirin  chewable 81 milliGRAM(s) Oral daily  clopidogrel Tablet 75 milliGRAM(s) Oral daily  pantoprazole    Tablet 40 milliGRAM(s) Oral before breakfast  fenofibrate Tablet 48 milliGRAM(s) Oral at bedtime  metoprolol succinate  milliGRAM(s) Oral daily  ferrous    sulfate 325 milliGRAM(s) Oral three times a day with meals  levothyroxine 200 MICROGram(s) Oral daily  simvastatin 20 milliGRAM(s) Oral at bedtime  multivitamin/minerals 1 Tablet(s) Oral daily  lactobacillus acidophilus 1 Tablet(s) Oral two times a day with meals  docusate sodium 100 milliGRAM(s) Oral two times a day  polyethylene glycol 3350 17 Gram(s) Oral daily  pembrolizumab IVPB 200 milliGRAM(s) IV Intermittent once  lidocaine   Patch 1 Patch Transdermal daily  dextrose 5% + sodium chloride 0.9%. 1000 milliLiter(s) (65 mL/Hr) IV Continuous <Continuous>  fentaNYL   Patch 100 MICROgram(s)/Hr 1 Patch Transdermal every 72 hours  fentaNYL   Patch  25 MICROgram(s)/Hr 1 Patch Transdermal every 72 hours    MEDICATIONS  (PRN):  ALBUTerol/ipratropium for Nebulization 3 milliLiter(s) Nebulizer every 6 hours PRN Shortness of Breath and/or Wheezing  oxyCODONE    IR 5 milliGRAM(s) Oral every 4 hours PRN Moderate Pain (4 - 6)  bisacodyl Suppository 10 milliGRAM(s) Rectal daily PRN Constipation  lactulose Syrup 10 Gram(s) Oral every 8 hours PRN constipation  HYDROmorphone  Injectable 1 milliGRAM(s) IV Push every 3 hours PRN Pain  cyclobenzaprine 5 milliGRAM(s) Oral three times a day PRN Muscle Spasm    PHYSICAL EXAM:    Vital Signs Last 24 Hrs  T(C): 36.7 (24 Aug 2017 10:50), Max: 36.8 (23 Aug 2017 16:22)  T(F): 98.1 (24 Aug 2017 10:50), Max: 98.3 (23 Aug 2017 16:22)  HR: 80 (24 Aug 2017 10:50) (76 - 86)  BP: 104/61 (24 Aug 2017 10:50) (92/59 - 124/70)  BP(mean): --  RR: 18 (24 Aug 2017 10:50) (18 - 19)  SpO2: 96% (24 Aug 2017 10:50) (96% - 99%)    General: alert and oriented but falling asleep during speaking     Karnofsky:  30-40 %    HEENT: dry mouth    Lungs: comfortable    CV: normal      GI: distended  tender  no BS              : normal     MSK: weakness               Skin: no rash    LABS:                      9.3    18.0  )-----------( 258      ( 24 Aug 2017 08:46 )             30.0     08-24    132<L>  |  98  |  12.0  ----------------------------<  107  3.7   |  21.0<L>  |  0.81    Ca    10.3<H>      24 Aug 2017 08:46  Phos  1.7     08-24  Mg     1.7     08-24    I&O's Summary    23 Aug 2017 07:01  -  24 Aug 2017 07:00  --------------------------------------------------------  IN: 0 mL / OUT: 701 mL / NET: -701 mL    RADIOLOGY & ADDITIONAL STUDIES:    ADVANCE DIRECTIVES: Full Code

## 2017-08-24 NOTE — CONSULT NOTE ADULT - SUBJECTIVE AND OBJECTIVE BOX
ACS team was consulted for possible ileus    HPI:   Patient was admitted to the medicine team for elevated calcium, leukopenia, and weakness.     MEDICATIONS  (STANDING):  senna 2 Tablet(s) Oral at bedtime  amiodarone    Tablet 200 milliGRAM(s) Oral daily  aspirin  chewable 81 milliGRAM(s) Oral daily  clopidogrel Tablet 75 milliGRAM(s) Oral daily  pantoprazole    Tablet 40 milliGRAM(s) Oral before breakfast  fenofibrate Tablet 48 milliGRAM(s) Oral at bedtime  metoprolol succinate  milliGRAM(s) Oral daily  ferrous    sulfate 325 milliGRAM(s) Oral three times a day with meals  levothyroxine 200 MICROGram(s) Oral daily  simvastatin 20 milliGRAM(s) Oral at bedtime  multivitamin/minerals 1 Tablet(s) Oral daily  lactobacillus acidophilus 1 Tablet(s) Oral two times a day with meals  docusate sodium 100 milliGRAM(s) Oral two times a day  polyethylene glycol 3350 17 Gram(s) Oral daily  pembrolizumab IVPB 200 milliGRAM(s) IV Intermittent once  lidocaine   Patch 1 Patch Transdermal daily  dextrose 5% + sodium chloride 0.9%. 1000 milliLiter(s) (65 mL/Hr) IV Continuous <Continuous>  fentaNYL   Patch 100 MICROgram(s)/Hr 1 Patch Transdermal every 72 hours  fentaNYL   Patch  25 MICROgram(s)/Hr 1 Patch Transdermal every 72 hours    MEDICATIONS  (PRN):  ALBUTerol/ipratropium for Nebulization 3 milliLiter(s) Nebulizer every 6 hours PRN Shortness of Breath and/or Wheezing  oxyCODONE    IR 5 milliGRAM(s) Oral every 4 hours PRN Moderate Pain (4 - 6)  bisacodyl Suppository 10 milliGRAM(s) Rectal daily PRN Constipation  lactulose Syrup 10 Gram(s) Oral every 8 hours PRN constipation  HYDROmorphone  Injectable 1 milliGRAM(s) IV Push every 3 hours PRN Pain  cyclobenzaprine 5 milliGRAM(s) Oral three times a day PRN Muscle Spasm      Vital Signs Last 24 Hrs  T(C): 36.7 (24 Aug 2017 10:50), Max: 36.8 (23 Aug 2017 16:22)  T(F): 98.1 (24 Aug 2017 10:50), Max: 98.3 (23 Aug 2017 16:22)  HR: 80 (24 Aug 2017 10:50) (76 - 86)  BP: 104/61 (24 Aug 2017 10:50) (92/59 - 124/70)  BP(mean): --  RR: 18 (24 Aug 2017 10:50) (18 - 19)  SpO2: 96% (24 Aug 2017 10:50) (96% - 99%)    Physical Exam:    Neurological:  No sensory/motor deficits    HEENT: PERRLA, EOMI, no drainage or redness    Neck: No bruits; no thyromegaly or nodules,  No JVD    Back: Normal spine flexure, No CVA tenderness, No deformity or limitation of movement    Respiratory: Breath Sounds equal & clear to auscultation, no accessory muscle use    Cardiovascular: Regular rate & rhythm, normal S1, S2; no murmurs, gallops or rubs    Gastrointestinal: Soft, non-tender, normal bowel sounds    Extremities: No peripheral edema, No cyanosis, clubbing     Vascular: Equal and normal pulses: 2+ peripheral pulses throughout    Musculoskeletal: No joint pain, swelling or deformity; no limitation of movement    Skin: No rashes      I&O's Detail    23 Aug 2017 07:01  -  24 Aug 2017 07:00  --------------------------------------------------------  IN:  Total IN: 0 mL    OUT:    Stool: 1 mL    Voided: 700 mL  Total OUT: 701 mL    Total NET: -701 mL          LABS:                        9.3    18.0  )-----------( 258      ( 24 Aug 2017 08:46 )             30.0     08-24    132<L>  |  98  |  12.0  ----------------------------<  107  3.7   |  21.0<L>  |  0.81    Ca    10.3<H>      24 Aug 2017 08:46  Phos  1.7     08-24  Mg     1.7     08-24            RADIOLOGY & ADDITIONAL STUDIES: ACS team was consulted for possible ileus    HPI:   71 year old male with PMH significant for metastatic lung cancer, CAD s/p stents, HTN, HLD, hypothyroidism, vtach who was admitted to the medicine team for elevated calcium, leukopenia, and low albumin. Patient was not having bowel movements for a few days and there was concern for possible ileus. Patient has had a decreased appetite for the past month and has had some bouts of nausea. Per patient's family, patient had 2 bowel movements (yesterday and today) and he has been passing flatus. His abdomen has decreased in size compared to previous days. Patient denies fevers, chills, vomiting, chest pain, shortness of breath.    PMH: metastatic lung cancer with left hilar lymphadenopathy, left pleural mets, left sacrum lesion, CAD s/p stents, s/p CABG, h/o prostate cancer, HTN, HLD, hypothyroidism, s/p AICD  PSH: AICD placement, prostatectomy, bronchoscopy, CABG  Medications: as per med reconciliation  Allergies: macrolide, penicillin, z-karma    MEDICATIONS  (STANDING):  senna 2 Tablet(s) Oral at bedtime  amiodarone    Tablet 200 milliGRAM(s) Oral daily  aspirin  chewable 81 milliGRAM(s) Oral daily  clopidogrel Tablet 75 milliGRAM(s) Oral daily  pantoprazole    Tablet 40 milliGRAM(s) Oral before breakfast  fenofibrate Tablet 48 milliGRAM(s) Oral at bedtime  metoprolol succinate  milliGRAM(s) Oral daily  ferrous    sulfate 325 milliGRAM(s) Oral three times a day with meals  levothyroxine 200 MICROGram(s) Oral daily  simvastatin 20 milliGRAM(s) Oral at bedtime  multivitamin/minerals 1 Tablet(s) Oral daily  lactobacillus acidophilus 1 Tablet(s) Oral two times a day with meals  docusate sodium 100 milliGRAM(s) Oral two times a day  polyethylene glycol 3350 17 Gram(s) Oral daily  pembrolizumab IVPB 200 milliGRAM(s) IV Intermittent once  lidocaine   Patch 1 Patch Transdermal daily  dextrose 5% + sodium chloride 0.9%. 1000 milliLiter(s) (65 mL/Hr) IV Continuous <Continuous>  fentaNYL   Patch 100 MICROgram(s)/Hr 1 Patch Transdermal every 72 hours  fentaNYL   Patch  25 MICROgram(s)/Hr 1 Patch Transdermal every 72 hours    MEDICATIONS  (PRN):  ALBUTerol/ipratropium for Nebulization 3 milliLiter(s) Nebulizer every 6 hours PRN Shortness of Breath and/or Wheezing  oxyCODONE    IR 5 milliGRAM(s) Oral every 4 hours PRN Moderate Pain (4 - 6)  bisacodyl Suppository 10 milliGRAM(s) Rectal daily PRN Constipation  lactulose Syrup 10 Gram(s) Oral every 8 hours PRN constipation  HYDROmorphone  Injectable 1 milliGRAM(s) IV Push every 3 hours PRN Pain  cyclobenzaprine 5 milliGRAM(s) Oral three times a day PRN Muscle Spasm      Vital Signs Last 24 Hrs  T(C): 36.7 (24 Aug 2017 10:50), Max: 36.8 (23 Aug 2017 16:22)  T(F): 98.1 (24 Aug 2017 10:50), Max: 98.3 (23 Aug 2017 16:22)  HR: 80 (24 Aug 2017 10:50) (76 - 86)  BP: 104/61 (24 Aug 2017 10:50) (92/59 - 124/70)  BP(mean): --  RR: 18 (24 Aug 2017 10:50) (18 - 19)  SpO2: 96% (24 Aug 2017 10:50) (96% - 99%)    Physical Exam:  General: resting comfortably in bed; falling asleep during interview; alert and oriented  Respiratory: Breath Sounds equal & clear to auscultation, no accessory muscle use  Chest wall: presence of AICD on left anterior chest  Cardiovascular: Regular rate & rhythm, normal S1, S2; no murmurs, gallops or rubs  Gastrointestinal: Softly distended, nontender, hypoactive bowel sounds present        I&O's Detail    23 Aug 2017 07:01  -  24 Aug 2017 07:00  --------------------------------------------------------  IN:  Total IN: 0 mL    OUT:    Stool: 1 mL    Voided: 700 mL  Total OUT: 701 mL    Total NET: -701 mL          LABS:                        9.3    18.0  )-----------( 258      ( 24 Aug 2017 08:46 )             30.0     08-24    132<L>  |  98  |  12.0  ----------------------------<  107  3.7   |  21.0<L>  |  0.81    Ca    10.3<H>      24 Aug 2017 08:46  Phos  1.7     08-24  Mg     1.7     08-24            RADIOLOGY & ADDITIONAL STUDIES:  abdominal xray: gaseous distention of colon and small bowel suggesting ileus  chest xray: left pleural effusion  KUB: persistent diffuse air filled bowel ileus We were called to see this 70yo M for evaluation of abdominal distension.  Pt hx significant for stage 4 NSCLC, CAD s/p stents, HTN, HLD, hypothyroidism, vtach who was admitted to the medicine team for elevated calcium, leukopenia, and low albumin. Patient was not having bowel movements for a few days and there was concern for possible ileus. Patient has had a decreased appetite for the past month and has had some bouts of nausea. Per patient's family, patient had 2 bowel movements (yesterday and today) and he has been passing flatus. His abdomen has decreased in size compared to previous days. Patient denies fevers, chills, vomiting, chest pain, shortness of breath.     PMH: metastatic lung cancer with left hilar lymphadenopathy, left pleural mets, left sacrum lesion, CAD s/p stents, s/p CABG, h/o prostate cancer, HTN, HLD, hypothyroidism, s/p AICD  PSH: AICD placement, prostatectomy, bronchoscopy, CABG  Medications: as per med reconciliation  Allergies: macrolide, penicillin, azithromycin    MEDICATIONS  (STANDING):  senna 2 Tablet(s) Oral at bedtime  amiodarone    Tablet 200 milliGRAM(s) Oral daily  aspirin  chewable 81 milliGRAM(s) Oral daily  clopidogrel Tablet 75 milliGRAM(s) Oral daily  pantoprazole    Tablet 40 milliGRAM(s) Oral before breakfast  fenofibrate Tablet 48 milliGRAM(s) Oral at bedtime  metoprolol succinate  milliGRAM(s) Oral daily  ferrous    sulfate 325 milliGRAM(s) Oral three times a day with meals  levothyroxine 200 MICROGram(s) Oral daily  simvastatin 20 milliGRAM(s) Oral at bedtime  multivitamin/minerals 1 Tablet(s) Oral daily  lactobacillus acidophilus 1 Tablet(s) Oral two times a day with meals  docusate sodium 100 milliGRAM(s) Oral two times a day  polyethylene glycol 3350 17 Gram(s) Oral daily  pembrolizumab IVPB 200 milliGRAM(s) IV Intermittent once  lidocaine   Patch 1 Patch Transdermal daily  dextrose 5% + sodium chloride 0.9%. 1000 milliLiter(s) (65 mL/Hr) IV Continuous <Continuous>  fentaNYL   Patch 100 MICROgram(s)/Hr 1 Patch Transdermal every 72 hours  fentaNYL   Patch  25 MICROgram(s)/Hr 1 Patch Transdermal every 72 hours    MEDICATIONS  (PRN):  ALBUTerol/ipratropium for Nebulization 3 milliLiter(s) Nebulizer every 6 hours PRN Shortness of Breath and/or Wheezing  oxyCODONE    IR 5 milliGRAM(s) Oral every 4 hours PRN Moderate Pain (4 - 6)  bisacodyl Suppository 10 milliGRAM(s) Rectal daily PRN Constipation  lactulose Syrup 10 Gram(s) Oral every 8 hours PRN constipation  HYDROmorphone  Injectable 1 milliGRAM(s) IV Push every 3 hours PRN Pain  cyclobenzaprine 5 milliGRAM(s) Oral three times a day PRN Muscle Spasm      Vital Signs Last 24 Hrs  T(C): 36.7 (24 Aug 2017 10:50), Max: 36.8 (23 Aug 2017 16:22)  T(F): 98.1 (24 Aug 2017 10:50), Max: 98.3 (23 Aug 2017 16:22)  HR: 80 (24 Aug 2017 10:50) (76 - 86)  BP: 104/61 (24 Aug 2017 10:50) (92/59 - 124/70)  BP(mean): --  RR: 18 (24 Aug 2017 10:50) (18 - 19)  SpO2: 96% (24 Aug 2017 10:50) (96% - 99%)    Physical Exam:    Resting comfortably in bed; falling asleep during exam; alert and oriented  Breath Sounds equal & clear to auscultation, no use of accessory muslcles, AICD on left anterior chest  Regular rate & rhythm, normal S1, S2; no murmurs, gallops or rubs  Softly distended, nontender, hypoactive bowel sounds present        I&O's Detail    23 Aug 2017 07:01  -  24 Aug 2017 07:00  --------------------------------------------------------  IN:  Total IN: 0 mL    OUT:    Stool: 1 mL    Voided: 700 mL  Total OUT: 701 mL    Total NET: -701 mL          LABS:                        9.3    18.0  )-----------( 258      ( 24 Aug 2017 08:46 )             30.0     08-24    132<L>  |  98  |  12.0  ----------------------------<  107  3.7   |  21.0<L>  |  0.81    Ca    10.3<H>      24 Aug 2017 08:46  Phos  1.7     08-24  Mg     1.7     08-24            RADIOLOGY & ADDITIONAL STUDIES:  abdominal xray: gaseous distention of colon and small bowel suggesting ileus  chest xray: left pleural effusion  KUB: persistent diffuse air filled bowel ileus

## 2017-08-24 NOTE — PROGRESS NOTE ADULT - PROBLEM SELECTOR PLAN 4
PAIN noted from this am - increased fentanyl to 100mcg, added muscle relaxant and lidoderm patch  prn dilaudid

## 2017-08-24 NOTE — CONSULT NOTE ADULT - PROBLEM SELECTOR PROBLEM 1
Primary malignant neoplasm of lung metastatic to other site, unspecified laterality
Loculated pleural effusion
Pleural effusion
Primary malignant neoplasm of lung metastatic to other site, unspecified laterality
Ileus

## 2017-08-24 NOTE — PROGRESS NOTE ADULT - ATTENDING COMMENTS
Thank you for the opportunity to assist with the care of this patient.   [facility] Consult [facility] [phone].

## 2017-08-24 NOTE — PROGRESS NOTE ADULT - PROBLEM SELECTOR PLAN 1
s/p 2bm's overnight. repeat xray noted with persistent ileus. c/w NPO status, start d5 solution   -called surgery eval; patient and family would like to avoid ngt at this time   -c/w bowel rest   -repeat axr this am to see if there is any improvement  -repleting K, Mg and Phos to keep desired levels   -f/up bmp   -OOB to sit up in chair today

## 2017-08-24 NOTE — CONSULT NOTE ADULT - ASSESSMENT
71 year old male admitted to the medicine team for hypercalcemia, leukopenia and low albumin. ACS team consulted for possible ileus. Ileus may be present, but no signs of obstruction are present at this time.     Plan:   -advance diet as tolerated  -no NGT needed at this time  -serial abdominal exams  -encourage ambulation. out of bed to chair  -minimize narcotics 71 year old male admitted to the medicine with hypercalcemia, leukopenia and hypoalbuminemia, with ileus.

## 2017-08-24 NOTE — PROGRESS NOTE ADULT - SUBJECTIVE AND OBJECTIVE BOX
71 year old male with suspected loculated left pleural effusion.  Pleural effusion is small and there is no need for pleurex catheter or intervention at this time from CT surgery standpoint.  Discussed with Dr. Foster, as well as Dr. Reich.

## 2017-08-24 NOTE — PROGRESS NOTE ADULT - SUBJECTIVE AND OBJECTIVE BOX
Received first dose of pembroluzimab (Keytruda) yesterday without difficulty.  Back pain remains severe, and we are in full agreement with the addition of Decadron. There are no interactions with immunotherapy drugs.  Hematocrit stable at 30 %, calcium 10.3.  Ileus exacerbated by narcotic analgesics.  Appreciate Palliative Care input.

## 2017-08-24 NOTE — PROGRESS NOTE ADULT - ASSESSMENT
71 yom with pmh which includes but not limited to Metastatic Lung Cancer follows at [facility], CAD s/p stents, V. Tach, HTN, HLD and Hypothyroidism sent from [facility] with hypercalcemia, low albumin and leucocytosis. Patient was recently at [facility] for loculated pleural effusions s/p chest tube and iv abx and severe hypercalcemia. Currently getting treated at this time for effusion, pending ct surgery f/up and has started keytruda via Oncology. He is also found to have an ileus, being medically managed and now to have rpt imaging post BM

## 2017-08-24 NOTE — PROGRESS NOTE ADULT - ASSESSMENT
71M with newly diagnosed metastatic lung cancer to bone, now with ileus, pain, debility, s/p keytruda.

## 2017-08-24 NOTE — CONSULT NOTE ADULT - PROBLEM SELECTOR RECOMMENDATION 9
-very guarded prognosis. patient to be started on keytruda, family and patient understand risks and benefits, and are willing to accept risks.
No acute need for tube placement at this time.      Possible pleurex when WBC resolves    care per primary team    will continue to follow
- advance diet as tolerated  - no NGT needed at this time  - serial abdominal exams  - encourage ambulation. out of bed to chair  - aspiration precautions  - minimize narcotics
1-Work up in progress  2-Pain not adequately controlled  -Meds:   -Start: Lidoderm Patch  -Continue: Fentanyl Patch 75mcg/q72                  Dilaudid 1mg/q3h-prn  3-Bowel Regimen : As ordered  4- Extensive discussion with patient and family, about current pain management regimen, will re-evaluate if the increase in Dilaudid is effective.   5-Recommend Palliative Care evaluation- family agrees  6-Discussed above plan, with Dr. Gautam, and Uriel, will continue to follow, and adjust medication as needed  7-Thank you for this kind consult
- needs drainage from CT surgery  - no clear evidence of pneumonia.   - stop all antibiotics

## 2017-08-24 NOTE — PROGRESS NOTE ADULT - SUBJECTIVE AND OBJECTIVE BOX
9.3    18.0  )-----------( 258      ( 24 Aug 2017 08:46 )             30.0   Patient is a 71y old  Male who presents with a chief complaint of Hypercalcemia (12.5), Low Albumin and Leucocytosis (14.17) (19 Aug 2017 00:52)    Patient seen and examined at bedside. Patient with s/p 2 BMs overnight, large. He states that he is having a lot of pain in his back, had a bad bout this am - took some meds - had some relief. No abdominal pain, n/v.     ROS: No chest pain, palpitations,  light headedness/dizziness,arcelia/chills, abdominal pain, n/v,  dysuria or increased urinary frequency.,    Vital Signs Last 24 Hrs  T(C): 36.6 (24 Aug 2017 05:34), Max: 36.8 (23 Aug 2017 16:22)  T(F): 97.8 (24 Aug 2017 05:34), Max: 98.3 (23 Aug 2017 16:22)  HR: 86 (24 Aug 2017 05:34) (76 - 86)  BP: 124/70 (24 Aug 2017 05:34) (92/59 - 128/75)  BP(mean): --  RR: 19 (24 Aug 2017 05:34) (18 - 19)  SpO2: 96% (24 Aug 2017 05:34) (96% - 99%)    PHYSICAL EXAM:    GENERAL:  NAD, thin, pale   HEENT: eomi, perrla, pallor   CHEST/LUNG: crackles at left base  HEART: Regular rate and rhythm; S1 S2  ABDOMEN: distended, tympanic to upper quadrants b/l. Dull to percussion at b/l LQ - improved BS in all quadrants  EXTREMITIES:  no edema.   NERVOUS SYSTEM:  Alert & Oriented X3    LABS:           08-24    132<L>  |  98  |  12.0  ----------------------------<  107  3.7   |  21.0<L>  |  0.81    Ca    10.3<H>      24 Aug 2017 08:46  Phos  1.7     08-24  Mg     1.7     08-24                          9.3    18.0  )-----------( 258      ( 24 Aug 2017 08:46 )             30.0   < from: Xray Kidney Ureter Bladder (08.24.17 @ 04:38) >  There is a persistent air-filled a diffuse bowel ileus patten. There is   no free intra-abdominal air.  There are no obvious intra-abdominal masses.  There are no abnormal calcifications.   The osseous structures are intact.     IMPRESSION:    Persistent diffuse air-filled bowelileus..    < end of copied text >    < from: Xray Chest 1 View AP/PA. (08.18.17 @ 22:44) >  FINDINGS:   The lungs  show persistent left lower lobe airspace consolidation and   effusion. The left upper lobe and right lung parenchyma are clear.   Pressure The  heart is enlarged in transverse diameter. No hilar mass.   Trachea midline.  Cardiac device wire leads are within right atrium and   right ventricle.      Visualized osseous structures are intact.        IMPRESSION:   A residual left lower lobe airspace consolidation and   effusion..            < end of copied text >

## 2017-08-24 NOTE — CONSULT NOTE ADULT - ATTENDING COMMENTS
72 yo male w/ multiple medical problems recently diagnosed w/ metastatic NSCLC (mets to bone) in July 2017.  Received radiation to the lungs and started on Keytruda on 8/23/17.  Admitted to medicine 2/2 hypercalcemia, leukopenia and hypoalbuminemia.  Patient is asleep and multiple family members at bedside and did not desire him to be awoken.  Has reportedly had a poor appetite and has been consuming mostly Ensure.  He has been having BM and flatus but his abdomen has been distended.  CT A/P on 8/19/17 shows a dilated stomach and colon with thoughts that this could be an ileus, SB is normal.  Subsequent plain films show dilated loops of bowel, non-specific that could be c/w ileus.  ACS consulted for persistent ileus.  Per family members, patient has been mobile today and he has been passing BM and flatus.  He was tolerating a diet but was made NPO today.  They state his abdominal distention is improved today.  Afebrile.  HD normal.  WBC: 18.  Hyponatremia (132).  Exam as above.  Ileus 2/2 multiple factors including prolonged bedrest, electrolyte abnormalities (please correct hyponatremia), use of narcotics, cancer.  Keytruda does not appear to have side effect of dysmotility.  Ileus does appear to be resolving per family.  If distention is resolving and patient is passing flatus and BM, would be ok to resume diet but please ensure that patient keeps HOB 45 degrees when eating and for at least 30 minutes after.  If distention worsens and/or nausea/vomiting develops, please make patient NPO. 70 yo male w/ multiple medical problems recently diagnosed w/ metastatic NSCLC (mets to bone) in July 2017.  Received radiation to the lungs and started on Keytruda on 8/23/17.  Admitted to medicine 2/2 hypercalcemia, leukopenia and hypoalbuminemia.  Patient is asleep and multiple family members at bedside and did not desire him to be awoken.  Has reportedly had a poor appetite and has been consuming mostly Ensure.  He has been having BM and flatus but his abdomen has been distended.  CT A/P on 8/19/17 shows a dilated stomach and colon with thoughts that this could be an ileus, SB is normal.  Subsequent plain films show dilated loops of bowel, non-specific that could be c/w ileus.  ACS consulted for persistent ileus.  Per family members, patient has been mobile today and he has been passing BM and flatus.  He was tolerating a diet but was made NPO.  They state his abdominal distention is improved today.  Afebrile.  HD normal.  WBC: 18.  Hyponatremia (132).  NAD.  Abdomen: softly distended and tympanic but non-tender.  Ileus 2/2 multiple factors including prolonged bedrest, electrolyte abnormalities (please correct hyponatremia), use of narcotics, cancer.  Keytruda does not appear to have side effect of dysmotility.  Ileus does appear to be resolving per family.  Continue NPO/IVF.  Place NGT to decompress GI tract.  Serial abdominal exams.  If distention resolves and patient is passing flatus and BM may resume diet but would do so w/ aspiration precautions including HOB 45 degrees and with assistance.  If patient develops further distention, nausea/vomiting, would make NPO again.

## 2017-08-24 NOTE — PROGRESS NOTE ADULT - PROBLEM SELECTOR PLAN 2
recurrent loculated left sided pleural effusion   CT surgery called back for possible drainage   repeat cxr noted, awaiting ct surgery f/up   Leucocytosis present and likely to be reactive to primary lung ca with mets  No e/o infection, seen by ID - no abx at this time - rec to have drain done by Ct surery

## 2017-08-24 NOTE — PROGRESS NOTE ADULT - PROBLEM SELECTOR PLAN 2
-pain management - increased patch to 125mcg, if able from heme/onc perspective, I would like to start him on steroids, decadron 4mg once daily for bone mets as this is the top source of his pain. Would like to avoid IVP of Dilaudid as it may be contributed to ileus.

## 2017-08-25 ENCOUNTER — APPOINTMENT (OUTPATIENT)
Dept: INFUSION THERAPY | Facility: CLINIC | Age: 72
End: 2017-08-25

## 2017-08-25 LAB
ANION GAP SERPL CALC-SCNC: 12 MMOL/L — SIGNIFICANT CHANGE UP (ref 5–17)
BASOPHILS # BLD AUTO: 0 K/UL — SIGNIFICANT CHANGE UP (ref 0–0.2)
BASOPHILS NFR BLD AUTO: 0.1 % — SIGNIFICANT CHANGE UP (ref 0–2)
BUN SERPL-MCNC: 10 MG/DL — SIGNIFICANT CHANGE UP (ref 8–20)
CALCIUM SERPL-MCNC: 9.7 MG/DL — SIGNIFICANT CHANGE UP (ref 8.6–10.2)
CHLORIDE SERPL-SCNC: 100 MMOL/L — SIGNIFICANT CHANGE UP (ref 98–107)
CO2 SERPL-SCNC: 21 MMOL/L — LOW (ref 22–29)
CREAT SERPL-MCNC: 0.81 MG/DL — SIGNIFICANT CHANGE UP (ref 0.5–1.3)
EOSINOPHIL # BLD AUTO: 0.1 K/UL — SIGNIFICANT CHANGE UP (ref 0–0.5)
EOSINOPHIL NFR BLD AUTO: 0.4 % — SIGNIFICANT CHANGE UP (ref 0–5)
GLUCOSE SERPL-MCNC: 157 MG/DL — HIGH (ref 70–115)
HCT VFR BLD CALC: 26.7 % — LOW (ref 42–52)
HGB BLD-MCNC: 8.4 G/DL — LOW (ref 14–18)
LYMPHOCYTES # BLD AUTO: 1 K/UL — SIGNIFICANT CHANGE UP (ref 1–4.8)
LYMPHOCYTES # BLD AUTO: 6.3 % — LOW (ref 20–55)
MAGNESIUM SERPL-MCNC: 1.8 MG/DL — SIGNIFICANT CHANGE UP (ref 1.6–2.6)
MCHC RBC-ENTMCNC: 26.2 PG — LOW (ref 27–31)
MCHC RBC-ENTMCNC: 31.5 G/DL — LOW (ref 32–36)
MCV RBC AUTO: 83.2 FL — SIGNIFICANT CHANGE UP (ref 80–94)
MONOCYTES # BLD AUTO: 0.8 K/UL — SIGNIFICANT CHANGE UP (ref 0–0.8)
MONOCYTES NFR BLD AUTO: 5.1 % — SIGNIFICANT CHANGE UP (ref 3–10)
NEUTROPHILS # BLD AUTO: 14.1 K/UL — HIGH (ref 1.8–8)
NEUTROPHILS NFR BLD AUTO: 87.5 % — HIGH (ref 37–73)
PHOSPHATE SERPL-MCNC: 1.3 MG/DL — LOW (ref 2.4–4.7)
PLATELET # BLD AUTO: 243 K/UL — SIGNIFICANT CHANGE UP (ref 150–400)
POTASSIUM SERPL-MCNC: 3.9 MMOL/L — SIGNIFICANT CHANGE UP (ref 3.5–5.3)
POTASSIUM SERPL-SCNC: 3.9 MMOL/L — SIGNIFICANT CHANGE UP (ref 3.5–5.3)
RBC # BLD: 3.21 M/UL — LOW (ref 4.6–6.2)
RBC # FLD: 16.7 % — HIGH (ref 11–15.6)
SODIUM SERPL-SCNC: 133 MMOL/L — LOW (ref 135–145)
WBC # BLD: 16.1 K/UL — HIGH (ref 4.8–10.8)
WBC # FLD AUTO: 16.1 K/UL — HIGH (ref 4.8–10.8)

## 2017-08-25 PROCEDURE — 99233 SBSQ HOSP IP/OBS HIGH 50: CPT

## 2017-08-25 RX ORDER — MAGNESIUM OXIDE 400 MG ORAL TABLET 241.3 MG
400 TABLET ORAL
Qty: 0 | Refills: 0 | Status: DISCONTINUED | OUTPATIENT
Start: 2017-08-25 | End: 2017-09-08

## 2017-08-25 RX ORDER — DEXAMETHASONE 0.5 MG/5ML
4 ELIXIR ORAL EVERY 8 HOURS
Qty: 0 | Refills: 0 | Status: DISCONTINUED | OUTPATIENT
Start: 2017-08-25 | End: 2017-08-28

## 2017-08-25 RX ORDER — HYDROMORPHONE HYDROCHLORIDE 2 MG/ML
4 INJECTION INTRAMUSCULAR; INTRAVENOUS; SUBCUTANEOUS EVERY 4 HOURS
Qty: 0 | Refills: 0 | Status: DISCONTINUED | OUTPATIENT
Start: 2017-08-25 | End: 2017-08-28

## 2017-08-25 RX ORDER — HYDROMORPHONE HYDROCHLORIDE 2 MG/ML
2 INJECTION INTRAMUSCULAR; INTRAVENOUS; SUBCUTANEOUS EVERY 4 HOURS
Qty: 0 | Refills: 0 | Status: DISCONTINUED | OUTPATIENT
Start: 2017-08-25 | End: 2017-08-28

## 2017-08-25 RX ORDER — SODIUM,POTASSIUM PHOSPHATES 278-250MG
1 POWDER IN PACKET (EA) ORAL
Qty: 0 | Refills: 0 | Status: COMPLETED | OUTPATIENT
Start: 2017-08-25 | End: 2017-08-28

## 2017-08-25 RX ORDER — ACETAMINOPHEN 500 MG
1000 TABLET ORAL ONCE
Qty: 0 | Refills: 0 | Status: COMPLETED | OUTPATIENT
Start: 2017-08-26 | End: 2017-08-26

## 2017-08-25 RX ORDER — GABAPENTIN 400 MG/1
100 CAPSULE ORAL AT BEDTIME
Qty: 0 | Refills: 0 | Status: DISCONTINUED | OUTPATIENT
Start: 2017-08-25 | End: 2017-09-08

## 2017-08-25 RX ORDER — DIAZEPAM 5 MG
5 TABLET ORAL AT BEDTIME
Qty: 0 | Refills: 0 | Status: DISCONTINUED | OUTPATIENT
Start: 2017-08-25 | End: 2017-09-01

## 2017-08-25 RX ORDER — DEXAMETHASONE 0.5 MG/5ML
4 ELIXIR ORAL EVERY 12 HOURS
Qty: 0 | Refills: 0 | Status: DISCONTINUED | OUTPATIENT
Start: 2017-08-25 | End: 2017-08-25

## 2017-08-25 RX ORDER — HYDROMORPHONE HYDROCHLORIDE 2 MG/ML
2 INJECTION INTRAMUSCULAR; INTRAVENOUS; SUBCUTANEOUS ONCE
Qty: 0 | Refills: 0 | Status: DISCONTINUED | OUTPATIENT
Start: 2017-08-25 | End: 2017-08-25

## 2017-08-25 RX ORDER — POTASSIUM PHOSPHATE, MONOBASIC POTASSIUM PHOSPHATE, DIBASIC 236; 224 MG/ML; MG/ML
15 INJECTION, SOLUTION INTRAVENOUS EVERY 6 HOURS
Qty: 0 | Refills: 0 | Status: COMPLETED | OUTPATIENT
Start: 2017-08-25 | End: 2017-08-26

## 2017-08-25 RX ORDER — MAGNESIUM SULFATE 500 MG/ML
2 VIAL (ML) INJECTION ONCE
Qty: 0 | Refills: 0 | Status: COMPLETED | OUTPATIENT
Start: 2017-08-25 | End: 2017-08-25

## 2017-08-25 RX ORDER — HYDROMORPHONE HYDROCHLORIDE 2 MG/ML
6 INJECTION INTRAMUSCULAR; INTRAVENOUS; SUBCUTANEOUS EVERY 4 HOURS
Qty: 0 | Refills: 0 | Status: DISCONTINUED | OUTPATIENT
Start: 2017-08-25 | End: 2017-08-28

## 2017-08-25 RX ORDER — ACETAMINOPHEN 500 MG
1000 TABLET ORAL ONCE
Qty: 0 | Refills: 0 | Status: COMPLETED | OUTPATIENT
Start: 2017-08-25 | End: 2017-08-25

## 2017-08-25 RX ADMIN — HYDROMORPHONE HYDROCHLORIDE 1 MILLIGRAM(S): 2 INJECTION INTRAMUSCULAR; INTRAVENOUS; SUBCUTANEOUS at 09:33

## 2017-08-25 RX ADMIN — Medication 1 TABLET(S): at 19:02

## 2017-08-25 RX ADMIN — HYDROMORPHONE HYDROCHLORIDE 1 MILLIGRAM(S): 2 INJECTION INTRAMUSCULAR; INTRAVENOUS; SUBCUTANEOUS at 12:50

## 2017-08-25 RX ADMIN — HYDROMORPHONE HYDROCHLORIDE 2 MILLIGRAM(S): 2 INJECTION INTRAMUSCULAR; INTRAVENOUS; SUBCUTANEOUS at 19:10

## 2017-08-25 RX ADMIN — HYDROMORPHONE HYDROCHLORIDE 2 MILLIGRAM(S): 2 INJECTION INTRAMUSCULAR; INTRAVENOUS; SUBCUTANEOUS at 16:22

## 2017-08-25 RX ADMIN — LIDOCAINE 1 PATCH: 4 CREAM TOPICAL at 08:51

## 2017-08-25 RX ADMIN — Medication 1 TABLET(S): at 09:33

## 2017-08-25 RX ADMIN — Medication 100 MILLIGRAM(S): at 06:32

## 2017-08-25 RX ADMIN — HYDROMORPHONE HYDROCHLORIDE 1 MILLIGRAM(S): 2 INJECTION INTRAMUSCULAR; INTRAVENOUS; SUBCUTANEOUS at 06:32

## 2017-08-25 RX ADMIN — Medication 325 MILLIGRAM(S): at 12:55

## 2017-08-25 RX ADMIN — HYDROMORPHONE HYDROCHLORIDE 1 MILLIGRAM(S): 2 INJECTION INTRAMUSCULAR; INTRAVENOUS; SUBCUTANEOUS at 08:51

## 2017-08-25 RX ADMIN — SODIUM CHLORIDE 65 MILLILITER(S): 9 INJECTION, SOLUTION INTRAVENOUS at 09:33

## 2017-08-25 RX ADMIN — SIMVASTATIN 20 MILLIGRAM(S): 20 TABLET, FILM COATED ORAL at 23:22

## 2017-08-25 RX ADMIN — Medication 81 MILLIGRAM(S): at 12:55

## 2017-08-25 RX ADMIN — Medication 100 MILLIGRAM(S): at 18:59

## 2017-08-25 RX ADMIN — HYDROMORPHONE HYDROCHLORIDE 2 MILLIGRAM(S): 2 INJECTION INTRAMUSCULAR; INTRAVENOUS; SUBCUTANEOUS at 16:07

## 2017-08-25 RX ADMIN — Medication 100 MILLIGRAM(S): at 06:33

## 2017-08-25 RX ADMIN — CLOPIDOGREL BISULFATE 75 MILLIGRAM(S): 75 TABLET, FILM COATED ORAL at 12:55

## 2017-08-25 RX ADMIN — Medication 1 TABLET(S): at 18:57

## 2017-08-25 RX ADMIN — Medication 400 MILLIGRAM(S): at 15:15

## 2017-08-25 RX ADMIN — POLYETHYLENE GLYCOL 3350 17 GRAM(S): 17 POWDER, FOR SOLUTION ORAL at 12:55

## 2017-08-25 RX ADMIN — AMIODARONE HYDROCHLORIDE 200 MILLIGRAM(S): 400 TABLET ORAL at 06:32

## 2017-08-25 RX ADMIN — Medication 50 GRAM(S): at 15:15

## 2017-08-25 RX ADMIN — Medication 5 MILLIGRAM(S): at 23:22

## 2017-08-25 RX ADMIN — PANTOPRAZOLE SODIUM 40 MILLIGRAM(S): 20 TABLET, DELAYED RELEASE ORAL at 06:32

## 2017-08-25 RX ADMIN — LIDOCAINE 1 PATCH: 4 CREAM TOPICAL at 12:55

## 2017-08-25 RX ADMIN — HYDROMORPHONE HYDROCHLORIDE 1 MILLIGRAM(S): 2 INJECTION INTRAMUSCULAR; INTRAVENOUS; SUBCUTANEOUS at 13:20

## 2017-08-25 RX ADMIN — Medication 325 MILLIGRAM(S): at 09:33

## 2017-08-25 RX ADMIN — Medication 4 MILLIGRAM(S): at 23:22

## 2017-08-25 RX ADMIN — HYDROMORPHONE HYDROCHLORIDE 2 MILLIGRAM(S): 2 INJECTION INTRAMUSCULAR; INTRAVENOUS; SUBCUTANEOUS at 18:56

## 2017-08-25 RX ADMIN — MAGNESIUM OXIDE 400 MG ORAL TABLET 400 MILLIGRAM(S): 241.3 TABLET ORAL at 19:02

## 2017-08-25 RX ADMIN — Medication 200 MICROGRAM(S): at 06:32

## 2017-08-25 RX ADMIN — HYDROMORPHONE HYDROCHLORIDE 2 MILLIGRAM(S): 2 INJECTION INTRAMUSCULAR; INTRAVENOUS; SUBCUTANEOUS at 23:22

## 2017-08-25 RX ADMIN — POTASSIUM PHOSPHATE, MONOBASIC POTASSIUM PHOSPHATE, DIBASIC 62.5 MILLIMOLE(S): 236; 224 INJECTION, SOLUTION INTRAVENOUS at 19:08

## 2017-08-25 RX ADMIN — HYDROMORPHONE HYDROCHLORIDE 1 MILLIGRAM(S): 2 INJECTION INTRAMUSCULAR; INTRAVENOUS; SUBCUTANEOUS at 11:07

## 2017-08-25 RX ADMIN — SODIUM CHLORIDE 65 MILLILITER(S): 9 INJECTION, SOLUTION INTRAVENOUS at 06:40

## 2017-08-25 RX ADMIN — SENNA PLUS 2 TABLET(S): 8.6 TABLET ORAL at 23:22

## 2017-08-25 RX ADMIN — Medication 1 TABLET(S): at 18:58

## 2017-08-25 RX ADMIN — Medication 1000 MILLIGRAM(S): at 15:30

## 2017-08-25 RX ADMIN — GABAPENTIN 100 MILLIGRAM(S): 400 CAPSULE ORAL at 23:22

## 2017-08-25 NOTE — PROGRESS NOTE ADULT - SUBJECTIVE AND OBJECTIVE BOX
seen for ileus, metastatic cancer    complaining of lower back pain, mildly improved with dialudid  no n/v.  + flatus, no BM  ROS otherwise negative      MEDICATIONS  (STANDING):  senna 2 Tablet(s) Oral at bedtime  amiodarone    Tablet 200 milliGRAM(s) Oral daily  aspirin  chewable 81 milliGRAM(s) Oral daily  clopidogrel Tablet 75 milliGRAM(s) Oral daily  pantoprazole    Tablet 40 milliGRAM(s) Oral before breakfast  metoprolol succinate  milliGRAM(s) Oral daily  levothyroxine 200 MICROGram(s) Oral daily  simvastatin 20 milliGRAM(s) Oral at bedtime  lactobacillus acidophilus 1 Tablet(s) Oral two times a day with meals  docusate sodium 100 milliGRAM(s) Oral two times a day  polyethylene glycol 3350 17 Gram(s) Oral daily  pembrolizumab IVPB 200 milliGRAM(s) IV Intermittent once  lidocaine   Patch 1 Patch Transdermal daily  dextrose 5% + sodium chloride 0.9%. 1000 milliLiter(s) (65 mL/Hr) IV Continuous <Continuous>  fentaNYL   Patch 100 MICROgram(s)/Hr 1 Patch Transdermal every 72 hours  fentaNYL   Patch  25 MICROgram(s)/Hr 1 Patch Transdermal every 72 hours  dexamethasone  Injectable 4 milliGRAM(s) IV Push every 12 hours  multivitamin 1 Tablet(s) Oral daily  acetaminophen  IVPB. 1000 milliGRAM(s) IV Intermittent once  magnesium oxide 400 milliGRAM(s) Oral three times a day with meals  magnesium sulfate  IVPB 2 Gram(s) IV Intermittent once  potassium phosphate IVPB 15 milliMole(s) IV Intermittent every 6 hours  potassium acid phosphate/sodium acid phosphate tablet (K-PHOS No. 2) 1 Tablet(s) Oral three times a day with meals    MEDICATIONS  (PRN):  ALBUTerol/ipratropium for Nebulization 3 milliLiter(s) Nebulizer every 6 hours PRN Shortness of Breath and/or Wheezing  bisacodyl Suppository 10 milliGRAM(s) Rectal daily PRN Constipation  lactulose Syrup 10 Gram(s) Oral every 8 hours PRN constipation  HYDROmorphone  Injectable 1 milliGRAM(s) IV Push every 3 hours PRN Pain  cyclobenzaprine 5 milliGRAM(s) Oral three times a day PRN Muscle Spasm  oxyCODONE    IR 5 milliGRAM(s) Oral every 4 hours PRN Moderate Pain (4 - 6)      Allergies    macrolide antibiotics (Urticaria; Rash; Hives)  penicillin (Urticaria; Rash)  Zithromax Z-Christian (Urticaria; Rash; Hives)    Vital Signs Last 24 Hrs  T(C): 36.6 (25 Aug 2017 12:07), Max: 37.2 (25 Aug 2017 04:57)  T(F): 97.8 (25 Aug 2017 12:07), Max: 98.9 (25 Aug 2017 04:57)  HR: 81 (25 Aug 2017 12:07) (81 - 96)  BP: 94/64 (25 Aug 2017 12:07) (94/64 - 131/75)  BP(mean): --  RR: 18 (25 Aug 2017 12:07) (16 - 18)  SpO2: 98% (24 Aug 2017 23:04) (98% - 99%)    PHYSICAL EXAM:    GENERAL: NAD frail  CHEST/LUNG: ctab  HEART: Regular rate and rhythm; S1 S2; no murmurs noted  ABDOMEN: Soft, Nontender, mildy distended; Bowel sounds hypoactive   EXTREMITIES:  no edema  NERVOUS SYSTEM:  responsive but drowsy    LABS:                        8.4    16.1  )-----------( 243      ( 25 Aug 2017 08:20 )             26.7     08-25    133<L>  |  100  |  10.0  ----------------------------<  157<H>  3.9   |  21.0<L>  |  0.81    Ca    9.7      25 Aug 2017 08:20  Phos  1.3     08-25  Mg     1.8     08-25            CAPILLARY BLOOD GLUCOSE            RADIOLOGY & ADDITIONAL TESTS:

## 2017-08-25 NOTE — PROGRESS NOTE ADULT - ATTENDING COMMENTS
Thank you for the opportunity to assist with the care of this patient.   [facility] [phone].

## 2017-08-25 NOTE — PROGRESS NOTE ADULT - PROBLEM SELECTOR PLAN 2
-seems better with increase in patch today, try to avoid IVP dilaudid as much as we can given ileus, will start decadron for bone pain, per oncology, they are okay with this therapy. It may also help with his energy levels. -seems better with increase in patch today, try to avoid IVP dilaudid as much as we can given ileus, will start decadron for bone pain, appreciate oncology input, they are okay with this therapy as of now. It may also help with his energy levels.

## 2017-08-25 NOTE — PROGRESS NOTE ADULT - PROBLEM SELECTOR PLAN 2
recurrent loculated left sided pleural effusion-no intervention by CT surgery.  Leucocytosis present and likely to be reactive to primary lung ca with mets  No e/o infection, seen by ID - no abx at this time

## 2017-08-25 NOTE — PROGRESS NOTE ADULT - SUBJECTIVE AND OBJECTIVE BOX
SUBJECTIVE:  Pt seen and examined no overnight events. Pt having sips of water and clears and tolerating. Last BM was yesterday + flatus. Still distended this am but soft. Denies N/V/F/C.       MEDICATIONS  (STANDING):  senna 2 Tablet(s) Oral at bedtime  amiodarone    Tablet 200 milliGRAM(s) Oral daily  aspirin  chewable 81 milliGRAM(s) Oral daily  clopidogrel Tablet 75 milliGRAM(s) Oral daily  pantoprazole    Tablet 40 milliGRAM(s) Oral before breakfast  fenofibrate Tablet 48 milliGRAM(s) Oral at bedtime  metoprolol succinate  milliGRAM(s) Oral daily  ferrous    sulfate 325 milliGRAM(s) Oral three times a day with meals  levothyroxine 200 MICROGram(s) Oral daily  simvastatin 20 milliGRAM(s) Oral at bedtime  lactobacillus acidophilus 1 Tablet(s) Oral two times a day with meals  docusate sodium 100 milliGRAM(s) Oral two times a day  polyethylene glycol 3350 17 Gram(s) Oral daily  pembrolizumab IVPB 200 milliGRAM(s) IV Intermittent once  lidocaine   Patch 1 Patch Transdermal daily  dextrose 5% + sodium chloride 0.9%. 1000 milliLiter(s) (65 mL/Hr) IV Continuous <Continuous>  fentaNYL   Patch 100 MICROgram(s)/Hr 1 Patch Transdermal every 72 hours  fentaNYL   Patch  25 MICROgram(s)/Hr 1 Patch Transdermal every 72 hours    MEDICATIONS  (PRN):  ALBUTerol/ipratropium for Nebulization 3 milliLiter(s) Nebulizer every 6 hours PRN Shortness of Breath and/or Wheezing  bisacodyl Suppository 10 milliGRAM(s) Rectal daily PRN Constipation  lactulose Syrup 10 Gram(s) Oral every 8 hours PRN constipation  HYDROmorphone  Injectable 1 milliGRAM(s) IV Push every 3 hours PRN Pain  cyclobenzaprine 5 milliGRAM(s) Oral three times a day PRN Muscle Spasm  oxyCODONE    IR 5 milliGRAM(s) Oral every 4 hours PRN Moderate Pain (4 - 6)      Vital Signs Last 24 Hrs  T(C): 37.2 (25 Aug 2017 04:57), Max: 37.2 (25 Aug 2017 04:57)  T(F): 98.9 (25 Aug 2017 04:57), Max: 98.9 (25 Aug 2017 04:57)  HR: 96 (25 Aug 2017 04:57) (80 - 96)  BP: 131/75 (25 Aug 2017 04:57) (104/61 - 131/75)  BP(mean): --  RR: 16 (25 Aug 2017 04:57) (16 - 18)  SpO2: 98% (24 Aug 2017 23:04) (96% - 99%)    PHYSICAL EXAM:      Constitutional: Alert, NAD    Eyes: EOMI     Respiratory: clear equal b/l lungs     Cardiovascular: RRR    Gastrointestinal: soft, Nontender, Distended, No rebound no guarding    I&O's Detail    24 Aug 2017 07:01  -  25 Aug 2017 07:00  --------------------------------------------------------  IN:    dextrose 5% + sodium chloride 0.9%.: 390 mL  Total IN: 390 mL    OUT:    Voided: 1200 mL  Total OUT: 1200 mL    Total NET: -810 mL          LABS:                        9.3    18.0  )-----------( 258      ( 24 Aug 2017 08:46 )             30.0     08-24    132<L>  |  98  |  12.0  ----------------------------<  107  3.7   |  21.0<L>  |  0.81    Ca    10.3<H>      24 Aug 2017 08:46  Phos  1.7     08-24  Mg     1.7     08-24            RADIOLOGY & ADDITIONAL STUDIES:

## 2017-08-25 NOTE — PROGRESS NOTE ADULT - PROBLEM SELECTOR PLAN 4
-psychosocial support continuing. Family is struggling with seeing him this way as he was function before he was diagnosed about 2 months ago. Overall guarded prognosis remains but patient and family would like to continue aggressive therapy for now. Ongoing discussions depending on clinical status. For now he seems to be hanging on and has the potential for some kind of improvement in his quality of life at least, even if his cancer is advanced and overall prognosis is guarded. -surgery follow up noted. having BM, and flatus. no Nausea or vomiting, upgrade to clears and go slowly. If symptoms develop, will place NG tube for decompression.

## 2017-08-25 NOTE — PROGRESS NOTE ADULT - ASSESSMENT
71 year old male admitted to the medicine with hypercalcemia, leukopenia and hypoalbuminemia, with ileus.  - Cont sips of clears to go slow  - Ambulate  - Serial abdominal exams  - Minimize narcotics  - Monitor bowel function  - If worsening distention or N/V, NPO with NG tube  - Cont care per primary team 71 year old male admitted to the medicine with hypercalcemia, leukopenia and hypoalbuminemia, with ileus.  - Cont sips of clears to go slow, ADT  - Ambulate  - Serial abdominal exams  - Minimize narcotics  - Monitor bowel function  - If worsening distention or N/V, NPO with NG tube  - Cont care per primary team   - Surgery team to sign off, please call if any new issues arise

## 2017-08-25 NOTE — PROGRESS NOTE ADULT - ASSESSMENT
71 yom with pmh which includes but not limited to Metastatic Lung Cancer follows at [facility], CAD s/p stents, V. Tach, HTN, HLD and Hypothyroidism sent from [facility] with hypercalcemia, low albumin and leucocytosis. Patient was recently at [facility] for loculated pleural effusions s/p chest tube and iv abx and severe hypercalcemia. Currently getting treated at this time for effusionand has started keytruda via Oncology. He is also found to have an ileus, being medically managed as surgery has signed off.

## 2017-08-25 NOTE — PROGRESS NOTE ADULT - SUBJECTIVE AND OBJECTIVE BOX
OVERNIGHT EVENTS: still in pain, but today pain is more in his belly than his back.     Present Symptoms:     Dyspnea: 0   Nausea/Vomiting: No  Anxiety:  No  Depression: No  Fatigue: Yes   Loss of appetite: No    Pain: abdomen - unable to characterize, just a pressure sensation             Character-            Duration-            Effect-            Factors-            Frequency-            Location-            Severity-    Review of Systems: Reviewed                                     Positive: abdominal pain   All others negative    MEDICATIONS  (STANDING):  senna 2 Tablet(s) Oral at bedtime  amiodarone    Tablet 200 milliGRAM(s) Oral daily  aspirin  chewable 81 milliGRAM(s) Oral daily  clopidogrel Tablet 75 milliGRAM(s) Oral daily  pantoprazole    Tablet 40 milliGRAM(s) Oral before breakfast  fenofibrate Tablet 48 milliGRAM(s) Oral at bedtime  metoprolol succinate  milliGRAM(s) Oral daily  ferrous    sulfate 325 milliGRAM(s) Oral three times a day with meals  levothyroxine 200 MICROGram(s) Oral daily  simvastatin 20 milliGRAM(s) Oral at bedtime  lactobacillus acidophilus 1 Tablet(s) Oral two times a day with meals  docusate sodium 100 milliGRAM(s) Oral two times a day  polyethylene glycol 3350 17 Gram(s) Oral daily  pembrolizumab IVPB 200 milliGRAM(s) IV Intermittent once  lidocaine   Patch 1 Patch Transdermal daily  dextrose 5% + sodium chloride 0.9%. 1000 milliLiter(s) (65 mL/Hr) IV Continuous <Continuous>  fentaNYL   Patch 100 MICROgram(s)/Hr 1 Patch Transdermal every 72 hours  fentaNYL   Patch  25 MICROgram(s)/Hr 1 Patch Transdermal every 72 hours    MEDICATIONS  (PRN):  ALBUTerol/ipratropium for Nebulization 3 milliLiter(s) Nebulizer every 6 hours PRN Shortness of Breath and/or Wheezing  bisacodyl Suppository 10 milliGRAM(s) Rectal daily PRN Constipation  lactulose Syrup 10 Gram(s) Oral every 8 hours PRN constipation  HYDROmorphone  Injectable 1 milliGRAM(s) IV Push every 3 hours PRN Pain  cyclobenzaprine 5 milliGRAM(s) Oral three times a day PRN Muscle Spasm  oxyCODONE    IR 5 milliGRAM(s) Oral every 4 hours PRN Moderate Pain (4 - 6)    PHYSICAL EXAM:    Vital Signs Last 24 Hrs  T(C): 37.2 (25 Aug 2017 04:57), Max: 37.2 (25 Aug 2017 04:57)  T(F): 98.9 (25 Aug 2017 04:57), Max: 98.9 (25 Aug 2017 04:57)  HR: 96 (25 Aug 2017 04:57) (88 - 96)  BP: 131/75 (25 Aug 2017 04:57) (123/- - 131/75)  BP(mean): --  RR: 16 (25 Aug 2017 04:57) (16 - 16)  SpO2: 98% (24 Aug 2017 23:04) (98% - 99%)    General: alert      Karnofsky:  30-40%    HEENT: normal     Lungs: comfortable    CV: normal      GI: mildy distended     : normal      MSK: weakness      Skin: no rash    LABS:                      8.4    16.1  )-----------( 243      ( 25 Aug 2017 08:20 )             26.7     08-25    133<L>  |  100  |  10.0  ----------------------------<  157<H>  3.9   |  21.0<L>  |  0.81    Ca    9.7      25 Aug 2017 08:20  Phos  1.3     08-25  Mg     1.8     08-25    I&O's Summary    24 Aug 2017 07:01  -  25 Aug 2017 07:00  --------------------------------------------------------  IN: 390 mL / OUT: 1200 mL / NET: -810 mL    RADIOLOGY & ADDITIONAL STUDIES:    ADVANCE DIRECTIVES: Full Code

## 2017-08-25 NOTE — CHART NOTE - NSCHARTNOTEFT_GEN_A_CORE
Upon Nutritional Assessment by the Registered Dietitian your patient was determined to meet criteria / has evidence of the following diagnosis/diagnoses:          [ ]  Mild Protein Calorie Malnutrition        [ ]  Moderate Protein Calorie Malnutrition        [ x] Severe Protein Calorie Malnutrition        [ ] Unspecified Protein Calorie Malnutrition        [ ] Underweight / BMI <19        [ ] Morbid Obesity / BMI > 40      Findings as based on:  •  Comprehensive nutrition assessment and consultation  •  Calorie counts (nutrient intake analysis)  •  Food acceptance and intake status from observations by staff  •  Follow up  •  Patient education  •  Intervention secondary to interdisciplinary rounds  •   concerns      Treatment:    The following diet has been recommended: Rec Puree diet with Ensure QID as requested by pt.      PROVIDER Section:     By signing this assessment you are acknowledging and agree with the diagnosis/diagnoses assigned by the Registered Dietitian    Comments:

## 2017-08-25 NOTE — DIETITIAN INITIAL EVALUATION ADULT. - PERTINENT LABORATORY DATA
08-25 Na133 mmol/L<L> Glu 157 mg/dL<H> K+ 3.9 mmol/L Cr  0.81 mg/dL BUN 10.0 mg/dL Phos 1.3 mg/dL<L> Alb n/a   PAB n/a

## 2017-08-26 PROCEDURE — 99239 HOSP IP/OBS DSCHRG MGMT >30: CPT

## 2017-08-26 RX ORDER — LACTULOSE 10 G/15ML
10 SOLUTION ORAL
Qty: 0 | Refills: 0 | Status: DISCONTINUED | OUTPATIENT
Start: 2017-08-26 | End: 2017-08-26

## 2017-08-26 RX ORDER — POLYETHYLENE GLYCOL 3350 17 G/17G
17 POWDER, FOR SOLUTION ORAL
Qty: 0 | Refills: 0 | Status: DISCONTINUED | OUTPATIENT
Start: 2017-08-26 | End: 2017-09-08

## 2017-08-26 RX ORDER — LACTULOSE 10 G/15ML
10 SOLUTION ORAL
Qty: 0 | Refills: 0 | Status: COMPLETED | OUTPATIENT
Start: 2017-08-26 | End: 2017-08-28

## 2017-08-26 RX ADMIN — GABAPENTIN 100 MILLIGRAM(S): 400 CAPSULE ORAL at 23:26

## 2017-08-26 RX ADMIN — POTASSIUM PHOSPHATE, MONOBASIC POTASSIUM PHOSPHATE, DIBASIC 62.5 MILLIMOLE(S): 236; 224 INJECTION, SOLUTION INTRAVENOUS at 02:24

## 2017-08-26 RX ADMIN — SIMVASTATIN 20 MILLIGRAM(S): 20 TABLET, FILM COATED ORAL at 23:26

## 2017-08-26 RX ADMIN — HYDROMORPHONE HYDROCHLORIDE 2 MILLIGRAM(S): 2 INJECTION INTRAMUSCULAR; INTRAVENOUS; SUBCUTANEOUS at 09:10

## 2017-08-26 RX ADMIN — LIDOCAINE 1 PATCH: 4 CREAM TOPICAL at 00:00

## 2017-08-26 RX ADMIN — AMIODARONE HYDROCHLORIDE 200 MILLIGRAM(S): 400 TABLET ORAL at 07:13

## 2017-08-26 RX ADMIN — HYDROMORPHONE HYDROCHLORIDE 2 MILLIGRAM(S): 2 INJECTION INTRAMUSCULAR; INTRAVENOUS; SUBCUTANEOUS at 23:26

## 2017-08-26 RX ADMIN — LACTULOSE 10 GRAM(S): 10 SOLUTION ORAL at 11:27

## 2017-08-26 RX ADMIN — MAGNESIUM OXIDE 400 MG ORAL TABLET 400 MILLIGRAM(S): 241.3 TABLET ORAL at 08:34

## 2017-08-26 RX ADMIN — Medication 200 MICROGRAM(S): at 07:13

## 2017-08-26 RX ADMIN — Medication 100 MILLIGRAM(S): at 07:13

## 2017-08-26 RX ADMIN — HYDROMORPHONE HYDROCHLORIDE 2 MILLIGRAM(S): 2 INJECTION INTRAMUSCULAR; INTRAVENOUS; SUBCUTANEOUS at 17:30

## 2017-08-26 RX ADMIN — MAGNESIUM OXIDE 400 MG ORAL TABLET 400 MILLIGRAM(S): 241.3 TABLET ORAL at 17:30

## 2017-08-26 RX ADMIN — Medication 4 MILLIGRAM(S): at 14:35

## 2017-08-26 RX ADMIN — SODIUM CHLORIDE 65 MILLILITER(S): 9 INJECTION, SOLUTION INTRAVENOUS at 08:39

## 2017-08-26 RX ADMIN — LIDOCAINE 1 PATCH: 4 CREAM TOPICAL at 23:27

## 2017-08-26 RX ADMIN — HYDROMORPHONE HYDROCHLORIDE 2 MILLIGRAM(S): 2 INJECTION INTRAMUSCULAR; INTRAVENOUS; SUBCUTANEOUS at 07:14

## 2017-08-26 RX ADMIN — HYDROMORPHONE HYDROCHLORIDE 2 MILLIGRAM(S): 2 INJECTION INTRAMUSCULAR; INTRAVENOUS; SUBCUTANEOUS at 10:02

## 2017-08-26 RX ADMIN — Medication 1 TABLET(S): at 17:30

## 2017-08-26 RX ADMIN — POLYETHYLENE GLYCOL 3350 17 GRAM(S): 17 POWDER, FOR SOLUTION ORAL at 11:26

## 2017-08-26 RX ADMIN — POLYETHYLENE GLYCOL 3350 17 GRAM(S): 17 POWDER, FOR SOLUTION ORAL at 17:30

## 2017-08-26 RX ADMIN — Medication 5 MILLIGRAM(S): at 23:25

## 2017-08-26 RX ADMIN — Medication 1 TABLET(S): at 08:32

## 2017-08-26 RX ADMIN — HYDROMORPHONE HYDROCHLORIDE 2 MILLIGRAM(S): 2 INJECTION INTRAMUSCULAR; INTRAVENOUS; SUBCUTANEOUS at 14:01

## 2017-08-26 RX ADMIN — HYDROMORPHONE HYDROCHLORIDE 6 MILLIGRAM(S): 2 INJECTION INTRAMUSCULAR; INTRAVENOUS; SUBCUTANEOUS at 12:56

## 2017-08-26 RX ADMIN — HYDROMORPHONE HYDROCHLORIDE 2 MILLIGRAM(S): 2 INJECTION INTRAMUSCULAR; INTRAVENOUS; SUBCUTANEOUS at 18:34

## 2017-08-26 RX ADMIN — SENNA PLUS 2 TABLET(S): 8.6 TABLET ORAL at 23:26

## 2017-08-26 RX ADMIN — HYDROMORPHONE HYDROCHLORIDE 2 MILLIGRAM(S): 2 INJECTION INTRAMUSCULAR; INTRAVENOUS; SUBCUTANEOUS at 03:22

## 2017-08-26 RX ADMIN — Medication 1 TABLET(S): at 11:29

## 2017-08-26 RX ADMIN — Medication 4 MILLIGRAM(S): at 07:13

## 2017-08-26 RX ADMIN — Medication 4 MILLIGRAM(S): at 23:26

## 2017-08-26 RX ADMIN — Medication 10 MILLIGRAM(S): at 23:27

## 2017-08-26 RX ADMIN — HYDROMORPHONE HYDROCHLORIDE 2 MILLIGRAM(S): 2 INJECTION INTRAMUSCULAR; INTRAVENOUS; SUBCUTANEOUS at 07:40

## 2017-08-26 RX ADMIN — HYDROMORPHONE HYDROCHLORIDE 6 MILLIGRAM(S): 2 INJECTION INTRAMUSCULAR; INTRAVENOUS; SUBCUTANEOUS at 13:35

## 2017-08-26 RX ADMIN — PANTOPRAZOLE SODIUM 40 MILLIGRAM(S): 20 TABLET, DELAYED RELEASE ORAL at 07:13

## 2017-08-26 RX ADMIN — Medication 1 TABLET(S): at 12:39

## 2017-08-26 RX ADMIN — Medication 1 TABLET(S): at 08:34

## 2017-08-26 RX ADMIN — HYDROMORPHONE HYDROCHLORIDE 2 MILLIGRAM(S): 2 INJECTION INTRAMUSCULAR; INTRAVENOUS; SUBCUTANEOUS at 00:00

## 2017-08-26 RX ADMIN — HYDROMORPHONE HYDROCHLORIDE 2 MILLIGRAM(S): 2 INJECTION INTRAMUSCULAR; INTRAVENOUS; SUBCUTANEOUS at 04:47

## 2017-08-26 RX ADMIN — Medication 100 MILLIGRAM(S): at 17:29

## 2017-08-26 RX ADMIN — HYDROMORPHONE HYDROCHLORIDE 2 MILLIGRAM(S): 2 INJECTION INTRAMUSCULAR; INTRAVENOUS; SUBCUTANEOUS at 14:16

## 2017-08-26 RX ADMIN — Medication 400 MILLIGRAM(S): at 07:13

## 2017-08-26 RX ADMIN — Medication 1000 MILLIGRAM(S): at 08:07

## 2017-08-26 RX ADMIN — LIDOCAINE 1 PATCH: 4 CREAM TOPICAL at 11:23

## 2017-08-26 RX ADMIN — LACTULOSE 10 GRAM(S): 10 SOLUTION ORAL at 17:30

## 2017-08-26 RX ADMIN — Medication 81 MILLIGRAM(S): at 11:26

## 2017-08-26 RX ADMIN — CLOPIDOGREL BISULFATE 75 MILLIGRAM(S): 75 TABLET, FILM COATED ORAL at 11:26

## 2017-08-26 RX ADMIN — MAGNESIUM OXIDE 400 MG ORAL TABLET 400 MILLIGRAM(S): 241.3 TABLET ORAL at 12:39

## 2017-08-26 NOTE — PROGRESS NOTE ADULT - SUBJECTIVE AND OBJECTIVE BOX
seen for metastatic lung ca, pain/ ileus    patient more awake but drowsy at times  pain is improved but persists.  + flatus, no BM  ROS otherwise negative     MEDICATIONS  (STANDING):  senna 2 Tablet(s) Oral at bedtime  amiodarone    Tablet 200 milliGRAM(s) Oral daily  aspirin  chewable 81 milliGRAM(s) Oral daily  clopidogrel Tablet 75 milliGRAM(s) Oral daily  pantoprazole    Tablet 40 milliGRAM(s) Oral before breakfast  metoprolol succinate  milliGRAM(s) Oral daily  levothyroxine 200 MICROGram(s) Oral daily  simvastatin 20 milliGRAM(s) Oral at bedtime  lactobacillus acidophilus 1 Tablet(s) Oral two times a day with meals  docusate sodium 100 milliGRAM(s) Oral two times a day  pembrolizumab IVPB 200 milliGRAM(s) IV Intermittent once  lidocaine   Patch 1 Patch Transdermal daily  dextrose 5% + sodium chloride 0.9%. 1000 milliLiter(s) (65 mL/Hr) IV Continuous <Continuous>  fentaNYL   Patch 100 MICROgram(s)/Hr 1 Patch Transdermal every 72 hours  fentaNYL   Patch  25 MICROgram(s)/Hr 1 Patch Transdermal every 72 hours  multivitamin 1 Tablet(s) Oral daily  magnesium oxide 400 milliGRAM(s) Oral three times a day with meals  potassium acid phosphate/sodium acid phosphate tablet (K-PHOS No. 2) 1 Tablet(s) Oral three times a day with meals  dexamethasone  Injectable 4 milliGRAM(s) IV Push every 8 hours  diazepam    Tablet 5 milliGRAM(s) Oral at bedtime  gabapentin 100 milliGRAM(s) Oral at bedtime  HYDROmorphone  Injectable 2 milliGRAM(s) IV Push every 4 hours  polyethylene glycol 3350 17 Gram(s) Oral two times a day  lactulose Syrup 10 Gram(s) Oral two times a day    MEDICATIONS  (PRN):  ALBUTerol/ipratropium for Nebulization 3 milliLiter(s) Nebulizer every 6 hours PRN Shortness of Breath and/or Wheezing  bisacodyl Suppository 10 milliGRAM(s) Rectal daily PRN Constipation  lactulose Syrup 10 Gram(s) Oral every 8 hours PRN constipation  diazepam    Tablet 2.5 milliGRAM(s) Oral every 6 hours PRN spasms/anxiety  HYDROmorphone   Tablet 4 milliGRAM(s) Oral every 4 hours PRN Mild Pain (1 - 3)  HYDROmorphone   Tablet 6 milliGRAM(s) Oral every 4 hours PRN Moderate Pain (4 - 6)      Allergies    macrolide antibiotics (Urticaria; Rash; Hives)  penicillin (Urticaria; Rash)  Zithromax Z-Christian (Urticaria; Rash; Hives)    Vital Signs Last 24 Hrs  T(C): 36.4 (26 Aug 2017 08:21), Max: 36.6 (25 Aug 2017 12:07)  T(F): 97.6 (26 Aug 2017 08:21), Max: 97.9 (25 Aug 2017 23:00)  HR: 83 (26 Aug 2017 08:21) (79 - 84)  BP: 111/64 (26 Aug 2017 08:21) (94/64 - 111/64)  BP(mean): --  RR: 19 (26 Aug 2017 08:21) (18 - 19)  SpO2: 95% (26 Aug 2017 04:55) (95% - 99%)    PHYSICAL EXAM:    GENERAL: NAD  CHEST/LUNG: dec bs left base  HEART: Regular rate and rhythm; S1 S2; no murmurs noted  ABDOMEN: +hypoactive bs, + soft distension  EXTREMITIES: no edema      LABS:                        8.4    16.1  )-----------( 243      ( 25 Aug 2017 08:20 )             26.7     08-25    133<L>  |  100  |  10.0  ----------------------------<  157<H>  3.9   |  21.0<L>  |  0.81    Ca    9.7      25 Aug 2017 08:20  Phos  1.3     08-25  Mg     1.8     08-25            CAPILLARY BLOOD GLUCOSE            RADIOLOGY & ADDITIONAL TESTS:

## 2017-08-26 NOTE — PROGRESS NOTE ADULT - ASSESSMENT
71 yom with pmh which includes but not limited to Metastatic Lung Cancer follows at [facility], CAD s/p stents, V. Tach, HTN, HLD and Hypothyroidism sent from [facility] with hypercalcemia, low albumin and leucocytosis. Patient was recently at [facility] for loculated pleural effusions s/p chest tube and iv abx and severe hypercalcemia. Currently getting treated at this time for effusionand has started keytruda via Oncology. He is also found to have an ileus, being medically managed as surgery has signed off.

## 2017-08-27 LAB
ALBUMIN SERPL ELPH-MCNC: 2.4 G/DL — LOW (ref 3.3–5.2)
ANION GAP SERPL CALC-SCNC: 12 MMOL/L — SIGNIFICANT CHANGE UP (ref 5–17)
ANISOCYTOSIS BLD QL: SLIGHT — SIGNIFICANT CHANGE UP
BASOPHILS # BLD AUTO: 0 K/UL — SIGNIFICANT CHANGE UP (ref 0–0.2)
BUN SERPL-MCNC: 11 MG/DL — SIGNIFICANT CHANGE UP (ref 8–20)
CALCIUM SERPL-MCNC: 10.4 MG/DL — HIGH (ref 8.6–10.2)
CHLORIDE SERPL-SCNC: 100 MMOL/L — SIGNIFICANT CHANGE UP (ref 98–107)
CO2 SERPL-SCNC: 23 MMOL/L — SIGNIFICANT CHANGE UP (ref 22–29)
CREAT SERPL-MCNC: 0.73 MG/DL — SIGNIFICANT CHANGE UP (ref 0.5–1.3)
GLUCOSE SERPL-MCNC: 193 MG/DL — HIGH (ref 70–115)
HCT VFR BLD CALC: 27.7 % — LOW (ref 42–52)
HGB BLD-MCNC: 8.6 G/DL — LOW (ref 14–18)
LYMPHOCYTES # BLD AUTO: 1.7 K/UL — SIGNIFICANT CHANGE UP (ref 1–4.8)
LYMPHOCYTES # BLD AUTO: 5.2 % — LOW (ref 20–55)
MACROCYTES BLD QL: SLIGHT — SIGNIFICANT CHANGE UP
MAGNESIUM SERPL-MCNC: 2 MG/DL — SIGNIFICANT CHANGE UP (ref 1.6–2.6)
MCHC RBC-ENTMCNC: 26.2 PG — LOW (ref 27–31)
MCHC RBC-ENTMCNC: 31 G/DL — LOW (ref 32–36)
MCV RBC AUTO: 84.5 FL — SIGNIFICANT CHANGE UP (ref 80–94)
MICROCYTES BLD QL: SLIGHT — SIGNIFICANT CHANGE UP
MONOCYTES # BLD AUTO: 0.9 K/UL — HIGH (ref 0–0.8)
MONOCYTES NFR BLD AUTO: 2.7 % — LOW (ref 3–10)
NEUTROPHILS # BLD AUTO: 29.1 K/UL — HIGH (ref 1.8–8)
NEUTROPHILS NFR BLD AUTO: 91.4 % — HIGH (ref 37–73)
OVALOCYTES BLD QL SMEAR: SLIGHT — SIGNIFICANT CHANGE UP
PHOSPHATE SERPL-MCNC: 2.8 MG/DL — SIGNIFICANT CHANGE UP (ref 2.4–4.7)
PLAT MORPH BLD: ABNORMAL
PLATELET # BLD AUTO: 312 K/UL — SIGNIFICANT CHANGE UP (ref 150–400)
PLATELET CLUMP BLD QL SMEAR: SIGNIFICANT CHANGE UP
POIKILOCYTOSIS BLD QL AUTO: SLIGHT — SIGNIFICANT CHANGE UP
POTASSIUM SERPL-MCNC: 4.4 MMOL/L — SIGNIFICANT CHANGE UP (ref 3.5–5.3)
POTASSIUM SERPL-SCNC: 4.4 MMOL/L — SIGNIFICANT CHANGE UP (ref 3.5–5.3)
PREALB SERPL-MCNC: 8 MG/DL — LOW (ref 18–38)
RBC # BLD: 3.28 M/UL — LOW (ref 4.6–6.2)
RBC # FLD: 17 % — HIGH (ref 11–15.6)
RBC BLD AUTO: ABNORMAL
SODIUM SERPL-SCNC: 135 MMOL/L — SIGNIFICANT CHANGE UP (ref 135–145)
WBC # BLD: 31.8 K/UL — HIGH (ref 4.8–10.8)
WBC # FLD AUTO: 31.8 K/UL — HIGH (ref 4.8–10.8)

## 2017-08-27 PROCEDURE — 99233 SBSQ HOSP IP/OBS HIGH 50: CPT

## 2017-08-27 RX ORDER — ZOLEDRONIC ACID 5 MG/100ML
4 INJECTION, SOLUTION INTRAVENOUS ONCE
Qty: 0 | Refills: 0 | Status: COMPLETED | OUTPATIENT
Start: 2017-08-27 | End: 2017-08-27

## 2017-08-27 RX ORDER — CALCITONIN SALMON 200 [IU]/ML
1 INJECTION, SOLUTION INTRAMUSCULAR DAILY
Qty: 0 | Refills: 0 | Status: COMPLETED | OUTPATIENT
Start: 2017-08-27 | End: 2017-08-28

## 2017-08-27 RX ADMIN — FENTANYL CITRATE 1 PATCH: 50 INJECTION INTRAVENOUS at 11:50

## 2017-08-27 RX ADMIN — Medication 1 TABLET(S): at 08:10

## 2017-08-27 RX ADMIN — HYDROMORPHONE HYDROCHLORIDE 2 MILLIGRAM(S): 2 INJECTION INTRAMUSCULAR; INTRAVENOUS; SUBCUTANEOUS at 14:15

## 2017-08-27 RX ADMIN — Medication 100 MILLIGRAM(S): at 05:31

## 2017-08-27 RX ADMIN — HYDROMORPHONE HYDROCHLORIDE 6 MILLIGRAM(S): 2 INJECTION INTRAMUSCULAR; INTRAVENOUS; SUBCUTANEOUS at 20:26

## 2017-08-27 RX ADMIN — Medication 4 MILLIGRAM(S): at 14:15

## 2017-08-27 RX ADMIN — LIDOCAINE 1 PATCH: 4 CREAM TOPICAL at 23:31

## 2017-08-27 RX ADMIN — CALCITONIN SALMON 1 SPRAY(S): 200 INJECTION, SOLUTION INTRAMUSCULAR at 11:47

## 2017-08-27 RX ADMIN — HYDROMORPHONE HYDROCHLORIDE 2 MILLIGRAM(S): 2 INJECTION INTRAMUSCULAR; INTRAVENOUS; SUBCUTANEOUS at 10:07

## 2017-08-27 RX ADMIN — MAGNESIUM OXIDE 400 MG ORAL TABLET 400 MILLIGRAM(S): 241.3 TABLET ORAL at 18:19

## 2017-08-27 RX ADMIN — Medication 81 MILLIGRAM(S): at 11:47

## 2017-08-27 RX ADMIN — HYDROMORPHONE HYDROCHLORIDE 2 MILLIGRAM(S): 2 INJECTION INTRAMUSCULAR; INTRAVENOUS; SUBCUTANEOUS at 05:32

## 2017-08-27 RX ADMIN — Medication 200 MICROGRAM(S): at 05:31

## 2017-08-27 RX ADMIN — Medication 1 TABLET(S): at 11:47

## 2017-08-27 RX ADMIN — Medication 5 MILLIGRAM(S): at 22:32

## 2017-08-27 RX ADMIN — HYDROMORPHONE HYDROCHLORIDE 2 MILLIGRAM(S): 2 INJECTION INTRAMUSCULAR; INTRAVENOUS; SUBCUTANEOUS at 02:45

## 2017-08-27 RX ADMIN — SENNA PLUS 2 TABLET(S): 8.6 TABLET ORAL at 22:32

## 2017-08-27 RX ADMIN — HYDROMORPHONE HYDROCHLORIDE 6 MILLIGRAM(S): 2 INJECTION INTRAMUSCULAR; INTRAVENOUS; SUBCUTANEOUS at 20:45

## 2017-08-27 RX ADMIN — HYDROMORPHONE HYDROCHLORIDE 2 MILLIGRAM(S): 2 INJECTION INTRAMUSCULAR; INTRAVENOUS; SUBCUTANEOUS at 02:06

## 2017-08-27 RX ADMIN — POLYETHYLENE GLYCOL 3350 17 GRAM(S): 17 POWDER, FOR SOLUTION ORAL at 05:33

## 2017-08-27 RX ADMIN — HYDROMORPHONE HYDROCHLORIDE 2 MILLIGRAM(S): 2 INJECTION INTRAMUSCULAR; INTRAVENOUS; SUBCUTANEOUS at 18:19

## 2017-08-27 RX ADMIN — PANTOPRAZOLE SODIUM 40 MILLIGRAM(S): 20 TABLET, DELAYED RELEASE ORAL at 06:41

## 2017-08-27 RX ADMIN — HYDROMORPHONE HYDROCHLORIDE 2 MILLIGRAM(S): 2 INJECTION INTRAMUSCULAR; INTRAVENOUS; SUBCUTANEOUS at 06:08

## 2017-08-27 RX ADMIN — HYDROMORPHONE HYDROCHLORIDE 2 MILLIGRAM(S): 2 INJECTION INTRAMUSCULAR; INTRAVENOUS; SUBCUTANEOUS at 22:45

## 2017-08-27 RX ADMIN — CLOPIDOGREL BISULFATE 75 MILLIGRAM(S): 75 TABLET, FILM COATED ORAL at 11:47

## 2017-08-27 RX ADMIN — Medication 100 MILLIGRAM(S): at 05:32

## 2017-08-27 RX ADMIN — SODIUM CHLORIDE 65 MILLILITER(S): 9 INJECTION, SOLUTION INTRAVENOUS at 15:43

## 2017-08-27 RX ADMIN — Medication 1 TABLET(S): at 18:19

## 2017-08-27 RX ADMIN — HYDROMORPHONE HYDROCHLORIDE 4 MILLIGRAM(S): 2 INJECTION INTRAMUSCULAR; INTRAVENOUS; SUBCUTANEOUS at 12:45

## 2017-08-27 RX ADMIN — POLYETHYLENE GLYCOL 3350 17 GRAM(S): 17 POWDER, FOR SOLUTION ORAL at 18:19

## 2017-08-27 RX ADMIN — ZOLEDRONIC ACID 400 MILLIGRAM(S): 5 INJECTION, SOLUTION INTRAVENOUS at 11:47

## 2017-08-27 RX ADMIN — HYDROMORPHONE HYDROCHLORIDE 4 MILLIGRAM(S): 2 INJECTION INTRAMUSCULAR; INTRAVENOUS; SUBCUTANEOUS at 12:19

## 2017-08-27 RX ADMIN — HYDROMORPHONE HYDROCHLORIDE 2 MILLIGRAM(S): 2 INJECTION INTRAMUSCULAR; INTRAVENOUS; SUBCUTANEOUS at 14:45

## 2017-08-27 RX ADMIN — HYDROMORPHONE HYDROCHLORIDE 2 MILLIGRAM(S): 2 INJECTION INTRAMUSCULAR; INTRAVENOUS; SUBCUTANEOUS at 19:16

## 2017-08-27 RX ADMIN — Medication 4 MILLIGRAM(S): at 05:31

## 2017-08-27 RX ADMIN — LIDOCAINE 1 PATCH: 4 CREAM TOPICAL at 11:48

## 2017-08-27 RX ADMIN — HYDROMORPHONE HYDROCHLORIDE 2 MILLIGRAM(S): 2 INJECTION INTRAMUSCULAR; INTRAVENOUS; SUBCUTANEOUS at 22:32

## 2017-08-27 RX ADMIN — GABAPENTIN 100 MILLIGRAM(S): 400 CAPSULE ORAL at 22:31

## 2017-08-27 RX ADMIN — MAGNESIUM OXIDE 400 MG ORAL TABLET 400 MILLIGRAM(S): 241.3 TABLET ORAL at 08:11

## 2017-08-27 RX ADMIN — MAGNESIUM OXIDE 400 MG ORAL TABLET 400 MILLIGRAM(S): 241.3 TABLET ORAL at 11:47

## 2017-08-27 RX ADMIN — AMIODARONE HYDROCHLORIDE 200 MILLIGRAM(S): 400 TABLET ORAL at 05:31

## 2017-08-27 RX ADMIN — HYDROMORPHONE HYDROCHLORIDE 2 MILLIGRAM(S): 2 INJECTION INTRAMUSCULAR; INTRAVENOUS; SUBCUTANEOUS at 00:56

## 2017-08-27 RX ADMIN — SODIUM CHLORIDE 65 MILLILITER(S): 9 INJECTION, SOLUTION INTRAVENOUS at 02:06

## 2017-08-27 RX ADMIN — Medication 100 MILLIGRAM(S): at 18:19

## 2017-08-27 RX ADMIN — SIMVASTATIN 20 MILLIGRAM(S): 20 TABLET, FILM COATED ORAL at 22:32

## 2017-08-27 RX ADMIN — Medication 4 MILLIGRAM(S): at 22:32

## 2017-08-27 RX ADMIN — LACTULOSE 10 GRAM(S): 10 SOLUTION ORAL at 18:19

## 2017-08-27 RX ADMIN — HYDROMORPHONE HYDROCHLORIDE 2 MILLIGRAM(S): 2 INJECTION INTRAMUSCULAR; INTRAVENOUS; SUBCUTANEOUS at 10:37

## 2017-08-27 NOTE — PROGRESS NOTE ADULT - SUBJECTIVE AND OBJECTIVE BOX
seen for metastatic ca, ileus    in chair, alert and responsive.  pain ok and improved  + BM  no n/v.  ROS otherwise negative     MEDICATIONS  (STANDING):  senna 2 Tablet(s) Oral at bedtime  amiodarone    Tablet 200 milliGRAM(s) Oral daily  aspirin  chewable 81 milliGRAM(s) Oral daily  clopidogrel Tablet 75 milliGRAM(s) Oral daily  pantoprazole    Tablet 40 milliGRAM(s) Oral before breakfast  metoprolol succinate  milliGRAM(s) Oral daily  levothyroxine 200 MICROGram(s) Oral daily  simvastatin 20 milliGRAM(s) Oral at bedtime  lactobacillus acidophilus 1 Tablet(s) Oral two times a day with meals  docusate sodium 100 milliGRAM(s) Oral two times a day  pembrolizumab IVPB 200 milliGRAM(s) IV Intermittent once  lidocaine   Patch 1 Patch Transdermal daily  dextrose 5% + sodium chloride 0.9%. 1000 milliLiter(s) (65 mL/Hr) IV Continuous <Continuous>  fentaNYL   Patch 100 MICROgram(s)/Hr 1 Patch Transdermal every 72 hours  fentaNYL   Patch  25 MICROgram(s)/Hr 1 Patch Transdermal every 72 hours  multivitamin 1 Tablet(s) Oral daily  magnesium oxide 400 milliGRAM(s) Oral three times a day with meals  potassium acid phosphate/sodium acid phosphate tablet (K-PHOS No. 2) 1 Tablet(s) Oral three times a day with meals  dexamethasone  Injectable 4 milliGRAM(s) IV Push every 8 hours  diazepam    Tablet 5 milliGRAM(s) Oral at bedtime  gabapentin 100 milliGRAM(s) Oral at bedtime  HYDROmorphone  Injectable 2 milliGRAM(s) IV Push every 4 hours  polyethylene glycol 3350 17 Gram(s) Oral two times a day  lactulose Syrup 10 Gram(s) Oral two times a day  zoledronic acid IVPB (ZOMETA) (Non - oncologic) 4 milliGRAM(s) IV Intermittent once  calcitonin Nasal 1 Spray(s) Nasal daily    MEDICATIONS  (PRN):  ALBUTerol/ipratropium for Nebulization 3 milliLiter(s) Nebulizer every 6 hours PRN Shortness of Breath and/or Wheezing  bisacodyl Suppository 10 milliGRAM(s) Rectal daily PRN Constipation  lactulose Syrup 10 Gram(s) Oral every 8 hours PRN constipation  diazepam    Tablet 2.5 milliGRAM(s) Oral every 6 hours PRN spasms/anxiety  HYDROmorphone   Tablet 4 milliGRAM(s) Oral every 4 hours PRN Mild Pain (1 - 3)  HYDROmorphone   Tablet 6 milliGRAM(s) Oral every 4 hours PRN Moderate Pain (4 - 6)      Allergies    macrolide antibiotics (Urticaria; Rash; Hives)  penicillin (Urticaria; Rash)  Zithromax Z-Christian (Urticaria; Rash; Hives)    Vital Signs Last 24 Hrs  T(C): 36.7 (27 Aug 2017 05:36), Max: 36.8 (26 Aug 2017 16:44)  T(F): 98 (27 Aug 2017 05:36), Max: 98.3 (26 Aug 2017 16:44)  HR: 77 (27 Aug 2017 05:36) (77 - 84)  BP: 111/63 (27 Aug 2017 05:36) (103/57 - 111/63)  BP(mean): --  RR: 18 (27 Aug 2017 05:36) (18 - 20)  SpO2: 92% (27 Aug 2017 05:36) (91% - 94%)    PHYSICAL EXAM:    GENERAL: NAD  CHEST/LUNG: dec bs at bases  HEART: Regular rate and rhythm; S1 S2; no murmurs noted  ABDOMEN: Softly distended, hypoactive bs  EXTREMITIES: no edema.       LABS:                        8.6    31.8  )-----------( 312      ( 27 Aug 2017 08:38 )             27.7     08-27    135  |  100  |  11.0  ----------------------------<  193<H>  4.4   |  23.0  |  0.73    Ca    10.4<H>      27 Aug 2017 08:38  Phos  2.8     08-27  Mg     2.0     08-27    TPro  x   /  Alb  2.4<L>  /  TBili  x   /  DBili  x   /  AST  x   /  ALT  x   /  AlkPhos  x   08-27          CAPILLARY BLOOD GLUCOSE            RADIOLOGY & ADDITIONAL TESTS:

## 2017-08-27 NOTE — PROGRESS NOTE ADULT - ASSESSMENT
71 yom with pmh which includes but not limited to Metastatic Lung Cancer follows at [facility], CAD s/p stents, V. Tach, HTN, HLD and Hypothyroidism sent from [facility] with hypercalcemia, low albumin and leucocytosis. Patient was recently at [facility] for loculated pleural effusions s/p chest tube and iv abx and severe hypercalcemia. Currently getting treated at this time for effusion and has started keytruda via Oncology. He is also found to have an ileus, being medically managed as surgery has signed off.

## 2017-08-28 PROCEDURE — 99233 SBSQ HOSP IP/OBS HIGH 50: CPT

## 2017-08-28 PROCEDURE — 74000: CPT | Mod: 26

## 2017-08-28 PROCEDURE — 99232 SBSQ HOSP IP/OBS MODERATE 35: CPT

## 2017-08-28 RX ORDER — HYDROMORPHONE HYDROCHLORIDE 2 MG/ML
6 INJECTION INTRAMUSCULAR; INTRAVENOUS; SUBCUTANEOUS EVERY 4 HOURS
Qty: 0 | Refills: 0 | Status: DISCONTINUED | OUTPATIENT
Start: 2017-08-28 | End: 2017-08-29

## 2017-08-28 RX ORDER — HYDROMORPHONE HYDROCHLORIDE 2 MG/ML
1 INJECTION INTRAMUSCULAR; INTRAVENOUS; SUBCUTANEOUS EVERY 6 HOURS
Qty: 0 | Refills: 0 | Status: DISCONTINUED | OUTPATIENT
Start: 2017-08-28 | End: 2017-08-29

## 2017-08-28 RX ORDER — FENTANYL CITRATE 50 UG/ML
1 INJECTION INTRAVENOUS
Qty: 0 | Refills: 0 | Status: DISCONTINUED | OUTPATIENT
Start: 2017-08-28 | End: 2017-08-29

## 2017-08-28 RX ORDER — HYDROMORPHONE HYDROCHLORIDE 2 MG/ML
8 INJECTION INTRAMUSCULAR; INTRAVENOUS; SUBCUTANEOUS EVERY 4 HOURS
Qty: 0 | Refills: 0 | Status: DISCONTINUED | OUTPATIENT
Start: 2017-08-28 | End: 2017-08-29

## 2017-08-28 RX ORDER — DEXAMETHASONE 0.5 MG/5ML
4 ELIXIR ORAL EVERY 12 HOURS
Qty: 0 | Refills: 0 | Status: DISCONTINUED | OUTPATIENT
Start: 2017-08-28 | End: 2017-08-29

## 2017-08-28 RX ORDER — ENOXAPARIN SODIUM 100 MG/ML
40 INJECTION SUBCUTANEOUS DAILY
Qty: 0 | Refills: 0 | Status: DISCONTINUED | OUTPATIENT
Start: 2017-08-28 | End: 2017-09-08

## 2017-08-28 RX ADMIN — PANTOPRAZOLE SODIUM 40 MILLIGRAM(S): 20 TABLET, DELAYED RELEASE ORAL at 05:51

## 2017-08-28 RX ADMIN — Medication 100 MILLIGRAM(S): at 05:50

## 2017-08-28 RX ADMIN — Medication 100 MILLIGRAM(S): at 18:04

## 2017-08-28 RX ADMIN — Medication 1 TABLET(S): at 08:43

## 2017-08-28 RX ADMIN — AMIODARONE HYDROCHLORIDE 200 MILLIGRAM(S): 400 TABLET ORAL at 05:50

## 2017-08-28 RX ADMIN — Medication 200 MICROGRAM(S): at 05:50

## 2017-08-28 RX ADMIN — SENNA PLUS 2 TABLET(S): 8.6 TABLET ORAL at 21:41

## 2017-08-28 RX ADMIN — HYDROMORPHONE HYDROCHLORIDE 2 MILLIGRAM(S): 2 INJECTION INTRAMUSCULAR; INTRAVENOUS; SUBCUTANEOUS at 10:36

## 2017-08-28 RX ADMIN — HYDROMORPHONE HYDROCHLORIDE 6 MILLIGRAM(S): 2 INJECTION INTRAMUSCULAR; INTRAVENOUS; SUBCUTANEOUS at 14:50

## 2017-08-28 RX ADMIN — MAGNESIUM OXIDE 400 MG ORAL TABLET 400 MILLIGRAM(S): 241.3 TABLET ORAL at 18:03

## 2017-08-28 RX ADMIN — SIMVASTATIN 20 MILLIGRAM(S): 20 TABLET, FILM COATED ORAL at 21:41

## 2017-08-28 RX ADMIN — Medication 5 MILLIGRAM(S): at 21:41

## 2017-08-28 RX ADMIN — HYDROMORPHONE HYDROCHLORIDE 2 MILLIGRAM(S): 2 INJECTION INTRAMUSCULAR; INTRAVENOUS; SUBCUTANEOUS at 02:54

## 2017-08-28 RX ADMIN — GABAPENTIN 100 MILLIGRAM(S): 400 CAPSULE ORAL at 21:41

## 2017-08-28 RX ADMIN — LIDOCAINE 1 PATCH: 4 CREAM TOPICAL at 11:08

## 2017-08-28 RX ADMIN — POLYETHYLENE GLYCOL 3350 17 GRAM(S): 17 POWDER, FOR SOLUTION ORAL at 05:49

## 2017-08-28 RX ADMIN — HYDROMORPHONE HYDROCHLORIDE 6 MILLIGRAM(S): 2 INJECTION INTRAMUSCULAR; INTRAVENOUS; SUBCUTANEOUS at 21:40

## 2017-08-28 RX ADMIN — Medication 1 TABLET(S): at 18:04

## 2017-08-28 RX ADMIN — HYDROMORPHONE HYDROCHLORIDE 6 MILLIGRAM(S): 2 INJECTION INTRAMUSCULAR; INTRAVENOUS; SUBCUTANEOUS at 23:43

## 2017-08-28 RX ADMIN — MAGNESIUM OXIDE 400 MG ORAL TABLET 400 MILLIGRAM(S): 241.3 TABLET ORAL at 08:43

## 2017-08-28 RX ADMIN — HYDROMORPHONE HYDROCHLORIDE 6 MILLIGRAM(S): 2 INJECTION INTRAMUSCULAR; INTRAVENOUS; SUBCUTANEOUS at 12:23

## 2017-08-28 RX ADMIN — Medication 1 TABLET(S): at 11:09

## 2017-08-28 RX ADMIN — CALCITONIN SALMON 1 SPRAY(S): 200 INJECTION, SOLUTION INTRAMUSCULAR at 11:08

## 2017-08-28 RX ADMIN — HYDROMORPHONE HYDROCHLORIDE 2 MILLIGRAM(S): 2 INJECTION INTRAMUSCULAR; INTRAVENOUS; SUBCUTANEOUS at 06:00

## 2017-08-28 RX ADMIN — Medication 4 MILLIGRAM(S): at 18:04

## 2017-08-28 RX ADMIN — Medication 4 MILLIGRAM(S): at 05:50

## 2017-08-28 RX ADMIN — POLYETHYLENE GLYCOL 3350 17 GRAM(S): 17 POWDER, FOR SOLUTION ORAL at 18:04

## 2017-08-28 RX ADMIN — HYDROMORPHONE HYDROCHLORIDE 2 MILLIGRAM(S): 2 INJECTION INTRAMUSCULAR; INTRAVENOUS; SUBCUTANEOUS at 05:51

## 2017-08-28 RX ADMIN — Medication 81 MILLIGRAM(S): at 11:09

## 2017-08-28 RX ADMIN — HYDROMORPHONE HYDROCHLORIDE 2 MILLIGRAM(S): 2 INJECTION INTRAMUSCULAR; INTRAVENOUS; SUBCUTANEOUS at 02:24

## 2017-08-28 RX ADMIN — HYDROMORPHONE HYDROCHLORIDE 6 MILLIGRAM(S): 2 INJECTION INTRAMUSCULAR; INTRAVENOUS; SUBCUTANEOUS at 13:00

## 2017-08-28 RX ADMIN — HYDROMORPHONE HYDROCHLORIDE 6 MILLIGRAM(S): 2 INJECTION INTRAMUSCULAR; INTRAVENOUS; SUBCUTANEOUS at 18:12

## 2017-08-28 RX ADMIN — HYDROMORPHONE HYDROCHLORIDE 2 MILLIGRAM(S): 2 INJECTION INTRAMUSCULAR; INTRAVENOUS; SUBCUTANEOUS at 10:06

## 2017-08-28 RX ADMIN — CLOPIDOGREL BISULFATE 75 MILLIGRAM(S): 75 TABLET, FILM COATED ORAL at 11:09

## 2017-08-28 RX ADMIN — ENOXAPARIN SODIUM 40 MILLIGRAM(S): 100 INJECTION SUBCUTANEOUS at 18:04

## 2017-08-28 RX ADMIN — HYDROMORPHONE HYDROCHLORIDE 6 MILLIGRAM(S): 2 INJECTION INTRAMUSCULAR; INTRAVENOUS; SUBCUTANEOUS at 14:20

## 2017-08-28 RX ADMIN — MAGNESIUM OXIDE 400 MG ORAL TABLET 400 MILLIGRAM(S): 241.3 TABLET ORAL at 12:24

## 2017-08-28 RX ADMIN — LACTULOSE 10 GRAM(S): 10 SOLUTION ORAL at 05:51

## 2017-08-28 RX ADMIN — LIDOCAINE 1 PATCH: 4 CREAM TOPICAL at 23:43

## 2017-08-28 RX ADMIN — HYDROMORPHONE HYDROCHLORIDE 6 MILLIGRAM(S): 2 INJECTION INTRAMUSCULAR; INTRAVENOUS; SUBCUTANEOUS at 18:03

## 2017-08-28 NOTE — PROGRESS NOTE ADULT - ASSESSMENT
71 yom with pmh which includes but not limited to Metastatic Lung Cancer follows at [facility], CAD s/p stents, V. Tach, HTN, HLD and Hypothyroidism sent from [facility] with hypercalcemia, low albumin and leucocytosis. Patient was recently at [facility] for loculated pleural effusions s/p chest tube and iv abx and severe hypercalcemia. Currently getting treated at this time for effusion and has started keytruda via Oncology. He is also found to have an ileus, being medically managed as surgery has signed off.

## 2017-08-28 NOTE — PROGRESS NOTE ADULT - SUBJECTIVE AND OBJECTIVE BOX
seen for metastatic ca, ileus    more awake, responsive  pain ok but improved with current regimen.  + BM. tolerating PO  ROS otherwise negative     MEDICATIONS  (STANDING):  senna 2 Tablet(s) Oral at bedtime  amiodarone    Tablet 200 milliGRAM(s) Oral daily  aspirin  chewable 81 milliGRAM(s) Oral daily  clopidogrel Tablet 75 milliGRAM(s) Oral daily  pantoprazole    Tablet 40 milliGRAM(s) Oral before breakfast  metoprolol succinate  milliGRAM(s) Oral daily  levothyroxine 200 MICROGram(s) Oral daily  simvastatin 20 milliGRAM(s) Oral at bedtime  lactobacillus acidophilus 1 Tablet(s) Oral two times a day with meals  docusate sodium 100 milliGRAM(s) Oral two times a day  pembrolizumab IVPB 200 milliGRAM(s) IV Intermittent once  lidocaine   Patch 1 Patch Transdermal daily  dextrose 5% + sodium chloride 0.9%. 1000 milliLiter(s) (65 mL/Hr) IV Continuous <Continuous>  multivitamin 1 Tablet(s) Oral daily  magnesium oxide 400 milliGRAM(s) Oral three times a day with meals  diazepam    Tablet 5 milliGRAM(s) Oral at bedtime  gabapentin 100 milliGRAM(s) Oral at bedtime  polyethylene glycol 3350 17 Gram(s) Oral two times a day  fentaNYL   Patch 100 MICROgram(s)/Hr 1 Patch Transdermal every 72 hours  fentaNYL   Patch  75 MICROgram(s)/Hr 1 Patch Transdermal every 72 hours  HYDROmorphone   Tablet 6 milliGRAM(s) Oral every 4 hours  dexamethasone  Injectable 4 milliGRAM(s) IV Push every 12 hours    MEDICATIONS  (PRN):  ALBUTerol/ipratropium for Nebulization 3 milliLiter(s) Nebulizer every 6 hours PRN Shortness of Breath and/or Wheezing  bisacodyl Suppository 10 milliGRAM(s) Rectal daily PRN Constipation  lactulose Syrup 10 Gram(s) Oral every 8 hours PRN constipation  diazepam    Tablet 2.5 milliGRAM(s) Oral every 6 hours PRN spasms/anxiety  HYDROmorphone   Tablet 8 milliGRAM(s) Oral every 4 hours PRN Severe Pain (7 - 10)  HYDROmorphone   Tablet 6 milliGRAM(s) Oral every 4 hours PRN mild to moderate pain (1-6)  HYDROmorphone  Injectable 1 milliGRAM(s) IV Push every 6 hours PRN breakthrough pain      Allergies    macrolide antibiotics (Urticaria; Rash; Hives)  penicillin (Urticaria; Rash)  Zithromax Z-Christian (Urticaria; Rash; Hives)      Vital Signs Last 24 Hrs  T(C): 36.5 (28 Aug 2017 08:36), Max: 36.7 (27 Aug 2017 22:32)  T(F): 97.7 (28 Aug 2017 08:36), Max: 98.1 (27 Aug 2017 22:32)  HR: 72 (28 Aug 2017 08:36) (72 - 77)  BP: 121/74 (28 Aug 2017 08:36) (110/62 - 121/74)  BP(mean): --  RR: 18 (28 Aug 2017 08:36) (17 - 18)  SpO2: 93% (28 Aug 2017 04:54) (93% - 96%)    PHYSICAL EXAM:    GENERAL: NAD  CHEST/LUNG: ctab  HEART: Regular rate and rhythm; S1 S2  ABDOMEN: Soft, Nontender, mild distended; Bowel sounds present  EXTREMITIES:  trace edema       LABS:                        8.6    31.8  )-----------( 312      ( 27 Aug 2017 08:38 )             27.7     08-27    135  |  100  |  11.0  ----------------------------<  193<H>  4.4   |  23.0  |  0.73    Ca    10.4<H>      27 Aug 2017 08:38  Phos  2.8     08-27  Mg     2.0     08-27    TPro  x   /  Alb  2.4<L>  /  TBili  x   /  DBili  x   /  AST  x   /  ALT  x   /  AlkPhos  x   08-27          CAPILLARY BLOOD GLUCOSE            RADIOLOGY & ADDITIONAL TESTS:

## 2017-08-28 NOTE — PROGRESS NOTE ADULT - PROBLEM SELECTOR PLAN 2
-increase patch, try to convert to oral medications to prepare for home per families request. I fear he may not do well with oral medications, he may need a PCA pump at home...

## 2017-08-28 NOTE — PROGRESS NOTE ADULT - SUBJECTIVE AND OBJECTIVE BOX
HPI:  71 years old male with Metastatic Lung Cancer, CAD s/p stents, V. Tach, HTN, HLD and Hypothyroidism sent from [facility] with hypercalcemia, low albumin and leucocytosis. Patient was recently diagnosed with Metastatic Lung Cancer in July 2017 and currently in the process of getting immunotherapy. Patient is complaining of back pain which is chronic. Denies any chest pain, palpitations, shortness of breath, abdominal pain, constipation or fever. He is alert and awake but not completely oriented to time and place (as per family, he has been confused intermittently since July).  He was hypercalcemic in July and was treated with IVF, Calcitonin and Pamidronate. He also had left loculated pleural effusion and was treated with IV antibiotics and chest tube (which was later removed). (19 Aug 2017 00:52)      PAST MEDICAL & SURGICAL HISTORY:  Psoriasis    PSVT (paroxysmal supraventricular tachycardia)  Chronic systolic CHF (congestive heart failure), NYHA class 2  Former smoker  Hypokinesis: apical  Mitral regurgitation  Bronchitis  Hypertension  PSVT (paroxysmal supraventricular tachycardia)  Ischemic cardiomyopathy  AICD (automatic cardioverter/defibrillator) present: 2010 [employer]  VT (ventricular tachycardia): May 2017 no ICD shock  Vitamin D deficiency  Tricuspid regurgitation  RBBB  Prostate cancer: s/p surgery no RT/CT  Mitral regurgitation  Hypothyroidism  HLD (hyperlipidemia)  Arrhythmia  Angina pectoris  CAD (coronary artery disease)  History of bronchoscopy: 2017  History of prostate surgery: davinci surgery in 2008  Cardiac disorder: triple bypass may 1997 [facility]  History of electrophysiologic study: 2009 positive study (AICD placement  2010)  H/O cardiac catheterization: stenting of SVG TO PDA IN 2010 2017 one stent      MEDICATIONS  (STANDING):  senna 2 Tablet(s) Oral at bedtime  amiodarone    Tablet 200 milliGRAM(s) Oral daily  aspirin  chewable 81 milliGRAM(s) Oral daily  clopidogrel Tablet 75 milliGRAM(s) Oral daily  pantoprazole    Tablet 40 milliGRAM(s) Oral before breakfast  metoprolol succinate  milliGRAM(s) Oral daily  levothyroxine 200 MICROGram(s) Oral daily  simvastatin 20 milliGRAM(s) Oral at bedtime  lactobacillus acidophilus 1 Tablet(s) Oral two times a day with meals  docusate sodium 100 milliGRAM(s) Oral two times a day  pembrolizumab IVPB 200 milliGRAM(s) IV Intermittent once  lidocaine   Patch 1 Patch Transdermal daily  multivitamin 1 Tablet(s) Oral daily  magnesium oxide 400 milliGRAM(s) Oral three times a day with meals  diazepam    Tablet 5 milliGRAM(s) Oral at bedtime  gabapentin 100 milliGRAM(s) Oral at bedtime  polyethylene glycol 3350 17 Gram(s) Oral two times a day  fentaNYL   Patch 100 MICROgram(s)/Hr 1 Patch Transdermal every 72 hours  fentaNYL   Patch  75 MICROgram(s)/Hr 1 Patch Transdermal every 72 hours  HYDROmorphone   Tablet 6 milliGRAM(s) Oral every 4 hours  dexamethasone  Injectable 4 milliGRAM(s) IV Push every 12 hours  enoxaparin Injectable 40 milliGRAM(s) SubCutaneous daily    MEDICATIONS  (PRN):  ALBUTerol/ipratropium for Nebulization 3 milliLiter(s) Nebulizer every 6 hours PRN Shortness of Breath and/or Wheezing  bisacodyl Suppository 10 milliGRAM(s) Rectal daily PRN Constipation  lactulose Syrup 10 Gram(s) Oral every 8 hours PRN constipation  diazepam    Tablet 2.5 milliGRAM(s) Oral every 6 hours PRN spasms/anxiety  HYDROmorphone   Tablet 8 milliGRAM(s) Oral every 4 hours PRN Severe Pain (7 - 10)  HYDROmorphone   Tablet 6 milliGRAM(s) Oral every 4 hours PRN mild to moderate pain (1-6)  HYDROmorphone  Injectable 1 milliGRAM(s) IV Push every 6 hours PRN breakthrough pain      Allergies    macrolide antibiotics (Urticaria; Rash; Hives)  penicillin (Urticaria; Rash)  Zithromax Z-Christian (Urticaria; Rash; Hives)    Intolerances        FAMILY HISTORY:  Family history of heart disease (Sibling)  Family history of diabetes mellitus (Sibling)  Family history of lung cancer (Sibling)  Family history of prostate cancer (Sibling)      Review of Systems    Constitutional, Eyes, ENT, Cardiovascular, Respiratory, Gastrointestinal, Genitourinary, Musculoskeletal, Integumentary, Neurological, Psychiatric, Endocrine, Heme/Lymph and Allergic/Immunologic review of systems are otherwise negative except as noted in HPI.     Vital Signs Last 24 Hrs  T(C): 36.8 (28 Aug 2017 16:00), Max: 36.8 (28 Aug 2017 16:00)  T(F): 98.3 (28 Aug 2017 16:00), Max: 98.3 (28 Aug 2017 16:00)  HR: 78 (28 Aug 2017 16:00) (72 - 78)  BP: 127/77 (28 Aug 2017 16:00) (120/76 - 127/77)  BP(mean): --  RR: 17 (28 Aug 2017 16:00) (17 - 18)  SpO2: 97% (28 Aug 2017 20:35) (93% - 97%)    Physical Exam  Constitutional: well developed, well nourished, in no acute distress and thin.   Eyes: PERRL, EOMI, no conjunctival infection, anicteric.   ENT: pharynx is unremarkable, moist mucus membrane, no oral lesions.   Neck: supple without JVD, no thyromegaly or masses appreciated.   Pulmonary: clear to auscultation bilaterally, no dullness, no wheezing.   Cardiac: RRR, normal S1S2, no murmurs, rubs, gallops.   Vascular: no JVD, no calf tenderness, venous stasis changes, varices.   Abdomen: normoactive bowel sounds, soft and nontender, no hepatosplenomegaly or masses appreciated.   Lymphatic: no peripheral adenopathy appreciated.   Musculoskeletal: full range of motion and no deformities appreciated.   Skin: normal appearance, no rash, nodules, vesicles, ulcers, erythema.   Neurology: grossly intact.   Psychiatric: affect appropriate.       LABS:  CBC Full  -  ( 27 Aug 2017 08:38 )  WBC Count : 31.8 K/uL  Hemoglobin : 8.6 g/dL  Hematocrit : 27.7 %  Platelet Count - Automated : 312 K/uL  Mean Cell Volume : 84.5 fl  Mean Cell Hemoglobin : 26.2 pg  Mean Cell Hemoglobin Concentration : 31.0 g/dL  Auto Neutrophil # : 29.1 K/uL  Auto Lymphocyte # : 1.7 K/uL  Auto Monocyte # : 0.9 K/uL  Auto Eosinophil # : x  Auto Basophil # : 0.0 K/uL  Auto Neutrophil % : 91.4 %  Auto Lymphocyte % : 5.2 %  Auto Monocyte % : 2.7 %  Auto Eosinophil % : x  Auto Basophil % : x    08-27    135  |  100  |  11.0  ----------------------------<  193<H>  4.4   |  23.0  |  0.73    Ca    10.4<H>      27 Aug 2017 08:38  Phos  2.8     08-27  Mg     2.0     08-27    TPro  x   /  Alb  2.4<L>  /  TBili  x   /  DBili  x   /  AST  x   /  ALT  x   /  AlkPhos  x   08-27          RADIOLOGY & ADDITIONAL STUDIES: HPI:  71 years old male with metastatic lung cancer, CAD s/p stents, V. Tach, HTN, HLD and hypothyroidism sent from [facility] with hypercalcemia, low albumin and leucocytosis. Patient was recently diagnosed with metastatic lung cancer in July 2017 and received first dose of pembroluzimab (Keytruda) 8/23 without difficulty.  Back pain improved with addition of fentanyl and Decadron. Hematocrit stable at 30 %, calcium 10. Denies any chest pain, palpitations, shortness of breath, abdominal pain, constipation or fever. He is alert and awake. He was a/w hypercalcemia and was treated with IVF, Calcitonin and Pamidronate.     PAST MEDICAL & SURGICAL HISTORY:  Psoriasis    PSVT (paroxysmal supraventricular tachycardia)  Chronic systolic CHF (congestive heart failure), NYHA class 2  Former smoker  Hypokinesis: apical  Mitral regurgitation  Bronchitis  Hypertension  PSVT (paroxysmal supraventricular tachycardia)  Ischemic cardiomyopathy  AICD (automatic cardioverter/defibrillator) present: 2010 [employer]  VT (ventricular tachycardia): May 2017 no ICD shock  Vitamin D deficiency  Tricuspid regurgitation  RBBB  Prostate cancer: s/p surgery no RT/CT  Mitral regurgitation  Hypothyroidism  HLD (hyperlipidemia)  Arrhythmia  Angina pectoris  CAD (coronary artery disease)  History of bronchoscopy: 2017  History of prostate surgery: davinci surgery in 2008  Cardiac disorder: triple bypass may 1997 [facility]  History of electrophysiologic study: 2009 positive study (AICD placement  2010)  H/O cardiac catheterization: stenting of SVG TO PDA IN 2010 2017 one stent      MEDICATIONS  (STANDING):  senna 2 Tablet(s) Oral at bedtime  amiodarone    Tablet 200 milliGRAM(s) Oral daily  aspirin  chewable 81 milliGRAM(s) Oral daily  clopidogrel Tablet 75 milliGRAM(s) Oral daily  pantoprazole    Tablet 40 milliGRAM(s) Oral before breakfast  metoprolol succinate  milliGRAM(s) Oral daily  levothyroxine 200 MICROGram(s) Oral daily  simvastatin 20 milliGRAM(s) Oral at bedtime  lactobacillus acidophilus 1 Tablet(s) Oral two times a day with meals  docusate sodium 100 milliGRAM(s) Oral two times a day  pembrolizumab IVPB 200 milliGRAM(s) IV Intermittent once  lidocaine   Patch 1 Patch Transdermal daily  multivitamin 1 Tablet(s) Oral daily  magnesium oxide 400 milliGRAM(s) Oral three times a day with meals  diazepam    Tablet 5 milliGRAM(s) Oral at bedtime  gabapentin 100 milliGRAM(s) Oral at bedtime  polyethylene glycol 3350 17 Gram(s) Oral two times a day  fentaNYL   Patch 100 MICROgram(s)/Hr 1 Patch Transdermal every 72 hours  fentaNYL   Patch  75 MICROgram(s)/Hr 1 Patch Transdermal every 72 hours  HYDROmorphone   Tablet 6 milliGRAM(s) Oral every 4 hours  dexamethasone  Injectable 4 milliGRAM(s) IV Push every 12 hours  enoxaparin Injectable 40 milliGRAM(s) SubCutaneous daily    MEDICATIONS  (PRN):  ALBUTerol/ipratropium for Nebulization 3 milliLiter(s) Nebulizer every 6 hours PRN Shortness of Breath and/or Wheezing  bisacodyl Suppository 10 milliGRAM(s) Rectal daily PRN Constipation  lactulose Syrup 10 Gram(s) Oral every 8 hours PRN constipation  diazepam    Tablet 2.5 milliGRAM(s) Oral every 6 hours PRN spasms/anxiety  HYDROmorphone   Tablet 8 milliGRAM(s) Oral every 4 hours PRN Severe Pain (7 - 10)  HYDROmorphone   Tablet 6 milliGRAM(s) Oral every 4 hours PRN mild to moderate pain (1-6)  HYDROmorphone  Injectable 1 milliGRAM(s) IV Push every 6 hours PRN breakthrough pain      Allergies    macrolide antibiotics (Urticaria; Rash; Hives)  penicillin (Urticaria; Rash)  Zithromax Z-Christian (Urticaria; Rash; Hives)    Intolerances        FAMILY HISTORY:  Family history of heart disease (Sibling)  Family history of diabetes mellitus (Sibling)  Family history of lung cancer (Sibling)  Family history of prostate cancer (Sibling)      Review of Systems    Constitutional, Eyes, ENT, Cardiovascular, Respiratory, Gastrointestinal, Genitourinary, Musculoskeletal, Integumentary, Neurological, Psychiatric, Endocrine, Heme/Lymph and Allergic/Immunologic review of systems are otherwise negative except as noted in HPI.     Vital Signs Last 24 Hrs  T(C): 36.8 (28 Aug 2017 16:00), Max: 36.8 (28 Aug 2017 16:00)  T(F): 98.3 (28 Aug 2017 16:00), Max: 98.3 (28 Aug 2017 16:00)  HR: 78 (28 Aug 2017 16:00) (72 - 78)  BP: 127/77 (28 Aug 2017 16:00) (120/76 - 127/77)  BP(mean): --  RR: 17 (28 Aug 2017 16:00) (17 - 18)  SpO2: 97% (28 Aug 2017 20:35) (93% - 97%)    Physical Exam  Constitutional: well developed, well nourished, in no acute distress and thin.   Eyes: PERRL, EOMI, no conjunctival infection, anicteric.   ENT: pharynx is unremarkable, moist mucus membrane, no oral lesions.   Neck: supple without JVD, no thyromegaly or masses appreciated.   Pulmonary: clear to auscultation bilaterally,  Cardiac: RRR, normal S1S2, no murmurs, rubs, gallops.   Vascular: no JVD, no calf tenderness, venous stasis changes, varices.   Abdomen: distended  Lymphatic: no peripheral adenopathy appreciated.   Musculoskeletal: full range of motion and no deformities appreciated.   Skin: normal appearance, no rash, nodules, vesicles, ulcers, erythema.   Neurology: grossly intact.   Psychiatric: affect appropriate.       LABS:  CBC Full  -  ( 27 Aug 2017 08:38 )  WBC Count : 31.8 K/uL  Hemoglobin : 8.6 g/dL  Hematocrit : 27.7 %  Platelet Count - Automated : 312 K/uL  Mean Cell Volume : 84.5 fl  Mean Cell Hemoglobin : 26.2 pg  Mean Cell Hemoglobin Concentration : 31.0 g/dL  Auto Neutrophil # : 29.1 K/uL  Auto Lymphocyte # : 1.7 K/uL  Auto Monocyte # : 0.9 K/uL  Auto Eosinophil # : x  Auto Basophil # : 0.0 K/uL  Auto Neutrophil % : 91.4 %  Auto Lymphocyte % : 5.2 %  Auto Monocyte % : 2.7 %  Auto Eosinophil % : x  Auto Basophil % : x    08-27    135  |  100  |  11.0  ----------------------------<  193<H>  4.4   |  23.0  |  0.73    Ca    10.4<H>      27 Aug 2017 08:38  Phos  2.8     08-27  Mg     2.0     08-27    TPro  x   /  Alb  2.4<L>  /  TBili  x   /  DBili  x   /  AST  x   /  ALT  x   /  AlkPhos  x   08-27          RADIOLOGY & ADDITIONAL STUDIES:

## 2017-08-28 NOTE — PROGRESS NOTE ADULT - PROBLEM SELECTOR PLAN 1
Despite the development of ileus, and progressive decline in performance status, decision in conjunction with the [facility] family to proceed with an initial dose of pembroluzimab 8/23/17.   All understand that the prognosis is poor and the chance of meaningful response is low, but agree that the wishes of the family and patient are to proceed.  I met today with patient and son Greta.  We discussed continued supportive care with option of hospice should the family and patient wish to transition his care to home.

## 2017-08-28 NOTE — PROGRESS NOTE ADULT - ATTENDING COMMENTS
home hospice advised to family given high need for nursing care and complicated pain regimen.  oncology can be followed as outpatient and further need for chemo can be determined at that time.   d/w Dr Cunningham, patient and son at bedside in detail.  d/w palliative care MD

## 2017-08-28 NOTE — PROGRESS NOTE ADULT - ASSESSMENT
71M with newly diagnosed metastatic lung cancer to bone, now with ileus, pain, debility, s/p keytruda, with pain better controlled now.

## 2017-08-28 NOTE — PROGRESS NOTE ADULT - PROBLEM SELECTOR PLAN 1
monitor daily, f/u repeat KUB   advance diet for now, maintain electrolytes  limit narcotics for pain as possible

## 2017-08-28 NOTE — PROGRESS NOTE ADULT - PROBLEM SELECTOR PLAN 4
- per family, they are considering taking home on hospice, but they are still contemplating following up with Dr. Alcantara for a second dose of keytruda in 3 weeks. They understand he cannot have aggressive treatments like keytruda on hospice. They would like to meet with hospice to hear options. Trying to convert his IV pain medications to orally at this time.

## 2017-08-28 NOTE — PROGRESS NOTE ADULT - ASSESSMENT
71 yom with pmh which includes but not limited to Metastatic Lung Cancer follows at [facility], CAD s/p stents, V. Tach, HTN, HLD and Hypothyroidism sent from [facility] with hypercalcemia, low albumin and leucocytosis. Patient was recently at [facility] for loculated pleural effusions s/p chest tube and iv abx and severe hypercalcemia. Currently getting treated at this time for effusion and has started keytruda via Oncology. He is also found to have an ileus, being medically managed as surgery has signed off.

## 2017-08-28 NOTE — PROGRESS NOTE ADULT - SUBJECTIVE AND OBJECTIVE BOX
OVERNIGHT EVENTS:    BRIEF HOSPITAL COURSE:    Present Symptoms:     Dyspnea: 0 1 2 3   Nausea/Vomiting: Yes No  Anxiety:  Yes No  Depression: Yes No  Fatigue: Yes No  Loss of appetite: Yes No    Pain:             Character-            Duration-            Effect-            Factors-            Frequency-            Location-            Severity-    Review of Systems: Reviewed                     Negative:                     Positive:  Unable to obtain due to poor mentation   All others negative    MEDICATIONS  (STANDING):  senna 2 Tablet(s) Oral at bedtime  amiodarone    Tablet 200 milliGRAM(s) Oral daily  aspirin  chewable 81 milliGRAM(s) Oral daily  clopidogrel Tablet 75 milliGRAM(s) Oral daily  pantoprazole    Tablet 40 milliGRAM(s) Oral before breakfast  metoprolol succinate  milliGRAM(s) Oral daily  levothyroxine 200 MICROGram(s) Oral daily  simvastatin 20 milliGRAM(s) Oral at bedtime  lactobacillus acidophilus 1 Tablet(s) Oral two times a day with meals  docusate sodium 100 milliGRAM(s) Oral two times a day  pembrolizumab IVPB 200 milliGRAM(s) IV Intermittent once  lidocaine   Patch 1 Patch Transdermal daily  dextrose 5% + sodium chloride 0.9%. 1000 milliLiter(s) (65 mL/Hr) IV Continuous <Continuous>  fentaNYL   Patch 100 MICROgram(s)/Hr 1 Patch Transdermal every 72 hours  fentaNYL   Patch  25 MICROgram(s)/Hr 1 Patch Transdermal every 72 hours  multivitamin 1 Tablet(s) Oral daily  magnesium oxide 400 milliGRAM(s) Oral three times a day with meals  dexamethasone  Injectable 4 milliGRAM(s) IV Push every 8 hours  diazepam    Tablet 5 milliGRAM(s) Oral at bedtime  gabapentin 100 milliGRAM(s) Oral at bedtime  HYDROmorphone  Injectable 2 milliGRAM(s) IV Push every 4 hours  polyethylene glycol 3350 17 Gram(s) Oral two times a day    MEDICATIONS  (PRN):  ALBUTerol/ipratropium for Nebulization 3 milliLiter(s) Nebulizer every 6 hours PRN Shortness of Breath and/or Wheezing  bisacodyl Suppository 10 milliGRAM(s) Rectal daily PRN Constipation  lactulose Syrup 10 Gram(s) Oral every 8 hours PRN constipation  diazepam    Tablet 2.5 milliGRAM(s) Oral every 6 hours PRN spasms/anxiety  HYDROmorphone   Tablet 4 milliGRAM(s) Oral every 4 hours PRN Mild Pain (1 - 3)  HYDROmorphone   Tablet 6 milliGRAM(s) Oral every 4 hours PRN Moderate Pain (4 - 6)      PHYSICAL EXAM:    Vital Signs Last 24 Hrs  T(C): 36.5 (28 Aug 2017 08:36), Max: 36.7 (27 Aug 2017 22:32)  T(F): 97.7 (28 Aug 2017 08:36), Max: 98.1 (27 Aug 2017 22:32)  HR: 72 (28 Aug 2017 08:36) (72 - 77)  BP: 121/74 (28 Aug 2017 08:36) (110/62 - 121/74)  BP(mean): --  RR: 18 (28 Aug 2017 08:36) (17 - 18)  SpO2: 93% (28 Aug 2017 04:54) (93% - 96%)    General: alert  oriented x ____ lethargic agitated                  cachexia  nonverbal  coma    Karnofsky:  %    HEENT: normal  dry mouth  ET tube/trach    Lungs: comfortable tachypnea/labored breathing  excessive secretions    CV: normal  tachycardia    GI: normal  distended  tender  no BS               PEG/NG/OG tube  constipation  last BM:     : normal  incontinent  oliguria/anuria  howard    MSK: normal  weakness  edema             ambulatory  bedbound/wheelchair bound    Skin: normal  pressure ulcers- Stage_____  no rash    LABS:                        8.6    31.8  )-----------( 312      ( 27 Aug 2017 08:38 )             27.7     08-27    135  |  100  |  11.0  ----------------------------<  193<H>  4.4   |  23.0  |  0.73    Ca    10.4<H>      27 Aug 2017 08:38  Phos  2.8     08-27  Mg     2.0     08-27    TPro  x   /  Alb  2.4<L>  /  TBili  x   /  DBili  x   /  AST  x   /  ALT  x   /  AlkPhos  x   08-27        I&O's Summary    27 Aug 2017 07:01  -  28 Aug 2017 07:00  --------------------------------------------------------  IN: 2230 mL / OUT: 1500 mL / NET: 730 mL        RADIOLOGY & ADDITIONAL STUDIES:    ADVANCE DIRECTIVES:   DNR YES NO  Completed on:                     HUSSEIN  YES NO   Completed on:  Living Will  YES NO   Completed on: OVERNIGHT EVENTS: pain better    Present Symptoms:     Dyspnea: 0   Nausea/Vomiting: No  Anxiety:  No  Depression: No  Fatigue: Yes   Loss of appetite: No    Pain: just got meds no pain currently             Character-            Duration-            Effect-            Factors-            Frequency-            Location-            Severity-    Review of Systems: Reviewed                     Negative: no dyspnea                  All others negative    MEDICATIONS  (STANDING):  senna 2 Tablet(s) Oral at bedtime  amiodarone    Tablet 200 milliGRAM(s) Oral daily  aspirin  chewable 81 milliGRAM(s) Oral daily  clopidogrel Tablet 75 milliGRAM(s) Oral daily  pantoprazole    Tablet 40 milliGRAM(s) Oral before breakfast  metoprolol succinate  milliGRAM(s) Oral daily  levothyroxine 200 MICROGram(s) Oral daily  simvastatin 20 milliGRAM(s) Oral at bedtime  lactobacillus acidophilus 1 Tablet(s) Oral two times a day with meals  docusate sodium 100 milliGRAM(s) Oral two times a day  pembrolizumab IVPB 200 milliGRAM(s) IV Intermittent once  lidocaine   Patch 1 Patch Transdermal daily  dextrose 5% + sodium chloride 0.9%. 1000 milliLiter(s) (65 mL/Hr) IV Continuous <Continuous>  fentaNYL   Patch 100 MICROgram(s)/Hr 1 Patch Transdermal every 72 hours  fentaNYL   Patch  25 MICROgram(s)/Hr 1 Patch Transdermal every 72 hours  multivitamin 1 Tablet(s) Oral daily  magnesium oxide 400 milliGRAM(s) Oral three times a day with meals  dexamethasone  Injectable 4 milliGRAM(s) IV Push every 8 hours  diazepam    Tablet 5 milliGRAM(s) Oral at bedtime  gabapentin 100 milliGRAM(s) Oral at bedtime  HYDROmorphone  Injectable 2 milliGRAM(s) IV Push every 4 hours  polyethylene glycol 3350 17 Gram(s) Oral two times a day    MEDICATIONS  (PRN):  ALBUTerol/ipratropium for Nebulization 3 milliLiter(s) Nebulizer every 6 hours PRN Shortness of Breath and/or Wheezing  bisacodyl Suppository 10 milliGRAM(s) Rectal daily PRN Constipation  lactulose Syrup 10 Gram(s) Oral every 8 hours PRN constipation  diazepam    Tablet 2.5 milliGRAM(s) Oral every 6 hours PRN spasms/anxiety  HYDROmorphone   Tablet 4 milliGRAM(s) Oral every 4 hours PRN Mild Pain (1 - 3)  HYDROmorphone   Tablet 6 milliGRAM(s) Oral every 4 hours PRN Moderate Pain (4 - 6)    PHYSICAL EXAM:    Vital Signs Last 24 Hrs  T(C): 36.5 (28 Aug 2017 08:36), Max: 36.7 (27 Aug 2017 22:32)  T(F): 97.7 (28 Aug 2017 08:36), Max: 98.1 (27 Aug 2017 22:32)  HR: 72 (28 Aug 2017 08:36) (72 - 77)  BP: 121/74 (28 Aug 2017 08:36) (110/62 - 121/74)  BP(mean): --  RR: 18 (28 Aug 2017 08:36) (17 - 18)  SpO2: 93% (28 Aug 2017 04:54) (93% - 96%)    General: alert     Karnofsky:  40%    HEENT: normal      Lungs: comfortable     CV: normal      GI: normal      : normal     MSK: weakness         Skin: no rash    LABS:                      8.6    31.8  )-----------( 312      ( 27 Aug 2017 08:38 )             27.7     08-27    135  |  100  |  11.0  ----------------------------<  193<H>  4.4   |  23.0  |  0.73    Ca    10.4<H>      27 Aug 2017 08:38  Phos  2.8     08-27  Mg     2.0     08-27    TPro  x   /  Alb  2.4<L>  /  TBili  x   /  DBili  x   /  AST  x   /  ALT  x   /  AlkPhos  x   08-27    I&O's Summary    27 Aug 2017 07:01  -  28 Aug 2017 07:00  --------------------------------------------------------  IN: 2230 mL / OUT: 1500 mL / NET: 730 mL    RADIOLOGY & ADDITIONAL STUDIES:    ADVANCE DIRECTIVES: Full Code

## 2017-08-28 NOTE — PROGRESS NOTE ADULT - ATTENDING COMMENTS
Thank you for the opportunity to assist with the care of this patient.   [place] [facility] [phone].

## 2017-08-29 DIAGNOSIS — Z71.89 OTHER SPECIFIED COUNSELING: ICD-10-CM

## 2017-08-29 LAB
ANION GAP SERPL CALC-SCNC: 13 MMOL/L — SIGNIFICANT CHANGE UP (ref 5–17)
BUN SERPL-MCNC: 14 MG/DL — SIGNIFICANT CHANGE UP (ref 8–20)
CALCIUM SERPL-MCNC: 10.3 MG/DL — HIGH (ref 8.6–10.2)
CHLORIDE SERPL-SCNC: 97 MMOL/L — LOW (ref 98–107)
CO2 SERPL-SCNC: 25 MMOL/L — SIGNIFICANT CHANGE UP (ref 22–29)
CREAT SERPL-MCNC: 0.72 MG/DL — SIGNIFICANT CHANGE UP (ref 0.5–1.3)
GLUCOSE SERPL-MCNC: 104 MG/DL — SIGNIFICANT CHANGE UP (ref 70–115)
MAGNESIUM SERPL-MCNC: 2 MG/DL — SIGNIFICANT CHANGE UP (ref 1.6–2.6)
POTASSIUM SERPL-MCNC: 5.6 MMOL/L — HIGH (ref 3.5–5.3)
POTASSIUM SERPL-SCNC: 5.6 MMOL/L — HIGH (ref 3.5–5.3)
SODIUM SERPL-SCNC: 135 MMOL/L — SIGNIFICANT CHANGE UP (ref 135–145)

## 2017-08-29 PROCEDURE — 99233 SBSQ HOSP IP/OBS HIGH 50: CPT

## 2017-08-29 RX ORDER — HYDROMORPHONE HYDROCHLORIDE 2 MG/ML
30 INJECTION INTRAMUSCULAR; INTRAVENOUS; SUBCUTANEOUS
Qty: 0 | Refills: 0 | Status: DISCONTINUED | OUTPATIENT
Start: 2017-08-29 | End: 2017-08-29

## 2017-08-29 RX ORDER — HYDROMORPHONE HYDROCHLORIDE 2 MG/ML
30 INJECTION INTRAMUSCULAR; INTRAVENOUS; SUBCUTANEOUS
Qty: 0 | Refills: 0 | Status: DISCONTINUED | OUTPATIENT
Start: 2017-08-29 | End: 2017-08-30

## 2017-08-29 RX ORDER — DEXAMETHASONE 0.5 MG/5ML
4 ELIXIR ORAL EVERY 8 HOURS
Qty: 0 | Refills: 0 | Status: DISCONTINUED | OUTPATIENT
Start: 2017-08-29 | End: 2017-09-06

## 2017-08-29 RX ORDER — HYDROMORPHONE HYDROCHLORIDE 2 MG/ML
0.5 INJECTION INTRAMUSCULAR; INTRAVENOUS; SUBCUTANEOUS EVERY 4 HOURS
Qty: 0 | Refills: 0 | Status: DISCONTINUED | OUTPATIENT
Start: 2017-08-29 | End: 2017-08-31

## 2017-08-29 RX ADMIN — Medication 1 TABLET(S): at 12:00

## 2017-08-29 RX ADMIN — AMIODARONE HYDROCHLORIDE 200 MILLIGRAM(S): 400 TABLET ORAL at 06:08

## 2017-08-29 RX ADMIN — Medication 4 MILLIGRAM(S): at 13:59

## 2017-08-29 RX ADMIN — HYDROMORPHONE HYDROCHLORIDE 1 MILLIGRAM(S): 2 INJECTION INTRAMUSCULAR; INTRAVENOUS; SUBCUTANEOUS at 01:35

## 2017-08-29 RX ADMIN — HYDROMORPHONE HYDROCHLORIDE 1 MILLIGRAM(S): 2 INJECTION INTRAMUSCULAR; INTRAVENOUS; SUBCUTANEOUS at 00:58

## 2017-08-29 RX ADMIN — LIDOCAINE 1 PATCH: 4 CREAM TOPICAL at 12:01

## 2017-08-29 RX ADMIN — POLYETHYLENE GLYCOL 3350 17 GRAM(S): 17 POWDER, FOR SOLUTION ORAL at 17:17

## 2017-08-29 RX ADMIN — Medication 4 MILLIGRAM(S): at 06:07

## 2017-08-29 RX ADMIN — Medication 100 MILLIGRAM(S): at 06:07

## 2017-08-29 RX ADMIN — Medication 200 MICROGRAM(S): at 06:07

## 2017-08-29 RX ADMIN — Medication 81 MILLIGRAM(S): at 12:00

## 2017-08-29 RX ADMIN — HYDROMORPHONE HYDROCHLORIDE 6 MILLIGRAM(S): 2 INJECTION INTRAMUSCULAR; INTRAVENOUS; SUBCUTANEOUS at 06:26

## 2017-08-29 RX ADMIN — PANTOPRAZOLE SODIUM 40 MILLIGRAM(S): 20 TABLET, DELAYED RELEASE ORAL at 06:07

## 2017-08-29 RX ADMIN — HYDROMORPHONE HYDROCHLORIDE 30 MILLILITER(S): 2 INJECTION INTRAMUSCULAR; INTRAVENOUS; SUBCUTANEOUS at 15:53

## 2017-08-29 RX ADMIN — Medication 100 MILLIGRAM(S): at 17:17

## 2017-08-29 RX ADMIN — HYDROMORPHONE HYDROCHLORIDE 6 MILLIGRAM(S): 2 INJECTION INTRAMUSCULAR; INTRAVENOUS; SUBCUTANEOUS at 02:41

## 2017-08-29 RX ADMIN — Medication 4 MILLIGRAM(S): at 22:31

## 2017-08-29 RX ADMIN — POLYETHYLENE GLYCOL 3350 17 GRAM(S): 17 POWDER, FOR SOLUTION ORAL at 06:08

## 2017-08-29 RX ADMIN — MAGNESIUM OXIDE 400 MG ORAL TABLET 400 MILLIGRAM(S): 241.3 TABLET ORAL at 12:00

## 2017-08-29 RX ADMIN — SIMVASTATIN 20 MILLIGRAM(S): 20 TABLET, FILM COATED ORAL at 22:31

## 2017-08-29 RX ADMIN — MAGNESIUM OXIDE 400 MG ORAL TABLET 400 MILLIGRAM(S): 241.3 TABLET ORAL at 17:17

## 2017-08-29 RX ADMIN — ENOXAPARIN SODIUM 40 MILLIGRAM(S): 100 INJECTION SUBCUTANEOUS at 12:00

## 2017-08-29 RX ADMIN — HYDROMORPHONE HYDROCHLORIDE 8 MILLIGRAM(S): 2 INJECTION INTRAMUSCULAR; INTRAVENOUS; SUBCUTANEOUS at 08:36

## 2017-08-29 RX ADMIN — SENNA PLUS 2 TABLET(S): 8.6 TABLET ORAL at 22:31

## 2017-08-29 RX ADMIN — HYDROMORPHONE HYDROCHLORIDE 8 MILLIGRAM(S): 2 INJECTION INTRAMUSCULAR; INTRAVENOUS; SUBCUTANEOUS at 09:37

## 2017-08-29 RX ADMIN — HYDROMORPHONE HYDROCHLORIDE 1 MILLIGRAM(S): 2 INJECTION INTRAMUSCULAR; INTRAVENOUS; SUBCUTANEOUS at 06:08

## 2017-08-29 RX ADMIN — Medication 1 TABLET(S): at 08:36

## 2017-08-29 RX ADMIN — GABAPENTIN 100 MILLIGRAM(S): 400 CAPSULE ORAL at 22:31

## 2017-08-29 RX ADMIN — Medication 5 MILLIGRAM(S): at 22:31

## 2017-08-29 RX ADMIN — MAGNESIUM OXIDE 400 MG ORAL TABLET 400 MILLIGRAM(S): 241.3 TABLET ORAL at 08:36

## 2017-08-29 RX ADMIN — HYDROMORPHONE HYDROCHLORIDE 30 MILLILITER(S): 2 INJECTION INTRAMUSCULAR; INTRAVENOUS; SUBCUTANEOUS at 14:34

## 2017-08-29 RX ADMIN — HYDROMORPHONE HYDROCHLORIDE 1 MILLIGRAM(S): 2 INJECTION INTRAMUSCULAR; INTRAVENOUS; SUBCUTANEOUS at 06:07

## 2017-08-29 RX ADMIN — Medication 1 TABLET(S): at 17:17

## 2017-08-29 RX ADMIN — CLOPIDOGREL BISULFATE 75 MILLIGRAM(S): 75 TABLET, FILM COATED ORAL at 12:00

## 2017-08-29 NOTE — PROVIDER CONTACT NOTE (OTHER) - SITUATION
Dr. Cunningham agrees to be the certifying physician for hospice care.  ADELSO Sharif, hospital liaison made aware

## 2017-08-29 NOTE — BRIEF OPERATIVE NOTE - PROCEDURE
Remove central venous catheter  08/22/2017  left groin TLV/CVP , pressure s/p removal for 20minutes, no heme drainage noted  Active  JSTEELE
Ultrasound guidance for needle placement  08/29/2017    Active  JSTEELE  Midline catheter insertion  08/29/2017  18G  10CM BARD POWERGLIDE MIDLINE  NS flush good heme back right basilic vein  Active  JSTEELE

## 2017-08-29 NOTE — BRIEF OPERATIVE NOTE - POST-OP DX
Vascular catheter fitting or adjustment  08/22/2017    Active  David Patino
Vascular catheter fitting or adjustment  08/22/2017    Active  David Patino

## 2017-08-29 NOTE — PROVIDER CONTACT NOTE (MEDICATION) - ACTION/TREATMENT ORDERED:
ordered duragesic patch to be 175 to be placed today on patient. Nurse may leave 100 mg on and take off 25 mcg and place 75 mcg

## 2017-08-29 NOTE — PROGRESS NOTE ADULT - PROBLEM SELECTOR PLAN 4
-family meeting at 2pm. overall guarded prognosis, hospice appropriate, will discuss directives and discussion about hospice options.

## 2017-08-29 NOTE — PROGRESS NOTE ADULT - ASSESSMENT
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

## 2017-08-29 NOTE — PROGRESS NOTE ADULT - SUBJECTIVE AND OBJECTIVE BOX
Late entry patient seen around 11am    OVERNIGHT EVENTS: doing poorly, more lethargic and delirious, pain not well controlled.     Present Symptoms:     Dyspnea: 0   Nausea/Vomiting: No  Anxiety:  No  Depression: No  Fatigue: Yes   Loss of appetite: No    Pain: yes brows furrowed, nods yes to asking if in pain, but he is unable to characterize             Character-            Duration-            Effect-            Factors-            Frequency-            Location-            Severity-    Review of Systems: Reviewed                 Unable to obtain due to poor mentation   All others negative    MEDICATIONS  (STANDING):  senna 2 Tablet(s) Oral at bedtime  amiodarone    Tablet 200 milliGRAM(s) Oral daily  aspirin  chewable 81 milliGRAM(s) Oral daily  clopidogrel Tablet 75 milliGRAM(s) Oral daily  pantoprazole    Tablet 40 milliGRAM(s) Oral before breakfast  metoprolol succinate  milliGRAM(s) Oral daily  levothyroxine 200 MICROGram(s) Oral daily  simvastatin 20 milliGRAM(s) Oral at bedtime  lactobacillus acidophilus 1 Tablet(s) Oral two times a day with meals  docusate sodium 100 milliGRAM(s) Oral two times a day  pembrolizumab IVPB 200 milliGRAM(s) IV Intermittent once  lidocaine   Patch 1 Patch Transdermal daily  multivitamin 1 Tablet(s) Oral daily  magnesium oxide 400 milliGRAM(s) Oral three times a day with meals  diazepam    Tablet 5 milliGRAM(s) Oral at bedtime  gabapentin 100 milliGRAM(s) Oral at bedtime  polyethylene glycol 3350 17 Gram(s) Oral two times a day  enoxaparin Injectable 40 milliGRAM(s) SubCutaneous daily  dexamethasone  Injectable 4 milliGRAM(s) IV Push every 8 hours  HYDROmorphone PCA (1 mG/mL) 30 milliLiter(s) PCA Continuous PCA Continuous    MEDICATIONS  (PRN):  ALBUTerol/ipratropium for Nebulization 3 milliLiter(s) Nebulizer every 6 hours PRN Shortness of Breath and/or Wheezing  bisacodyl Suppository 10 milliGRAM(s) Rectal daily PRN Constipation  lactulose Syrup 10 Gram(s) Oral every 8 hours PRN constipation  diazepam    Tablet 2.5 milliGRAM(s) Oral every 6 hours PRN spasms/anxiety    PHYSICAL EXAM:    Vital Signs Last 24 Hrs  T(C): 36.4 (29 Aug 2017 11:59), Max: 36.8 (28 Aug 2017 16:00)  T(F): 97.5 (29 Aug 2017 11:59), Max: 98.3 (28 Aug 2017 16:00)  HR: 82 (29 Aug 2017 11:59) (78 - 83)  BP: 125/72 (29 Aug 2017 11:59) (111/67 - 127/77)  BP(mean): --  RR: 20 (29 Aug 2017 11:59) (17 - 20)  SpO2: 97% (28 Aug 2017 20:35) (97% - 97%)    General: alert but overall lethargic    Karnofsky:  30-40%    HEENT: dry mouth      Lungs: tachypnea/labored breathing      CV: normal      GI: normal    : normal      MSK: weakness      Skin: no rash    LABS:  08-29    135  |  97<L>  |  14.0  ----------------------------<  104  5.6<H>   |  25.0  |  0.72    Ca    10.3<H>      29 Aug 2017 07:27  Mg     2.0     08-29    I&O's Summary    28 Aug 2017 07:01  -  29 Aug 2017 07:00  --------------------------------------------------------  IN: 890 mL / OUT: 0 mL / NET: 890 mL    RADIOLOGY & ADDITIONAL STUDIES:    ADVANCE DIRECTIVES:  Full Code

## 2017-08-29 NOTE — PROGRESS NOTE ADULT - ATTENDING COMMENTS
Thank you for the opportunity to assist with the care of this patient.   [place] [facility] Consult [facility] [phone].

## 2017-08-29 NOTE — BRIEF OPERATIVE NOTE - OPERATION/FINDINGS
Called to remove TLC/CVP line per order.   Patient education and demonstration performed for procedure.  Patient positioned in trendelenburg and prepped for procedure. Sterile technique observed. Dressing C/D/I with small amounts of dried blood. Transparent occlusive dressing removed. 3  sutures removed and discarded. Upon patient breath hold and exhalation, entire catheter removed from left groin without difficulty. Bandages immediately applied. No heme drainage produced and occlusive bandages and tape applied over insertion site.  Patient instructed to remain supine for 45min-60min. Patient tolerated well.
A referral for inpatient midline insertion by ultrasound guidance was received in the medicine department from a PMD/PCP order and an order is present in chart. Patient was registered and identification wrist tag present.  After discussing the risks, benefits and alternatives of this procedure with the patient, informed.  Patient was then draped and prepped for procedure utilizing sterile technique.  Ultrasound revealed patent brachial vein and image was saved. Preliminary measurements were made by the distance from the venotomy site to the distal tip. Ultrasound guidance for correct side and insertion site marked for procedure.  Laterality measures were performed by the medical team and a time out for correct patient,  correct side , and insertion, and marked with healthcare provider initials. Sterile field created, chlorhexidine gluconate scrub was performed. 21 gauge micropuncture introducer needle was inserted by ultrasound guidance to the right brachial vein and confirmed with heme return. A 0.018 guidewire inserted to secure access and the needle was removed.  A 18G catheter was then inserted over the wire.  A 18G midline inserted to the appropriate length ( 10cm ) and then advanced into place through ultrasound observation. The guidewire was then removed.  By ultrasound guidance the catheter was purported to be a good position.  The site was then dressed with an occlusive dressing with lock anchor and no heme drainage was noted.   There were no immediate complications.  Patient was given discharge instructions and paperwork for care and maintenance. The patient was then given instruction to go to PCP/PMD or local emergency room if swelling, redness, tenderness, fever or drainage was to occur.  Patient tolerated procedure well.

## 2017-08-29 NOTE — CHART NOTE - NSCHARTNOTEFT_GEN_A_CORE
Source: Patient [ ]  Family [x ]   other [ ]    Current Diet:   puree with Ensure TID    Patient reports [ ] nausea  [ ] vomiting [ ] diarrhea [ ] constipation  [ ]chewing problems [ ] swallowing issues  [ ] other:     PO intake:  < 50% [x ]   50-75%  [ ]   %  [ ]  other :    Source for PO intake [ ] Patient [x ] family [ ] chart [ ] staff [ ] other    Enteral /Parenteral Nutrition:     Current Weight:     % Weight Change     Pertinent Medications: MEDICATIONS  (STANDING):  senna 2 Tablet(s) Oral at bedtime  amiodarone    Tablet 200 milliGRAM(s) Oral daily  aspirin  chewable 81 milliGRAM(s) Oral daily  clopidogrel Tablet 75 milliGRAM(s) Oral daily  pantoprazole    Tablet 40 milliGRAM(s) Oral before breakfast  metoprolol succinate  milliGRAM(s) Oral daily  levothyroxine 200 MICROGram(s) Oral daily  simvastatin 20 milliGRAM(s) Oral at bedtime  lactobacillus acidophilus 1 Tablet(s) Oral two times a day with meals  docusate sodium 100 milliGRAM(s) Oral two times a day  pembrolizumab IVPB 200 milliGRAM(s) IV Intermittent once  lidocaine   Patch 1 Patch Transdermal daily  multivitamin 1 Tablet(s) Oral daily  magnesium oxide 400 milliGRAM(s) Oral three times a day with meals  diazepam    Tablet 5 milliGRAM(s) Oral at bedtime  gabapentin 100 milliGRAM(s) Oral at bedtime  polyethylene glycol 3350 17 Gram(s) Oral two times a day  fentaNYL   Patch 100 MICROgram(s)/Hr 1 Patch Transdermal every 72 hours  fentaNYL   Patch  75 MICROgram(s)/Hr 1 Patch Transdermal every 72 hours  dexamethasone  Injectable 4 milliGRAM(s) IV Push every 12 hours  enoxaparin Injectable 40 milliGRAM(s) SubCutaneous daily    MEDICATIONS  (PRN):  ALBUTerol/ipratropium for Nebulization 3 milliLiter(s) Nebulizer every 6 hours PRN Shortness of Breath and/or Wheezing  bisacodyl Suppository 10 milliGRAM(s) Rectal daily PRN Constipation  lactulose Syrup 10 Gram(s) Oral every 8 hours PRN constipation  diazepam    Tablet 2.5 milliGRAM(s) Oral every 6 hours PRN spasms/anxiety  HYDROmorphone   Tablet 8 milliGRAM(s) Oral every 4 hours PRN Severe Pain (7 - 10)  HYDROmorphone   Tablet 6 milliGRAM(s) Oral every 4 hours PRN mild to moderate pain (1-6)  HYDROmorphone  Injectable 1 milliGRAM(s) IV Push every 6 hours PRN breakthrough pain    Pertinent Labs: 08-29 Na135 mmol/L Glu 104 mg/dL K+ 5.6 mmol/L<H> Cr  0.72 mg/dL BUN 14.0 mg/dL Phos n/a   Alb n/a   PAB n/a               Skin:     Nutrition focused physical exam conducted - found signs of malnutrition [ ]absent [ ]present    Subcutaneous fat loss: [ ] Orbital fat pads region, [ ]Buccal fat region, [ ]Triceps region,  [ ]Ribs region    Muscle wasting: [ ]Temples region, [ ]Clavicle region, [ ]Shoulder region, [ ]Scapula region, [ ]Interosseous region,  [ ]thigh region, [ ]Calf region    Estimated Needs:   [x ] no change since previous assessment  [ ] recalculated:     Current Nutrition Diagnosis:  Nutrition Note: Pt meets criteria for severe protein calorie malnutrition related to weight loss, decreased energy intake and swallowing issues in setting of lung CA with mets, as evidenced by 28# weight loss, <50% intake > 7 days.   Pt now on puree foods with Ensure TID. Family providing baby food to pt with poor to fair appetite. Take Ensure fair.  Plan is hospice. See recommendations:    Recommendations:     Monitoring and Evaluation:   [x ] PO intake [x ] Tolerance to diet prescription [X] Weights  [X] Follow up per protocol [X] Labs:

## 2017-08-29 NOTE — CHART NOTE - NSCHARTNOTEFT_GEN_A_CORE
Lengthy family meeting with patient, 3 sons (1 by phone ) daughter,  Santiago, [facility] care [facility] - [facility], and Chante. I asked family if they wanted to meet outside the room since patient was pretty sleepy and out of it and unable to participate with the conversation, but they stated they wanted to stay in the room. We started talking about his clinical condition and that despite aggressive therapies he has continued to get worse, and has decompensated in the last few days in terms of mental state, pain, debility. I discussed with them that in his current state, he is not a candidate for aggressive therapy like keytruda. Family understood and asked about hospice. We discussed that he would likely be a candidate for inpatient hospice for aggressive symptom management to see if he is able to transition home. Family stated he always wanted to be home. Patient during the meeting woke up and asked us what was going on. I gently expressed to him that we were all meeting to try to figure out what the next best steps would be for him, and that maybe shifting our focus to making him more comfortable at this time is what's best. his daughter expressed to him that we were looking into hospice options. He then asked us if he is dying. I expressed to him that given his cancer and his overall state he is very sick, and none of us have a crystal ball. We then ended the meeting and gave the family time to be together privately. Family will continue to talk about things together and come to a decision regarding next steps. We also talked about advanced directives but no definitive decisions.

## 2017-08-29 NOTE — PROGRESS NOTE ADULT - SUBJECTIVE AND OBJECTIVE BOX
seen for lung ca with mets, pain    patient complaining of pain but lethargy and unable to provide additional history.  more responsive yesterday      MEDICATIONS  (STANDING):  senna 2 Tablet(s) Oral at bedtime  amiodarone    Tablet 200 milliGRAM(s) Oral daily  aspirin  chewable 81 milliGRAM(s) Oral daily  clopidogrel Tablet 75 milliGRAM(s) Oral daily  pantoprazole    Tablet 40 milliGRAM(s) Oral before breakfast  metoprolol succinate  milliGRAM(s) Oral daily  levothyroxine 200 MICROGram(s) Oral daily  simvastatin 20 milliGRAM(s) Oral at bedtime  lactobacillus acidophilus 1 Tablet(s) Oral two times a day with meals  docusate sodium 100 milliGRAM(s) Oral two times a day  pembrolizumab IVPB 200 milliGRAM(s) IV Intermittent once  lidocaine   Patch 1 Patch Transdermal daily  multivitamin 1 Tablet(s) Oral daily  magnesium oxide 400 milliGRAM(s) Oral three times a day with meals  diazepam    Tablet 5 milliGRAM(s) Oral at bedtime  gabapentin 100 milliGRAM(s) Oral at bedtime  polyethylene glycol 3350 17 Gram(s) Oral two times a day  fentaNYL   Patch 100 MICROgram(s)/Hr 1 Patch Transdermal every 72 hours  fentaNYL   Patch  75 MICROgram(s)/Hr 1 Patch Transdermal every 72 hours  enoxaparin Injectable 40 milliGRAM(s) SubCutaneous daily  dexamethasone  Injectable 4 milliGRAM(s) IV Push every 8 hours    MEDICATIONS  (PRN):  ALBUTerol/ipratropium for Nebulization 3 milliLiter(s) Nebulizer every 6 hours PRN Shortness of Breath and/or Wheezing  bisacodyl Suppository 10 milliGRAM(s) Rectal daily PRN Constipation  lactulose Syrup 10 Gram(s) Oral every 8 hours PRN constipation  diazepam    Tablet 2.5 milliGRAM(s) Oral every 6 hours PRN spasms/anxiety  HYDROmorphone   Tablet 8 milliGRAM(s) Oral every 4 hours PRN Severe Pain (7 - 10)  HYDROmorphone   Tablet 6 milliGRAM(s) Oral every 4 hours PRN mild to moderate pain (1-6)  HYDROmorphone  Injectable 1 milliGRAM(s) IV Push every 6 hours PRN breakthrough pain      Allergies    macrolide antibiotics (Urticaria; Rash; Hives)  penicillin (Urticaria; Rash)  Zithromax Z-Christian (Urticaria; Rash; Hives)    Vital Signs Last 24 Hrs  T(C): 36.8 (29 Aug 2017 05:00), Max: 36.8 (28 Aug 2017 16:00)  T(F): 98.3 (29 Aug 2017 05:00), Max: 98.3 (28 Aug 2017 16:00)  HR: 83 (29 Aug 2017 05:00) (78 - 83)  BP: 121/75 (29 Aug 2017 05:00) (111/67 - 127/77)  BP(mean): --  RR: 18 (29 Aug 2017 05:00) (17 - 18)  SpO2: 97% (28 Aug 2017 20:35) (97% - 97%)    PHYSICAL EXAM:    GENERAL: NAD  CHEST/LUNG: ctab  HEART: Regular rate and rhythm; S1 S2; no murmurs noted  ABDOMEN: Soft, Nontender, mild distended; Bowel sounds present  EXTREMITIES:  no edema  NERVOUS SYSTEM:  lethargic but arousable, staring into space mostly    LABS:    08-29    135  |  97<L>  |  14.0  ----------------------------<  104  5.6<H>   |  25.0  |  0.72    Ca    10.3<H>      29 Aug 2017 07:27  Mg     2.0     08-29            CAPILLARY BLOOD GLUCOSE            RADIOLOGY & ADDITIONAL TESTS:

## 2017-08-29 NOTE — PROGRESS NOTE ADULT - ASSESSMENT
71 yom with pmh which includes but not limited to Metastatic Lung Cancer follows at [facility], CAD s/p stents, V. Tach, HTN, HLD and Hypothyroidism sent from [facility] with hypercalcemia, low albumin and leucocytosis. Patient was recently at [facility] for loculated pleural effusions s/p chest tube and iv abx and severe hypercalcemia. Currently getting treated at this time for effusion and has started keytruda via Oncology. He is also found to have an ileus, being medically managed as surgery has signed off.

## 2017-08-29 NOTE — PROGRESS NOTE ADULT - PROBLEM SELECTOR PLAN 2
d/w son at bedside as patient is more lethargic today   plan for hospice/palliative meeting today with family.  discussed home hospice vs hosp[facility] inn as needs nursing care and MD management of pain medications.  son wants comfort first and advised that keytruda is not curative or long term management.

## 2017-08-29 NOTE — PROGRESS NOTE ADULT - PROBLEM SELECTOR PLAN 2
-did not tolerate change in meds to oral, will order a PCA pump with continuous rate and PRN, dc fentanyl patches.

## 2017-08-30 ENCOUNTER — TRANSCRIPTION ENCOUNTER (OUTPATIENT)
Age: 72
End: 2017-08-30

## 2017-08-30 PROCEDURE — 99233 SBSQ HOSP IP/OBS HIGH 50: CPT

## 2017-08-30 PROCEDURE — 99232 SBSQ HOSP IP/OBS MODERATE 35: CPT

## 2017-08-30 RX ORDER — ACETAMINOPHEN 500 MG
2 TABLET ORAL
Qty: 0 | Refills: 0 | COMMUNITY

## 2017-08-30 RX ORDER — CHOLECALCIFEROL (VITAMIN D3) 125 MCG
1 CAPSULE ORAL
Qty: 0 | Refills: 0 | COMMUNITY

## 2017-08-30 RX ORDER — OXYCODONE HYDROCHLORIDE 5 MG/1
1 TABLET ORAL
Qty: 0 | Refills: 0 | COMMUNITY

## 2017-08-30 RX ORDER — LIDOCAINE 4 G/100G
1 CREAM TOPICAL
Qty: 0 | Refills: 0 | COMMUNITY
Start: 2017-08-30

## 2017-08-30 RX ORDER — HYDROMORPHONE HYDROCHLORIDE 2 MG/ML
0.5 INJECTION INTRAMUSCULAR; INTRAVENOUS; SUBCUTANEOUS
Qty: 0 | Refills: 0 | COMMUNITY
Start: 2017-08-30

## 2017-08-30 RX ORDER — HYDROMORPHONE HYDROCHLORIDE 2 MG/ML
30 INJECTION INTRAMUSCULAR; INTRAVENOUS; SUBCUTANEOUS
Qty: 0 | Refills: 0 | Status: DISCONTINUED | OUTPATIENT
Start: 2017-08-30 | End: 2017-08-31

## 2017-08-30 RX ORDER — HYDROMORPHONE HYDROCHLORIDE 2 MG/ML
2 INJECTION INTRAMUSCULAR; INTRAVENOUS; SUBCUTANEOUS
Qty: 0 | Refills: 0 | COMMUNITY
Start: 2017-08-30

## 2017-08-30 RX ORDER — LACTULOSE 10 G/15ML
15 SOLUTION ORAL
Qty: 0 | Refills: 0 | COMMUNITY
Start: 2017-08-30

## 2017-08-30 RX ORDER — FENTANYL CITRATE 50 UG/ML
1 INJECTION INTRAVENOUS
Qty: 0 | Refills: 0 | COMMUNITY

## 2017-08-30 RX ORDER — RANITIDINE HYDROCHLORIDE 150 MG/1
1 TABLET, FILM COATED ORAL
Qty: 0 | Refills: 0 | COMMUNITY

## 2017-08-30 RX ORDER — FERROUS SULFATE 325(65) MG
1 TABLET ORAL
Qty: 0 | Refills: 0 | COMMUNITY

## 2017-08-30 RX ORDER — UBIDECARENONE 100 MG
100 CAPSULE ORAL
Qty: 0 | Refills: 0 | COMMUNITY

## 2017-08-30 RX ORDER — SIMVASTATIN 20 MG/1
1 TABLET, FILM COATED ORAL
Qty: 0 | Refills: 0 | COMMUNITY

## 2017-08-30 RX ORDER — LANOLIN ALCOHOL/MO/W.PET/CERES
1 CREAM (GRAM) TOPICAL
Qty: 0 | Refills: 0 | COMMUNITY

## 2017-08-30 RX ORDER — DOCUSATE SODIUM 100 MG
1 CAPSULE ORAL
Qty: 0 | Refills: 0 | COMMUNITY
Start: 2017-08-30

## 2017-08-30 RX ORDER — PANTOPRAZOLE SODIUM 20 MG/1
1 TABLET, DELAYED RELEASE ORAL
Qty: 0 | Refills: 0 | COMMUNITY
Start: 2017-08-30

## 2017-08-30 RX ORDER — DEXAMETHASONE 0.5 MG/5ML
4 ELIXIR ORAL
Qty: 0 | Refills: 0 | COMMUNITY
Start: 2017-08-30

## 2017-08-30 RX ORDER — DIAZEPAM 5 MG
1 TABLET ORAL
Qty: 0 | Refills: 0 | COMMUNITY
Start: 2017-08-30

## 2017-08-30 RX ADMIN — Medication 81 MILLIGRAM(S): at 13:11

## 2017-08-30 RX ADMIN — MAGNESIUM OXIDE 400 MG ORAL TABLET 400 MILLIGRAM(S): 241.3 TABLET ORAL at 17:40

## 2017-08-30 RX ADMIN — Medication 4 MILLIGRAM(S): at 13:11

## 2017-08-30 RX ADMIN — GABAPENTIN 100 MILLIGRAM(S): 400 CAPSULE ORAL at 21:10

## 2017-08-30 RX ADMIN — SENNA PLUS 2 TABLET(S): 8.6 TABLET ORAL at 21:10

## 2017-08-30 RX ADMIN — POLYETHYLENE GLYCOL 3350 17 GRAM(S): 17 POWDER, FOR SOLUTION ORAL at 08:54

## 2017-08-30 RX ADMIN — MAGNESIUM OXIDE 400 MG ORAL TABLET 400 MILLIGRAM(S): 241.3 TABLET ORAL at 08:54

## 2017-08-30 RX ADMIN — Medication 100 MILLIGRAM(S): at 05:47

## 2017-08-30 RX ADMIN — LIDOCAINE 1 PATCH: 4 CREAM TOPICAL at 13:11

## 2017-08-30 RX ADMIN — SIMVASTATIN 20 MILLIGRAM(S): 20 TABLET, FILM COATED ORAL at 21:10

## 2017-08-30 RX ADMIN — MAGNESIUM OXIDE 400 MG ORAL TABLET 400 MILLIGRAM(S): 241.3 TABLET ORAL at 13:11

## 2017-08-30 RX ADMIN — Medication 4 MILLIGRAM(S): at 21:09

## 2017-08-30 RX ADMIN — LIDOCAINE 1 PATCH: 4 CREAM TOPICAL at 05:31

## 2017-08-30 RX ADMIN — Medication 4 MILLIGRAM(S): at 05:46

## 2017-08-30 RX ADMIN — Medication 200 MICROGRAM(S): at 05:47

## 2017-08-30 RX ADMIN — Medication 5 MILLIGRAM(S): at 21:10

## 2017-08-30 RX ADMIN — ENOXAPARIN SODIUM 40 MILLIGRAM(S): 100 INJECTION SUBCUTANEOUS at 13:12

## 2017-08-30 RX ADMIN — CLOPIDOGREL BISULFATE 75 MILLIGRAM(S): 75 TABLET, FILM COATED ORAL at 13:11

## 2017-08-30 RX ADMIN — AMIODARONE HYDROCHLORIDE 200 MILLIGRAM(S): 400 TABLET ORAL at 05:47

## 2017-08-30 RX ADMIN — PANTOPRAZOLE SODIUM 40 MILLIGRAM(S): 20 TABLET, DELAYED RELEASE ORAL at 08:54

## 2017-08-30 RX ADMIN — HYDROMORPHONE HYDROCHLORIDE 30 MILLILITER(S): 2 INJECTION INTRAMUSCULAR; INTRAVENOUS; SUBCUTANEOUS at 13:46

## 2017-08-30 RX ADMIN — Medication 1 TABLET(S): at 17:40

## 2017-08-30 RX ADMIN — Medication 1 TABLET(S): at 13:11

## 2017-08-30 NOTE — PROGRESS NOTE ADULT - SUBJECTIVE AND OBJECTIVE BOX
HPI:  71 years old male with metastatic lung cancer, CAD s/p stents, V. Tach, HTN, HLD and hypothyroidism sent from [facility] with hypercalcemia, low albumin and leucocytosis. Patient was recently diagnosed with metastatic lung cancer in July 2017 and received first dose of pembroluzimab (Keytruda) 8/23 without difficulty.  Back pain improved with addition of fentanyl and Decadron. Hematocrit stable at 30 %, calcium 10. Denies any chest pain, palpitations, shortness of breath, abdominal pain, constipation or fever. He is alert and awake. He was a/w hypercalcemia and was treated with IVF, Calcitonin and Pamidronate.     PAST MEDICAL & SURGICAL HISTORY:  Psoriasis    PSVT (paroxysmal supraventricular tachycardia)  Chronic systolic CHF (congestive heart failure), NYHA class 2  Former smoker  Hypokinesis: apical  Mitral regurgitation  Bronchitis  Hypertension  PSVT (paroxysmal supraventricular tachycardia)  Ischemic cardiomyopathy  AICD (automatic cardioverter/defibrillator) present: 2010 [employer]  VT (ventricular tachycardia): May 2017 no ICD shock  Vitamin D deficiency  Tricuspid regurgitation  RBBB  Prostate cancer: s/p surgery no RT/CT  Mitral regurgitation  Hypothyroidism  HLD (hyperlipidemia)  Arrhythmia  Angina pectoris  CAD (coronary artery disease)  History of bronchoscopy: 2017  History of prostate surgery: davinci surgery in 2008  Cardiac disorder: triple bypass may 1997 [facility]  History of electrophysiologic study: 2009 positive study (AICD placement  2010)  H/O cardiac catheterization: stenting of SVG TO PDA IN 2010 2017 one stent      MEDICATIONS  (STANDING):  senna 2 Tablet(s) Oral at bedtime  amiodarone    Tablet 200 milliGRAM(s) Oral daily  aspirin  chewable 81 milliGRAM(s) Oral daily  clopidogrel Tablet 75 milliGRAM(s) Oral daily  pantoprazole    Tablet 40 milliGRAM(s) Oral before breakfast  metoprolol succinate  milliGRAM(s) Oral daily  levothyroxine 200 MICROGram(s) Oral daily  simvastatin 20 milliGRAM(s) Oral at bedtime  lactobacillus acidophilus 1 Tablet(s) Oral two times a day with meals  docusate sodium 100 milliGRAM(s) Oral two times a day  pembrolizumab IVPB 200 milliGRAM(s) IV Intermittent once  lidocaine   Patch 1 Patch Transdermal daily  multivitamin 1 Tablet(s) Oral daily  magnesium oxide 400 milliGRAM(s) Oral three times a day with meals  diazepam    Tablet 5 milliGRAM(s) Oral at bedtime  gabapentin 100 milliGRAM(s) Oral at bedtime  polyethylene glycol 3350 17 Gram(s) Oral two times a day  fentaNYL   Patch 100 MICROgram(s)/Hr 1 Patch Transdermal every 72 hours  fentaNYL   Patch  75 MICROgram(s)/Hr 1 Patch Transdermal every 72 hours  HYDROmorphone   Tablet 6 milliGRAM(s) Oral every 4 hours  dexamethasone  Injectable 4 milliGRAM(s) IV Push every 12 hours  enoxaparin Injectable 40 milliGRAM(s) SubCutaneous daily    MEDICATIONS  (PRN):  ALBUTerol/ipratropium for Nebulization 3 milliLiter(s) Nebulizer every 6 hours PRN Shortness of Breath and/or Wheezing  bisacodyl Suppository 10 milliGRAM(s) Rectal daily PRN Constipation  lactulose Syrup 10 Gram(s) Oral every 8 hours PRN constipation  diazepam    Tablet 2.5 milliGRAM(s) Oral every 6 hours PRN spasms/anxiety  HYDROmorphone   Tablet 8 milliGRAM(s) Oral every 4 hours PRN Severe Pain (7 - 10)  HYDROmorphone   Tablet 6 milliGRAM(s) Oral every 4 hours PRN mild to moderate pain (1-6)  HYDROmorphone  Injectable 1 milliGRAM(s) IV Push every 6 hours PRN breakthrough pain      Allergies    macrolide antibiotics (Urticaria; Rash; Hives)  penicillin (Urticaria; Rash)  Zithromax Z-Christian (Urticaria; Rash; Hives)    Intolerances        FAMILY HISTORY:  Family history of heart disease (Sibling)  Family history of diabetes mellitus (Sibling)  Family history of lung cancer (Sibling)  Family history of prostate cancer (Sibling)      Review of Systems    Constitutional, Eyes, ENT, Cardiovascular, Respiratory, Gastrointestinal, Genitourinary, Musculoskeletal, Integumentary, Neurological, Psychiatric, Endocrine, Heme/Lymph and Allergic/Immunologic review of systems are otherwise negative except as noted in HPI.     Vital Signs Last 24 Hrs  T(C): 36.8 (28 Aug 2017 16:00), Max: 36.8 (28 Aug 2017 16:00)  T(F): 98.3 (28 Aug 2017 16:00), Max: 98.3 (28 Aug 2017 16:00)  HR: 78 (28 Aug 2017 16:00) (72 - 78)  BP: 127/77 (28 Aug 2017 16:00) (120/76 - 127/77)  BP(mean): --  RR: 17 (28 Aug 2017 16:00) (17 - 18)  SpO2: 97% (28 Aug 2017 20:35) (93% - 97%)    Physical Exam  Constitutional: well developed, well nourished, in no acute distress and thin.   Eyes: PERRL, EOMI, no conjunctival infection, anicteric.   ENT: pharynx is unremarkable, moist mucus membrane, no oral lesions.   Neck: supple without JVD, no thyromegaly or masses appreciated.   Pulmonary: clear to auscultation bilaterally,  Cardiac: RRR, normal S1S2, no murmurs, rubs, gallops.   Vascular: no JVD, no calf tenderness, venous stasis changes, varices.   Abdomen: distended  Lymphatic: no peripheral adenopathy appreciated.   Musculoskeletal: full range of motion and no deformities appreciated.   Skin: normal appearance, no rash, nodules, vesicles, ulcers, erythema.   Neurology: grossly intact.   Psychiatric: affect appropriate.       LABS:  CBC Full  -  ( 27 Aug 2017 08:38 )  WBC Count : 31.8 K/uL  Hemoglobin : 8.6 g/dL  Hematocrit : 27.7 %  Platelet Count - Automated : 312 K/uL  Mean Cell Volume : 84.5 fl  Mean Cell Hemoglobin : 26.2 pg  Mean Cell Hemoglobin Concentration : 31.0 g/dL  Auto Neutrophil # : 29.1 K/uL  Auto Lymphocyte # : 1.7 K/uL  Auto Monocyte # : 0.9 K/uL  Auto Eosinophil # : x  Auto Basophil # : 0.0 K/uL  Auto Neutrophil % : 91.4 %  Auto Lymphocyte % : 5.2 %  Auto Monocyte % : 2.7 %  Auto Eosinophil % : x  Auto Basophil % : x    08-27    135  |  100  |  11.0  ----------------------------<  193<H>  4.4   |  23.0  |  0.73    Ca    10.4<H>      27 Aug 2017 08:38  Phos  2.8     08-27  Mg     2.0     08-27    TPro  x   /  Alb  2.4<L>  /  TBili  x   /  DBili  x   /  AST  x   /  ALT  x   /  AlkPhos  x   08-27          RADIOLOGY & ADDITIONAL STUDIES:

## 2017-08-30 NOTE — DISCHARGE NOTE ADULT - HOSPITAL COURSE
71 yom with pmh which includes but not limited to Metastatic Lung Cancer follows at [facility], CAD s/p stents, V. Tach, HTN, HLD and Hypothyroidism sent from [facility] with hypercalcemia, low albumin and leucocytosis. Patient was recently at [facility] for loculated pleural effusions s/p chest tube and iv abx and severe hypercalcemia. Currently getting treated at this time for effusion and has started keytruda via Oncology. He is also found to have an ileus, being medically managed as surgery has signed off.  Received 2 Units PRBC for anemia. After much discussion with family and palliative care/hospice service, patient and family agreeable to transfer to home hospice on dilaudid PCA and dexamethasone with hopeful transition to home hospice.   patient seen by infectious disease and CT surgery with no antibiotics or drainage recommended.     d/c planning 45 min.   updated family at bedside in detail. d/w palliative care MD  patient more awake/responsive on current regimen of pain medications.  no acute complaints.   CTAB anteriorly, rrr s1s2 soft abdomen with mild distension. +BM per family. trace edema b/l extremities 71 yom with pmh which includes but not limited to Metastatic Lung Cancer follows at [facility], CAD s/p stents, V. Tach, HTN, HLD and Hypothyroidism sent from [facility] with hypercalcemia, low albumin and leucocytosis. Patient was recently at [facility] for loculated pleural effusions s/p chest tube and iv abx and severe hypercalcemia. Currently getting treated at this time for effusion and has started keytruda via Oncology. He is also found to have an ileus, being medically managed as surgery has signed off.  Received 2 Units PRBC for anemia. After much discussion with family and palliative care/hospice service, patient and family agreeable to transfer to home hospice on dilaudid PCA and dexamethasone with hopeful transition to home hospice.   patient seen by infectious disease and CT surgery with no antibiotics or drainage recommended.     d/c planning 45 min.   updated family at bedside in detail. d/w palliative care MD    exam on 8/30/17: patient more awake/responsive on current regimen of pain medications.  no acute complaints.   CTAB anteriorly, rrr s1s2 soft abdomen with mild distension. +BM per family. trace edema b/l extremities    exam on 8/31/17:  more delirious but awake/responsive  soft abdomen with mild distension, +BS, + BM.  rrr s1s2 CTAB   PCA adjusted with pain management. 71 yom with pmh which includes but not limited to Metastatic Lung Cancer follows at [facility], CAD s/p stents, V. Tach, HTN, HLD and Hypothyroidism sent from [facility] with hypercalcemia, low albumin and leucocytosis. Patient was recently at [facility] for loculated pleural effusions s/p chest tube and iv abx and severe hypercalcemia. Currently getting treated at this time for effusion and has started keytruda via Oncology. He is also found to have an ileus, being medically managed as surgery has signed off.  Received 2 Units PRBC for anemia. After much discussion with family and palliative care/hospice service, patient and family agreeable to transfer to home hospice on dilaudid PCA and dexamethasone with hopeful transition to home hospice.   patient seen by infectious disease and CT surgery with no antibiotics or drainage recommended.     d/c planning 45 min.   updated family at bedside in detail. d/w palliative care MD    exam on 8/30/17: patient more awake/responsive on current regimen of pain medications.  no acute complaints.   CTAB anteriorly, rrr s1s2 soft abdomen with mild distension. +BM per family. trace edema b/l extremities 71 yom with pmh which includes but not limited to Metastatic Lung Cancer follows at [facility], CAD s/p stents, V. Tach, HTN, HLD and Hypothyroidism sent from [facility] with hypercalcemia, low albumin and leucocytosis. Patient was recently at [facility] for loculated pleural effusions s/p chest tube and iv abx and severe hypercalcemia, he was treated at this time for effusion was given dose of keytruda via Oncology.   For his hypercalcemia it was  likely 2/2 primary malignancy, he got IV, zometa and calcitonin nasal spray, calcium level was initally monitored trended down some what but started coming up, discuss with patient and family since it is related to cancer, it will not improve, so family decided no further monitoring of levels, his effusion was treated with chest tube and antibiotics initally, patient was followed by CT surgery and ID, later on chest ube was taken out and since it was related to metastatic disease, antibiotics were discontinued.   during the course patient was c/w steroids, PCA for the pain control palliative and Hospice eval appreciated,   patient feels comfortable, will had a family meeting today along with daughter, patient expressed his wishes to make him comfortable and he wanted to go home with pain meds and hospice, no further testing, DNR/DNI, wanted to continue with current meds, Hospice team is arranging home hospice.       He is also found to have an ileus, being medically managed as surgery has signed off.  Received 2 Units PRBC for anemia. After much discussion with family and palliative care/hospice service, patient and family agreeable to transfer to home hospice on dilaudid PCA and dexamethasone with hopeful transition to home hospice.   patient seen by infectious disease and CT surgery with no antibiotics or drainage recommended.     d/c planning 45 min.   updated family at bedside in detail. d/w palliative care MD    exam on 8/30/17: patient more awake/responsive on current regimen of pain medications.  no acute complaints.   CTAB anteriorly, rrr s1s2 soft abdomen with mild distension. +BM per family. trace edema b/l extremities    This is 72 yo man with pmh of Metastatic Lung Cancer follows at [facility], CAD s/p stents, V. Tach, HTN, HLD and Hypothyroidism sent from [facility] with hypercalcemia, low albumin and leucocytosis. Patient was recently at [facility] for loculated pleural effusions s/p chest tube and iv abx and severe hypercalcemia. Currently getting treated at this time for effusion and has started keytruda via Oncology. He is also found to have an ileus, medically managed, distension improved, tolerating diet,surgery has signed off. Pt was seen by palliative and Hospice on PCA, family is willing for [facility].         Problem/Plan - 1:  ?  Problem: Pain.  Plan:     Problem/Plan - 2:  ?  Problem: Goals of care, counseling/discussion.  Plan: appreciate Palliative and hospice follow up, patient is going for the home hospice.     Problem/Plan - 3:  ?  Problem: Ileus.  Plan: improved, tolerating diet, having BM.     Problem/Plan - 4:  ?  Problem: Pleural effusion.  Plan: recurrent loculated left sided pleural effusion-no intervention by CT surgery.  No e/o infection, seen by ID - no abx at this time.     Problem/Plan - 5:  ?  Problem: Malignant neoplasm of lung, unspecified laterality, unspecified part of lung.  Plan: with mets to S1 as per ct scan   Seen by Oncology   s/p 1 dose of keytruda on 8/23  zofran prn, pain meds, discussed with oncology, no further chemo or immunotherapy at this point of time.     Problem/Plan - 6:  Problem: Hypercalcemia. Plan:     Problem/Plan - 7:  ?  Problem: Hypertension.  Plan: c/w medications at this time   BP controlled at this time.     Problem/Plan - 8:  ?  Problem: Anemia.  Plan: s/p 2 units prbc   stable at this time, no further labs for now.     Problem/Plan - 9:  ?  Problem: Hypothyroidism.  Plan: synthroid.     Problem/Plan - 10:  Problem: Protein calorie malnutrition. Plan; diet/ensure. 71 yom with pmh which includes but not limited to [facility]ast[facility], CAD s/p stents, V. Tach, HTN, HLD and Hypothyroidism sent from [facility] with hypercalcemia, low albumin and leucocytosis. Patient was recently at [facility] for loculated pleural effusions s/p chest tube and iv abx and severe hypercalcemia, he was treated at this time for effusion was given dose of keytruda via Oncology, discussed with oncology, as per them, at this point of time no further chemo or immunotherapy recommended.   For his hypercalcemia it was  likely 2/2 primary malignancy, he got IV, zometa and calcitonin nasal spray, calcium level was initially monitored trended down some what but started coming up, discuss with patient and family since it is related to cancer, it will not improve, so family decided no further monitoring of levels, his effusion was treated with chest tube and antibiotics initially, patient was followed by CT surgery and ID, later on chest ube was taken out and since it was related to metastatic disease, antibiotics were discontinued.   during the course patient was c/w steroids, PCA for the pain control palliative and Hospice eval done, patient feels comfortable, had a family meeting along with daughter, SON and patient, he expressed his wishes to make him comfortable and he wanted to go home with pain meds and hospice, no further testing, DNR/DNI, wanted to continue with current meds, Hospice team is arranging home hospice.   during the course he was found to have an ileus, was medically managed, over the course he received 2 Units PRBC for anemia.    Patient is looking comfortable, he is on Dilaudid PCA pump, adjusted as his needs for the pain, he is being discharged home with home hospice.     Vital Signs Last 24 Hrs  T(C): 36.4 (08 Sep 2017 04:30), Max: 36.4 (07 Sep 2017 13:00)  T(F): 97.6 (08 Sep 2017 04:30), Max: 97.6 (07 Sep 2017 16:00)  HR: 92 (08 Sep 2017 04:30) (83 - 92)  BP: 140/58 (08 Sep 2017 04:30) (98/62 - 140/58)  RR: 16 (08 Sep 2017 04:30) (14 - 16)  SpO2: 97% (07 Sep 2017 16:00) (97% - 98%)    PHYSICAL EXAM:    GENERAL: Elderly male looking comfortable  HEENT: PERRL, +EOMI  NECK: soft, Supple, No JVD,   CHEST/LUNG: Decrease air entry bilaterally; No wheezing  HEART: S1S2+, Regular rate and rhythm; No murmurs  ABDOMEN: Soft, Nontender, Nondistended; Bowel sounds present    Total time spent 40 minutes

## 2017-08-30 NOTE — DISCHARGE NOTE ADULT - PLAN OF CARE
stable. received zometa and calcitonin nasal spray  hospice care. hospice care  received 1 dose of keytruda dilaudid PCA pump and dexamethasone continue with aspirin/plavix/metoprolol given recent stent diet as tolerated amiodarone improving.  ensure daily bowel movements. no more labs checks

## 2017-08-30 NOTE — DISCHARGE NOTE ADULT - MEDICATION SUMMARY - MEDICATIONS TO TAKE
I will START or STAY ON the medications listed below when I get home from the hospital:    dexamethasone  -- 4 milligram(s) intravenous 3 times a day  -- Indication: For Malignant neoplasm of lung, unspecified laterality, unspecified part of lung    HYDROmorphone  -- 2 milligram(s) intravenous every 4 hours, As Needed breakthrough pain   -- Indication: For chronic pain    HYDROmorphone  -- 0.5 milligram(s) intravenous every 1 to 2 hours  PCA pump:  0.5mg/hr  with 0.5 mg IVP [id number] for pain  -- Indication: For chronic pain    aspirin 81 mg oral tablet, chewable  -- 1 tab(s) by mouth once a day  -- Indication: For cardiac stent    amiodarone 200 mg oral tablet  -- 1 tab(s) by mouth once a day  -- Indication: For vtach    diazePAM 5 mg oral tablet  -- 1 tab(s) by mouth once a day (at bedtime)  -- Indication: For Pain/spasms    clopidogrel 75 mg oral tablet  -- 1 tab(s) by mouth once a day  -- Indication: For cardiac stent    metoprolol succinate 100 mg oral tablet, extended release  -- 1 tab(s) by mouth once a day  -- Indication: For cardiac stent    lidocaine 5% topical film  -- Apply on skin to affected area once a day  -- Indication: For back pain    docusate sodium 100 mg oral capsule  -- 1 cap(s) by mouth 2 times a day  -- Indication: For constipation    lactulose 10 g/15 mL oral syrup  -- 15 milliliter(s) by mouth every 8 hours, As needed, constipation  -- Indication: For constipation    senna oral tablet  -- 2 tab(s) by mouth once a day (at bedtime)  -- Indication: For constipation    polyethylene glycol 3350 oral powder for reconstitution  -- 17 gram(s) by mouth once a day  -- Indication: For constipation    pantoprazole 40 mg oral delayed release tablet  -- 1 tab(s) by mouth once a day (before a meal)  -- Indication: For Prophylactic measure    levothyroxine 200 mcg (0.2 mg) oral tablet  -- 1 tab(s) by mouth once a day  -- Indication: For Hypothyroidism I will START or STAY ON the medications listed below when I get home from the hospital:    dexamethasone  -- 4 milligram(s) intravenous 3 times a day  -- Indication: For Malignant neoplasm of lung, unspecified laterality, unspecified part of lung    HYDROmorphone  -- 2 milligram(s) intravenous every 4 hours, As Needed breakthrough pain   -- Indication: For chronic pain    aspirin 81 mg oral tablet, chewable  -- 1 tab(s) by mouth once a day  -- Indication: For cardiac stent    HYDROmorphone  --   PCA pump:  1mg/hr  with 0.5 mg IVP [id number] for pain  -- Indication: For Chronic pain due to neoplasm    amiodarone 200 mg oral tablet  -- 1 tab(s) by mouth once a day  -- Indication: For vtach    diazePAM 5 mg oral tablet  -- 1 tab(s) by mouth once a day (at bedtime)  -- Indication: For Pain/spasms    clopidogrel 75 mg oral tablet  -- 1 tab(s) by mouth once a day  -- Indication: For cardiac stent    metoprolol succinate 100 mg oral tablet, extended release  -- 1 tab(s) by mouth once a day  -- Indication: For cardiac stent    lidocaine 5% topical film  -- Apply on skin to affected area once a day  -- Indication: For back pain    docusate sodium 100 mg oral capsule  -- 1 cap(s) by mouth 2 times a day  -- Indication: For constipation    lactulose 10 g/15 mL oral syrup  -- 15 milliliter(s) by mouth every 8 hours, As needed, constipation  -- Indication: For constipation    senna oral tablet  -- 2 tab(s) by mouth once a day (at bedtime)  -- Indication: For constipation    polyethylene glycol 3350 oral powder for reconstitution  -- 17 gram(s) by mouth once a day  -- Indication: For constipation    pantoprazole 40 mg oral delayed release tablet  -- 1 tab(s) by mouth once a day (before a meal)  -- Indication: For Prophylactic measure    levothyroxine 200 mcg (0.2 mg) oral tablet  -- 1 tab(s) by mouth once a day  -- Indication: For Hypothyroidism I will START or STAY ON the medications listed below when I get home from the hospital:    dexamethasone 4 mg oral tablet  -- 1 tab(s) by mouth 3 times a day  -- It is very important that you take or use this exactly as directed.  Do not skip doses or discontinue unless directed by your doctor.  Obtain medical advice before taking any non-prescription drugs as some may affect the action of this medication.  Take with food or milk.    -- Indication: For Malignancy    aspirin 81 mg oral tablet, chewable  -- 1 tab(s) by mouth once a day  -- Indication: For cardiac stent    Tylenol 325 mg oral tablet  -- 2 tab(s) by mouth every 4 hours, As Needed  -- Indication: For Pain    HYDROmorphone 1 mg/mL injectable solution  -- PCA pump 1ml/h, please give 0.5 milliliter(s) every 30minutes as needed, [id number] over 4 hours MDD:12  -- Caution federal law prohibits the transfer of this drug to any person other  than the person for whom it was prescribed.  May cause drowsiness.  Alcohol may intensify this effect.  Use care when operating dangerous machinery.  This prescription cannot be refilled.    -- Indication: For Pain    amiodarone 200 mg oral tablet  -- 1 tab(s) by mouth once a day  -- Indication: For Pain    gabapentin 100 mg oral capsule  -- 1 cap(s) by mouth once a day (at bedtime)  -- Indication: For Pain    diazePAM 5 mg oral tablet  -- 1 tab(s) by mouth once a day (at bedtime), As Needed MDD:1  -- Indication: For Anxiety    nystatin 100,000 units/mL oral suspension  -- 5 milliliter(s) by mouth 3 times a day  -- Indication: For Oral thrust    simvastatin 20 mg oral tablet  -- 1 tab(s) by mouth once a day (at bedtime)  -- Indication: For HLD    clopidogrel 75 mg oral tablet  -- 1 tab(s) by mouth once a day  -- Indication: For CAD    metoprolol succinate 100 mg oral tablet, extended release  -- 1 tab(s) by mouth once a day  -- Indication: For HTN    albuterol-ipratropium 2.5 mg-0.5 mg/3 mL inhalation solution  -- 3 milliliter(s) inhaled every 6 hours, As needed, Shortness of Breath and/or Wheezing  -- Indication: For SOB    lidocaine 5% topical film  -- Apply on skin to affected area once a day  -- Indication: For back pain    docusate sodium 100 mg oral capsule  -- 1 cap(s) by mouth 2 times a day  -- Indication: For Constipation    lactulose 10 g/15 mL oral syrup  -- 15 milliliter(s) by mouth every 8 hours, As needed, constipation  -- Indication: For Constipation    polyethylene glycol 3350 oral powder for reconstitution  -- 17 gram(s) by mouth once a day, As Needed  -- Indication: For Constipation    senna oral tablet  -- 2 tab(s) by mouth once a day (at bedtime)  -- Indication: For Constipation    melatonin 3 mg oral tablet  -- 1 tab(s) by mouth once (at bedtime) x 1 days, As Needed  -- Indication: For Sleep     pantoprazole 40 mg oral delayed release tablet  -- 1 tab(s) by mouth once a day (before a meal)  -- Indication: For GERD    levothyroxine 200 mcg (0.2 mg) oral tablet  -- 1 tab(s) by mouth once a day  -- Indication: For Hypothyroidism I will START or STAY ON the medications listed below when I get home from the hospital:    dexamethasone 4 mg oral tablet  -- 1 tab(s) by mouth 3 times a day  -- It is very important that you take or use this exactly as directed.  Do not skip doses or discontinue unless directed by your doctor.  Obtain medical advice before taking any non-prescription drugs as some may affect the action of this medication.  Take with food or milk.    -- Indication: For Malignancy    aspirin 81 mg oral tablet, chewable  -- 1 tab(s) by mouth once a day  -- Indication: For cardiac stent    Tylenol 325 mg oral tablet  -- 2 tab(s) by mouth every 4 hours, As Needed  -- Indication: For Pain    HYDROmorphone 1 mg/mL injectable solution  -- PCA pump 1ml/h, please give 0.5 milliliter(s) every 30minutes as needed, [id number] over 4 hours MDD:12  -- Caution federal law prohibits the transfer of this drug to any person other  than the person for whom it was prescribed.  May cause drowsiness.  Alcohol may intensify this effect.  Use care when operating dangerous machinery.  This prescription cannot be refilled.    -- Indication: For Pain    HYDROmorphone 2 mg/mL injectable solution  -- PCA pump 1mg/h, 0.5 milligram(s) every 30minutes PRN, total number of bags 5, max is 48 mg MDD:48  -- Caution federal law prohibits the transfer of this drug to any person other  than the person for whom it was prescribed.  May cause drowsiness.  Alcohol may intensify this effect.  Use care when operating dangerous machinery.  This prescription cannot be refilled.    -- Indication: For Pain    amiodarone 200 mg oral tablet  -- 1 tab(s) by mouth once a day  -- Indication: For Pain    gabapentin 100 mg oral capsule  -- 1 cap(s) by mouth once a day (at bedtime)  -- Indication: For Pain    diazePAM 5 mg oral tablet  -- 1 tab(s) by mouth once a day (at bedtime), As Needed MDD:1  -- Indication: For Anxiety    nystatin 100,000 units/mL oral suspension  -- 5 milliliter(s) by mouth 3 times a day  -- Indication: For Oral thrust    simvastatin 20 mg oral tablet  -- 1 tab(s) by mouth once a day (at bedtime)  -- Indication: For HLD    clopidogrel 75 mg oral tablet  -- 1 tab(s) by mouth once a day  -- Indication: For CAD    metoprolol succinate 100 mg oral tablet, extended release  -- 1 tab(s) by mouth once a day  -- Indication: For HTN    albuterol-ipratropium 2.5 mg-0.5 mg/3 mL inhalation solution  -- 3 milliliter(s) inhaled every 6 hours, As needed, Shortness of Breath and/or Wheezing  -- Indication: For SOB    lidocaine 5% topical film  -- Apply on skin to affected area once a day  -- Indication: For back pain    docusate sodium 100 mg oral capsule  -- 1 cap(s) by mouth 2 times a day  -- Indication: For Constipation    lactulose 10 g/15 mL oral syrup  -- 15 milliliter(s) by mouth every 8 hours, As needed, constipation  -- Indication: For Constipation    polyethylene glycol 3350 oral powder for reconstitution  -- 17 gram(s) by mouth once a day, As Needed  -- Indication: For Constipation    senna oral tablet  -- 2 tab(s) by mouth once a day (at bedtime)  -- Indication: For Constipation    melatonin 3 mg oral tablet  -- 1 tab(s) by mouth once (at bedtime) x 1 days, As Needed  -- Indication: For Sleep     pantoprazole 40 mg oral delayed release tablet  -- 1 tab(s) by mouth once a day (before a meal)  -- Indication: For GERD    levothyroxine 200 mcg (0.2 mg) oral tablet  -- 1 tab(s) by mouth once a day  -- Indication: For Hypothyroidism I will START or STAY ON the medications listed below when I get home from the hospital:    dexamethasone 4 mg oral tablet  -- 1 tab(s) by mouth 3 times a day  -- It is very important that you take or use this exactly as directed.  Do not skip doses or discontinue unless directed by your doctor.  Obtain medical advice before taking any non-prescription drugs as some may affect the action of this medication.  Take with food or milk.    -- Indication: For Malignancy    aspirin 81 mg oral tablet, chewable  -- 1 tab(s) by mouth once a day  -- Indication: For cardiac stent    Tylenol 325 mg oral tablet  -- 2 tab(s) by mouth every 4 hours, As Needed  -- Indication: For Pain    HYDROmorphone 1 mg/mL injectable solution  -- PCA pump 1ml/h, please give 0.5 milliliter(s) every 30minutes as needed, [id number] over 4 hours MDD:12  -- Caution federal law prohibits the transfer of this drug to any person other  than the person for whom it was prescribed.  May cause drowsiness.  Alcohol may intensify this effect.  Use care when operating dangerous machinery.  This prescription cannot be refilled.    -- Indication: For Pain    HYDROmorphone 2 mg/mL injectable solution  -- PCA pump 1mg/h, 0.5 milligram(s) every 30minutes PRN, total number of bags 5, max is 48 mg MDD:48  -- Caution federal law prohibits the transfer of this drug to any person other  than the person for whom it was prescribed.  May cause drowsiness.  Alcohol may intensify this effect.  Use care when operating dangerous machinery.  This prescription cannot be refilled.    -- Indication: For Pain    amiodarone 200 mg oral tablet  -- 1 tab(s) by mouth once a day  -- Indication: For Pain    gabapentin 100 mg oral capsule  -- 1 cap(s) by mouth once a day (at bedtime)  -- Indication: For Pain    diazePAM 5 mg oral tablet  -- 1 tab(s) by mouth once a day (at bedtime), As Needed MDD:1  -- Indication: For Anxiety    LORazepam 2 mg/mL oral concentrate  -- 0.25 milliliter(s) by mouth every 4 hours, As Needed MDD:2mg  -- Caution federal law prohibits the transfer of this drug to any person other  than the person for whom it was prescribed.  Dilute this medication with liquid before administration.  Do not take this drug if you are pregnant.  Keep in refrigerator.  Do not freeze.  May cause drowsiness.  Alcohol may intensify this effect.  Use care when operating dangerous machinery.    -- Indication: For Anxiety and nausea     nystatin 100,000 units/mL oral suspension  -- 5 milliliter(s) by mouth 3 times a day  -- Indication: For Coronary artery disease involving native coronary artery of native heart without angina pectoris    simvastatin 20 mg oral tablet  -- 1 tab(s) by mouth once a day (at bedtime)  -- Indication: For HLD    clopidogrel 75 mg oral tablet  -- 1 tab(s) by mouth once a day  -- Indication: For CAD    metoprolol succinate 100 mg oral tablet, extended release  -- 1 tab(s) by mouth once a day  -- Indication: For HTN    albuterol-ipratropium 2.5 mg-0.5 mg/3 mL inhalation solution  -- 3 milliliter(s) inhaled every 6 hours, As needed, Shortness of Breath and/or Wheezing  -- Indication: For SOB    lidocaine 5% topical film  -- Apply on skin to affected area once a day  -- Indication: For back pain    docusate sodium 100 mg oral capsule  -- 1 cap(s) by mouth 2 times a day  -- Indication: For Constipation    lactulose 10 g/15 mL oral syrup  -- 15 milliliter(s) by mouth every 8 hours, As needed, constipation  -- Indication: For Constipation    polyethylene glycol 3350 oral powder for reconstitution  -- 17 gram(s) by mouth once a day, As Needed  -- Indication: For Constipation    senna oral tablet  -- 2 tab(s) by mouth once a day (at bedtime)  -- Indication: For Constipation    melatonin 3 mg oral tablet  -- 1 tab(s) by mouth once (at bedtime) x 1 days, As Needed  -- Indication: For Sleep     atropine 1% ophthalmic solution  -- 4 drop(s) to each affected eye every 6 hours, As Needed  -- For the eye.    -- Indication: For Secretions     pantoprazole 40 mg oral delayed release tablet  -- 1 tab(s) by mouth once a day (before a meal)  -- Indication: For GERD    levothyroxine 200 mcg (0.2 mg) oral tablet  -- 1 tab(s) by mouth once a day  -- Indication: For Hypothyroidism

## 2017-08-30 NOTE — PROGRESS NOTE ADULT - ASSESSMENT
71 yom with pmh which includes but not limited to Metastatic Lung Cancer follows at [facility], CAD s/p stents, V. Tach, HTN, HLD and Hypothyroidism sent from [facility] with hypercalcemia, low albumin and leucocytosis. Patient was recently at [facility] for loculated pleural effusions s/p chest tube and iv abx and severe hypercalcemia. Currently getting treated at this time for effusion and has started keytruda via Oncology. He is also found to have an ileus, being medically managed as surgery has signed off.

## 2017-08-30 NOTE — PROGRESS NOTE ADULT - ASSESSMENT
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

## 2017-08-30 NOTE — PROGRESS NOTE ADULT - SUBJECTIVE AND OBJECTIVE BOX
OVERNIGHT EVENTS: pain better controlled today and he is feeling better.     Present Symptoms:     Dyspnea: 0 1 2 3   Nausea/Vomiting: Yes No  Anxiety:  Yes No  Depression: Yes No  Fatigue: Yes No  Loss of appetite: Yes No    Pain:             Character-            Duration-            Effect-            Factors-            Frequency-            Location-            Severity-    Review of Systems: Reviewed                     Negative:                     Positive:  Unable to obtain due to poor mentation   All others negative    MEDICATIONS  (STANDING):  senna 2 Tablet(s) Oral at bedtime  amiodarone    Tablet 200 milliGRAM(s) Oral daily  aspirin  chewable 81 milliGRAM(s) Oral daily  clopidogrel Tablet 75 milliGRAM(s) Oral daily  pantoprazole    Tablet 40 milliGRAM(s) Oral before breakfast  metoprolol succinate  milliGRAM(s) Oral daily  levothyroxine 200 MICROGram(s) Oral daily  simvastatin 20 milliGRAM(s) Oral at bedtime  lactobacillus acidophilus 1 Tablet(s) Oral two times a day with meals  docusate sodium 100 milliGRAM(s) Oral two times a day  pembrolizumab IVPB 200 milliGRAM(s) IV Intermittent once  lidocaine   Patch 1 Patch Transdermal daily  multivitamin 1 Tablet(s) Oral daily  magnesium oxide 400 milliGRAM(s) Oral three times a day with meals  diazepam    Tablet 5 milliGRAM(s) Oral at bedtime  gabapentin 100 milliGRAM(s) Oral at bedtime  polyethylene glycol 3350 17 Gram(s) Oral two times a day  enoxaparin Injectable 40 milliGRAM(s) SubCutaneous daily  dexamethasone  Injectable 4 milliGRAM(s) IV Push every 8 hours  HYDROmorphone PCA (1 mG/mL) 30 milliLiter(s) PCA Continuous PCA Continuous    MEDICATIONS  (PRN):  ALBUTerol/ipratropium for Nebulization 3 milliLiter(s) Nebulizer every 6 hours PRN Shortness of Breath and/or Wheezing  bisacodyl Suppository 10 milliGRAM(s) Rectal daily PRN Constipation  lactulose Syrup 10 Gram(s) Oral every 8 hours PRN constipation  diazepam    Tablet 2.5 milliGRAM(s) Oral every 6 hours PRN spasms/anxiety  HYDROmorphone  Injectable 0.5 milliGRAM(s) IV Push every 4 hours PRN breakthrough pain    PHYSICAL EXAM:    Vital Signs Last 24 Hrs  T(C): 36.6 (30 Aug 2017 05:00), Max: 36.6 (30 Aug 2017 05:00)  T(F): 97.8 (30 Aug 2017 05:00), Max: 97.8 (30 Aug 2017 05:00)  HR: 78 (30 Aug 2017 05:00) (77 - 83)  BP: 140/75 (30 Aug 2017 05:00) (111/68 - 140/75)  BP(mean): --  RR: 18 (30 Aug 2017 05:00) (18 - 20)  SpO2: 88% (29 Aug 2017 16:19) (88% - 88%)    General: alert  oriented x ____ lethargic agitated                  cachexia  nonverbal  coma    Karnofsky:  %    HEENT: normal  dry mouth  ET tube/trach    Lungs: comfortable tachypnea/labored breathing  excessive secretions    CV: normal  tachycardia    GI: normal  distended  tender  no BS               PEG/NG/OG tube  constipation  last BM:     : normal  incontinent  oliguria/anuria  howard    MSK: normal  weakness  edema             ambulatory  bedbound/wheelchair bound    Skin: normal  pressure ulcers- Stage_____  no rash    LABS:      08-29    135  |  97<L>  |  14.0  ----------------------------<  104  5.6<H>   |  25.0  |  0.72    Ca    10.3<H>      29 Aug 2017 07:27  Mg     2.0     08-29          I&O's Summary    29 Aug 2017 07:01  -  30 Aug 2017 07:00  --------------------------------------------------------  IN: 0 mL / OUT: 1000 mL / NET: -1000 mL        RADIOLOGY & ADDITIONAL STUDIES:    ADVANCE DIRECTIVES:   DNR YES NO  Completed on:                     MOLST  YES NO   Completed on:  Living Will  YES NO   Completed on: OVERNIGHT EVENTS: pain better controlled today and he is feeling better.     Present Symptoms:     Dyspnea: 0   Nausea/Vomiting: No  Anxiety:  No  Depression: No  Fatigue: Yes   Loss of appetite: No    Pain: currently controlled with PCA pump             Character-            Duration-            Effect-            Factors-            Frequency-            Location-            Severity-    Review of Systems: Reviewed                     Negative: no chest pain, no abdominal pain          All others negative    MEDICATIONS  (STANDING):  senna 2 Tablet(s) Oral at bedtime  amiodarone    Tablet 200 milliGRAM(s) Oral daily  aspirin  chewable 81 milliGRAM(s) Oral daily  clopidogrel Tablet 75 milliGRAM(s) Oral daily  pantoprazole    Tablet 40 milliGRAM(s) Oral before breakfast  metoprolol succinate  milliGRAM(s) Oral daily  levothyroxine 200 MICROGram(s) Oral daily  simvastatin 20 milliGRAM(s) Oral at bedtime  lactobacillus acidophilus 1 Tablet(s) Oral two times a day with meals  docusate sodium 100 milliGRAM(s) Oral two times a day  pembrolizumab IVPB 200 milliGRAM(s) IV Intermittent once  lidocaine   Patch 1 Patch Transdermal daily  multivitamin 1 Tablet(s) Oral daily  magnesium oxide 400 milliGRAM(s) Oral three times a day with meals  diazepam    Tablet 5 milliGRAM(s) Oral at bedtime  gabapentin 100 milliGRAM(s) Oral at bedtime  polyethylene glycol 3350 17 Gram(s) Oral two times a day  enoxaparin Injectable 40 milliGRAM(s) SubCutaneous daily  dexamethasone  Injectable 4 milliGRAM(s) IV Push every 8 hours  HYDROmorphone PCA (1 mG/mL) 30 milliLiter(s) PCA Continuous PCA Continuous    MEDICATIONS  (PRN):  ALBUTerol/ipratropium for Nebulization 3 milliLiter(s) Nebulizer every 6 hours PRN Shortness of Breath and/or Wheezing  bisacodyl Suppository 10 milliGRAM(s) Rectal daily PRN Constipation  lactulose Syrup 10 Gram(s) Oral every 8 hours PRN constipation  diazepam    Tablet 2.5 milliGRAM(s) Oral every 6 hours PRN spasms/anxiety  HYDROmorphone  Injectable 0.5 milliGRAM(s) IV Push every 4 hours PRN breakthrough pain    PHYSICAL EXAM:    Vital Signs Last 24 Hrs  T(C): 36.6 (30 Aug 2017 05:00), Max: 36.6 (30 Aug 2017 05:00)  T(F): 97.8 (30 Aug 2017 05:00), Max: 97.8 (30 Aug 2017 05:00)  HR: 78 (30 Aug 2017 05:00) (77 - 83)  BP: 140/75 (30 Aug 2017 05:00) (111/68 - 140/75)  BP(mean): --  RR: 18 (30 Aug 2017 05:00) (18 - 20)  SpO2: 88% (29 Aug 2017 16:19) (88% - 88%)    General: alert     Karnofsky:  30%    HEENT: normal      Lungs: comfortable     CV: normal      GI: nondistended     : normal      MSK: weakness     Skin: no rash    LABS:    08-29    135  |  97<L>  |  14.0  ----------------------------<  104  5.6<H>   |  25.0  |  0.72    Ca    10.3<H>      29 Aug 2017 07:27  Mg     2.0     08-29    I&O's Summary    29 Aug 2017 07:01  -  30 Aug 2017 07:00  --------------------------------------------------------  IN: 0 mL / OUT: 1000 mL / NET: -1000 mL    RADIOLOGY & ADDITIONAL STUDIES:    ADVANCE DIRECTIVES: now DNR/I - MOLST done

## 2017-08-30 NOTE — PROGRESS NOTE ADULT - PROBLEM SELECTOR PLAN 4
-patient feels better in terms of pain, he trusts his children to help in makign decisions, spoke to son Paul, they all talked as a family last night and they have decided that they are in agreement with [facility] now as they understand the overall prognosis is poor. They are hopeful that he can transition home from the [facility] once pain and symptoms are better managed. Son stated they all spoke about the directives and they have decided as a family on DNR/I, HUSSEIN filled out.

## 2017-08-30 NOTE — DISCHARGE NOTE ADULT - OTHER SIGNIFICANT FINDINGS
ct abdomen:  Reaccumulation of large loculated left basal complex pleural effusion.   Progression of lung nodules.    Enlarged sacral osseous metastasis.    Mildly distended stomach, correlate for gastroparesis. Mildly distended   colon, correlate for ileus. ct abdomen:    Reaccumulation of large loculated left basal complex pleural effusion.   Progression of lung nodules.    Enlarged sacral osseous metastasis.    Mildly distended stomach, correlate for gastroparesis. Mildly distended   colon, correlate for ileus.

## 2017-08-30 NOTE — DISCHARGE NOTE ADULT - CARE PROVIDER_API CALL
Camilla Cunningham), Medical Oncology  [facility]  [address] [place] [address]  Phone: [phone]  Fax: [phone]

## 2017-08-30 NOTE — DISCHARGE NOTE ADULT - CARE PLAN
Principal Discharge DX:	Hypercalcemia of malignancy  Goal:	stable.  Instructions for follow-up, activity and diet:	received zometa and calcitonin nasal spray  hospice care.  Secondary Diagnosis:	Primary malignant neoplasm of lung metastatic to other site, unspecified laterality  Instructions for follow-up, activity and diet:	hospice care  received 1 dose of keytruda  Secondary Diagnosis:	Chronic pain due to neoplasm  Instructions for follow-up, activity and diet:	dilaudid PCA pump and dexamethasone  Secondary Diagnosis:	Coronary artery disease involving native coronary artery of native heart without angina pectoris  Instructions for follow-up, activity and diet:	continue with aspirin/plavix/metoprolol given recent stent  Secondary Diagnosis:	Protein calorie malnutrition  Instructions for follow-up, activity and diet:	diet as tolerated  Secondary Diagnosis:	VT (ventricular tachycardia)  Instructions for follow-up, activity and diet:	amiodarone  Secondary Diagnosis:	Ileus  Instructions for follow-up, activity and diet:	improving.  ensure daily bowel movements. Principal Discharge DX:	Hypercalcemia of malignancy  Goal:	no more labs checks  Instructions for follow-up, activity and diet:	received zometa and calcitonin nasal spray  hospice care.  Secondary Diagnosis:	Primary malignant neoplasm of lung metastatic to other site, unspecified laterality  Instructions for follow-up, activity and diet:	hospice care  received 1 dose of keytruda  Secondary Diagnosis:	Chronic pain due to neoplasm  Instructions for follow-up, activity and diet:	dilaudid PCA pump and dexamethasone  Secondary Diagnosis:	Coronary artery disease involving native coronary artery of native heart without angina pectoris  Instructions for follow-up, activity and diet:	continue with aspirin/plavix/metoprolol given recent stent  Secondary Diagnosis:	Protein calorie malnutrition  Instructions for follow-up, activity and diet:	diet as tolerated  Secondary Diagnosis:	VT (ventricular tachycardia)  Instructions for follow-up, activity and diet:	amiodarone  Secondary Diagnosis:	Ileus  Instructions for follow-up, activity and diet:	improving.  ensure daily bowel movements.

## 2017-08-30 NOTE — DISCHARGE NOTE ADULT - MEDICATION SUMMARY - MEDICATIONS TO STOP TAKING
I will STOP taking the medications listed below when I get home from the hospital:    CoQ10  -- 100 milligram(s) by mouth once a day    Vitamin D3 2000 intl units oral tablet  -- 1 tab(s) by mouth once a day    albuterol-ipratropium 2.5 mg-0.5 mg/3 mL inhalation solution  -- 3 milliliter(s) inhaled every 6 hours, As needed, Shortness of Breath and/or Wheezing    atorvastatin 40 mg oral tablet  -- 1 tab(s) by mouth once a day (at bedtime)    famotidine 20 mg oral tablet  -- 1 tab(s) by mouth once a day    fenofibrate 48 mg oral tablet  -- 1 tab(s) by mouth once a day    levoFLOXacin 500 mg oral tablet  -- 1 tab(s) by mouth every 24 hours    Multiple Vitamins with Minerals oral tablet  -- 1 tab(s) by mouth once a day    zolpidem 5 mg oral tablet  -- 1 tab(s) by mouth once a day (at bedtime), As needed, Insomnia    fentaNYL 75 mcg/hr transdermal film, extended release  -- 1 patch by transdermal patch every 72 hours    Zantac 150 oral tablet  -- 1 tab(s) by mouth once a day (at bedtime)    potassium chloride 10 mEq oral tablet, extended release  -- 1 tab(s) by mouth 2 times a day    Tylenol 325 mg oral tablet  -- 2 tab(s) by mouth every 4 hours, As Needed    melatonin 3 mg oral tablet  -- 1 tab(s) by mouth once (at bedtime)    simvastatin 20 mg oral tablet  -- 1 tab(s) by mouth once a day (at bedtime)    Feosol 325 mg (65 mg elemental iron) oral tablet  -- 1 tab(s) by mouth 2 times a day    oxyCODONE 5 mg oral tablet  -- 1 tab(s) by mouth every 4 hours, As Needed I will STOP taking the medications listed below when I get home from the hospital:    CoQ10  -- 100 milligram(s) by mouth once a day    Vitamin D3 2000 intl units oral tablet  -- 1 tab(s) by mouth once a day    albuterol-ipratropium 2.5 mg-0.5 mg/3 mL inhalation solution  -- 3 milliliter(s) inhaled every 6 hours, As needed, Shortness of Breath and/or Wheezing    atorvastatin 40 mg oral tablet  -- 1 tab(s) by mouth once a day (at bedtime)    famotidine 20 mg oral tablet  -- 1 tab(s) by mouth once a day    fenofibrate 48 mg oral tablet  -- 1 tab(s) by mouth once a day    levoFLOXacin 500 mg oral tablet  -- 1 tab(s) by mouth every 24 hours    zolpidem 5 mg oral tablet  -- 1 tab(s) by mouth once a day (at bedtime), As needed, Insomnia    fentaNYL 75 mcg/hr transdermal film, extended release  -- 1 patch by transdermal patch every 72 hours    Zantac 150 oral tablet  -- 1 tab(s) by mouth once a day (at bedtime)    potassium chloride 10 mEq oral tablet, extended release  -- 1 tab(s) by mouth 2 times a day    Tylenol 325 mg oral tablet  -- 2 tab(s) by mouth every 4 hours, As Needed    simvastatin 20 mg oral tablet  -- 1 tab(s) by mouth once a day (at bedtime)    Feosol 325 mg (65 mg elemental iron) oral tablet  -- 1 tab(s) by mouth 2 times a day    oxyCODONE 5 mg oral tablet  -- 1 tab(s) by mouth every 4 hours, As Needed

## 2017-08-30 NOTE — DISCHARGE NOTE ADULT - PATIENT PORTAL LINK FT
“You can access the Follow[facility] Patient Portal, offered by [facility], by registering with the following website: [email or web address]”

## 2017-08-30 NOTE — DISCHARGE NOTE ADULT - SECONDARY DIAGNOSIS.
Primary malignant neoplasm of lung metastatic to other site, unspecified laterality Chronic pain due to neoplasm Coronary artery disease involving native coronary artery of native heart without angina pectoris Protein calorie malnutrition VT (ventricular tachycardia) Ileus

## 2017-08-31 PROCEDURE — 99232 SBSQ HOSP IP/OBS MODERATE 35: CPT

## 2017-08-31 PROCEDURE — 99233 SBSQ HOSP IP/OBS HIGH 50: CPT

## 2017-08-31 RX ORDER — HYDROMORPHONE HYDROCHLORIDE 2 MG/ML
30 INJECTION INTRAMUSCULAR; INTRAVENOUS; SUBCUTANEOUS
Qty: 0 | Refills: 0 | Status: DISCONTINUED | OUTPATIENT
Start: 2017-08-31 | End: 2017-09-07

## 2017-08-31 RX ADMIN — HYDROMORPHONE HYDROCHLORIDE 0.5 MILLIGRAM(S): 2 INJECTION INTRAMUSCULAR; INTRAVENOUS; SUBCUTANEOUS at 08:42

## 2017-08-31 RX ADMIN — Medication 81 MILLIGRAM(S): at 11:16

## 2017-08-31 RX ADMIN — Medication 100 MILLIGRAM(S): at 05:57

## 2017-08-31 RX ADMIN — SENNA PLUS 2 TABLET(S): 8.6 TABLET ORAL at 21:24

## 2017-08-31 RX ADMIN — LIDOCAINE 1 PATCH: 4 CREAM TOPICAL at 11:16

## 2017-08-31 RX ADMIN — Medication 1 TABLET(S): at 18:41

## 2017-08-31 RX ADMIN — GABAPENTIN 100 MILLIGRAM(S): 400 CAPSULE ORAL at 21:24

## 2017-08-31 RX ADMIN — Medication 100 MILLIGRAM(S): at 18:41

## 2017-08-31 RX ADMIN — Medication 200 MICROGRAM(S): at 05:57

## 2017-08-31 RX ADMIN — Medication 1 TABLET(S): at 08:21

## 2017-08-31 RX ADMIN — PANTOPRAZOLE SODIUM 40 MILLIGRAM(S): 20 TABLET, DELAYED RELEASE ORAL at 06:00

## 2017-08-31 RX ADMIN — LIDOCAINE 1 PATCH: 4 CREAM TOPICAL at 00:24

## 2017-08-31 RX ADMIN — MAGNESIUM OXIDE 400 MG ORAL TABLET 400 MILLIGRAM(S): 241.3 TABLET ORAL at 18:41

## 2017-08-31 RX ADMIN — POLYETHYLENE GLYCOL 3350 17 GRAM(S): 17 POWDER, FOR SOLUTION ORAL at 18:41

## 2017-08-31 RX ADMIN — HYDROMORPHONE HYDROCHLORIDE 30 MILLILITER(S): 2 INJECTION INTRAMUSCULAR; INTRAVENOUS; SUBCUTANEOUS at 08:04

## 2017-08-31 RX ADMIN — Medication 5 MILLIGRAM(S): at 21:24

## 2017-08-31 RX ADMIN — MAGNESIUM OXIDE 400 MG ORAL TABLET 400 MILLIGRAM(S): 241.3 TABLET ORAL at 08:21

## 2017-08-31 RX ADMIN — Medication 4 MILLIGRAM(S): at 21:23

## 2017-08-31 RX ADMIN — Medication 4 MILLIGRAM(S): at 05:56

## 2017-08-31 RX ADMIN — Medication 1 TABLET(S): at 11:16

## 2017-08-31 RX ADMIN — AMIODARONE HYDROCHLORIDE 200 MILLIGRAM(S): 400 TABLET ORAL at 05:57

## 2017-08-31 RX ADMIN — ENOXAPARIN SODIUM 40 MILLIGRAM(S): 100 INJECTION SUBCUTANEOUS at 11:16

## 2017-08-31 RX ADMIN — HYDROMORPHONE HYDROCHLORIDE 0.5 MILLIGRAM(S): 2 INJECTION INTRAMUSCULAR; INTRAVENOUS; SUBCUTANEOUS at 08:21

## 2017-08-31 RX ADMIN — POLYETHYLENE GLYCOL 3350 17 GRAM(S): 17 POWDER, FOR SOLUTION ORAL at 05:59

## 2017-08-31 RX ADMIN — SIMVASTATIN 20 MILLIGRAM(S): 20 TABLET, FILM COATED ORAL at 21:24

## 2017-08-31 RX ADMIN — CLOPIDOGREL BISULFATE 75 MILLIGRAM(S): 75 TABLET, FILM COATED ORAL at 11:16

## 2017-08-31 RX ADMIN — HYDROMORPHONE HYDROCHLORIDE 30 MILLILITER(S): 2 INJECTION INTRAMUSCULAR; INTRAVENOUS; SUBCUTANEOUS at 11:17

## 2017-08-31 NOTE — PROGRESS NOTE ADULT - ATTENDING COMMENTS
Thank you for the opportunity to assist with the care of this patient.   [facility] [phone].

## 2017-08-31 NOTE — PROGRESS NOTE ADULT - ASSESSMENT
71 yom with pmh which includes but not limited to Metastatic Lung Cancer follows at [facility], CAD s/p stents, V. Tach, HTN, HLD and Hypothyroidism sent from [facility] with hypercalcemia, low albumin and leucocytosis. Patient was recently at [facility] for loculated pleural effusions s/p chest tube and iv abx and severe hypercalcemia. Currently getting treated at this time for effusion and has started keytruda via Oncology. He is also found to have an ileus, being medically managed as surgery has signed off.

## 2017-08-31 NOTE — PROGRESS NOTE ADULT - SUBJECTIVE AND OBJECTIVE BOX
OVERNIGHT EVENTS:    BRIEF HOSPITAL COURSE:    Present Symptoms:     Dyspnea: 0 1 2 3   Nausea/Vomiting: Yes No  Anxiety:  Yes No  Depression: Yes No  Fatigue: Yes No  Loss of appetite: Yes No    Pain:             Character-            Duration-            Effect-            Factors-            Frequency-            Location-            Severity-    Review of Systems: Reviewed                     Negative:                     Positive:  Unable to obtain due to poor mentation   All others negative    MEDICATIONS  (STANDING):  senna 2 Tablet(s) Oral at bedtime  amiodarone    Tablet 200 milliGRAM(s) Oral daily  aspirin  chewable 81 milliGRAM(s) Oral daily  clopidogrel Tablet 75 milliGRAM(s) Oral daily  pantoprazole    Tablet 40 milliGRAM(s) Oral before breakfast  metoprolol succinate  milliGRAM(s) Oral daily  levothyroxine 200 MICROGram(s) Oral daily  simvastatin 20 milliGRAM(s) Oral at bedtime  lactobacillus acidophilus 1 Tablet(s) Oral two times a day with meals  docusate sodium 100 milliGRAM(s) Oral two times a day  pembrolizumab IVPB 200 milliGRAM(s) IV Intermittent once  lidocaine   Patch 1 Patch Transdermal daily  multivitamin 1 Tablet(s) Oral daily  magnesium oxide 400 milliGRAM(s) Oral three times a day with meals  diazepam    Tablet 5 milliGRAM(s) Oral at bedtime  gabapentin 100 milliGRAM(s) Oral at bedtime  polyethylene glycol 3350 17 Gram(s) Oral two times a day  enoxaparin Injectable 40 milliGRAM(s) SubCutaneous daily  dexamethasone  Injectable 4 milliGRAM(s) IV Push every 8 hours  HYDROmorphone PCA (1 mG/mL) 30 milliLiter(s) PCA Continuous PCA Continuous    MEDICATIONS  (PRN):  ALBUTerol/ipratropium for Nebulization 3 milliLiter(s) Nebulizer every 6 hours PRN Shortness of Breath and/or Wheezing  bisacodyl Suppository 10 milliGRAM(s) Rectal daily PRN Constipation  lactulose Syrup 10 Gram(s) Oral every 8 hours PRN constipation  diazepam    Tablet 2.5 milliGRAM(s) Oral every 6 hours PRN spasms/anxiety  HYDROmorphone  Injectable 0.5 milliGRAM(s) IV Push every 4 hours PRN breakthrough pain      PHYSICAL EXAM:    Vital Signs Last 24 Hrs  T(C): 36.2 (31 Aug 2017 04:30), Max: 36.3 (30 Aug 2017 21:27)  T(F): 97.2 (31 Aug 2017 04:30), Max: 97.4 (30 Aug 2017 21:27)  HR: 78 (31 Aug 2017 04:30) (78 - 88)  BP: 131/80 (31 Aug 2017 04:30) (111/65 - 131/80)  BP(mean): --  RR: 18 (31 Aug 2017 04:30) (18 - 18)  SpO2: 99% (30 Aug 2017 16:14) (99% - 99%)    General: alert  oriented x ____ lethargic agitated                  cachexia  nonverbal  coma    Karnofsky:  %    HEENT: normal  dry mouth  ET tube/trach    Lungs: comfortable tachypnea/labored breathing  excessive secretions    CV: normal  tachycardia    GI: normal  distended  tender  no BS               PEG/NG/OG tube  constipation  last BM:     : normal  incontinent  oliguria/anuria  anita    MSK: normal  weakness  edema             ambulatory  bedbound/wheelchair bound    Skin: normal  pressure ulcers- Stage_____  no rash    LABS:                I&O's Summary    30 Aug 2017 07:01  -  31 Aug 2017 07:00  --------------------------------------------------------  IN: 0 mL / OUT: 400 mL / NET: -400 mL        RADIOLOGY & ADDITIONAL STUDIES:    ADVANCE DIRECTIVES:   DNR YES NO  Completed on:                     MOLST  YES NO   Completed on:  Living Will  YES NO   Completed on: OVERNIGHT EVENTS: had an episode of pain last night, requiring a lot of PRN doses through the PCA.     Present Symptoms:     Dyspnea: 0  Nausea/Vomiting: No  Anxiety:  No  Depression: No  Fatigue: Yes   Loss of appetite: No    Pain: now managed, but had an episode of severe pain overnight.             Character-            Duration-            Effect-            Factors-            Frequency-            Location-            Severity-    Review of Systems: Reviewed                     Negative: no chest pain                  All others negative    MEDICATIONS  (STANDING):  senna 2 Tablet(s) Oral at bedtime  amiodarone    Tablet 200 milliGRAM(s) Oral daily  aspirin  chewable 81 milliGRAM(s) Oral daily  clopidogrel Tablet 75 milliGRAM(s) Oral daily  pantoprazole    Tablet 40 milliGRAM(s) Oral before breakfast  metoprolol succinate  milliGRAM(s) Oral daily  levothyroxine 200 MICROGram(s) Oral daily  simvastatin 20 milliGRAM(s) Oral at bedtime  lactobacillus acidophilus 1 Tablet(s) Oral two times a day with meals  docusate sodium 100 milliGRAM(s) Oral two times a day  pembrolizumab IVPB 200 milliGRAM(s) IV Intermittent once  lidocaine   Patch 1 Patch Transdermal daily  multivitamin 1 Tablet(s) Oral daily  magnesium oxide 400 milliGRAM(s) Oral three times a day with meals  diazepam    Tablet 5 milliGRAM(s) Oral at bedtime  gabapentin 100 milliGRAM(s) Oral at bedtime  polyethylene glycol 3350 17 Gram(s) Oral two times a day  enoxaparin Injectable 40 milliGRAM(s) SubCutaneous daily  dexamethasone  Injectable 4 milliGRAM(s) IV Push every 8 hours  HYDROmorphone PCA (1 mG/mL) 30 milliLiter(s) PCA Continuous PCA Continuous    MEDICATIONS  (PRN):  ALBUTerol/ipratropium for Nebulization 3 milliLiter(s) Nebulizer every 6 hours PRN Shortness of Breath and/or Wheezing  bisacodyl Suppository 10 milliGRAM(s) Rectal daily PRN Constipation  lactulose Syrup 10 Gram(s) Oral every 8 hours PRN constipation  diazepam    Tablet 2.5 milliGRAM(s) Oral every 6 hours PRN spasms/anxiety  HYDROmorphone  Injectable 0.5 milliGRAM(s) IV Push every 4 hours PRN breakthrough pain      PHYSICAL EXAM:    Vital Signs Last 24 Hrs  T(C): 36.2 (31 Aug 2017 04:30), Max: 36.3 (30 Aug 2017 21:27)  T(F): 97.2 (31 Aug 2017 04:30), Max: 97.4 (30 Aug 2017 21:27)  HR: 78 (31 Aug 2017 04:30) (78 - 88)  BP: 131/80 (31 Aug 2017 04:30) (111/65 - 131/80)  BP(mean): --  RR: 18 (31 Aug 2017 04:30) (18 - 18)  SpO2: 99% (30 Aug 2017 16:14) (99% - 99%)    General: alert  but delirious    Karnofsky:  30%    HEENT: normal     Lungs: comfortable     CV: normal      GI: nondistended    : normal     MSK: weakness      Skin  no rash    LABS: none     I&O's Summary    30 Aug 2017 07:01  -  31 Aug 2017 07:00  --------------------------------------------------------  IN: 0 mL / OUT: 400 mL / NET: -400 mL    RADIOLOGY & ADDITIONAL STUDIES:    ADVANCE DIRECTIVES: DNR/I - MOLST OVERNIGHT EVENTS: had an episode of pain last night, requiring a lot of PRN doses through the PCA.     Present Symptoms:     Dyspnea: 0  Nausea/Vomiting: No  Anxiety:  No  Depression: No  Fatigue: Yes   Loss of appetite: No    Pain: now managed, but had an episode of severe pain overnight.             Character-            Duration-            Effect-            Factors-            Frequency-            Location-            Severity-    Review of Systems: Reviewed                     Negative: no chest pain                  All others negative    MEDICATIONS  (STANDING):  senna 2 Tablet(s) Oral at bedtime  amiodarone    Tablet 200 milliGRAM(s) Oral daily  aspirin  chewable 81 milliGRAM(s) Oral daily  clopidogrel Tablet 75 milliGRAM(s) Oral daily  pantoprazole    Tablet 40 milliGRAM(s) Oral before breakfast  metoprolol succinate  milliGRAM(s) Oral daily  levothyroxine 200 MICROGram(s) Oral daily  simvastatin 20 milliGRAM(s) Oral at bedtime  lactobacillus acidophilus 1 Tablet(s) Oral two times a day with meals  docusate sodium 100 milliGRAM(s) Oral two times a day  pembrolizumab IVPB 200 milliGRAM(s) IV Intermittent once  lidocaine   Patch 1 Patch Transdermal daily  multivitamin 1 Tablet(s) Oral daily  magnesium oxide 400 milliGRAM(s) Oral three times a day with meals  diazepam    Tablet 5 milliGRAM(s) Oral at bedtime  gabapentin 100 milliGRAM(s) Oral at bedtime  polyethylene glycol 3350 17 Gram(s) Oral two times a day  enoxaparin Injectable 40 milliGRAM(s) SubCutaneous daily  dexamethasone  Injectable 4 milliGRAM(s) IV Push every 8 hours  HYDROmorphone PCA (1 mG/mL) 30 milliLiter(s) PCA Continuous PCA Continuous    MEDICATIONS  (PRN):  ALBUTerol/ipratropium for Nebulization 3 milliLiter(s) Nebulizer every 6 hours PRN Shortness of Breath and/or Wheezing  bisacodyl Suppository 10 milliGRAM(s) Rectal daily PRN Constipation  lactulose Syrup 10 Gram(s) Oral every 8 hours PRN constipation  diazepam    Tablet 2.5 milliGRAM(s) Oral every 6 hours PRN spasms/anxiety  HYDROmorphone  Injectable 0.5 milliGRAM(s) IV Push every 4 hours PRN breakthrough pain    PHYSICAL EXAM:    Vital Signs Last 24 Hrs  T(C): 36.2 (31 Aug 2017 04:30), Max: 36.3 (30 Aug 2017 21:27)  T(F): 97.2 (31 Aug 2017 04:30), Max: 97.4 (30 Aug 2017 21:27)  HR: 78 (31 Aug 2017 04:30) (78 - 88)  BP: 131/80 (31 Aug 2017 04:30) (111/65 - 131/80)  BP(mean): --  RR: 18 (31 Aug 2017 04:30) (18 - 18)  SpO2: 99% (30 Aug 2017 16:14) (99% - 99%)    General: alert  but delirious    Karnofsky:  30%    HEENT: normal     Lungs: comfortable     CV: normal      GI: nondistended    : normal     MSK: weakness      Skin  no rash    LABS: none     I&O's Summary    30 Aug 2017 07:01  -  31 Aug 2017 07:00  --------------------------------------------------------  IN: 0 mL / OUT: 400 mL / NET: -400 mL    RADIOLOGY & ADDITIONAL STUDIES:    ADVANCE DIRECTIVES: DNR/I - MOLST

## 2017-08-31 NOTE — PROGRESS NOTE ADULT - ASSESSMENT
71M with end stage lung cancer s/p keytruda on 8/23, with severe neoplasm pain, debility, resolved ileus, with acute pain crisis overnight.

## 2017-08-31 NOTE — PROGRESS NOTE ADULT - PROBLEM SELECTOR PLAN 1
Despite the development of ileus, and progressive decline in performance status, decision in conjunction with the [facility]us family to proceed with an initial dose of pembroluzimab 8/23/17.   All understand that the prognosis is poor and the chance of meaningful response is low, but agree that the wishes of the family and patient are to proceed.  I met today with patient and son Greta.  Plan for hospice inn. Agree with poor prognosis and unlikelihood that continuing therapy will change outcome.

## 2017-08-31 NOTE — PROGRESS NOTE ADULT - ASSESSMENT
71 yom with pmh which includes but not limited to Metastatic Lung Cancer follows at [facility], CAD s/p stents, V. Tach, HTN, HLD and Hypothyroidism sent from [facility] with hypercalcemia, low albumin and leucocytosis. Patient was recently at [facility] for loculated pleural effusions s/p chest tube and iv abx and severe hypercalcemia. Currently getting treated at this time for effusion and has started keytruda via Oncology. He is also found to have an ileus, being medically managed as surgery has signed off.

## 2017-08-31 NOTE — PROGRESS NOTE ADULT - PROBLEM SELECTOR PLAN 4
-very guarded overall prognosis of likely weeks, he is planned to go to the hospice INN at this time for further aggressive symptom management and focus on comfort. Support provided to patient and his family. They all understand guarded prognosis

## 2017-08-31 NOTE — PROGRESS NOTE ADULT - SUBJECTIVE AND OBJECTIVE BOX
HPI:  71 years old male with metastatic lung cancer, CAD s/p stents, V. Tach, HTN, HLD and hypothyroidism sent from [facility] with hypercalcemia, low albumin and leucocytosis. Patient was recently diagnosed with metastatic lung cancer in July 2017 and received first dose of pembroluzimab (Keytruda) 8/23 without difficulty.  Back pain improved with addition of fentanyl and Decadron. Hematocrit stable at 30 %, calcium 10. Denies any chest pain, palpitations, shortness of breath, abdominal pain, constipation or fever. He is alert and awake. He was a/w hypercalcemia and was treated with IVF, Calcitonin and Pamidronate.     PAST MEDICAL & SURGICAL HISTORY:  Psoriasis    PSVT (paroxysmal supraventricular tachycardia)  Chronic systolic CHF (congestive heart failure), NYHA class 2  Former smoker  Hypokinesis: apical  Mitral regurgitation  Bronchitis  Hypertension  PSVT (paroxysmal supraventricular tachycardia)  Ischemic cardiomyopathy  AICD (automatic cardioverter/defibrillator) present: 2010 [employer]  VT (ventricular tachycardia): May 2017 no ICD shock  Vitamin D deficiency  Tricuspid regurgitation  RBBB  Prostate cancer: s/p surgery no RT/CT  Mitral regurgitation  Hypothyroidism  HLD (hyperlipidemia)  Arrhythmia  Angina pectoris  CAD (coronary artery disease)  History of bronchoscopy: 2017  History of prostate surgery: davinci surgery in 2008  Cardiac disorder: triple bypass may 1997 [facility]  History of electrophysiologic study: 2009 positive study (AICD placement  2010)  H/O cardiac catheterization: stenting of SVG TO PDA IN 2010 2017 one stent      MEDICATIONS  (STANDING):  senna 2 Tablet(s) Oral at bedtime  amiodarone    Tablet 200 milliGRAM(s) Oral daily  aspirin  chewable 81 milliGRAM(s) Oral daily  clopidogrel Tablet 75 milliGRAM(s) Oral daily  pantoprazole    Tablet 40 milliGRAM(s) Oral before breakfast  metoprolol succinate  milliGRAM(s) Oral daily  levothyroxine 200 MICROGram(s) Oral daily  simvastatin 20 milliGRAM(s) Oral at bedtime  lactobacillus acidophilus 1 Tablet(s) Oral two times a day with meals  docusate sodium 100 milliGRAM(s) Oral two times a day  pembrolizumab IVPB 200 milliGRAM(s) IV Intermittent once  lidocaine   Patch 1 Patch Transdermal daily  multivitamin 1 Tablet(s) Oral daily  magnesium oxide 400 milliGRAM(s) Oral three times a day with meals  diazepam    Tablet 5 milliGRAM(s) Oral at bedtime  gabapentin 100 milliGRAM(s) Oral at bedtime  polyethylene glycol 3350 17 Gram(s) Oral two times a day  fentaNYL   Patch 100 MICROgram(s)/Hr 1 Patch Transdermal every 72 hours  fentaNYL   Patch  75 MICROgram(s)/Hr 1 Patch Transdermal every 72 hours  HYDROmorphone   Tablet 6 milliGRAM(s) Oral every 4 hours  dexamethasone  Injectable 4 milliGRAM(s) IV Push every 12 hours  enoxaparin Injectable 40 milliGRAM(s) SubCutaneous daily    MEDICATIONS  (PRN):  ALBUTerol/ipratropium for Nebulization 3 milliLiter(s) Nebulizer every 6 hours PRN Shortness of Breath and/or Wheezing  bisacodyl Suppository 10 milliGRAM(s) Rectal daily PRN Constipation  lactulose Syrup 10 Gram(s) Oral every 8 hours PRN constipation  diazepam    Tablet 2.5 milliGRAM(s) Oral every 6 hours PRN spasms/anxiety  HYDROmorphone   Tablet 8 milliGRAM(s) Oral every 4 hours PRN Severe Pain (7 - 10)  HYDROmorphone   Tablet 6 milliGRAM(s) Oral every 4 hours PRN mild to moderate pain (1-6)  HYDROmorphone  Injectable 1 milliGRAM(s) IV Push every 6 hours PRN breakthrough pain      Allergies    macrolide antibiotics (Urticaria; Rash; Hives)  penicillin (Urticaria; Rash)  Zithromax Z-Christian (Urticaria; Rash; Hives)    Intolerances        FAMILY HISTORY:  Family history of heart disease (Sibling)  Family history of diabetes mellitus (Sibling)  Family history of lung cancer (Sibling)  Family history of prostate cancer (Sibling)      Review of Systems    Constitutional, Eyes, ENT, Cardiovascular, Respiratory, Gastrointestinal, Genitourinary, Musculoskeletal, Integumentary, Neurological, Psychiatric, Endocrine, Heme/Lymph and Allergic/Immunologic review of systems are otherwise negative except as noted in HPI.     Vital Signs Last 24 Hrs  T(C): 36.8 (28 Aug 2017 16:00), Max: 36.8 (28 Aug 2017 16:00)  T(F): 98.3 (28 Aug 2017 16:00), Max: 98.3 (28 Aug 2017 16:00)  HR: 78 (28 Aug 2017 16:00) (72 - 78)  BP: 127/77 (28 Aug 2017 16:00) (120/76 - 127/77)  BP(mean): --  RR: 17 (28 Aug 2017 16:00) (17 - 18)  SpO2: 97% (28 Aug 2017 20:35) (93% - 97%)    Physical Exam  Constitutional: well developed, well nourished, in no acute distress and thin.   Eyes: PERRL, EOMI, no conjunctival infection, anicteric.   ENT: pharynx is unremarkable, moist mucus membrane, no oral lesions.   Neck: supple without JVD, no thyromegaly or masses appreciated.   Pulmonary: clear to auscultation bilaterally,  Cardiac: RRR, normal S1S2, no murmurs, rubs, gallops.   Vascular: no JVD, no calf tenderness, venous stasis changes, varices.   Abdomen: distended  Lymphatic: no peripheral adenopathy appreciated.   Musculoskeletal: full range of motion and no deformities appreciated.   Skin: normal appearance, no rash, nodules, vesicles, ulcers, erythema.   Neurology: grossly intact.   Psychiatric: affect appropriate.       LABS:  CBC Full  -  ( 27 Aug 2017 08:38 )  WBC Count : 31.8 K/uL  Hemoglobin : 8.6 g/dL  Hematocrit : 27.7 %  Platelet Count - Automated : 312 K/uL  Mean Cell Volume : 84.5 fl  Mean Cell Hemoglobin : 26.2 pg  Mean Cell Hemoglobin Concentration : 31.0 g/dL  Auto Neutrophil # : 29.1 K/uL  Auto Lymphocyte # : 1.7 K/uL  Auto Monocyte # : 0.9 K/uL  Auto Eosinophil # : x  Auto Basophil # : 0.0 K/uL  Auto Neutrophil % : 91.4 %  Auto Lymphocyte % : 5.2 %  Auto Monocyte % : 2.7 %  Auto Eosinophil % : x  Auto Basophil % : x    08-27    135  |  100  |  11.0  ----------------------------<  193<H>  4.4   |  23.0  |  0.73    Ca    10.4<H>      27 Aug 2017 08:38  Phos  2.8     08-27  Mg     2.0     08-27    TPro  x   /  Alb  2.4<L>  /  TBili  x   /  DBili  x   /  AST  x   /  ALT  x   /  AlkPhos  x   08-27          RADIOLOGY & ADDITIONAL STUDIES:

## 2017-08-31 NOTE — PROGRESS NOTE ADULT - SUBJECTIVE AND OBJECTIVE BOX
seen for hospice care, hypercalcemia, lung ca wiht mets    pain ok on pca  pt with mild delirium  limited ROS     MEDICATIONS  (STANDING):  senna 2 Tablet(s) Oral at bedtime  amiodarone    Tablet 200 milliGRAM(s) Oral daily  aspirin  chewable 81 milliGRAM(s) Oral daily  clopidogrel Tablet 75 milliGRAM(s) Oral daily  pantoprazole    Tablet 40 milliGRAM(s) Oral before breakfast  metoprolol succinate  milliGRAM(s) Oral daily  levothyroxine 200 MICROGram(s) Oral daily  simvastatin 20 milliGRAM(s) Oral at bedtime  lactobacillus acidophilus 1 Tablet(s) Oral two times a day with meals  docusate sodium 100 milliGRAM(s) Oral two times a day  pembrolizumab IVPB 200 milliGRAM(s) IV Intermittent once  lidocaine   Patch 1 Patch Transdermal daily  multivitamin 1 Tablet(s) Oral daily  magnesium oxide 400 milliGRAM(s) Oral three times a day with meals  diazepam    Tablet 5 milliGRAM(s) Oral at bedtime  gabapentin 100 milliGRAM(s) Oral at bedtime  polyethylene glycol 3350 17 Gram(s) Oral two times a day  enoxaparin Injectable 40 milliGRAM(s) SubCutaneous daily  dexamethasone  Injectable 4 milliGRAM(s) IV Push every 8 hours  HYDROmorphone PCA (1 mG/mL) 30 milliLiter(s) PCA Continuous PCA Continuous    MEDICATIONS  (PRN):  ALBUTerol/ipratropium for Nebulization 3 milliLiter(s) Nebulizer every 6 hours PRN Shortness of Breath and/or Wheezing  bisacodyl Suppository 10 milliGRAM(s) Rectal daily PRN Constipation  lactulose Syrup 10 Gram(s) Oral every 8 hours PRN constipation  diazepam    Tablet 2.5 milliGRAM(s) Oral every 6 hours PRN spasms/anxiety  HYDROmorphone  Injectable 0.5 milliGRAM(s) IV Push every 4 hours PRN breakthrough pain      Allergies    macrolide antibiotics (Urticaria; Rash; Hives)  penicillin (Urticaria; Rash)  Zithromax Z-Christian (Urticaria; Rash; Hives)      PHYSICAL EXAM:    GENERAL: NAD  ABDOMEN: softer, mildly distended   EXTREMITIES:  trace edema.   NERVOUS SYSTEM:  awake, responsive  confused ,n onfocal    LABS:                CAPILLARY BLOOD GLUCOSE            RADIOLOGY & ADDITIONAL TESTS:

## 2017-09-01 PROCEDURE — 99233 SBSQ HOSP IP/OBS HIGH 50: CPT

## 2017-09-01 RX ADMIN — Medication 10 MILLIGRAM(S): at 21:08

## 2017-09-01 RX ADMIN — POLYETHYLENE GLYCOL 3350 17 GRAM(S): 17 POWDER, FOR SOLUTION ORAL at 05:43

## 2017-09-01 RX ADMIN — Medication 1 TABLET(S): at 09:00

## 2017-09-01 RX ADMIN — Medication 81 MILLIGRAM(S): at 12:12

## 2017-09-01 RX ADMIN — AMIODARONE HYDROCHLORIDE 200 MILLIGRAM(S): 400 TABLET ORAL at 05:42

## 2017-09-01 RX ADMIN — LIDOCAINE 1 PATCH: 4 CREAM TOPICAL at 12:12

## 2017-09-01 RX ADMIN — Medication 200 MICROGRAM(S): at 05:42

## 2017-09-01 RX ADMIN — Medication 100 MILLIGRAM(S): at 05:42

## 2017-09-01 RX ADMIN — Medication 100 MILLIGRAM(S): at 17:09

## 2017-09-01 RX ADMIN — GABAPENTIN 100 MILLIGRAM(S): 400 CAPSULE ORAL at 21:08

## 2017-09-01 RX ADMIN — POLYETHYLENE GLYCOL 3350 17 GRAM(S): 17 POWDER, FOR SOLUTION ORAL at 17:10

## 2017-09-01 RX ADMIN — Medication 4 MILLIGRAM(S): at 21:08

## 2017-09-01 RX ADMIN — HYDROMORPHONE HYDROCHLORIDE 30 MILLILITER(S): 2 INJECTION INTRAMUSCULAR; INTRAVENOUS; SUBCUTANEOUS at 06:11

## 2017-09-01 RX ADMIN — ENOXAPARIN SODIUM 40 MILLIGRAM(S): 100 INJECTION SUBCUTANEOUS at 12:12

## 2017-09-01 RX ADMIN — Medication 4 MILLIGRAM(S): at 17:10

## 2017-09-01 RX ADMIN — SIMVASTATIN 20 MILLIGRAM(S): 20 TABLET, FILM COATED ORAL at 21:09

## 2017-09-01 RX ADMIN — MAGNESIUM OXIDE 400 MG ORAL TABLET 400 MILLIGRAM(S): 241.3 TABLET ORAL at 12:12

## 2017-09-01 RX ADMIN — MAGNESIUM OXIDE 400 MG ORAL TABLET 400 MILLIGRAM(S): 241.3 TABLET ORAL at 17:10

## 2017-09-01 RX ADMIN — Medication 4 MILLIGRAM(S): at 05:42

## 2017-09-01 RX ADMIN — PANTOPRAZOLE SODIUM 40 MILLIGRAM(S): 20 TABLET, DELAYED RELEASE ORAL at 06:15

## 2017-09-01 RX ADMIN — CLOPIDOGREL BISULFATE 75 MILLIGRAM(S): 75 TABLET, FILM COATED ORAL at 12:12

## 2017-09-01 RX ADMIN — LACTULOSE 10 GRAM(S): 10 SOLUTION ORAL at 16:13

## 2017-09-01 RX ADMIN — MAGNESIUM OXIDE 400 MG ORAL TABLET 400 MILLIGRAM(S): 241.3 TABLET ORAL at 09:00

## 2017-09-01 RX ADMIN — LIDOCAINE 1 PATCH: 4 CREAM TOPICAL at 06:18

## 2017-09-01 RX ADMIN — Medication 1 TABLET(S): at 12:12

## 2017-09-01 RX ADMIN — Medication 1 TABLET(S): at 17:10

## 2017-09-01 RX ADMIN — Medication 5 MILLIGRAM(S): at 21:08

## 2017-09-01 RX ADMIN — SENNA PLUS 2 TABLET(S): 8.6 TABLET ORAL at 21:09

## 2017-09-01 NOTE — PROGRESS NOTE ADULT - ATTENDING COMMENTS
hospice pending  spoke with daughter/son yesterday in detail re hospice care as [facility] told family that all medications aside from pain medications stopped . I advised family that is not the case and care is tailored to each individual as indicated.    reiterated grave prognosis of patient and need for IV steroids/dilaudid PCA for pain and need for [facility].  hospice to talk to family again and discuss all of their concerns

## 2017-09-01 NOTE — PROGRESS NOTE ADULT - SUBJECTIVE AND OBJECTIVE BOX
OVERNIGHT EVENTS: in pain     Present Symptoms:     Dyspnea: 0   Nausea/Vomiting: No  Anxiety:  No  Depression: No  Fatigue: Yes   Loss of appetite: No    Pain: 8/10            Character- constant             Duration- hours             Effect- constant             Factors- everything             Frequency- often             Location- back             Severity-8/10     Review of Systems: Reviewed                                   Positive: back pain     MEDICATIONS  (STANDING):  senna 2 Tablet(s) Oral at bedtime  amiodarone    Tablet 200 milliGRAM(s) Oral daily  aspirin  chewable 81 milliGRAM(s) Oral daily  clopidogrel Tablet 75 milliGRAM(s) Oral daily  pantoprazole    Tablet 40 milliGRAM(s) Oral before breakfast  metoprolol succinate  milliGRAM(s) Oral daily  levothyroxine 200 MICROGram(s) Oral daily  simvastatin 20 milliGRAM(s) Oral at bedtime  lactobacillus acidophilus 1 Tablet(s) Oral two times a day with meals  docusate sodium 100 milliGRAM(s) Oral two times a day  pembrolizumab IVPB 200 milliGRAM(s) IV Intermittent once  lidocaine   Patch 1 Patch Transdermal daily  multivitamin 1 Tablet(s) Oral daily  magnesium oxide 400 milliGRAM(s) Oral three times a day with meals  diazepam    Tablet 5 milliGRAM(s) Oral at bedtime  gabapentin 100 milliGRAM(s) Oral at bedtime  polyethylene glycol 3350 17 Gram(s) Oral two times a day  enoxaparin Injectable 40 milliGRAM(s) SubCutaneous daily  dexamethasone  Injectable 4 milliGRAM(s) IV Push every 8 hours  HYDROmorphone PCA (1 mG/mL) 30 milliLiter(s) PCA Continuous PCA Continuous    MEDICATIONS  (PRN):  ALBUTerol/ipratropium for Nebulization 3 milliLiter(s) Nebulizer every 6 hours PRN Shortness of Breath and/or Wheezing  bisacodyl Suppository 10 milliGRAM(s) Rectal daily PRN Constipation  lactulose Syrup 10 Gram(s) Oral every 8 hours PRN constipation  diazepam    Tablet 2.5 milliGRAM(s) Oral every 6 hours PRN spasms/anxiety  HYDROmorphone  Injectable 0.5 milliGRAM(s) IV Push every 4 hours PRN breakthrough pain    PHYSICAL EXAM:    Vital Signs Last 24 Hrs  T(C): 36.7 (01 Sep 2017 04:30), Max: 36.7 (31 Aug 2017 13:37)  T(F): 98.1 (01 Sep 2017 04:30), Max: 98.1 (31 Aug 2017 13:37)  HR: 76 (01 Sep 2017 04:30) (71 - 84)  BP: 119/67 (01 Sep 2017 04:30) (95/65 - 124/72)  BP(mean): --  RR: 17 (01 Sep 2017 04:30) (17 - 18)  SpO2: 95% (01 Sep 2017 04:30) (94% - 95%)    General: alert      Karnofsky:  30%    HEENT: dry mouth    Lungs: comfortable    CV: normal      GI: normal      : normal     MSK: weakness        Skin: no rash    LABS:    I&O's Summary    31 Aug 2017 07:01  -  01 Sep 2017 07:00  --------------------------------------------------------  IN: 0 mL / OUT: 1950 mL / NET: -1950 mL    RADIOLOGY & ADDITIONAL STUDIES:    ADVANCE DIRECTIVES: DNR/I - MOLST

## 2017-09-01 NOTE — PROGRESS NOTE ADULT - PROBLEM SELECTOR PLAN 4
-spoke to son at bedside. His sister went to visit the [facility] yesterday and was apparently told by a staff member there (that was not a doctor or a nurse) that they stop all meds other than pain medications. We explained to the family that is not entirely true and medication management is tailored to individuals. We will have hospice speak directly to him and his sister to quell their concerns -spoke to son at bedside. His sister went to visit the [facility] yesterday and was apparently told by a staff member there (that was not a doctor or a nurse) that they stop all meds other than pain medications. We explained to the family that is not entirely true and medication management is tailored to individuals. We will have hospice speak directly to him and his sister to quell their concerns.

## 2017-09-01 NOTE — PROGRESS NOTE ADULT - ATTENDING COMMENTS
Thank you for the opportunity to assist with the care of this patient.   [facility] [phone].

## 2017-09-01 NOTE — PROGRESS NOTE ADULT - PROBLEM SELECTOR PLAN 2
-still in pain, but better on 1mg/hour continuous. patient has only pushed button twice since being increased to 1mg/hour.

## 2017-09-01 NOTE — PROGRESS NOTE ADULT - SUBJECTIVE AND OBJECTIVE BOX
seen for end stage lung ca with mets    patient responsive but lethargic.  pain better      MEDICATIONS  (STANDING):  senna 2 Tablet(s) Oral at bedtime  amiodarone    Tablet 200 milliGRAM(s) Oral daily  aspirin  chewable 81 milliGRAM(s) Oral daily  clopidogrel Tablet 75 milliGRAM(s) Oral daily  pantoprazole    Tablet 40 milliGRAM(s) Oral before breakfast  metoprolol succinate  milliGRAM(s) Oral daily  levothyroxine 200 MICROGram(s) Oral daily  simvastatin 20 milliGRAM(s) Oral at bedtime  lactobacillus acidophilus 1 Tablet(s) Oral two times a day with meals  docusate sodium 100 milliGRAM(s) Oral two times a day  pembrolizumab IVPB 200 milliGRAM(s) IV Intermittent once  lidocaine   Patch 1 Patch Transdermal daily  multivitamin 1 Tablet(s) Oral daily  magnesium oxide 400 milliGRAM(s) Oral three times a day with meals  diazepam    Tablet 5 milliGRAM(s) Oral at bedtime  gabapentin 100 milliGRAM(s) Oral at bedtime  polyethylene glycol 3350 17 Gram(s) Oral two times a day  enoxaparin Injectable 40 milliGRAM(s) SubCutaneous daily  dexamethasone  Injectable 4 milliGRAM(s) IV Push every 8 hours  HYDROmorphone PCA (1 mG/mL) 30 milliLiter(s) PCA Continuous PCA Continuous    MEDICATIONS  (PRN):  ALBUTerol/ipratropium for Nebulization 3 milliLiter(s) Nebulizer every 6 hours PRN Shortness of Breath and/or Wheezing  bisacodyl Suppository 10 milliGRAM(s) Rectal daily PRN Constipation  lactulose Syrup 10 Gram(s) Oral every 8 hours PRN constipation  diazepam    Tablet 2.5 milliGRAM(s) Oral every 6 hours PRN spasms/anxiety  HYDROmorphone  Injectable 0.5 milliGRAM(s) IV Push every 4 hours PRN breakthrough pain      Allergies    macrolide antibiotics (Urticaria; Rash; Hives)  penicillin (Urticaria; Rash)  Zithromax Z-Christian (Urticaria; Rash; Hives)    Vital Signs Last 24 Hrs  T(C): 36.7 (01 Sep 2017 11:27), Max: 36.8 (01 Sep 2017 07:30)  T(F): 98.1 (01 Sep 2017 11:27), Max: 98.3 (01 Sep 2017 07:30)  HR: 69 (01 Sep 2017 11:27) (69 - 84)  BP: 98/92 (01 Sep 2017 11:27) (95/65 - 124/72)  BP(mean): --  RR: 17 (01 Sep 2017 11:27) (17 - 18)  SpO2: 97% (01 Sep 2017 11:27) (94% - 97%)    PHYSICAL EXAM:    GENERAL: NAD  ABDOMEN: Soft, mildly distended   EXTREMITIES:  no edema.   NERVOUS SYSTEM:  Alert & responsive, nonfocal neuro  LABS:                CAPILLARY BLOOD GLUCOSE            RADIOLOGY & ADDITIONAL TESTS:

## 2017-09-01 NOTE — PROGRESS NOTE ADULT - ASSESSMENT
71 yom with pmh which includes but not limited to Metastatic Lung Cancer follows at [facility], CAD s/p stents, V. Tach, HTN, HLD and Hypothyroidism sent from [facility] with hypercalcemia, low albumin and leucocytosis. Patient was recently at [facility] for loculated pleural effusions s/p chest tube and iv abx and severe hypercalcemia. Currently getting treated at this time for effusion and has started keytruda via Oncology. He is also found to have an ileus, being medically managed as surgery has signed off.

## 2017-09-02 PROCEDURE — 99233 SBSQ HOSP IP/OBS HIGH 50: CPT

## 2017-09-02 RX ADMIN — SENNA PLUS 2 TABLET(S): 8.6 TABLET ORAL at 22:21

## 2017-09-02 RX ADMIN — HYDROMORPHONE HYDROCHLORIDE 30 MILLILITER(S): 2 INJECTION INTRAMUSCULAR; INTRAVENOUS; SUBCUTANEOUS at 01:55

## 2017-09-02 RX ADMIN — Medication 4 MILLIGRAM(S): at 06:00

## 2017-09-02 RX ADMIN — SIMVASTATIN 20 MILLIGRAM(S): 20 TABLET, FILM COATED ORAL at 22:21

## 2017-09-02 RX ADMIN — PANTOPRAZOLE SODIUM 40 MILLIGRAM(S): 20 TABLET, DELAYED RELEASE ORAL at 06:01

## 2017-09-02 RX ADMIN — AMIODARONE HYDROCHLORIDE 200 MILLIGRAM(S): 400 TABLET ORAL at 06:00

## 2017-09-02 RX ADMIN — Medication 1 TABLET(S): at 17:41

## 2017-09-02 RX ADMIN — LIDOCAINE 1 PATCH: 4 CREAM TOPICAL at 12:22

## 2017-09-02 RX ADMIN — MAGNESIUM OXIDE 400 MG ORAL TABLET 400 MILLIGRAM(S): 241.3 TABLET ORAL at 08:01

## 2017-09-02 RX ADMIN — POLYETHYLENE GLYCOL 3350 17 GRAM(S): 17 POWDER, FOR SOLUTION ORAL at 17:41

## 2017-09-02 RX ADMIN — Medication 4 MILLIGRAM(S): at 22:21

## 2017-09-02 RX ADMIN — CLOPIDOGREL BISULFATE 75 MILLIGRAM(S): 75 TABLET, FILM COATED ORAL at 12:21

## 2017-09-02 RX ADMIN — Medication 100 MILLIGRAM(S): at 05:59

## 2017-09-02 RX ADMIN — ENOXAPARIN SODIUM 40 MILLIGRAM(S): 100 INJECTION SUBCUTANEOUS at 12:21

## 2017-09-02 RX ADMIN — POLYETHYLENE GLYCOL 3350 17 GRAM(S): 17 POWDER, FOR SOLUTION ORAL at 06:00

## 2017-09-02 RX ADMIN — Medication 100 MILLIGRAM(S): at 17:41

## 2017-09-02 RX ADMIN — GABAPENTIN 100 MILLIGRAM(S): 400 CAPSULE ORAL at 22:21

## 2017-09-02 RX ADMIN — MAGNESIUM OXIDE 400 MG ORAL TABLET 400 MILLIGRAM(S): 241.3 TABLET ORAL at 17:42

## 2017-09-02 RX ADMIN — MAGNESIUM OXIDE 400 MG ORAL TABLET 400 MILLIGRAM(S): 241.3 TABLET ORAL at 12:21

## 2017-09-02 RX ADMIN — Medication 4 MILLIGRAM(S): at 15:02

## 2017-09-02 RX ADMIN — Medication 100 MILLIGRAM(S): at 06:00

## 2017-09-02 RX ADMIN — Medication 200 MICROGRAM(S): at 05:59

## 2017-09-02 RX ADMIN — Medication 81 MILLIGRAM(S): at 12:21

## 2017-09-02 RX ADMIN — Medication 1 TABLET(S): at 08:01

## 2017-09-02 RX ADMIN — Medication 1 TABLET(S): at 12:21

## 2017-09-02 RX ADMIN — LIDOCAINE 1 PATCH: 4 CREAM TOPICAL at 01:07

## 2017-09-02 NOTE — PROGRESS NOTE ADULT - PROBLEM SELECTOR PLAN 2
As per family, [facility] was [facility]   will f/u with Palliative and Hospice

## 2017-09-02 NOTE — PROGRESS NOTE ADULT - SUBJECTIVE AND OBJECTIVE BOX
Internal Medicine Hospitalist - Dr. Cynthia GREGORY    [id number]    71y      Male    Patient is a 71y old  Male who presents with a chief complaint of hypercalcemia (30 Aug 2017 11:51)    INTERVAL HPI/ OVERNIGHT EVENTS: Patient is seen and examined, pt report back pain, on PCA pump, tolerating diet, denied abd. pain, SOB, tolerating diet. As per family, [facility]     REVIEW OF SYSTEMS:    limited     PHYSICAL EXAM:    Vital Signs Last 24 Hrs  T(C): 36.5 (02 Sep 2017 10:10), Max: 36.8 (01 Sep 2017 15:37)  T(F): 97.7 (02 Sep 2017 10:10), Max: 98.3 (01 Sep 2017 15:37)  HR: 72 (02 Sep 2017 10:10) (72 - 76)  BP: 110/64 (02 Sep 2017 10:10) (101/69 - 110/68)  BP(mean): --  RR: 14 (02 Sep 2017 10:10) (14 - 18)  SpO2: 94% (02 Sep 2017 10:10) (94% - 97%)    GENERAL: awake, lethargic   Neck: supple  CHEST/LUNG: positive air entry   HEART: S1S2+ audible  ABDOMEN: Soft, Nontender, less distended; Bowel sounds present  EXTREMITIES:  positive edema  CNS: awake, open eyes on verbal stimuli     LABS:    MEDICATIONS  (STANDING):  senna 2 Tablet(s) Oral at bedtime  amiodarone    Tablet 200 milliGRAM(s) Oral daily  aspirin  chewable 81 milliGRAM(s) Oral daily  clopidogrel Tablet 75 milliGRAM(s) Oral daily  pantoprazole    Tablet 40 milliGRAM(s) Oral before breakfast  metoprolol succinate  milliGRAM(s) Oral daily  levothyroxine 200 MICROGram(s) Oral daily  simvastatin 20 milliGRAM(s) Oral at bedtime  lactobacillus acidophilus 1 Tablet(s) Oral two times a day with meals  docusate sodium 100 milliGRAM(s) Oral two times a day  pembrolizumab IVPB 200 milliGRAM(s) IV Intermittent once  lidocaine   Patch 1 Patch Transdermal daily  multivitamin 1 Tablet(s) Oral daily  magnesium oxide 400 milliGRAM(s) Oral three times a day with meals  gabapentin 100 milliGRAM(s) Oral at bedtime  polyethylene glycol 3350 17 Gram(s) Oral two times a day  enoxaparin Injectable 40 milliGRAM(s) SubCutaneous daily  dexamethasone  Injectable 4 milliGRAM(s) IV Push every 8 hours  HYDROmorphone PCA (1 mG/mL) 30 milliLiter(s) PCA Continuous PCA Continuous    MEDICATIONS  (PRN):  ALBUTerol/ipratropium for Nebulization 3 milliLiter(s) Nebulizer every 6 hours PRN Shortness of Breath and/or Wheezing  bisacodyl Suppository 10 milliGRAM(s) Rectal daily PRN Constipation  lactulose Syrup 10 Gram(s) Oral every 8 hours PRN constipation  HYDROmorphone  Injectable 0.5 milliGRAM(s) IV Push every 4 hours PRN breakthrough pain      RADIOLOGY & ADDITIONAL TEST

## 2017-09-02 NOTE — PROGRESS NOTE ADULT - ATTENDING COMMENTS
discussed with family present bedside, updated discussed with family present bedside, updated, as per son he is not accepted [facility], will f/u with palliative and Hospice.

## 2017-09-02 NOTE — PROGRESS NOTE ADULT - PROBLEM SELECTOR PLAN 4
recurrent loculated left sided pleural effusion-no intervention by CT surgery.  No e/o infection, seen by ID - no abx at this time

## 2017-09-02 NOTE — PROGRESS NOTE ADULT - ASSESSMENT
This is 70 yo man with pmh of Metastatic Lung Cancer follows at [facility], CAD s/p stents, V. Tach, HTN, HLD and Hypothyroidism sent from [facility] with hypercalcemia, low albumin and leucocytosis. Patient was recently at [facility] for loculated pleural effusions s/p chest tube and iv abx and severe hypercalcemia. Currently getting treated at this time for effusion and has started keytruda via Oncology. He is also found to have an ileus, being medically managed as surgery has signed off, tolerating diet. Pt is c/o back pain, seen by palliative on PCA. Pt was also seen by Hospice

## 2017-09-03 PROCEDURE — 99233 SBSQ HOSP IP/OBS HIGH 50: CPT

## 2017-09-03 RX ORDER — NYSTATIN 500MM UNIT
500000 POWDER (EA) MISCELLANEOUS THREE TIMES A DAY
Qty: 0 | Refills: 0 | Status: DISCONTINUED | OUTPATIENT
Start: 2017-09-03 | End: 2017-09-08

## 2017-09-03 RX ADMIN — Medication 100 MILLIGRAM(S): at 17:42

## 2017-09-03 RX ADMIN — Medication 4 MILLIGRAM(S): at 05:46

## 2017-09-03 RX ADMIN — Medication 4 MILLIGRAM(S): at 12:53

## 2017-09-03 RX ADMIN — GABAPENTIN 100 MILLIGRAM(S): 400 CAPSULE ORAL at 22:26

## 2017-09-03 RX ADMIN — Medication 200 MICROGRAM(S): at 05:47

## 2017-09-03 RX ADMIN — MAGNESIUM OXIDE 400 MG ORAL TABLET 400 MILLIGRAM(S): 241.3 TABLET ORAL at 08:44

## 2017-09-03 RX ADMIN — HYDROMORPHONE HYDROCHLORIDE 30 MILLILITER(S): 2 INJECTION INTRAMUSCULAR; INTRAVENOUS; SUBCUTANEOUS at 03:46

## 2017-09-03 RX ADMIN — LIDOCAINE 1 PATCH: 4 CREAM TOPICAL at 12:52

## 2017-09-03 RX ADMIN — Medication 100 MILLIGRAM(S): at 05:47

## 2017-09-03 RX ADMIN — AMIODARONE HYDROCHLORIDE 200 MILLIGRAM(S): 400 TABLET ORAL at 05:47

## 2017-09-03 RX ADMIN — PANTOPRAZOLE SODIUM 40 MILLIGRAM(S): 20 TABLET, DELAYED RELEASE ORAL at 06:42

## 2017-09-03 RX ADMIN — LIDOCAINE 1 PATCH: 4 CREAM TOPICAL at 00:20

## 2017-09-03 RX ADMIN — CLOPIDOGREL BISULFATE 75 MILLIGRAM(S): 75 TABLET, FILM COATED ORAL at 12:53

## 2017-09-03 RX ADMIN — POLYETHYLENE GLYCOL 3350 17 GRAM(S): 17 POWDER, FOR SOLUTION ORAL at 17:42

## 2017-09-03 RX ADMIN — SIMVASTATIN 20 MILLIGRAM(S): 20 TABLET, FILM COATED ORAL at 22:25

## 2017-09-03 RX ADMIN — MAGNESIUM OXIDE 400 MG ORAL TABLET 400 MILLIGRAM(S): 241.3 TABLET ORAL at 17:42

## 2017-09-03 RX ADMIN — POLYETHYLENE GLYCOL 3350 17 GRAM(S): 17 POWDER, FOR SOLUTION ORAL at 05:46

## 2017-09-03 RX ADMIN — Medication 1 TABLET(S): at 08:44

## 2017-09-03 RX ADMIN — ENOXAPARIN SODIUM 40 MILLIGRAM(S): 100 INJECTION SUBCUTANEOUS at 12:52

## 2017-09-03 RX ADMIN — Medication 4 MILLIGRAM(S): at 22:26

## 2017-09-03 RX ADMIN — MAGNESIUM OXIDE 400 MG ORAL TABLET 400 MILLIGRAM(S): 241.3 TABLET ORAL at 12:53

## 2017-09-03 RX ADMIN — LACTULOSE 10 GRAM(S): 10 SOLUTION ORAL at 17:42

## 2017-09-03 RX ADMIN — Medication 1 TABLET(S): at 17:42

## 2017-09-03 RX ADMIN — SENNA PLUS 2 TABLET(S): 8.6 TABLET ORAL at 22:26

## 2017-09-03 RX ADMIN — Medication 81 MILLIGRAM(S): at 12:53

## 2017-09-03 RX ADMIN — Medication 1 TABLET(S): at 12:53

## 2017-09-03 NOTE — PROGRESS NOTE ADULT - ASSESSMENT
This is 70 yo man with pmh of Metastatic Lung Cancer follows at [facility], CAD s/p stents, V. Tach, HTN, HLD and Hypothyroidism sent from [facility] with hypercalcemia, low albumin and leucocytosis. Patient was recently at [facility] for loculated pleural effusions s/p chest tube and iv abx and severe hypercalcemia. Currently getting treated at this time for effusion and has started keytruda via Oncology. He is also found to have an ileus, medically managed, distension improved, tolerating diet,surgery has signed off. Pt was seen by palliative and Hospice on PCA, [facility] is [facility].

## 2017-09-03 NOTE — PROGRESS NOTE ADULT - PROBLEM SELECTOR PLAN 2
appreciate Palliative and hospice, [facility], son was told [facility] is not [facility],  will f/u with Palliative and Hospice

## 2017-09-03 NOTE — PROGRESS NOTE ADULT - SUBJECTIVE AND OBJECTIVE BOX
Internal Medicine Hospitalist - Dr. Cynthia GREGORY    [id number]    71y      Male    Patient is a 71y old  Male who presents with a chief complaint of hypercalcemia (30 Aug 2017 11:51)    INTERVAL HPI/ OVERNIGHT EVENTS: Patient is seen and examined, pain is controlled with PCA, had BM yesterday, denied abd. pain, nausea, vomiting, tolerating diet       PHYSICAL EXAM:    Vital Signs Last 24 Hrs  T(C): 36.4 (03 Sep 2017 10:30), Max: 36.8 (02 Sep 2017 22:00)  T(F): 97.6 (03 Sep 2017 10:30), Max: 98.2 (02 Sep 2017 22:00)  HR: 68 (03 Sep 2017 10:30) (68 - 78)  BP: 98/52 (03 Sep 2017 10:30) (98/52 - 112/78)  BP(mean): --  RR: 18 (03 Sep 2017 10:30) (16 - 18)  SpO2: 99% (03 Sep 2017 10:30) (95% - 99%)    GENERAL: NAD  HEENT: EOMI  Neck: supple  CHEST/LUNG: positive air entry   HEART: S1S2+ audible  ABDOMEN: Soft, Nontender, Nondistended; Bowel sounds present  EXTREMITIES:  no edema  CNS: Awake     LABS:       MEDICATIONS  (STANDING):  senna 2 Tablet(s) Oral at bedtime  amiodarone    Tablet 200 milliGRAM(s) Oral daily  aspirin  chewable 81 milliGRAM(s) Oral daily  clopidogrel Tablet 75 milliGRAM(s) Oral daily  pantoprazole    Tablet 40 milliGRAM(s) Oral before breakfast  metoprolol succinate  milliGRAM(s) Oral daily  levothyroxine 200 MICROGram(s) Oral daily  simvastatin 20 milliGRAM(s) Oral at bedtime  lactobacillus acidophilus 1 Tablet(s) Oral two times a day with meals  docusate sodium 100 milliGRAM(s) Oral two times a day  pembrolizumab IVPB 200 milliGRAM(s) IV Intermittent once  lidocaine   Patch 1 Patch Transdermal daily  multivitamin 1 Tablet(s) Oral daily  magnesium oxide 400 milliGRAM(s) Oral three times a day with meals  gabapentin 100 milliGRAM(s) Oral at bedtime  polyethylene glycol 3350 17 Gram(s) Oral two times a day  enoxaparin Injectable 40 milliGRAM(s) SubCutaneous daily  dexamethasone  Injectable 4 milliGRAM(s) IV Push every 8 hours  HYDROmorphone PCA (1 mG/mL) 30 milliLiter(s) PCA Continuous PCA Continuous    MEDICATIONS  (PRN):  ALBUTerol/ipratropium for Nebulization 3 milliLiter(s) Nebulizer every 6 hours PRN Shortness of Breath and/or Wheezing  bisacodyl Suppository 10 milliGRAM(s) Rectal daily PRN Constipation  lactulose Syrup 10 Gram(s) Oral every 8 hours PRN constipation  HYDROmorphone  Injectable 0.5 milliGRAM(s) IV Push every 4 hours PRN breakthrough pain      RADIOLOGY & ADDITIONAL TEST

## 2017-09-04 PROCEDURE — 99232 SBSQ HOSP IP/OBS MODERATE 35: CPT

## 2017-09-04 RX ORDER — SODIUM POLYSTYRENE SULFONATE 4.1 MEQ/G
15 POWDER, FOR SUSPENSION ORAL ONCE
Qty: 0 | Refills: 0 | Status: DISCONTINUED | OUTPATIENT
Start: 2017-09-04 | End: 2017-09-04

## 2017-09-04 RX ADMIN — POLYETHYLENE GLYCOL 3350 17 GRAM(S): 17 POWDER, FOR SOLUTION ORAL at 06:10

## 2017-09-04 RX ADMIN — MAGNESIUM OXIDE 400 MG ORAL TABLET 400 MILLIGRAM(S): 241.3 TABLET ORAL at 17:27

## 2017-09-04 RX ADMIN — MAGNESIUM OXIDE 400 MG ORAL TABLET 400 MILLIGRAM(S): 241.3 TABLET ORAL at 12:28

## 2017-09-04 RX ADMIN — Medication 200 MICROGRAM(S): at 06:10

## 2017-09-04 RX ADMIN — PANTOPRAZOLE SODIUM 40 MILLIGRAM(S): 20 TABLET, DELAYED RELEASE ORAL at 06:10

## 2017-09-04 RX ADMIN — CLOPIDOGREL BISULFATE 75 MILLIGRAM(S): 75 TABLET, FILM COATED ORAL at 12:29

## 2017-09-04 RX ADMIN — LIDOCAINE 1 PATCH: 4 CREAM TOPICAL at 12:29

## 2017-09-04 RX ADMIN — Medication 500000 UNIT(S): at 12:29

## 2017-09-04 RX ADMIN — Medication 500000 UNIT(S): at 06:10

## 2017-09-04 RX ADMIN — GABAPENTIN 100 MILLIGRAM(S): 400 CAPSULE ORAL at 21:25

## 2017-09-04 RX ADMIN — SIMVASTATIN 20 MILLIGRAM(S): 20 TABLET, FILM COATED ORAL at 21:25

## 2017-09-04 RX ADMIN — Medication 1 TABLET(S): at 17:26

## 2017-09-04 RX ADMIN — Medication 1 TABLET(S): at 08:07

## 2017-09-04 RX ADMIN — LIDOCAINE 1 PATCH: 4 CREAM TOPICAL at 00:40

## 2017-09-04 RX ADMIN — HYDROMORPHONE HYDROCHLORIDE 30 MILLILITER(S): 2 INJECTION INTRAMUSCULAR; INTRAVENOUS; SUBCUTANEOUS at 04:16

## 2017-09-04 RX ADMIN — Medication 4 MILLIGRAM(S): at 12:29

## 2017-09-04 RX ADMIN — Medication 81 MILLIGRAM(S): at 12:29

## 2017-09-04 RX ADMIN — Medication 4 MILLIGRAM(S): at 06:10

## 2017-09-04 RX ADMIN — Medication 4 MILLIGRAM(S): at 21:25

## 2017-09-04 RX ADMIN — ENOXAPARIN SODIUM 40 MILLIGRAM(S): 100 INJECTION SUBCUTANEOUS at 12:30

## 2017-09-04 RX ADMIN — AMIODARONE HYDROCHLORIDE 200 MILLIGRAM(S): 400 TABLET ORAL at 06:10

## 2017-09-04 RX ADMIN — Medication 500000 UNIT(S): at 21:24

## 2017-09-04 RX ADMIN — MAGNESIUM OXIDE 400 MG ORAL TABLET 400 MILLIGRAM(S): 241.3 TABLET ORAL at 08:07

## 2017-09-04 RX ADMIN — Medication 100 MILLIGRAM(S): at 06:10

## 2017-09-04 RX ADMIN — Medication 100 MILLIGRAM(S): at 17:26

## 2017-09-04 RX ADMIN — Medication 1 TABLET(S): at 12:28

## 2017-09-04 NOTE — PROGRESS NOTE ADULT - SUBJECTIVE AND OBJECTIVE BOX
SULEIMAN GREGORY    [id number]    71y      Male    Patient is a 71y old  Male who presents with a chief complaint of hypercalcemia (30 Aug 2017 11:51)      INTERVAL HPI/OVERNIGHT EVENTS:    patient is looking comfortable, denies any complains.     REVIEW OF SYSTEMS:    CONSTITUTIONAL: No fever, has fatigue  RESPIRATORY: No cough, No shortness of breath  CARDIOVASCULAR: No chest pain, palpitations  GASTROINTESTINAL: No abdominal, No nausea, vomiting  MISCELLANEOUS: No joint swelling or pain       Vital Signs Last 24 Hrs  T(C): 36.6 (04 Sep 2017 08:08), Max: 36.6 (03 Sep 2017 18:05)  T(F): 97.9 (04 Sep 2017 08:08), Max: 97.9 (03 Sep 2017 18:05)  HR: 72 (04 Sep 2017 08:08) (68 - 74)  BP: 106/70 (04 Sep 2017 08:08) (92/66 - 106/70)  RR: 14 (04 Sep 2017 08:08) (14 - 18)  SpO2: 98% (04 Sep 2017 08:08) (98% - 99%)    PHYSICAL EXAM:    GENERAL: Elderly male looking comfortable  HEENT: PERRL, +EOMI  NECK: soft, Supple, No JVD,   CHEST/LUNG: Decrease air entry bilaterally; No wheezing  HEART: S1S2+, Regular rate and rhythm; No murmurs  ABDOMEN: Soft, Nontender, Nondistended; Bowel sounds present      LABS:                  I&O's Summary    03 Sep 2017 07:01  -  04 Sep 2017 07:00  --------------------------------------------------------  IN: 1200 mL / OUT: 250 mL / NET: 950 mL    04 Sep 2017 07:01  -  04 Sep 2017 10:43  --------------------------------------------------------  IN: 0 mL / OUT: 450 mL / NET: -450 mL        MEDICATIONS  (STANDING):  senna 2 Tablet(s) Oral at bedtime  amiodarone    Tablet 200 milliGRAM(s) Oral daily  aspirin  chewable 81 milliGRAM(s) Oral daily  clopidogrel Tablet 75 milliGRAM(s) Oral daily  pantoprazole    Tablet 40 milliGRAM(s) Oral before breakfast  metoprolol succinate  milliGRAM(s) Oral daily  levothyroxine 200 MICROGram(s) Oral daily  simvastatin 20 milliGRAM(s) Oral at bedtime  lactobacillus acidophilus 1 Tablet(s) Oral two times a day with meals  docusate sodium 100 milliGRAM(s) Oral two times a day  pembrolizumab IVPB 200 milliGRAM(s) IV Intermittent once  lidocaine   Patch 1 Patch Transdermal daily  multivitamin 1 Tablet(s) Oral daily  magnesium oxide 400 milliGRAM(s) Oral three times a day with meals  gabapentin 100 milliGRAM(s) Oral at bedtime  polyethylene glycol 3350 17 Gram(s) Oral two times a day  enoxaparin Injectable 40 milliGRAM(s) SubCutaneous daily  dexamethasone  Injectable 4 milliGRAM(s) IV Push every 8 hours  HYDROmorphone PCA (1 mG/mL) 30 milliLiter(s) PCA Continuous PCA Continuous  nystatin    Suspension 331370 Unit(s) Oral three times a day    MEDICATIONS  (PRN):  ALBUTerol/ipratropium for Nebulization 3 milliLiter(s) Nebulizer every 6 hours PRN Shortness of Breath and/or Wheezing  bisacodyl Suppository 10 milliGRAM(s) Rectal daily PRN Constipation  lactulose Syrup 10 Gram(s) Oral every 8 hours PRN constipation  HYDROmorphone  Injectable 0.5 milliGRAM(s) IV Push every 4 hours PRN breakthrough pain

## 2017-09-04 NOTE — PROGRESS NOTE ADULT - PROBLEM SELECTOR PLAN 2
appreciate Palliative and hospice, [facility], [facility] was told [facility],  will f/u with Palliative and Hospice team.

## 2017-09-04 NOTE — PROGRESS NOTE ADULT - ASSESSMENT
This is 70 yo man with pmh of Metastatic Lung Cancer follows at [facility], CAD s/p stents, V. Tach, HTN, HLD and Hypothyroidism sent from [facility] with hypercalcemia, low albumin and leucocytosis. Patient was recently at [facility] for loculated pleural effusions s/p chest tube and iv abx and severe hypercalcemia. Currently getting treated at this time for effusion and has started keytruda via Oncology. He is also found to have an ileus, medically managed, distension improved, tolerating diet,surgery has signed off. Pt was seen by palliative and Hospice on PCA, [facility] is [facility].

## 2017-09-05 PROCEDURE — 99232 SBSQ HOSP IP/OBS MODERATE 35: CPT

## 2017-09-05 PROCEDURE — 99233 SBSQ HOSP IP/OBS HIGH 50: CPT

## 2017-09-05 RX ADMIN — GABAPENTIN 100 MILLIGRAM(S): 400 CAPSULE ORAL at 22:29

## 2017-09-05 RX ADMIN — CLOPIDOGREL BISULFATE 75 MILLIGRAM(S): 75 TABLET, FILM COATED ORAL at 11:40

## 2017-09-05 RX ADMIN — Medication 81 MILLIGRAM(S): at 11:40

## 2017-09-05 RX ADMIN — MAGNESIUM OXIDE 400 MG ORAL TABLET 400 MILLIGRAM(S): 241.3 TABLET ORAL at 11:40

## 2017-09-05 RX ADMIN — Medication 4 MILLIGRAM(S): at 22:29

## 2017-09-05 RX ADMIN — PANTOPRAZOLE SODIUM 40 MILLIGRAM(S): 20 TABLET, DELAYED RELEASE ORAL at 05:57

## 2017-09-05 RX ADMIN — HYDROMORPHONE HYDROCHLORIDE 30 MILLILITER(S): 2 INJECTION INTRAMUSCULAR; INTRAVENOUS; SUBCUTANEOUS at 22:29

## 2017-09-05 RX ADMIN — HYDROMORPHONE HYDROCHLORIDE 30 MILLILITER(S): 2 INJECTION INTRAMUSCULAR; INTRAVENOUS; SUBCUTANEOUS at 01:13

## 2017-09-05 RX ADMIN — ENOXAPARIN SODIUM 40 MILLIGRAM(S): 100 INJECTION SUBCUTANEOUS at 11:40

## 2017-09-05 RX ADMIN — Medication 500000 UNIT(S): at 05:57

## 2017-09-05 RX ADMIN — LIDOCAINE 1 PATCH: 4 CREAM TOPICAL at 11:40

## 2017-09-05 RX ADMIN — Medication 500000 UNIT(S): at 22:29

## 2017-09-05 RX ADMIN — MAGNESIUM OXIDE 400 MG ORAL TABLET 400 MILLIGRAM(S): 241.3 TABLET ORAL at 17:26

## 2017-09-05 RX ADMIN — Medication 100 MILLIGRAM(S): at 17:26

## 2017-09-05 RX ADMIN — SENNA PLUS 2 TABLET(S): 8.6 TABLET ORAL at 22:29

## 2017-09-05 RX ADMIN — POLYETHYLENE GLYCOL 3350 17 GRAM(S): 17 POWDER, FOR SOLUTION ORAL at 17:26

## 2017-09-05 RX ADMIN — Medication 1 TABLET(S): at 11:41

## 2017-09-05 RX ADMIN — AMIODARONE HYDROCHLORIDE 200 MILLIGRAM(S): 400 TABLET ORAL at 05:57

## 2017-09-05 RX ADMIN — Medication 200 MICROGRAM(S): at 05:57

## 2017-09-05 RX ADMIN — LIDOCAINE 1 PATCH: 4 CREAM TOPICAL at 00:31

## 2017-09-05 RX ADMIN — Medication 1 TABLET(S): at 17:26

## 2017-09-05 RX ADMIN — Medication 4 MILLIGRAM(S): at 05:57

## 2017-09-05 RX ADMIN — SIMVASTATIN 20 MILLIGRAM(S): 20 TABLET, FILM COATED ORAL at 22:32

## 2017-09-05 NOTE — PROGRESS NOTE ADULT - PROBLEM SELECTOR PLAN 1
c/w steroids, PCA, palliative and Hospice eval appreciated, patient feels comfortable, will have a family meeting later today.

## 2017-09-05 NOTE — PROGRESS NOTE ADULT - SUBJECTIVE AND OBJECTIVE BOX
SULEIMAN GREGOYR    [id number]    71y      Male    Patient is a 71y old  Male who presents with a chief complaint of hypercalcemia (30 Aug 2017 11:51)      INTERVAL HPI/OVERNIGHT EVENTS:    Patient is looking comfortable, denies any complains, pain is well controlled on PCA pump    REVIEW OF SYSTEMS:    CONSTITUTIONAL: No fever, has fatigue  RESPIRATORY: No cough, No shortness of breath  CARDIOVASCULAR: No chest pain, palpitations  GASTROINTESTINAL: No abdominal, No nausea, vomiting  MISCELLANEOUS: No joint swelling or pain         Vital Signs Last 24 Hrs  T(C): 36.4 (05 Sep 2017 05:31), Max: 36.4 (05 Sep 2017 05:31)  T(F): 97.5 (05 Sep 2017 05:31), Max: 97.5 (05 Sep 2017 05:31)  HR: 80 (05 Sep 2017 05:31) (79 - 86)  BP: 100/60 (05 Sep 2017 05:31) (100/60 - 109/67)  RR: 18 (05 Sep 2017 05:31) (17 - 18)  SpO2: 96% (05 Sep 2017 05:31) (93% - 96%)    PHYSICAL EXAM:    GENERAL: Elderly male looking comfortable  HEENT: PERRL, +EOMI  NECK: soft, Supple, No JVD,   CHEST/LUNG: Decrease air entry bilaterally; No wheezing  HEART: S1S2+, Regular rate and rhythm; No murmurs  ABDOMEN: Soft, Nontender, Nondistended; Bowel sounds present    LABS:                  I&O's Summary    04 Sep 2017 07:01  -  05 Sep 2017 07:00  --------------------------------------------------------  IN: 240 mL / OUT: 454 mL / NET: -214 mL        MEDICATIONS  (STANDING):  senna 2 Tablet(s) Oral at bedtime  amiodarone    Tablet 200 milliGRAM(s) Oral daily  aspirin  chewable 81 milliGRAM(s) Oral daily  clopidogrel Tablet 75 milliGRAM(s) Oral daily  pantoprazole    Tablet 40 milliGRAM(s) Oral before breakfast  metoprolol succinate  milliGRAM(s) Oral daily  levothyroxine 200 MICROGram(s) Oral daily  simvastatin 20 milliGRAM(s) Oral at bedtime  lactobacillus acidophilus 1 Tablet(s) Oral two times a day with meals  docusate sodium 100 milliGRAM(s) Oral two times a day  pembrolizumab IVPB 200 milliGRAM(s) IV Intermittent once  lidocaine   Patch 1 Patch Transdermal daily  multivitamin 1 Tablet(s) Oral daily  magnesium oxide 400 milliGRAM(s) Oral three times a day with meals  gabapentin 100 milliGRAM(s) Oral at bedtime  polyethylene glycol 3350 17 Gram(s) Oral two times a day  enoxaparin Injectable 40 milliGRAM(s) SubCutaneous daily  dexamethasone  Injectable 4 milliGRAM(s) IV Push every 8 hours  HYDROmorphone PCA (1 mG/mL) 30 milliLiter(s) PCA Continuous PCA Continuous  nystatin    Suspension 417032 Unit(s) Oral three times a day    MEDICATIONS  (PRN):  ALBUTerol/ipratropium for Nebulization 3 milliLiter(s) Nebulizer every 6 hours PRN Shortness of Breath and/or Wheezing  bisacodyl Suppository 10 milliGRAM(s) Rectal daily PRN Constipation  lactulose Syrup 10 Gram(s) Oral every 8 hours PRN constipation  HYDROmorphone  Injectable 0.5 milliGRAM(s) IV Push every 4 hours PRN breakthrough pain

## 2017-09-05 NOTE — PROGRESS NOTE ADULT - ASSESSMENT
71M with end stage lung cancer with severe neoplasm pain, severe debility, pain better managed on PCA pump.

## 2017-09-05 NOTE — PROGRESS NOTE ADULT - PROBLEM SELECTOR PLAN 2
appreciate Palliative and hospice, family wishes [facility], son was told [facility], will have a family meeting later today.

## 2017-09-05 NOTE — PROGRESS NOTE ADULT - SUBJECTIVE AND OBJECTIVE BOX
OVERNIGHT EVENTS: doing okay, pretty lethargic     Present Symptoms:     Dyspnea: 0   Nausea/Vomiting: No  Anxiety:  No  Depression: No  Fatigue: Yes   Loss of appetite: No    Pain: none currently             Character-            Duration-            Effect-            Factors-            Frequency-            Location-            Severity-    Review of Systems: Reviewed               no chest pain   All others negative    MEDICATIONS  (STANDING):  senna 2 Tablet(s) Oral at bedtime  amiodarone    Tablet 200 milliGRAM(s) Oral daily  aspirin  chewable 81 milliGRAM(s) Oral daily  clopidogrel Tablet 75 milliGRAM(s) Oral daily  pantoprazole    Tablet 40 milliGRAM(s) Oral before breakfast  metoprolol succinate  milliGRAM(s) Oral daily  levothyroxine 200 MICROGram(s) Oral daily  simvastatin 20 milliGRAM(s) Oral at bedtime  lactobacillus acidophilus 1 Tablet(s) Oral two times a day with meals  docusate sodium 100 milliGRAM(s) Oral two times a day  pembrolizumab IVPB 200 milliGRAM(s) IV Intermittent once  lidocaine   Patch 1 Patch Transdermal daily  multivitamin 1 Tablet(s) Oral daily  magnesium oxide 400 milliGRAM(s) Oral three times a day with meals  gabapentin 100 milliGRAM(s) Oral at bedtime  polyethylene glycol 3350 17 Gram(s) Oral two times a day  enoxaparin Injectable 40 milliGRAM(s) SubCutaneous daily  dexamethasone  Injectable 4 milliGRAM(s) IV Push every 8 hours  HYDROmorphone PCA (1 mG/mL) 30 milliLiter(s) PCA Continuous PCA Continuous  nystatin    Suspension 243512 Unit(s) Oral three times a day    MEDICATIONS  (PRN):  ALBUTerol/ipratropium for Nebulization 3 milliLiter(s) Nebulizer every 6 hours PRN Shortness of Breath and/or Wheezing  bisacodyl Suppository 10 milliGRAM(s) Rectal daily PRN Constipation  lactulose Syrup 10 Gram(s) Oral every 8 hours PRN constipation  HYDROmorphone  Injectable 0.5 milliGRAM(s) IV Push every 4 hours PRN breakthrough pain    PHYSICAL EXAM:    Vital Signs Last 24 Hrs  T(C): 36.4 (05 Sep 2017 05:31), Max: 36.4 (05 Sep 2017 05:31)  T(F): 97.5 (05 Sep 2017 05:31), Max: 97.5 (05 Sep 2017 05:31)  HR: 80 (05 Sep 2017 05:31) (79 - 86)  BP: 100/60 (05 Sep 2017 05:31) (100/60 - 109/67)  BP(mean): --  RR: 18 (05 Sep 2017 05:31) (17 - 18)  SpO2: 96% (05 Sep 2017 05:31) (93% - 96%)    General: alert     Karnofsky:  30%    HEENT: dry mouth      Lungs: comfortable     CV: normal      GI: normal      : normal     MSK: weakness      Skin: no rash    LABS: none     I&O's Summary    04 Sep 2017 07:01  -  05 Sep 2017 07:00  --------------------------------------------------------  IN: 240 mL / OUT: 454 mL / NET: -214 mL    RADIOLOGY & ADDITIONAL STUDIES: none     ADVANCE DIRECTIVES: DNR/I

## 2017-09-05 NOTE — PROGRESS NOTE ADULT - ATTENDING COMMENTS
Thank you for the opportunity to assist with the care of this patient.   [facility] Consult Service [phone].

## 2017-09-05 NOTE — PROGRESS NOTE ADULT - ASSESSMENT
This is 72 yo man with pmh of Metastatic Lung Cancer follows at [facility], CAD s/p stents, V. Tach, HTN, HLD and Hypothyroidism sent from [facility] with hypercalcemia, low albumin and leucocytosis. Patient was recently at [facility] for loculated pleural effusions s/p chest tube and iv abx and severe hypercalcemia. Currently getting treated at this time for effusion and has started keytruda via Oncology. He is also found to have an ileus, medically managed, distension improved, tolerating diet,surgery has signed off. Pt was seen by palliative and Hospice on PCA, family is willing for [facility].

## 2017-09-05 NOTE — CHART NOTE - NSCHARTNOTEFT_GEN_A_CORE
Source: Patient [ ]  Family [x ]   other [ ]    Current Diet: puree with Ensure TID    Patient reports [ ] nausea  [ ] vomiting [ ] diarrhea [ ] constipation  [ ]chewing problems [ ] swallowing issues  [ ] other:     PO intake:  < 50% [x ]   50-75%  [ ]   %  [ ]  other :    Source for PO intake [ ] Patient [x ] family [ ] chart [ ] staff [ ] other    Enteral /Parenteral Nutrition:     Current Weight:     % Weight Change     Pertinent Medications: MEDICATIONS  (STANDING):  senna 2 Tablet(s) Oral at bedtime  amiodarone    Tablet 200 milliGRAM(s) Oral daily  aspirin  chewable 81 milliGRAM(s) Oral daily  clopidogrel Tablet 75 milliGRAM(s) Oral daily  pantoprazole    Tablet 40 milliGRAM(s) Oral before breakfast  metoprolol succinate  milliGRAM(s) Oral daily  levothyroxine 200 MICROGram(s) Oral daily  simvastatin 20 milliGRAM(s) Oral at bedtime  lactobacillus acidophilus 1 Tablet(s) Oral two times a day with meals  docusate sodium 100 milliGRAM(s) Oral two times a day  pembrolizumab IVPB 200 milliGRAM(s) IV Intermittent once  lidocaine   Patch 1 Patch Transdermal daily  multivitamin 1 Tablet(s) Oral daily  magnesium oxide 400 milliGRAM(s) Oral three times a day with meals  gabapentin 100 milliGRAM(s) Oral at bedtime  polyethylene glycol 3350 17 Gram(s) Oral two times a day  enoxaparin Injectable 40 milliGRAM(s) SubCutaneous daily  dexamethasone  Injectable 4 milliGRAM(s) IV Push every 8 hours  HYDROmorphone PCA (1 mG/mL) 30 milliLiter(s) PCA Continuous PCA Continuous  nystatin    Suspension 553702 Unit(s) Oral three times a day    MEDICATIONS  (PRN):  ALBUTerol/ipratropium for Nebulization 3 milliLiter(s) Nebulizer every 6 hours PRN Shortness of Breath and/or Wheezing  bisacodyl Suppository 10 milliGRAM(s) Rectal daily PRN Constipation  lactulose Syrup 10 Gram(s) Oral every 8 hours PRN constipation  HYDROmorphone  Injectable 0.5 milliGRAM(s) IV Push every 4 hours PRN breakthrough pain    Pertinent Labs:         Skin:     Nutrition focused physical exam conducted - found signs of malnutrition [ ]absent [ ]present    Subcutaneous fat loss: [ ] Orbital fat pads region, [ ]Buccal fat region, [ ]Triceps region,  [ ]Ribs region    Muscle wasting: [ ]Temples region, [ ]Clavicle region, [ ]Shoulder region, [ ]Scapula region, [ ]Interosseous region,  [ ]thigh region, [ ]Calf region    Estimated Needs:   [x ] no change since previous assessment  [ ] recalculated:     Current Nutrition Diagnosis:  Nutrition Note: Pt meets criteria for severe protein calorie malnutrition related to weight loss, decreased energy intake and swallowing issues in setting of lung CA with mets, as evidenced by 28# weight loss, <50% intake > 7 days.   Pt continues on puree foods with Ensure TID. Family providing baby food to pt with poor to fair appetite. Take Ensure fair.  Plan is home with hospice. See recommendations:      Recommendations: Continue to provide Ensure TID and provide encouragement.    Monitoring and Evaluation:   [x ] PO intake [x ] Tolerance to diet prescription [X] Weights  [X] Follow up per protocol [X] Labs:

## 2017-09-05 NOTE — PROGRESS NOTE ADULT - PROBLEM SELECTOR PLAN 4
- poor overall prognosis. patient likely not a candidate for inpatient hospice at this time, will need to discuss with family regarding home hospice option. Family meeting when son and daughter come in later today around 3 - 330pm.

## 2017-09-06 PROCEDURE — 99233 SBSQ HOSP IP/OBS HIGH 50: CPT

## 2017-09-06 PROCEDURE — 99232 SBSQ HOSP IP/OBS MODERATE 35: CPT

## 2017-09-06 RX ORDER — DEXAMETHASONE 0.5 MG/5ML
4 ELIXIR ORAL EVERY 12 HOURS
Qty: 0 | Refills: 0 | Status: DISCONTINUED | OUTPATIENT
Start: 2017-09-06 | End: 2017-09-08

## 2017-09-06 RX ADMIN — LIDOCAINE 1 PATCH: 4 CREAM TOPICAL at 11:57

## 2017-09-06 RX ADMIN — ENOXAPARIN SODIUM 40 MILLIGRAM(S): 100 INJECTION SUBCUTANEOUS at 11:57

## 2017-09-06 RX ADMIN — Medication 4 MILLIGRAM(S): at 06:41

## 2017-09-06 RX ADMIN — Medication 100 MILLIGRAM(S): at 17:10

## 2017-09-06 RX ADMIN — MAGNESIUM OXIDE 400 MG ORAL TABLET 400 MILLIGRAM(S): 241.3 TABLET ORAL at 17:10

## 2017-09-06 RX ADMIN — POLYETHYLENE GLYCOL 3350 17 GRAM(S): 17 POWDER, FOR SOLUTION ORAL at 06:42

## 2017-09-06 RX ADMIN — Medication 500000 UNIT(S): at 06:42

## 2017-09-06 RX ADMIN — Medication 100 MILLIGRAM(S): at 06:41

## 2017-09-06 RX ADMIN — Medication 1 TABLET(S): at 17:10

## 2017-09-06 RX ADMIN — HYDROMORPHONE HYDROCHLORIDE 30 MILLILITER(S): 2 INJECTION INTRAMUSCULAR; INTRAVENOUS; SUBCUTANEOUS at 19:31

## 2017-09-06 RX ADMIN — Medication 4 MILLIGRAM(S): at 17:10

## 2017-09-06 RX ADMIN — GABAPENTIN 100 MILLIGRAM(S): 400 CAPSULE ORAL at 21:52

## 2017-09-06 RX ADMIN — Medication 100 MILLIGRAM(S): at 06:42

## 2017-09-06 RX ADMIN — AMIODARONE HYDROCHLORIDE 200 MILLIGRAM(S): 400 TABLET ORAL at 06:41

## 2017-09-06 RX ADMIN — CLOPIDOGREL BISULFATE 75 MILLIGRAM(S): 75 TABLET, FILM COATED ORAL at 11:57

## 2017-09-06 RX ADMIN — Medication 200 MICROGRAM(S): at 06:41

## 2017-09-06 RX ADMIN — LIDOCAINE 1 PATCH: 4 CREAM TOPICAL at 00:57

## 2017-09-06 RX ADMIN — SENNA PLUS 2 TABLET(S): 8.6 TABLET ORAL at 21:52

## 2017-09-06 RX ADMIN — Medication 500000 UNIT(S): at 12:52

## 2017-09-06 RX ADMIN — Medication 81 MILLIGRAM(S): at 11:57

## 2017-09-06 RX ADMIN — Medication 500000 UNIT(S): at 21:52

## 2017-09-06 RX ADMIN — MAGNESIUM OXIDE 400 MG ORAL TABLET 400 MILLIGRAM(S): 241.3 TABLET ORAL at 11:57

## 2017-09-06 RX ADMIN — SIMVASTATIN 20 MILLIGRAM(S): 20 TABLET, FILM COATED ORAL at 21:52

## 2017-09-06 RX ADMIN — PANTOPRAZOLE SODIUM 40 MILLIGRAM(S): 20 TABLET, DELAYED RELEASE ORAL at 06:41

## 2017-09-06 RX ADMIN — Medication 1 TABLET(S): at 11:57

## 2017-09-06 NOTE — PROGRESS NOTE ADULT - PROBLEM SELECTOR PLAN 1
c/w steroids, PCA, palliative and Hospice eval appreciated, patient feels comfortable, will have a family meeting later today. c/w steroids, PCA, palliative and Hospice eval appreciated, patient feels comfortable, will had a family meeting later today and was decided to continue with comfort measure, no further testing, continue with current meds, Hospice team to arrange home hospice.

## 2017-09-06 NOTE — PROGRESS NOTE ADULT - SUBJECTIVE AND OBJECTIVE BOX
OVERNIGHT EVENTS:    BRIEF HOSPITAL COURSE:    Present Symptoms:     Dyspnea: 0 1 2 3   Nausea/Vomiting: Yes No  Anxiety:  Yes No  Depression: Yes No  Fatigue: Yes No  Loss of appetite: Yes No    Pain:             Character-            Duration-            Effect-            Factors-            Frequency-            Location-            Severity-    Review of Systems: Reviewed                     Negative:                     Positive:  Unable to obtain due to poor mentation   All others negative    MEDICATIONS  (STANDING):  senna 2 Tablet(s) Oral at bedtime  amiodarone    Tablet 200 milliGRAM(s) Oral daily  aspirin  chewable 81 milliGRAM(s) Oral daily  clopidogrel Tablet 75 milliGRAM(s) Oral daily  pantoprazole    Tablet 40 milliGRAM(s) Oral before breakfast  metoprolol succinate  milliGRAM(s) Oral daily  levothyroxine 200 MICROGram(s) Oral daily  simvastatin 20 milliGRAM(s) Oral at bedtime  lactobacillus acidophilus 1 Tablet(s) Oral two times a day with meals  docusate sodium 100 milliGRAM(s) Oral two times a day  pembrolizumab IVPB 200 milliGRAM(s) IV Intermittent once  lidocaine   Patch 1 Patch Transdermal daily  multivitamin 1 Tablet(s) Oral daily  magnesium oxide 400 milliGRAM(s) Oral three times a day with meals  gabapentin 100 milliGRAM(s) Oral at bedtime  polyethylene glycol 3350 17 Gram(s) Oral two times a day  enoxaparin Injectable 40 milliGRAM(s) SubCutaneous daily  dexamethasone  Injectable 4 milliGRAM(s) IV Push every 8 hours  HYDROmorphone PCA (1 mG/mL) 30 milliLiter(s) PCA Continuous PCA Continuous  nystatin    Suspension 117470 Unit(s) Oral three times a day    MEDICATIONS  (PRN):  ALBUTerol/ipratropium for Nebulization 3 milliLiter(s) Nebulizer every 6 hours PRN Shortness of Breath and/or Wheezing  bisacodyl Suppository 10 milliGRAM(s) Rectal daily PRN Constipation  lactulose Syrup 10 Gram(s) Oral every 8 hours PRN constipation      PHYSICAL EXAM:    Vital Signs Last 24 Hrs  T(C): 36.5 (06 Sep 2017 06:45), Max: 36.5 (06 Sep 2017 06:45)  T(F): 97.7 (06 Sep 2017 06:45), Max: 97.7 (06 Sep 2017 06:45)  HR: 90 (06 Sep 2017 06:45) (83 - 96)  BP: 113/71 (06 Sep 2017 06:45) (103/66 - 113/71)  BP(mean): --  RR: 18 (06 Sep 2017 06:45) (18 - 18)  SpO2: 96% (06 Sep 2017 06:45) (96% - 97%)    General: alert  oriented x ____ lethargic agitated                  cachexia  nonverbal  coma    Karnofsky:  %    HEENT: normal  dry mouth  ET tube/trach    Lungs: comfortable tachypnea/labored breathing  excessive secretions    CV: normal  tachycardia    GI: normal  distended  tender  no BS               PEG/NG/OG tube  constipation  last BM:     : normal  incontinent  oliguria/anuria  howard    MSK: normal  weakness  edema             ambulatory  bedbound/wheelchair bound    Skin: normal  pressure ulcers- Stage_____  no rash    LABS:                I&O's Summary    05 Sep 2017 07:01  -  06 Sep 2017 07:00  --------------------------------------------------------  IN: 360 mL / OUT: 0 mL / NET: 360 mL        RADIOLOGY & ADDITIONAL STUDIES:    ADVANCE DIRECTIVES:   DNR YES NO  Completed on:                     MOLST  YES NO   Completed on:  Living Will  YES NO   Completed on: Patient seen and met with family earlier today around 12pm     OVERNIGHT EVENTS: pain well controlled on PCA     Present Symptoms:     Dyspnea: 0   Nausea/Vomiting: No  Anxiety:  No  Depression: No  Fatigue: Yes   Loss of appetite: No    Pain: none currently            Character-            Duration-            Effect-            Factors-            Frequency-            Location-            Severity-    Review of Systems: Reviewed                     Negative: no chest pain    MEDICATIONS  (STANDING):  senna 2 Tablet(s) Oral at bedtime  amiodarone    Tablet 200 milliGRAM(s) Oral daily  aspirin  chewable 81 milliGRAM(s) Oral daily  clopidogrel Tablet 75 milliGRAM(s) Oral daily  pantoprazole    Tablet 40 milliGRAM(s) Oral before breakfast  metoprolol succinate  milliGRAM(s) Oral daily  levothyroxine 200 MICROGram(s) Oral daily  simvastatin 20 milliGRAM(s) Oral at bedtime  lactobacillus acidophilus 1 Tablet(s) Oral two times a day with meals  docusate sodium 100 milliGRAM(s) Oral two times a day  pembrolizumab IVPB 200 milliGRAM(s) IV Intermittent once  lidocaine   Patch 1 Patch Transdermal daily  multivitamin 1 Tablet(s) Oral daily  magnesium oxide 400 milliGRAM(s) Oral three times a day with meals  gabapentin 100 milliGRAM(s) Oral at bedtime  polyethylene glycol 3350 17 Gram(s) Oral two times a day  enoxaparin Injectable 40 milliGRAM(s) SubCutaneous daily  dexamethasone  Injectable 4 milliGRAM(s) IV Push every 8 hours  HYDROmorphone PCA (1 mG/mL) 30 milliLiter(s) PCA Continuous PCA Continuous  nystatin    Suspension 189468 Unit(s) Oral three times a day    MEDICATIONS  (PRN):  ALBUTerol/ipratropium for Nebulization 3 milliLiter(s) Nebulizer every 6 hours PRN Shortness of Breath and/or Wheezing  bisacodyl Suppository 10 milliGRAM(s) Rectal daily PRN Constipation  lactulose Syrup 10 Gram(s) Oral every 8 hours PRN constipation    PHYSICAL EXAM:    Vital Signs Last 24 Hrs  T(C): 36.5 (06 Sep 2017 06:45), Max: 36.5 (06 Sep 2017 06:45)  T(F): 97.7 (06 Sep 2017 06:45), Max: 97.7 (06 Sep 2017 06:45)  HR: 90 (06 Sep 2017 06:45) (83 - 96)  BP: 113/71 (06 Sep 2017 06:45) (103/66 - 113/71)  BP(mean): --  RR: 18 (06 Sep 2017 06:45) (18 - 18)  SpO2: 96% (06 Sep 2017 06:45) (96% - 97%)    General: alert      Karnofsky:  40%    HEENT: normal      Lungs: comfortable    CV: normal      GI: normal      : normal     MSK: weakness     Skin: no rash    I&O's Summary    05 Sep 2017 07:01  -  06 Sep 2017 07:00  --------------------------------------------------------  IN: 360 mL / OUT: 0 mL / NET: 360 mL    RADIOLOGY & ADDITIONAL STUDIES:    ADVANCE DIRECTIVES:

## 2017-09-06 NOTE — PROGRESS NOTE ADULT - SUBJECTIVE AND OBJECTIVE BOX
SULEIMAN GREGORY    [id number]    71y      Male    Patient is a 71y old  Male who presents with a chief complaint of hypercalcemia (30 Aug 2017 11:51)      INTERVAL HPI/OVERNIGHT EVENTS:    Patient is looking comfortable, denies any complains, pain is well controlled on PCA pump    REVIEW OF SYSTEMS:    CONSTITUTIONAL: No fever, has fatigue  RESPIRATORY: No cough, No shortness of breath  CARDIOVASCULAR: No chest pain, palpitations  GASTROINTESTINAL: No abdominal, No nausea, vomiting  MISCELLANEOUS: No joint swelling or pain         Vital Signs Last 24 Hrs  T(C): 36.2 (06 Sep 2017 12:03), Max: 36.5 (06 Sep 2017 06:45)  T(F): 97.2 (06 Sep 2017 12:03), Max: 97.7 (06 Sep 2017 06:45)  HR: 75 (06 Sep 2017 17:06) (65 - 96)  BP: 96/56 (06 Sep 2017 17:06) (96/56 - 113/71)  BP(mean): --  RR: 20 (06 Sep 2017 17:06) (17 - 20)  SpO2: 96% (06 Sep 2017 06:45) (96% - 97%)    PHYSICAL EXAM:    GENERAL: Elderly male looking comfortable  HEENT: PERRL, +EOMI  NECK: soft, Supple, No JVD,   CHEST/LUNG: Decrease air entry bilaterally; No wheezing  HEART: S1S2+, Regular rate and rhythm; No murmurs  ABDOMEN: Soft, Nontender, Nondistended; Bowel sounds present        I&O's Summary    05 Sep 2017 07:01  -  06 Sep 2017 07:00  --------------------------------------------------------  IN: 360 mL / OUT: 0 mL / NET: 360 mL        MEDICATIONS  (STANDING):  senna 2 Tablet(s) Oral at bedtime  amiodarone    Tablet 200 milliGRAM(s) Oral daily  aspirin  chewable 81 milliGRAM(s) Oral daily  clopidogrel Tablet 75 milliGRAM(s) Oral daily  pantoprazole    Tablet 40 milliGRAM(s) Oral before breakfast  metoprolol succinate  milliGRAM(s) Oral daily  levothyroxine 200 MICROGram(s) Oral daily  simvastatin 20 milliGRAM(s) Oral at bedtime  lactobacillus acidophilus 1 Tablet(s) Oral two times a day with meals  docusate sodium 100 milliGRAM(s) Oral two times a day  pembrolizumab IVPB 200 milliGRAM(s) IV Intermittent once  lidocaine   Patch 1 Patch Transdermal daily  multivitamin 1 Tablet(s) Oral daily  magnesium oxide 400 milliGRAM(s) Oral three times a day with meals  gabapentin 100 milliGRAM(s) Oral at bedtime  polyethylene glycol 3350 17 Gram(s) Oral two times a day  enoxaparin Injectable 40 milliGRAM(s) SubCutaneous daily  HYDROmorphone PCA (1 mG/mL) 30 milliLiter(s) PCA Continuous PCA Continuous  nystatin    Suspension 521398 Unit(s) Oral three times a day  dexamethasone     Tablet 4 milliGRAM(s) Oral every 12 hours    MEDICATIONS  (PRN):  ALBUTerol/ipratropium for Nebulization 3 milliLiter(s) Nebulizer every 6 hours PRN Shortness of Breath and/or Wheezing  bisacodyl Suppository 10 milliGRAM(s) Rectal daily PRN Constipation  lactulose Syrup 10 Gram(s) Oral every 8 hours PRN constipation SULEIMAN GREGORY    [id number]    71y      Male    Patient is a 71y old  Male who presents with a chief complaint of hypercalcemia (30 Aug 2017 11:51)      INTERVAL HPI/OVERNIGHT EVENTS:    Patient is looking comfortable, denies any complains, pain is well controlled on PCA pump, has family meeting today.     REVIEW OF SYSTEMS:    CONSTITUTIONAL: No fever, has fatigue  RESPIRATORY: No cough, No shortness of breath  CARDIOVASCULAR: No chest pain, palpitations  GASTROINTESTINAL: No abdominal, No nausea, vomiting  MISCELLANEOUS: No joint swelling or pain         Vital Signs Last 24 Hrs  T(C): 36.2 (06 Sep 2017 12:03), Max: 36.5 (06 Sep 2017 06:45)  T(F): 97.2 (06 Sep 2017 12:03), Max: 97.7 (06 Sep 2017 06:45)  HR: 75 (06 Sep 2017 17:06) (65 - 96)  BP: 96/56 (06 Sep 2017 17:06) (96/56 - 113/71)  RR: 20 (06 Sep 2017 17:06) (17 - 20)  SpO2: 96% (06 Sep 2017 06:45) (96% - 97%)    PHYSICAL EXAM:    GENERAL: Elderly male looking comfortable  HEENT: PERRL, +EOMI  NECK: soft, Supple, No JVD,   CHEST/LUNG: Decrease air entry bilaterally; No wheezing  HEART: S1S2+, Regular rate and rhythm; No murmurs  ABDOMEN: Soft, Nontender, Nondistended; Bowel sounds present        I&O's Summary    05 Sep 2017 07:01  -  06 Sep 2017 07:00  --------------------------------------------------------  IN: 360 mL / OUT: 0 mL / NET: 360 mL        MEDICATIONS  (STANDING):  senna 2 Tablet(s) Oral at bedtime  amiodarone    Tablet 200 milliGRAM(s) Oral daily  aspirin  chewable 81 milliGRAM(s) Oral daily  clopidogrel Tablet 75 milliGRAM(s) Oral daily  pantoprazole    Tablet 40 milliGRAM(s) Oral before breakfast  metoprolol succinate  milliGRAM(s) Oral daily  levothyroxine 200 MICROGram(s) Oral daily  simvastatin 20 milliGRAM(s) Oral at bedtime  lactobacillus acidophilus 1 Tablet(s) Oral two times a day with meals  docusate sodium 100 milliGRAM(s) Oral two times a day  pembrolizumab IVPB 200 milliGRAM(s) IV Intermittent once  lidocaine   Patch 1 Patch Transdermal daily  multivitamin 1 Tablet(s) Oral daily  magnesium oxide 400 milliGRAM(s) Oral three times a day with meals  gabapentin 100 milliGRAM(s) Oral at bedtime  polyethylene glycol 3350 17 Gram(s) Oral two times a day  enoxaparin Injectable 40 milliGRAM(s) SubCutaneous daily  HYDROmorphone PCA (1 mG/mL) 30 milliLiter(s) PCA Continuous PCA Continuous  nystatin    Suspension 717490 Unit(s) Oral three times a day  dexamethasone     Tablet 4 milliGRAM(s) Oral every 12 hours    MEDICATIONS  (PRN):  ALBUTerol/ipratropium for Nebulization 3 milliLiter(s) Nebulizer every 6 hours PRN Shortness of Breath and/or Wheezing  bisacodyl Suppository 10 milliGRAM(s) Rectal daily PRN Constipation  lactulose Syrup 10 Gram(s) Oral every 8 hours PRN constipation

## 2017-09-06 NOTE — PROGRESS NOTE ADULT - PROBLEM SELECTOR PLAN 2
appreciate Palliative and hospice, family wishes [facility], son was told [facility] not [facility], will have a family meeting later today. appreciate Palliative and hospice follow up, patient is going for the home hospice.

## 2017-09-06 NOTE — PROGRESS NOTE ADULT - ATTENDING COMMENTS
Thank you for the opportunity to assist with the care of this patient.   [facility] Consult Service [phone].

## 2017-09-06 NOTE — PROGRESS NOTE ADULT - PROBLEM SELECTOR PLAN 4
-met with family - 2 sons with Dr. Brownlee, and Ofelia COOPER. They have come to the decision to take him home with hospice. They are looking into privately getting some additional aid services. They understand poor prognosis. They understand that before  he goes home, we will deactivate AICD. Left hospice consents with them to sign and [facility]  to meet with them tomorrow am at 10.

## 2017-09-06 NOTE — PROGRESS NOTE ADULT - ASSESSMENT
This is 70 yo man with pmh of Metastatic Lung Cancer follows at [facility], CAD s/p stents, V. Tach, HTN, HLD and Hypothyroidism sent from [facility] with hypercalcemia, low albumin and leucocytosis. Patient was recently at [facility] for loculated pleural effusions s/p chest tube and iv abx and severe hypercalcemia. Currently getting treated at this time for effusion and has started keytruda via Oncology. He is also found to have an ileus, medically managed, distension improved, tolerating diet,surgery has signed off. Pt was seen by palliative and Hospice on PCA, family is willing for [facility].

## 2017-09-07 ENCOUNTER — OUTPATIENT (OUTPATIENT)
Dept: OUTPATIENT SERVICES | Facility: HOSPITAL | Age: 72
LOS: 1 days | Discharge: HOME | End: 2017-09-07

## 2017-09-07 DIAGNOSIS — Z98.890 OTHER SPECIFIED POSTPROCEDURAL STATES: Chronic | ICD-10-CM

## 2017-09-07 DIAGNOSIS — C34.92 MALIGNANT NEOPLASM OF UNSPECIFIED PART OF LEFT BRONCHUS OR LUNG: ICD-10-CM

## 2017-09-07 DIAGNOSIS — I51.9 HEART DISEASE, UNSPECIFIED: Chronic | ICD-10-CM

## 2017-09-07 LAB
ALBUMIN SERPL ELPH-MCNC: 2.6 G/DL — LOW (ref 3.3–5.2)
ALP SERPL-CCNC: 311 U/L — HIGH (ref 40–120)
ALT FLD-CCNC: 32 U/L — SIGNIFICANT CHANGE UP
ANION GAP SERPL CALC-SCNC: 12 MMOL/L — SIGNIFICANT CHANGE UP (ref 5–17)
AST SERPL-CCNC: 18 U/L — SIGNIFICANT CHANGE UP
BILIRUB SERPL-MCNC: 0.7 MG/DL — SIGNIFICANT CHANGE UP (ref 0.4–2)
BUN SERPL-MCNC: 28 MG/DL — HIGH (ref 8–20)
CALCIUM SERPL-MCNC: 11.3 MG/DL — HIGH (ref 8.6–10.2)
CHLORIDE SERPL-SCNC: 95 MMOL/L — LOW (ref 98–107)
CO2 SERPL-SCNC: 26 MMOL/L — SIGNIFICANT CHANGE UP (ref 22–29)
CREAT SERPL-MCNC: 1.02 MG/DL — SIGNIFICANT CHANGE UP (ref 0.5–1.3)
GLUCOSE SERPL-MCNC: 240 MG/DL — HIGH (ref 70–115)
HCT VFR BLD CALC: 31.6 % — LOW (ref 42–52)
HGB BLD-MCNC: 9.8 G/DL — LOW (ref 14–18)
MCHC RBC-ENTMCNC: 27.4 PG — SIGNIFICANT CHANGE UP (ref 27–31)
MCHC RBC-ENTMCNC: 31 G/DL — LOW (ref 32–36)
MCV RBC AUTO: 88.3 FL — SIGNIFICANT CHANGE UP (ref 80–94)
PHOSPHATE SERPL-MCNC: 3.8 MG/DL — SIGNIFICANT CHANGE UP (ref 2.4–4.7)
PLATELET # BLD AUTO: 300 K/UL — SIGNIFICANT CHANGE UP (ref 150–400)
POTASSIUM SERPL-MCNC: 4 MMOL/L — SIGNIFICANT CHANGE UP (ref 3.5–5.3)
POTASSIUM SERPL-SCNC: 4 MMOL/L — SIGNIFICANT CHANGE UP (ref 3.5–5.3)
PROT SERPL-MCNC: 6.2 G/DL — LOW (ref 6.6–8.7)
RBC # BLD: 3.58 M/UL — LOW (ref 4.6–6.2)
RBC # FLD: 19.5 % — HIGH (ref 11–15.6)
SODIUM SERPL-SCNC: 133 MMOL/L — LOW (ref 135–145)
WBC # BLD: 35.5 K/UL — HIGH (ref 4.8–10.8)
WBC # FLD AUTO: 35.5 K/UL — HIGH (ref 4.8–10.8)

## 2017-09-07 PROCEDURE — 99233 SBSQ HOSP IP/OBS HIGH 50: CPT

## 2017-09-07 PROCEDURE — 99497 ADVNCD CARE PLAN 30 MIN: CPT | Mod: 25

## 2017-09-07 PROCEDURE — 99232 SBSQ HOSP IP/OBS MODERATE 35: CPT

## 2017-09-07 RX ORDER — HYDROMORPHONE HYDROCHLORIDE 2 MG/ML
30 INJECTION INTRAMUSCULAR; INTRAVENOUS; SUBCUTANEOUS
Qty: 0 | Refills: 0 | Status: DISCONTINUED | OUTPATIENT
Start: 2017-09-07 | End: 2017-09-08

## 2017-09-07 RX ADMIN — Medication 500000 UNIT(S): at 06:26

## 2017-09-07 RX ADMIN — GABAPENTIN 100 MILLIGRAM(S): 400 CAPSULE ORAL at 21:52

## 2017-09-07 RX ADMIN — HYDROMORPHONE HYDROCHLORIDE 30 MILLILITER(S): 2 INJECTION INTRAMUSCULAR; INTRAVENOUS; SUBCUTANEOUS at 20:59

## 2017-09-07 RX ADMIN — Medication 500000 UNIT(S): at 15:15

## 2017-09-07 RX ADMIN — Medication 200 MICROGRAM(S): at 05:37

## 2017-09-07 RX ADMIN — AMIODARONE HYDROCHLORIDE 200 MILLIGRAM(S): 400 TABLET ORAL at 05:37

## 2017-09-07 RX ADMIN — MAGNESIUM OXIDE 400 MG ORAL TABLET 400 MILLIGRAM(S): 241.3 TABLET ORAL at 18:56

## 2017-09-07 RX ADMIN — PANTOPRAZOLE SODIUM 40 MILLIGRAM(S): 20 TABLET, DELAYED RELEASE ORAL at 06:26

## 2017-09-07 RX ADMIN — Medication 1 TABLET(S): at 18:57

## 2017-09-07 RX ADMIN — LIDOCAINE 1 PATCH: 4 CREAM TOPICAL at 15:14

## 2017-09-07 RX ADMIN — Medication 4 MILLIGRAM(S): at 19:57

## 2017-09-07 RX ADMIN — MAGNESIUM OXIDE 400 MG ORAL TABLET 400 MILLIGRAM(S): 241.3 TABLET ORAL at 15:14

## 2017-09-07 RX ADMIN — Medication 100 MILLIGRAM(S): at 05:37

## 2017-09-07 RX ADMIN — CLOPIDOGREL BISULFATE 75 MILLIGRAM(S): 75 TABLET, FILM COATED ORAL at 15:14

## 2017-09-07 RX ADMIN — POLYETHYLENE GLYCOL 3350 17 GRAM(S): 17 POWDER, FOR SOLUTION ORAL at 05:36

## 2017-09-07 RX ADMIN — Medication 1 TABLET(S): at 15:14

## 2017-09-07 RX ADMIN — LIDOCAINE 1 PATCH: 4 CREAM TOPICAL at 00:09

## 2017-09-07 RX ADMIN — Medication 4 MILLIGRAM(S): at 05:37

## 2017-09-07 RX ADMIN — Medication 81 MILLIGRAM(S): at 15:14

## 2017-09-07 RX ADMIN — HYDROMORPHONE HYDROCHLORIDE 30 MILLILITER(S): 2 INJECTION INTRAMUSCULAR; INTRAVENOUS; SUBCUTANEOUS at 21:52

## 2017-09-07 RX ADMIN — ENOXAPARIN SODIUM 40 MILLIGRAM(S): 100 INJECTION SUBCUTANEOUS at 15:14

## 2017-09-07 NOTE — PROGRESS NOTE ADULT - PROBLEM SELECTOR PLAN 8
REsolved    Likely due to short course of lasix  D/c lasix  Monitor Cr
s/p 2 units prbc   stable at this time  will c/w monitoring
s/p 2 units prbc   stable at this time, no further labs for now
synthroid
when diet is resumed, to start ensure

## 2017-09-07 NOTE — PROGRESS NOTE ADULT - PROBLEM SELECTOR PLAN 5
2/2 active hemoptysis  Hgb stable  S/p 1u PRBC  Transfuse to keep Hgb >8
likely 2/2 primary malignancy   IVF,  zometa and calcitonin nasal spray as can contribute to ileus
likely 2/2 primary malignancy   improved  IVF
likely 2/2 primary malignancy   improved  will resume IVF
likely 2/2 primary malignancy   improved  will resume IVF
likely 2/2 primary malignancy   s/p IV, zometa and calcitonin nasal spray
with mets to S1 as per ct scan   Seen by Oncology   s/p 1 dose of keytruda on 8/23  zofran prn
with mets to S1 as per ct scan   Seen by Oncology   s/p 1 dose of keytruda on 8/23  zofran prn, pain meds
with mets to S1 as per ct scan   Seen by Oncology   s/p 1 dose of keytruda on 8/23  zofran prn, pain meds, discussed with oncology, no further chemo or immunotherapy at this point of time
s/p 2 units prbc   stable at this time  will c/w monitoring
-psychosocial support continuing. Family is struggling with seeing him this way as he was function before he was diagnosed about 2 months ago. Overall guarded prognosis remains but patient and family would like to continue aggressive therapy for now. Ongoing discussions depending on clinical status. For now he seems to be hanging on and has the potential for some kind of improvement in his quality of life at least, even if his cancer is advanced and overall prognosis is guarded.

## 2017-09-07 NOTE — PROGRESS NOTE ADULT - PROBLEM SELECTOR PLAN 9
diet/ensure
not in exacerbation.
synthroid
when diet is resumed, to start ensure
lovenox sq

## 2017-09-07 NOTE — PROGRESS NOTE ADULT - SUBJECTIVE AND OBJECTIVE BOX
SULEIMAN GREGORY    [id number]    71y      Male    Patient is a 71y old  Male who presents with a chief complaint of hypercalcemia (30 Aug 2017 11:51)      INTERVAL HPI/OVERNIGHT EVENTS:    Patient is looking comfortable, denies any complains, pain is well controlled on PCA pump, has family meeting today along with daughter, patient expressed his wishes to make him comfortable and he wanted to go home with pain meds and hospice.     REVIEW OF SYSTEMS:    CONSTITUTIONAL: No fever, has fatigue  RESPIRATORY: No cough, No shortness of breath  CARDIOVASCULAR: No chest pain, palpitations  GASTROINTESTINAL: No abdominal, No nausea, vomiting  MISCELLANEOUS: pain is well controlled        Vital Signs Last 24 Hrs  T(C): 36.4 (07 Sep 2017 13:00), Max: 36.8 (06 Sep 2017 22:00)  T(F): 97.5 (07 Sep 2017 13:00), Max: 98.3 (07 Sep 2017 08:14)  HR: 86 (07 Sep 2017 13:00) (75 - 86)  BP: 98/62 (07 Sep 2017 13:00) (96/56 - 108/56)  RR: 16 (07 Sep 2017 13:00) (16 - 20)  SpO2: 98% (07 Sep 2017 13:00) (97% - 98%)    PHYSICAL EXAM:    GENERAL: Elderly male looking comfortable  HEENT: PERRL, +EOMI  NECK: soft, Supple, No JVD,   CHEST/LUNG: Decrease air entry bilaterally; No wheezing  HEART: S1S2+, Regular rate and rhythm; No murmurs  ABDOMEN: Soft, Nontender, Nondistended; Bowel sounds present      LABS:                        9.8    35.5  )-----------( 300      ( 07 Sep 2017 12:59 )             31.6     09-07    133<L>  |  95<L>  |  28.0<H>  ----------------------------<  240<H>  4.0   |  26.0  |  1.02    Ca    11.3<H>      07 Sep 2017 12:59  Phos  3.8     09-07    TPro  6.2<L>  /  Alb  2.6<L>  /  TBili  0.7  /  DBili  x   /  AST  18  /  ALT  32  /  AlkPhos  311<H>  09-07            I&O's Summary    06 Sep 2017 07:01  -  07 Sep 2017 07:00  --------------------------------------------------------  IN: 480 mL / OUT: 250 mL / NET: 230 mL        MEDICATIONS  (STANDING):  senna 2 Tablet(s) Oral at bedtime  amiodarone    Tablet 200 milliGRAM(s) Oral daily  aspirin  chewable 81 milliGRAM(s) Oral daily  clopidogrel Tablet 75 milliGRAM(s) Oral daily  pantoprazole    Tablet 40 milliGRAM(s) Oral before breakfast  metoprolol succinate  milliGRAM(s) Oral daily  levothyroxine 200 MICROGram(s) Oral daily  simvastatin 20 milliGRAM(s) Oral at bedtime  lactobacillus acidophilus 1 Tablet(s) Oral two times a day with meals  docusate sodium 100 milliGRAM(s) Oral two times a day  pembrolizumab IVPB 200 milliGRAM(s) IV Intermittent once  lidocaine   Patch 1 Patch Transdermal daily  multivitamin 1 Tablet(s) Oral daily  magnesium oxide 400 milliGRAM(s) Oral three times a day with meals  gabapentin 100 milliGRAM(s) Oral at bedtime  polyethylene glycol 3350 17 Gram(s) Oral two times a day  enoxaparin Injectable 40 milliGRAM(s) SubCutaneous daily  HYDROmorphone PCA (1 mG/mL) 30 milliLiter(s) PCA Continuous PCA Continuous  nystatin    Suspension 297669 Unit(s) Oral three times a day  dexamethasone     Tablet 4 milliGRAM(s) Oral every 12 hours    MEDICATIONS  (PRN):  ALBUTerol/ipratropium for Nebulization 3 milliLiter(s) Nebulizer every 6 hours PRN Shortness of Breath and/or Wheezing  bisacodyl Suppository 10 milliGRAM(s) Rectal daily PRN Constipation  lactulose Syrup 10 Gram(s) Oral every 8 hours PRN constipation

## 2017-09-07 NOTE — PROGRESS NOTE ADULT - PROBLEM SELECTOR PLAN 4
-ACP discussion; met with family, with Jesus Wilkins at bedside, Dr. Boyer, and of course patient. Patient has made his wishes clear, he would like to go home so he can spend one last night in his chair at home. He knows he does not have long, he would like to be home so he can die at home. Hospice being set up at home. Spoke to him about his wishes - he confirms DNR/I MOLST completed

## 2017-09-07 NOTE — PROGRESS NOTE ADULT - ATTENDING COMMENTS
COUNSELING:  Face to face meeting to discuss Advanced Care Planning - Time Spent 20 Minutes.      Thank you for the opportunity to assist with the care of this patient.   [facility] [phone].

## 2017-09-07 NOTE — PROGRESS NOTE ADULT - PROBLEM SELECTOR PROBLEM 8
Acute renal failure with acute cortical necrosis
Anemia
Hypothyroidism
Protein calorie malnutrition

## 2017-09-07 NOTE — PROGRESS NOTE ADULT - PROBLEM SELECTOR PROBLEM 9
Chronic systolic CHF (congestive heart failure), NYHA class 2
Hypothyroidism
Protein calorie malnutrition
Prophylactic measure

## 2017-09-07 NOTE — PROGRESS NOTE ADULT - PROBLEM SELECTOR PLAN 6
Recently placed ZARINA to SVG to RCA 6/16/17  C/w asa and plavix
c/w medications at this time   BP controlled at this time
likely 2/2 primary malignancy   s/p IV, zometa and calcitonin nasal spray
likely 2/2 primary malignancy   s/p IV, zometa and calcitonin nasal spray, no further monitoring of levels, talked with family.
seen by pain, started on fentanyl   If continues to use prn diluadid, will increase dose of fentanyl

## 2017-09-07 NOTE — PROGRESS NOTE ADULT - PROBLEM SELECTOR PROBLEM 5
Anemia due to blood loss
Hypercalcemia
Malignant neoplasm of lung, unspecified laterality, unspecified part of lung
Anemia
Palliative care encounter

## 2017-09-07 NOTE — PROGRESS NOTE ADULT - PROBLEM SELECTOR PLAN 1
c/w steroids, PCA, palliative and Hospice eval appreciated, patient feels comfortable, will had a family meeting today along with daughter, patient expressed his wishes to make him comfortable and he wanted to go home with pain meds and hospice, no further testing, DNR/DNI, wanted to continue with current meds, Hospice team is arranging home hospice.

## 2017-09-07 NOTE — PROGRESS NOTE ADULT - PROBLEM SELECTOR PROBLEM 7
Anemia
Hypertension
VT (ventricular tachycardia)
Hypothyroidism

## 2017-09-07 NOTE — PROGRESS NOTE ADULT - SUBJECTIVE AND OBJECTIVE BOX
OVERNIGHT EVENTS: doing poorly     Present Symptoms:     Dyspnea: 0   Nausea/Vomiting: No  Anxiety:  No  Depression: No  Fatigue: Yes  Loss of appetite: Yes No    Pain:             Character-            Duration-            Effect-            Factors-            Frequency-            Location-            Severity-    Review of Systems: Reviewed                     Negative:                     Positive:  Unable to obtain due to poor mentation   All others negative    MEDICATIONS  (STANDING):  senna 2 Tablet(s) Oral at bedtime  amiodarone    Tablet 200 milliGRAM(s) Oral daily  aspirin  chewable 81 milliGRAM(s) Oral daily  clopidogrel Tablet 75 milliGRAM(s) Oral daily  pantoprazole    Tablet 40 milliGRAM(s) Oral before breakfast  metoprolol succinate  milliGRAM(s) Oral daily  levothyroxine 200 MICROGram(s) Oral daily  simvastatin 20 milliGRAM(s) Oral at bedtime  lactobacillus acidophilus 1 Tablet(s) Oral two times a day with meals  docusate sodium 100 milliGRAM(s) Oral two times a day  pembrolizumab IVPB 200 milliGRAM(s) IV Intermittent once  lidocaine   Patch 1 Patch Transdermal daily  multivitamin 1 Tablet(s) Oral daily  magnesium oxide 400 milliGRAM(s) Oral three times a day with meals  gabapentin 100 milliGRAM(s) Oral at bedtime  polyethylene glycol 3350 17 Gram(s) Oral two times a day  enoxaparin Injectable 40 milliGRAM(s) SubCutaneous daily  HYDROmorphone PCA (1 mG/mL) 30 milliLiter(s) PCA Continuous PCA Continuous  nystatin    Suspension 403673 Unit(s) Oral three times a day  dexamethasone     Tablet 4 milliGRAM(s) Oral every 12 hours    MEDICATIONS  (PRN):  ALBUTerol/ipratropium for Nebulization 3 milliLiter(s) Nebulizer every 6 hours PRN Shortness of Breath and/or Wheezing  bisacodyl Suppository 10 milliGRAM(s) Rectal daily PRN Constipation  lactulose Syrup 10 Gram(s) Oral every 8 hours PRN constipation    PHYSICAL EXAM:    Vital Signs Last 24 Hrs  T(C): 36.8 (07 Sep 2017 04:35), Max: 36.8 (06 Sep 2017 22:00)  T(F): 98.2 (07 Sep 2017 04:35), Max: 98.2 (06 Sep 2017 22:00)  HR: 78 (07 Sep 2017 04:35) (75 - 78)  BP: 108/56 (07 Sep 2017 05:00) (96/56 - 108/56)  BP(mean): --  RR: 18 (07 Sep 2017 04:35) (18 - 20)  SpO2: 98% (07 Sep 2017 04:35) (97% - 98%)    General: alert  oriented x ____ lethargic agitated                  cachexia  nonverbal  coma    Karnofsky:  %    HEENT: normal  dry mouth  ET tube/trach    Lungs: comfortable tachypnea/labored breathing  excessive secretions    CV: normal  tachycardia    GI: normal  distended  tender  no BS               PEG/NG/OG tube  constipation  last BM:     : normal  incontinent  oliguria/anuria  howard    MSK: normal  weakness  edema             ambulatory  bedbound/wheelchair bound    Skin: normal  pressure ulcers- Stage_____  no rash    LABS:                I&O's Summary    06 Sep 2017 07:01  -  07 Sep 2017 07:00  --------------------------------------------------------  IN: 480 mL / OUT: 250 mL / NET: 230 mL        RADIOLOGY & ADDITIONAL STUDIES:    ADVANCE DIRECTIVES:   DNR YES NO  Completed on:                     MOLST  YES NO   Completed on:  Living Will  YES NO   Completed on: OVERNIGHT EVENTS: doing poorly, asking to go home.     Present Symptoms:     Dyspnea: 0   Nausea/Vomiting: No  Anxiety:  No  Depression: No  Fatigue: Yes  Loss of appetite: No    Pain: none            Character-            Duration-            Effect-            Factors-            Frequency-            Location-            Severity-    Review of Systems: Reviewed                       Positive: frustration, fatigue   Unable to obtain due to poor mentation     MEDICATIONS  (STANDING):  senna 2 Tablet(s) Oral at bedtime  amiodarone    Tablet 200 milliGRAM(s) Oral daily  aspirin  chewable 81 milliGRAM(s) Oral daily  clopidogrel Tablet 75 milliGRAM(s) Oral daily  pantoprazole    Tablet 40 milliGRAM(s) Oral before breakfast  metoprolol succinate  milliGRAM(s) Oral daily  levothyroxine 200 MICROGram(s) Oral daily  simvastatin 20 milliGRAM(s) Oral at bedtime  lactobacillus acidophilus 1 Tablet(s) Oral two times a day with meals  docusate sodium 100 milliGRAM(s) Oral two times a day  pembrolizumab IVPB 200 milliGRAM(s) IV Intermittent once  lidocaine   Patch 1 Patch Transdermal daily  multivitamin 1 Tablet(s) Oral daily  magnesium oxide 400 milliGRAM(s) Oral three times a day with meals  gabapentin 100 milliGRAM(s) Oral at bedtime  polyethylene glycol 3350 17 Gram(s) Oral two times a day  enoxaparin Injectable 40 milliGRAM(s) SubCutaneous daily  HYDROmorphone PCA (1 mG/mL) 30 milliLiter(s) PCA Continuous PCA Continuous  nystatin    Suspension 598227 Unit(s) Oral three times a day  dexamethasone     Tablet 4 milliGRAM(s) Oral every 12 hours    MEDICATIONS  (PRN):  ALBUTerol/ipratropium for Nebulization 3 milliLiter(s) Nebulizer every 6 hours PRN Shortness of Breath and/or Wheezing  bisacodyl Suppository 10 milliGRAM(s) Rectal daily PRN Constipation  lactulose Syrup 10 Gram(s) Oral every 8 hours PRN constipation    PHYSICAL EXAM:    Vital Signs Last 24 Hrs  T(C): 36.8 (07 Sep 2017 04:35), Max: 36.8 (06 Sep 2017 22:00)  T(F): 98.2 (07 Sep 2017 04:35), Max: 98.2 (06 Sep 2017 22:00)  HR: 78 (07 Sep 2017 04:35) (75 - 78)  BP: 108/56 (07 Sep 2017 05:00) (96/56 - 108/56)  BP(mean): --  RR: 18 (07 Sep 2017 04:35) (18 - 20)  SpO2: 98% (07 Sep 2017 04:35) (97% - 98%)    General: alert and oriented    Karnofsky: 30 %    HEENT: normal      Lungs: comfortable     CV: normal      GI: normal; distended     : normal     MSK: weakness      Skin:  no rash    LABS:    I&O's Summary    06 Sep 2017 07:01  -  07 Sep 2017 07:00  --------------------------------------------------------  IN: 480 mL / OUT: 250 mL / NET: 230 mL    RADIOLOGY & ADDITIONAL STUDIES:    ADVANCE DIRECTIVES: DNR/I

## 2017-09-07 NOTE — PROGRESS NOTE ADULT - PROBLEM SELECTOR PLAN 7
NSVT  AICD  On metoprolol ER 100mg  Appreciate cardiology consult
c/w medications at this time   BP controlled at this time
s/p 2 units prbc   stable at this time  will c/w monitoring
synthroid

## 2017-09-07 NOTE — PROGRESS NOTE ADULT - ASSESSMENT
This is 72 yo man with pmh of Metastatic Lung Cancer follows at [facility], CAD s/p stents, V. Tach, HTN, HLD and Hypothyroidism sent from [facility] with hypercalcemia, low albumin and leucocytosis. Patient was recently at [facility] for loculated pleural effusions s/p chest tube and iv abx and severe hypercalcemia. Currently getting treated at this time for effusion and has started keytruda via Oncology. He is also found to have an ileus, medically managed, distension improved, tolerating diet,surgery has signed off. Pt was seen by palliative and Hospice on PCA, family is willing for [facility].

## 2017-09-07 NOTE — PROGRESS NOTE ADULT - PROBLEM SELECTOR PLAN 10
diet/ensure
lovenox sq
diet/ensure
lovenox sq
lovenox sq

## 2017-09-07 NOTE — GOALS OF CARE CONVERSATION - PERSONAL ADVANCE DIRECTIVE - CONVERSATION DETAILS
Daughter Claudette Grossman who is  identified as HCP  called this rn on phone very upset she had may medical questions  question regarding  AICD , Why Pt needs Hospice  ? Why Pt can't have homecare ?  When were labs and  test done  ?  who would care for pt if he went home ?  Why was the plan not discussed with her ?  Who agreed to Hospice ? who called her house saying  he is being discharged and his insurance  has  ?    I explained to Mrs delprete that although I was not at the meeting yesterday we had met previously  and when hospice was discussed previously to my knowledge  I was told that meeting was scheduled with her today at 10 am am to facilitate his return home and discuss her hospice concerns  . That as per the MDs her brothers had said this was the family wish and  they said it had been discussed with her as well  that family was willing to help care for pt at home. she says she has many concerns about caring for him at home as he lives alone .  she understand with hospice medicare can provide 2-4hr a day 5days a week monday - to friday and can take  a week or more to place . I recommended private hire  I offered list,   her medical question I endorsed to medical doctor dr xiong  who stated he will call her  I did address her  hospice questions  and I endorsed her case management questions to CC violette Middleton . Mrs grossman did understand that hospice is  a service and a option, she can pursue whatever plan is best for her father and her family . Collaborated with dr nicole zuñiga palliative director.  CC will reach out hcp .  she is awre of above
approached family again today  as per dr omalley and dr zuñiga family was open to hospice [facility] today .  Family states no  not today,  they will go visit the [facility] discuss it further as family and ask that we revisit with them  tomorrow 9/1 .   They have contact numbers if plan of care or pts condition should change  Dr Omalley aware as is dr zuñiga endorsed to cc kalyan  to endorse to YAYA Vasquez
As  per  dr zuñiga , [facility]  [facility]ospice is not to approach family today she would like hospice to participate in family meeting scheduled for  8/29  she discussed with with dr FRANCO keen he is aware .
Hospice services discussed with patient's family including inpatient hospice options and criteria. Hospice consents discussed. Family would like more time to discuss options and the plan of care for the patient. Hospice contact information given to family. Hospice will remain available to assist the patient and family with a safe discharge plan. raeann JOHNSON LMSW
As per palliative director dr nicole zuñiga  she met with family at present they wish to pursue  treatment  Keytruda . Keytruda  is disease modifying agent  discussed  case with hospice medical director  dr mildred grove as per hospice medical director  if keytruda is the plan  it is usally given q 3 weeks and is not a therapy consistent with a hospice plan of care and therefore pt cannot concurrently receive keytruda and hospice services . Hospice will remain available if plan of care  or pts condition should change

## 2017-09-07 NOTE — PROGRESS NOTE ADULT - PROBLEM SELECTOR PROBLEM 6
Coronary artery disease involving native heart without angina pectoris, unspecified vessel or lesion type
Hypercalcemia
Hypertension
Pain

## 2017-09-08 ENCOUNTER — APPOINTMENT (OUTPATIENT)
Dept: RADIATION ONCOLOGY | Facility: CLINIC | Age: 72
End: 2017-09-08

## 2017-09-08 ENCOUNTER — APPOINTMENT (OUTPATIENT)
Dept: INFUSION THERAPY | Facility: CLINIC | Age: 72
End: 2017-09-08

## 2017-09-08 VITALS
SYSTOLIC BLOOD PRESSURE: 92 MMHG | DIASTOLIC BLOOD PRESSURE: 68 MMHG | RESPIRATION RATE: 18 BRPM | HEART RATE: 99 BPM | TEMPERATURE: 98 F

## 2017-09-08 PROCEDURE — 99233 SBSQ HOSP IP/OBS HIGH 50: CPT

## 2017-09-08 PROCEDURE — 99239 HOSP IP/OBS DSCHRG MGMT >30: CPT

## 2017-09-08 RX ORDER — SENNA PLUS 8.6 MG/1
2 TABLET ORAL
Qty: 30 | Refills: 0 | OUTPATIENT
Start: 2017-09-08 | End: 2017-09-23

## 2017-09-08 RX ORDER — PANTOPRAZOLE SODIUM 20 MG/1
1 TABLET, DELAYED RELEASE ORAL
Qty: 30 | Refills: 0 | OUTPATIENT
Start: 2017-09-08 | End: 2017-10-08

## 2017-09-08 RX ORDER — HYDROMORPHONE HYDROCHLORIDE 2 MG/ML
30 INJECTION INTRAMUSCULAR; INTRAVENOUS; SUBCUTANEOUS
Qty: 0 | Refills: 0 | Status: DISCONTINUED | OUTPATIENT
Start: 2017-09-08 | End: 2017-09-08

## 2017-09-08 RX ORDER — LEVOTHYROXINE SODIUM 125 MCG
1 TABLET ORAL
Qty: 30 | Refills: 0 | OUTPATIENT
Start: 2017-09-08 | End: 2017-10-08

## 2017-09-08 RX ORDER — AMIODARONE HYDROCHLORIDE 400 MG/1
1 TABLET ORAL
Qty: 30 | Refills: 0 | OUTPATIENT
Start: 2017-09-08 | End: 2017-10-08

## 2017-09-08 RX ORDER — DEXAMETHASONE 0.5 MG/5ML
1 ELIXIR ORAL
Qty: 45 | Refills: 1 | OUTPATIENT
Start: 2017-09-08 | End: 2017-10-07

## 2017-09-08 RX ORDER — NYSTATIN 500MM UNIT
5 POWDER (EA) MISCELLANEOUS
Qty: 105 | Refills: 0 | OUTPATIENT
Start: 2017-09-08 | End: 2017-09-15

## 2017-09-08 RX ORDER — HYDROMORPHONE HYDROCHLORIDE 2 MG/ML
0.5 INJECTION INTRAMUSCULAR; INTRAVENOUS; SUBCUTANEOUS
Qty: 500 | Refills: 0 | OUTPATIENT
Start: 2017-09-08 | End: 2017-09-15

## 2017-09-08 RX ORDER — DIAZEPAM 5 MG
1 TABLET ORAL
Qty: 15 | Refills: 0 | OUTPATIENT
Start: 2017-09-08 | End: 2017-09-23

## 2017-09-08 RX ORDER — IPRATROPIUM/ALBUTEROL SULFATE 18-103MCG
3 AEROSOL WITH ADAPTER (GRAM) INHALATION
Qty: 30 | Refills: 0 | OUTPATIENT
Start: 2017-09-08 | End: 2017-09-23

## 2017-09-08 RX ORDER — METOPROLOL TARTRATE 50 MG
1 TABLET ORAL
Qty: 30 | Refills: 0 | OUTPATIENT
Start: 2017-09-08 | End: 2017-10-08

## 2017-09-08 RX ORDER — ATROPINE SULFATE 1 %
4 DROPS OPHTHALMIC (EYE)
Qty: 2 | Refills: 0 | OUTPATIENT
Start: 2017-09-08 | End: 2017-09-15

## 2017-09-08 RX ORDER — CLOPIDOGREL BISULFATE 75 MG/1
1 TABLET, FILM COATED ORAL
Qty: 30 | Refills: 0 | OUTPATIENT
Start: 2017-09-08 | End: 2017-10-08

## 2017-09-08 RX ORDER — POLYETHYLENE GLYCOL 3350 17 G/17G
17 POWDER, FOR SOLUTION ORAL
Qty: 255 | Refills: 0 | OUTPATIENT
Start: 2017-09-08 | End: 2017-09-23

## 2017-09-08 RX ORDER — GABAPENTIN 400 MG/1
1 CAPSULE ORAL
Qty: 30 | Refills: 0 | OUTPATIENT
Start: 2017-09-08 | End: 2017-10-08

## 2017-09-08 RX ORDER — ACETAMINOPHEN 500 MG
1000 TABLET ORAL ONCE
Qty: 0 | Refills: 0 | Status: COMPLETED | OUTPATIENT
Start: 2017-09-08 | End: 2017-09-08

## 2017-09-08 RX ORDER — SIMVASTATIN 20 MG/1
1 TABLET, FILM COATED ORAL
Qty: 30 | Refills: 0 | OUTPATIENT
Start: 2017-09-08 | End: 2017-10-08

## 2017-09-08 RX ORDER — ACETAMINOPHEN 500 MG
2 TABLET ORAL
Qty: 360 | Refills: 0 | OUTPATIENT
Start: 2017-09-08 | End: 2017-10-08

## 2017-09-08 RX ORDER — LACTULOSE 10 G/15ML
15 SOLUTION ORAL
Qty: 675 | Refills: 0 | OUTPATIENT
Start: 2017-09-08 | End: 2017-09-23

## 2017-09-08 RX ORDER — HYDROMORPHONE HYDROCHLORIDE 2 MG/ML
0.5 INJECTION INTRAMUSCULAR; INTRAVENOUS; SUBCUTANEOUS
Qty: 150 | Refills: 0 | OUTPATIENT
Start: 2017-09-08 | End: 2017-09-15

## 2017-09-08 RX ORDER — DOCUSATE SODIUM 100 MG
1 CAPSULE ORAL
Qty: 60 | Refills: 0 | OUTPATIENT
Start: 2017-09-08 | End: 2017-10-08

## 2017-09-08 RX ORDER — LANOLIN ALCOHOL/MO/W.PET/CERES
1 CREAM (GRAM) TOPICAL
Qty: 30 | Refills: 0 | OUTPATIENT
Start: 2017-09-08 | End: 2017-09-09

## 2017-09-08 RX ADMIN — HYDROMORPHONE HYDROCHLORIDE 30 MILLILITER(S): 2 INJECTION INTRAMUSCULAR; INTRAVENOUS; SUBCUTANEOUS at 01:36

## 2017-09-08 RX ADMIN — HYDROMORPHONE HYDROCHLORIDE 30 MILLILITER(S): 2 INJECTION INTRAMUSCULAR; INTRAVENOUS; SUBCUTANEOUS at 11:32

## 2017-09-08 RX ADMIN — HYDROMORPHONE HYDROCHLORIDE 30 MILLILITER(S): 2 INJECTION INTRAMUSCULAR; INTRAVENOUS; SUBCUTANEOUS at 16:05

## 2017-09-08 RX ADMIN — Medication 1 TABLET(S): at 13:05

## 2017-09-08 RX ADMIN — Medication 500000 UNIT(S): at 13:05

## 2017-09-08 RX ADMIN — HYDROMORPHONE HYDROCHLORIDE 30 MILLILITER(S): 2 INJECTION INTRAMUSCULAR; INTRAVENOUS; SUBCUTANEOUS at 05:18

## 2017-09-08 RX ADMIN — LIDOCAINE 1 PATCH: 4 CREAM TOPICAL at 13:05

## 2017-09-08 RX ADMIN — CLOPIDOGREL BISULFATE 75 MILLIGRAM(S): 75 TABLET, FILM COATED ORAL at 13:05

## 2017-09-08 RX ADMIN — Medication 400 MILLIGRAM(S): at 05:14

## 2017-09-08 RX ADMIN — Medication 1000 MILLIGRAM(S): at 05:20

## 2017-09-08 RX ADMIN — Medication 81 MILLIGRAM(S): at 13:05

## 2017-09-08 RX ADMIN — LIDOCAINE 1 PATCH: 4 CREAM TOPICAL at 04:57

## 2017-09-08 RX ADMIN — PANTOPRAZOLE SODIUM 40 MILLIGRAM(S): 20 TABLET, DELAYED RELEASE ORAL at 06:46

## 2017-09-08 RX ADMIN — MAGNESIUM OXIDE 400 MG ORAL TABLET 400 MILLIGRAM(S): 241.3 TABLET ORAL at 13:05

## 2017-09-08 RX ADMIN — ENOXAPARIN SODIUM 40 MILLIGRAM(S): 100 INJECTION SUBCUTANEOUS at 13:05

## 2017-09-08 RX ADMIN — HYDROMORPHONE HYDROCHLORIDE 30 MILLILITER(S): 2 INJECTION INTRAMUSCULAR; INTRAVENOUS; SUBCUTANEOUS at 07:40

## 2017-09-08 RX ADMIN — Medication 100 MILLIGRAM(S): at 06:46

## 2017-09-08 RX ADMIN — Medication 1 TABLET(S): at 08:19

## 2017-09-08 RX ADMIN — MAGNESIUM OXIDE 400 MG ORAL TABLET 400 MILLIGRAM(S): 241.3 TABLET ORAL at 08:20

## 2017-09-08 RX ADMIN — Medication 4 MILLIGRAM(S): at 06:46

## 2017-09-08 RX ADMIN — Medication 200 MICROGRAM(S): at 06:45

## 2017-09-08 RX ADMIN — AMIODARONE HYDROCHLORIDE 200 MILLIGRAM(S): 400 TABLET ORAL at 06:46

## 2017-09-08 NOTE — PROGRESS NOTE ADULT - ASSESSMENT
71M with end stage lung cancer with severe neoplasm pain, severe debility, pain exacerbation overnight requiring additional breakthrough medications.

## 2017-09-08 NOTE — PROGRESS NOTE ADULT - NSHPATTENDINGPLANDISCUSS_GEN_ALL_CORE
Dr. Reich
Dr. Brownlee
Dr. Brownlee and Kanwal COOPER RN
Dr. Brownlee, [facility]
Dr. Omalley
Dr. Omalley
Dr. Ashutosh Mckinney
Dr. Gautam
son
son, Dr Pickering
son, palliative MD
RN
RN
patient, family at bedside. RN
patient, family at bedside. RN
patient, family at bedside. d/w Palliative care MD
patient, family at bedside and Surgical consult phone
patient, nurse, son at bedside. Prognosis poor
son, palliative MD
Dr. Omalley
Roberto Tejada from N
Dr. Gautam, [facility]
Dr. Menchaca, [facility].

## 2017-09-08 NOTE — PROGRESS NOTE ADULT - PROBLEM SELECTOR PROBLEM 1
Loculated pleural effusion
Ileus
Lung mass
Malignant neoplasm of lung, unspecified laterality, unspecified part of lung
Pain
Primary malignant neoplasm of lung metastatic to other site, unspecified laterality
Malignant neoplasm of lung, unspecified laterality, unspecified part of lung
Pleural effusion
Primary malignant neoplasm of lung metastatic to other site, unspecified laterality
Malignant neoplasm of lung, unspecified laterality, unspecified part of lung

## 2017-09-08 NOTE — PROGRESS NOTE ADULT - SUBJECTIVE AND OBJECTIVE BOX
OVERNIGHT EVENTS: a lot of pain overnight     Present Symptoms:     Dyspnea: 0   Nausea/Vomiting: No  Anxiety:  No  Depression: No  Fatigue: Yes   Loss of appetite: No    Pain: none currently but had an exacerbation overnight             Character-            Duration-            Effect-            Factors-            Frequency-            Location-            Severity-    Review of Systems: Reviewed                  Unable to obtain due to poor mentation   All others negative    MEDICATIONS  (STANDING):  senna 2 Tablet(s) Oral at bedtime  amiodarone    Tablet 200 milliGRAM(s) Oral daily  aspirin  chewable 81 milliGRAM(s) Oral daily  clopidogrel Tablet 75 milliGRAM(s) Oral daily  pantoprazole    Tablet 40 milliGRAM(s) Oral before breakfast  metoprolol succinate  milliGRAM(s) Oral daily  levothyroxine 200 MICROGram(s) Oral daily  simvastatin 20 milliGRAM(s) Oral at bedtime  lactobacillus acidophilus 1 Tablet(s) Oral two times a day with meals  docusate sodium 100 milliGRAM(s) Oral two times a day  pembrolizumab IVPB 200 milliGRAM(s) IV Intermittent once  lidocaine   Patch 1 Patch Transdermal daily  multivitamin 1 Tablet(s) Oral daily  magnesium oxide 400 milliGRAM(s) Oral three times a day with meals  gabapentin 100 milliGRAM(s) Oral at bedtime  polyethylene glycol 3350 17 Gram(s) Oral two times a day  enoxaparin Injectable 40 milliGRAM(s) SubCutaneous daily  nystatin    Suspension 615352 Unit(s) Oral three times a day  dexamethasone     Tablet 4 milliGRAM(s) Oral every 12 hours  HYDROmorphone PCA (1 mG/mL) 30 milliLiter(s) PCA Continuous PCA Continuous    MEDICATIONS  (PRN):  ALBUTerol/ipratropium for Nebulization 3 milliLiter(s) Nebulizer every 6 hours PRN Shortness of Breath and/or Wheezing  bisacodyl Suppository 10 milliGRAM(s) Rectal daily PRN Constipation  lactulose Syrup 10 Gram(s) Oral every 8 hours PRN constipation    PHYSICAL EXAM:    Vital Signs Last 24 Hrs  T(C): 36.4 (08 Sep 2017 04:30), Max: 36.4 (07 Sep 2017 13:00)  T(F): 97.6 (08 Sep 2017 04:30), Max: 97.6 (07 Sep 2017 16:00)  HR: 92 (08 Sep 2017 04:30) (83 - 92)  BP: 140/58 (08 Sep 2017 04:30) (98/62 - 140/58)  BP(mean): --  RR: 16 (08 Sep 2017 04:30) (14 - 16)  SpO2: 97% (07 Sep 2017 16:00) (97% - 98%)    General: lethargic                    Karnofsky:  20%    HEENT: normal      Lungs: comfortable    CV: normal      GI: normal       : normal      MSK: weakness    Skin: no rash    LABS:                      9.8    35.5  )-----------( 300      ( 07 Sep 2017 12:59 )             31.6     09-07    133<L>  |  95<L>  |  28.0<H>  ----------------------------<  240<H>  4.0   |  26.0  |  1.02    Ca    11.3<H>      07 Sep 2017 12:59  Phos  3.8     09-07    TPro  6.2<L>  /  Alb  2.6<L>  /  TBili  0.7  /  DBili  x   /  AST  18  /  ALT  32  /  AlkPhos  311<H>  09-07    RADIOLOGY & ADDITIONAL STUDIES:    < from: Xray Abdomen 1 View- AP Only (08.28.17 @ 13:43) >  Air distended large bowel. Small bowel shows paucity of air. Opacified left lower hemithorax versus overlying heart ..    ADVANCE DIRECTIVES: DNR/I

## 2017-09-08 NOTE — PROGRESS NOTE ADULT - PROBLEM SELECTOR PROBLEM 2
Primary malignant neoplasm of lung metastatic to other site, unspecified laterality
Goals of care, counseling/discussion
Hypercalcemia
Hypercalcemia
Pain
Pleural effusion
Pleural effusion on left
Hypercalcemia
Malignant neoplasm of lung, unspecified laterality, unspecified part of lung
Pain

## 2017-09-08 NOTE — PROGRESS NOTE ADULT - ATTENDING COMMENTS
Thank you for the opportunity to assist with the care of this patient.   [facility] Consult [facility] [phone].

## 2017-09-08 NOTE — PROGRESS NOTE ADULT - PROBLEM SELECTOR PLAN 3
-assist with ADLs
-assist with all ADLs
-assist with all ADLs
-patient is pretty much bedbound at this time. total assist with all ADLs
-today OOB to chair, encourage mobilization
-worsening, assist with all ADLs
Repeat sputum culture - MSSA  Legionella negative  Streptococcus negative  Appreciate ID consult - c/w levaquin until August 11.
improved, tolerating diet, having BM
monitor daily, f/u repeat KUB   advance diet for now, maintain electrolytes
tolerating diet, having BM
total assist with ADLs
with mets to S1 as per ct scan   Seen by Oncology   s/p 1 dose of keytruda on 8/23  zofran prn
-assist with ADLs
-assist with most ADLs
-total assist with all ADLs
likely 2/2 primary malignancy   improved  will resume IVF
-total assist
-assist with ADLs
WBC has previously been elevated  - reactive

## 2017-09-08 NOTE — PROGRESS NOTE ADULT - PROBLEM SELECTOR PROBLEM 3
Leukemoid reaction
Debility
Ileus
Malignant neoplasm of lung, unspecified laterality, unspecified part of lung
Pneumonia of left lower lobe due to infectious organism
Debility
Hypercalcemia
Debility
Debility

## 2017-09-12 ENCOUNTER — APPOINTMENT (OUTPATIENT)
Dept: RADIATION ONCOLOGY | Facility: CLINIC | Age: 72
End: 2017-09-12

## 2017-09-16 LAB
CULTURE RESULTS: SIGNIFICANT CHANGE UP
SPECIMEN SOURCE: SIGNIFICANT CHANGE UP

## 2017-10-19 PROCEDURE — 82330 ASSAY OF CALCIUM: CPT

## 2017-10-19 PROCEDURE — 85014 HEMATOCRIT: CPT

## 2017-10-19 PROCEDURE — 96374 THER/PROPH/DIAG INJ IV PUSH: CPT | Mod: XU

## 2017-10-19 PROCEDURE — 83735 ASSAY OF MAGNESIUM: CPT

## 2017-10-19 PROCEDURE — 80053 COMPREHEN METABOLIC PANEL: CPT

## 2017-10-19 PROCEDURE — 84100 ASSAY OF PHOSPHORUS: CPT

## 2017-10-19 PROCEDURE — 85730 THROMBOPLASTIN TIME PARTIAL: CPT

## 2017-10-19 PROCEDURE — 36556 INSERT NON-TUNNEL CV CATH: CPT

## 2017-10-19 PROCEDURE — 82947 ASSAY GLUCOSE BLOOD QUANT: CPT

## 2017-10-19 PROCEDURE — 74176 CT ABD & PELVIS W/O CONTRAST: CPT

## 2017-10-19 PROCEDURE — 82803 BLOOD GASES ANY COMBINATION: CPT

## 2017-10-19 PROCEDURE — 82040 ASSAY OF SERUM ALBUMIN: CPT

## 2017-10-19 PROCEDURE — 87040 BLOOD CULTURE FOR BACTERIA: CPT

## 2017-10-19 PROCEDURE — 36415 COLL VENOUS BLD VENIPUNCTURE: CPT

## 2017-10-19 PROCEDURE — 86920 COMPATIBILITY TEST SPIN: CPT

## 2017-10-19 PROCEDURE — 85610 PROTHROMBIN TIME: CPT

## 2017-10-19 PROCEDURE — 84295 ASSAY OF SERUM SODIUM: CPT

## 2017-10-19 PROCEDURE — 71045 X-RAY EXAM CHEST 1 VIEW: CPT

## 2017-10-19 PROCEDURE — 86901 BLOOD TYPING SEROLOGIC RH(D): CPT

## 2017-10-19 PROCEDURE — 83690 ASSAY OF LIPASE: CPT

## 2017-10-19 PROCEDURE — P9016: CPT

## 2017-10-19 PROCEDURE — 87086 URINE CULTURE/COLONY COUNT: CPT

## 2017-10-19 PROCEDURE — 93005 ELECTROCARDIOGRAM TRACING: CPT

## 2017-10-19 PROCEDURE — 84132 ASSAY OF SERUM POTASSIUM: CPT

## 2017-10-19 PROCEDURE — 86850 RBC ANTIBODY SCREEN: CPT

## 2017-10-19 PROCEDURE — 83605 ASSAY OF LACTIC ACID: CPT

## 2017-10-19 PROCEDURE — 74000: CPT

## 2017-10-19 PROCEDURE — 84134 ASSAY OF PREALBUMIN: CPT

## 2017-10-19 PROCEDURE — 85027 COMPLETE CBC AUTOMATED: CPT

## 2017-10-19 PROCEDURE — 80048 BASIC METABOLIC PNL TOTAL CA: CPT

## 2017-10-19 PROCEDURE — 86900 BLOOD TYPING SEROLOGIC ABO: CPT

## 2017-10-19 PROCEDURE — 36430 TRANSFUSION BLD/BLD COMPNT: CPT

## 2017-10-19 PROCEDURE — 99285 EMERGENCY DEPT VISIT HI MDM: CPT | Mod: 25

## 2017-10-19 PROCEDURE — 82435 ASSAY OF BLOOD CHLORIDE: CPT

## 2017-10-19 PROCEDURE — 81003 URINALYSIS AUTO W/O SCOPE: CPT

## 2017-10-19 PROCEDURE — 74018 RADEX ABDOMEN 1 VIEW: CPT

## 2017-10-19 PROCEDURE — P9047: CPT

## 2017-10-19 PROCEDURE — 87186 SC STD MICRODIL/AGAR DIL: CPT

## 2017-11-13 NOTE — ED PROVIDER NOTE - CROS ED RESP ALL NEG
Denied  Refill request too early; Rx sent 8/21/2017 for 1 year  Aria YEAGER RN    Requested Prescriptions   Pending Prescriptions Disp Refills     esomeprazole (NEXIUM) 40 MG CR capsule [Pharmacy Med Name: ESOMEPRAZOLE MAG DR CAPS 40MG] 90 capsule 3     Sig: TAKE 1 CAPSULE DAILY 30 TO 60 MINUTES BEFORE EATING    PPI Protocol Passed    11/11/2017  9:36 AM       Passed - Not on Clopidogrel (unless Pantoprazole ordered)       Passed - No diagnosis of osteoporosis on record       Passed - Recent or future visit with authorizing provider's specialty    Patient had office visit in the last year or has a visit in the next 30 days with authorizing provider.  See chart review.              Passed - Patient is age 18 or older        finasteride (PROSCAR) 5 MG tablet [Pharmacy Med Name: FINASTERIDE TABS 5MG 5MG] 90 tablet 3     Sig: TAKE 1 TABLET DAILY    There is no refill protocol information for this order          
- - -

## 2018-02-20 NOTE — ED ADULT NURSE NOTE - CADM POA URETHRAL CATHETER
Spoke to patient's mom, procedure scheduled for 2/27/2018 in [facility]. Order entered. Instructions verbally reviewed with patient's mom and sent to the patient's [facility] account as requested.    No

## 2018-03-12 NOTE — PROGRESS NOTE ADULT - PROVIDER SPECIALTY LIST ADULT
Anesthesia PHYSICAL THERAPY TREATMENT NOTE - INPATIENT    Room Number: 358/358-A     Session: 1   Number of Visits to Meet Established Goals: 3    Presenting Problem: Left LE cellulitis    Problem List  Principal Problem:    Cellulitis of left lower extremity      P 2/20/2006   • Unspecified sleep apnea    • URI (upper respiratory infection)    • Vasculitis (HCC)    • Venous insufficiency     severe   • Venous stasis dermatitis 5/7/2002    severe   • Vomiting and diarrhea        Past Surgical History  Past Surgical Hi bed?: None   How much help from another person does the patient currently need. ..   -   Moving to and from a bed to a chair (including a wheelchair)?: None   -   Need to walk in hospital room?: A Little   -   Climbing 3-5 steps with a railing?: A Little RECOMMENDATIONS  PT Discharge Recommendations: Home with home health PT     PLAN  PT Treatment Plan: Bed mobility; Endurance; Energy conservation;Patient education; Family education;Gait training;Transfer training;Balance training  Rehab Potential : Good  [place]

## 2018-08-28 NOTE — PATIENT PROFILE ADULT. - MEDICATION ADMINISTRATION INFO, PROFILE
[facility] sent a refill request for Humira. Pt was last seen 5/22/2018, MD please add refills.  
done  
no concerns

## 2018-09-11 NOTE — ASU PATIENT PROFILE, ADULT - NS PRO TALK SOMEONE YN
no
Airway patent, TM normal bilaterally, normal appearing mouth, nose, throat, neck supple with full range of motion, no cervical adenopathy.

## 2019-08-05 NOTE — H&P PST ADULT - NS SC CAGE ALCOHOL ANNOYED YOU
no Detail Level: Zone Azithromycin Pregnancy And Lactation Text: This medication is considered safe during pregnancy and is also secreted in breast milk. Dapsone Counseling: I discussed with the patient the risks of dapsone including but not limited to hemolytic anemia, agranulocytosis, rashes, methemoglobinemia, kidney failure, peripheral neuropathy, headaches, GI upset, and liver toxicity.  Patients who start dapsone require monitoring including baseline LFTs and weekly CBCs for the first month, then every month thereafter.  The patient verbalized understanding of the proper use and possible adverse effects of dapsone.  All of the patient's questions and concerns were addressed. Tetracycline Counseling: Patient counseled regarding possible photosensitivity and increased risk for sunburn.  Patient instructed to avoid sunlight, if possible.  When exposed to sunlight, patients should wear protective clothing, sunglasses, and sunscreen.  The patient was instructed to call the office immediately if the following severe adverse effects occur:  hearing changes, easy bruising/bleeding, severe headache, or vision changes.  The patient verbalized understanding of the proper use and possible adverse effects of tetracycline.  All of the patient's questions and concerns were addressed. Patient understands to avoid pregnancy while on therapy due to potential birth defects. Topical Clindamycin Pregnancy And Lactation Text: This medication is Pregnancy Category B and is considered safe during pregnancy. It is unknown if it is excreted in breast milk. High Dose Vitamin A Pregnancy And Lactation Text: High dose vitamin A therapy is contraindicated during pregnancy and breast feeding. Erythromycin Counseling:  I discussed with the patient the risks of erythromycin including but not limited to GI upset, allergic reaction, drug rash, diarrhea, increase in liver enzymes, and yeast infections. Topical Retinoid counseling:  Patient advised to apply a pea-sized amount only at bedtime and wait 30 minutes after washing their face before applying.  If too drying, patient may add a non-comedogenic moisturizer. The patient verbalized understanding of the proper use and possible adverse effects of retinoids.  All of the patient's questions and concerns were addressed. Azithromycin Counseling:  I discussed with the patient the risks of azithromycin including but not limited to GI upset, allergic reaction, drug rash, diarrhea, and yeast infections. Birth Control Pills Pregnancy And Lactation Text: This medication should be avoided if pregnant and for the first 30 days post-partum. Tazorac Counseling:  Patient advised that medication is irritating and drying.  Patient may need to apply sparingly and wash off after an hour before eventually leaving it on overnight.  The patient verbalized understanding of the proper use and possible adverse effects of tazorac.  All of the patient's questions and concerns were addressed. Dapsone Pregnancy And Lactation Text: This medication is Pregnancy Category C and is not considered safe during pregnancy or breast feeding. Isotretinoin Counseling: Patient should get monthly blood tests, not donate blood, not drive at night if vision affected, not share medication, and not undergo elective surgery for 6 months after tx completed. Side effects reviewed, pt to contact office should one occur. Tetracycline Pregnancy And Lactation Text: This medication is Pregnancy Category D and not consider safe during pregnancy. It is also excreted in breast milk. Bactrim Counseling:  I discussed with the patient the risks of sulfa antibiotics including but not limited to GI upset, allergic reaction, drug rash, diarrhea, dizziness, photosensitivity, and yeast infections.  Rarely, more serious reactions can occur including but not limited to aplastic anemia, agranulocytosis, methemoglobinemia, blood dyscrasias, liver or kidney failure, lung infiltrates or desquamative/blistering drug rashes. Minocycline Counseling: Patient advised regarding possible photosensitivity and discoloration of the teeth, skin, lips, tongue and gums.  Patient instructed to avoid sunlight, if possible.  When exposed to sunlight, patients should wear protective clothing, sunglasses, and sunscreen.  The patient was instructed to call the office immediately if the following severe adverse effects occur:  hearing changes, easy bruising/bleeding, severe headache, or vision changes.  The patient verbalized understanding of the proper use and possible adverse effects of minocycline.  All of the patient's questions and concerns were addressed. Erythromycin Pregnancy And Lactation Text: This medication is Pregnancy Category B and is considered safe during pregnancy. It is also excreted in breast milk. Topical Sulfur Applications Counseling: Topical Sulfur Counseling: Patient counseled that this medication may cause skin irritation or allergic reactions.  In the event of skin irritation, the patient was advised to reduce the amount of the drug applied or use it less frequently.   The patient verbalized understanding of the proper use and possible adverse effects of topical sulfur application.  All of the patient's questions and concerns were addressed. Topical Retinoid Pregnancy And Lactation Text: This medication is Pregnancy Category C. It is unknown if this medication is excreted in breast milk. Spironolactone Counseling: Patient advised regarding risks of diarrhea, abdominal pain, hyperkalemia, birth defects (for female patients), liver toxicity and renal toxicity. The patient may need blood work to monitor liver and kidney function and potassium levels while on therapy. The patient verbalized understanding of the proper use and possible adverse effects of spironolactone.  All of the patient's questions and concerns were addressed. Tazorac Pregnancy And Lactation Text: This medication is not safe during pregnancy. It is unknown if this medication is excreted in breast milk. Isotretinoin Pregnancy And Lactation Text: This medication is Pregnancy Category X and is considered extremely dangerous during pregnancy. It is unknown if it is excreted in breast milk. Doxycycline Counseling:  Patient counseled regarding possible photosensitivity and increased risk for sunburn.  Patient instructed to avoid sunlight, if possible.  When exposed to sunlight, patients should wear protective clothing, sunglasses, and sunscreen.  The patient was instructed to call the office immediately if the following severe adverse effects occur:  hearing changes, easy bruising/bleeding, severe headache, or vision changes.  The patient verbalized understanding of the proper use and possible adverse effects of doxycycline.  All of the patient's questions and concerns were addressed. Benzoyl Peroxide Counseling: Patient counseled that medicine may cause skin irritation and bleach clothing.  In the event of skin irritation, the patient was advised to reduce the amount of the drug applied or use it less frequently.   The patient verbalized understanding of the proper use and possible adverse effects of benzoyl peroxide.  All of the patient's questions and concerns were addressed. Bactrim Pregnancy And Lactation Text: This medication is Pregnancy Category D and is known to cause fetal risk.  It is also excreted in breast milk. Topical Sulfur Applications Pregnancy And Lactation Text: This medication is Pregnancy Category C and has an unknown safety profile during pregnancy. It is unknown if this topical medication is excreted in breast milk. Spironolactone Pregnancy And Lactation Text: This medication can cause feminization of the male fetus and should be avoided during pregnancy. The active metabolite is also found in breast milk. Use Enhanced Medication Counseling?: No Topical Clindamycin Counseling: Patient counseled that this medication may cause skin irritation or allergic reactions.  In the event of skin irritation, the patient was advised to reduce the amount of the drug applied or use it less frequently.   The patient verbalized understanding of the proper use and possible adverse effects of clindamycin.  All of the patient's questions and concerns were addressed. High Dose Vitamin A Counseling: Side effects reviewed, pt to contact office should one occur. Birth Control Pills Counseling: Birth Control Pill Counseling: I discussed with the patient the potential side effects of OCPs including but not limited to increased risk of stroke, heart attack, thrombophlebitis, deep venous thrombosis, hepatic adenomas, breast changes, GI upset, headaches, and depression.  The patient verbalized understanding of the proper use and possible adverse effects of OCPs. All of the patient's questions and concerns were addressed. Benzoyl Peroxide Pregnancy And Lactation Text: This medication is Pregnancy Category C. It is unknown if benzoyl peroxide is excreted in breast milk. Doxycycline Pregnancy And Lactation Text: This medication is Pregnancy Category D and not consider safe during pregnancy. It is also excreted in breast milk but is considered safe for shorter treatment courses.

## 2019-11-14 NOTE — PATIENT PROFILE ADULT. - PRO SERVICES AT DISCH
Assessment  Diagnoses and all orders for this visit:    Subacromial impingement of right shoulder        Discussion and Plan:    · The patient has an examination consistent with subacromial impingement syndrome of the right shoulder  I have discussed with the patient the pathophysiology of this diagnosis and reviewed how the examination correlates with this diagnosis  Treatment options were discussed at length and after discussing these treatment options, the patient denied wanting to received a subacromial injection of corticosteroid today in the office  She was provided with a prescription for referral to physical therapy  · We will reevaluate the patient in 6-8 weeks  If the symptoms fail to improve with this treatment the patient would be indicated for further imaging in the form of an MRI scan of the shoulder and/or a cortisone injection may be performed  Subjective:   Patient ID: Kalia Avila is a 75 y o  female      The patient presents with a chief complaint of right shoulder pain  The pain began 2 week(s) ago and is not associated with an acute injury  Patient states that she was preforming repetitive motions while performing yard work over the past couple weeks  The patient describes the pain as aching and dull in intensity,  intermittent in timing, and localizes the pain to the  right subacromial joint, deltoid  The pain is worse with overhead work, overuse and raising arm over head and relieved by rest, ice, avoiding the painful activities  The pain is not associated with numbness and tingling  The pain is not associated with constitutional symptoms  The patient is not awoken at night by the pain  The patient has had no prior treatment                The following portions of the patient's history were reviewed and updated as appropriate: allergies, current medications, past family history, past medical history, past social history, past surgical history and problem list     Review of Systems   Constitutional: Negative for chills, fever and unexpected weight change  HENT: Negative for hearing loss, nosebleeds and sore throat  Eyes: Negative for pain, redness and visual disturbance  Respiratory: Negative for cough, shortness of breath and wheezing  Cardiovascular: Negative for chest pain, palpitations and leg swelling  Gastrointestinal: Negative for abdominal distention, nausea and vomiting  Endocrine: Negative for polydipsia and polyuria  Genitourinary: Negative for dysuria and hematuria  Skin: Negative for rash and wound  Neurological: Negative for dizziness, numbness and headaches  Psychiatric/Behavioral: Negative for decreased concentration and suicidal ideas  Objective:  /60   Pulse 65   Ht 5' 8" (1 727 m)   Wt 83 5 kg (184 lb) Comment: verbal  BMI 27 98 kg/m²       Right Shoulder Exam     Tenderness   The patient is experiencing no tenderness  Range of Motion   External rotation: 70   Forward flexion: 170   Internal rotation 0 degrees: Mid thoracic     Muscle Strength   Abduction: 5/5   External rotation: 5/5     Tests   Ching test: positive  Impingement: positive  Drop arm: negative    Other   Erythema: absent  Sensation: normal  Pulse: present            Physical Exam   Constitutional: She is oriented to person, place, and time  She appears well-developed and well-nourished  Eyes: Pupils are equal, round, and reactive to light  Pulmonary/Chest: Effort normal and breath sounds normal    Neurological: She is alert and oriented to person, place, and time  Skin: Skin is warm and dry  Psychiatric: She has a normal mood and affect  Her behavior is normal  Judgment and thought content normal          I have personally reviewed pertinent films in PACS and my interpretation is as follows      X Ray Right Shoulder: No acute osseous abnormality or degenerative changes    Scribe Attestation    I,:   Edil Echavarria am acting as a scribe while in the presence of the attending physician :        I,:   Marita Boswell MD personally performed the services described in this documentation    as scribed in my presence : unsure

## 2019-11-25 NOTE — PATIENT PROFILE ADULT. - FALLEN IN THE PAST
34 yo F s/p bunionectomy revision 11/19/19 on Tylenol #3 c/o constipation x 6 days with worsening rectal pain today. Pt has not passed a BM in 6 days since surgery. Pt reports worsening pain while trying to pass BM today - pt states she was only able to pass 1 small pellet. Denies fever, chills, N/V, melena, urinary symptoms, flank pain, HA, dizziness. Pt has tried milk of magnesia with no relief. no

## 2020-04-23 NOTE — PATIENT PROFILE ADULT. - AS SC BRADEN FRICTION
(3) no apparent problem F: none (previously on D10 for episodes of hypoglycemia)  E: Replete PRN Mg<2, K<4  N: renal diet    DVT: heparin SQ 5000 units BID + SCDs  GI: none    Dispo: F Resolved.  Due to LLE cellulitis vs osteomyelitis  Patient presented with worsening LLE pain, noted to have extensive ulceration concerning for LLE cellulitis. Possible infiltrate in the RLL (4/11), UA negative. BCx NGTD. Low concern for nec fasc given no crepitus on exam, mild tenderness on palpation, no gas on imaging, more consistent with chronic wound per vascular.  - fluids discontinued  - switched to Linezolid 600 mg PO BID (4/10-4/19)  - discontinued Zosyn 2.25 g IV q8h (4/10-4/14), switched to Cefpodoxime 200 mg daily (4/14-4/19)  - weaned off of levophed 4/12, holding home BP medications    #R/o COVID  - Patient was COVID negative on 4/10 but had lymphopenia and elevated inflammatory markers with worsening b/l infiltrates on CXR Previous Echo 9/19 with EF 60% with wall motion abnormalities consistent with cardiac surgery and bioprosthetic aortic valve noted with grapht in the ascending aorta s/p aortic dissection repair   - Euvolemic on exam  - Will continue to hold home Lasix  - Restart Coreg 6.26 BID after concern for bleeding resolved. Previous Echo 9/19 with EF 60% with wall motion abnormalities consistent with cardiac surgery and bioprosthetic aortic valve noted with grapht in the ascending aorta s/p aortic dissection repair   - Euvolemic on exam  - Will continue to hold home Lasix  - Restart Coreg 6.26 BID after concern for bleeding resolved.  - c/w ASA and Simvastatin home regimen

## 2020-05-11 NOTE — PROCEDURE NOTE - NSCOMPLICATION_GEN_A_CORE
ALERT AND ORIENTED X4.  UP WITH STAND BY ASSIST, GAIT BELT AND WALKER.
PATIENT HOPS ON LEFT LEG WITH TRANSFERS DUE TO HAS RIGHT BKA.  DRESSING
CHANGED BY DR TODAY ON STUMP.  STUMP  IN PLACE UNDER IMMOBILIZER ON
RIGHT STUMP.  PO PAIN MEDICATION GIVEN AND HELPFUL.  USES CALL LIGHT WHEN
NEEDING ASSIST.  FALL PRECAUTIONS IN PLACE, BED ALARM AND CHAIR ALARM USED.
no complications
no complications

## 2020-08-23 NOTE — DISCHARGE NOTE ADULT - NS AS DC PROVIDER CONTACT Y/N MULTI
Yes

## 2021-05-20 NOTE — ED ADULT NURSE NOTE - TEMPLATE LIST FOR HEAD TO TOE ASSESSMENT
Impression: Age-related nuclear cataract, bilateral: H25.13. Bilateral. Plan: Discussed condition. Continue to monitor without treatment at this time.
Impression: Meibomian gland dysfunction of unspecified eye, unspecified eyelid: H02.889. Plan: Rediscussed diagnosis in detail with patient. Warm compresses with lid massage from top / down, bottom / up, and sweep from inside / out x 2. Patient instructed to use emulsive base lubricant 3-4 x a day. Increase omega foods/nuts and/or Borage oil supplement 1500-2500mg capsule orally per day.
Impression: Presbyopia: H52.4. Plan: Discussed condition. New mrx given today. Pt to call with any concerns.
Cardiac

## 2021-07-06 NOTE — ASU PATIENT PROFILE, ADULT - FALLEN IN THE PAST
53 yo male with pmh of hypertriglyceridemia and osteoarthritis of the R hip and lower back presents for PST for scheduled R hip replacement with Dr. Horton on 07/19/2021. Patient complaints of dull R hip pain that radiates down to the knee with weight bearing positions such as walking up stairs and standing for long periods. Alleviating factors includes taking meloxicam or Tylenol and rest. Patient denies any fever, sob, chest pain, dyspnea, nausea, vomiting, constipation. no

## 2021-10-25 NOTE — PATIENT PROFILE ADULT. - HAS THE PATIENT HAD A SIGNIFICANT CHANGE IN FUNCTIONAL STATUS DUE TO CVA, HEAD TRAUMA, ORTHOPEDIC TRAUMA/SURGERY, OR FALL, WITH THE WEEK PRIOR TO ADMISSION
Refill approved for 1 month.  Eulalio Dias needs to be seen for an appointment before further refills are given.    
no

## 2021-11-01 NOTE — H&P PST ADULT - LIVES WITH, PROFILE
Biopsy Photograph Reviewed: Yes Consent Type: Consent 1 (Standard) Eye Shield Used: No Initial Size Of Lesion: 0.8 X Size Of Lesion In Cm (Optional): 0.7 Number Of Stages: 1 Primary Defect Width In Cm (Final Defect Size - Required For Flaps/Grafts): 0.9 Repair Type: Referred to ASC for closure Oculoplastic Surgeon Procedure Text (A): After obtaining clear surgical margins the patient was sent to oculoplastics for surgical repair.  The patient understands they will receive post-surgical care and follow-up from the referring physician's office. Oculoplastic Surgeon Procedure Text (B): After obtaining clear surgical margins the patient was sent to oculoplastics for surgical repair.  The patient understands they will receive post-surgical care and follow-up from the referring physician's office. Otolaryngologist Procedure Text (A): After obtaining clear surgical margins the patient was sent to otolaryngology for surgical repair.  The patient understands they will receive post-surgical care and follow-up from the referring physician's office. Otolaryngologist Procedure Text (B): After obtaining clear surgical margins the patient was sent to otolaryngology for surgical repair.  The patient understands they will receive post-surgical care and follow-up from the referring physician's office. Plastic Surgeon Procedure Text (A): After obtaining clear surgical margins the patient was sent to plastics for surgical repair.  The patient understands they will receive post-surgical care and follow-up from the referring physician's office. alone Plastic Surgeon Procedure Text (B): After obtaining clear surgical margins the patient was sent to plastics for surgical repair.  The patient understands they will receive post-surgical care and follow-up from the referring physician's office. Mid-Level Procedure Text (A): After obtaining clear surgical margins the patient was sent to a mid-level provider for surgical repair.  The patient understands they will receive post-surgical care and follow-up from the mid-level provider. Mid-Level Procedure Text (B): After obtaining clear surgical margins the patient was sent to a mid-level provider for surgical repair.  The patient understands they will receive post-surgical care and follow-up from the mid-level provider. Provider Procedure Text (A): After obtaining clear surgical margins the defect was repaired by another provider. Asc Procedure Text (A): After obtaining clear surgical margins the patient was sent to an ASC for surgical repair.  The patient understands they will receive post-surgical care and follow-up from the ASC physician. Asc Procedure Text (B): After obtaining clear surgical margins the patient was sent to an ASC for surgical repair.  The patient understands they will receive post-surgical care and follow-up from the ASC physician. Suturegard Retention Suture: 2-0 Nylon Retention Suture Bite Size: 3 mm Length To Time In Minutes Device Was In Place: 10 Simple / Intermediate / Complex Repair - Final Wound Length In Cm: 0 Undermining Type: Entire Wound Debridement Text: The wound edges were debrided prior to proceeding with the closure to facilitate wound healing. Helical Rim Text: The closure involved the helical rim. Vermilion Border Text: The closure involved the vermilion border. Nostril Rim Text: The closure involved the nostril rim. Retention Suture Text: Retention sutures were placed to support the closure and prevent dehiscence. Location Indication Override (Is Already Calculated Based On Selected Body Location): Area H Area H Indication Text: Tumors in this location are included in Area H (eyelids, eyebrows, nose, lips, chin, ear, pre-auricular, post-auricular, temple, genitalia, hands, feet, ankles and areola).  Tissue conservation is critical in these anatomic locations. Area M Indication Text: Tumors in this location are included in Area M (cheek, forehead, scalp, neck, jawline and pretibial skin).  Mohs surgery is indicated for tumors in these anatomic locations. Area L Indication Text: Tumors in this location are included in Area L (trunk and extremities).  Mohs surgery is indicated for larger tumors, or tumors with aggressive histologic features, in these anatomic locations. Tumor Debulked?: curette Depth Of Tumor Invasion (For Histology): tumor not visualized (deep and peripheral margins are clear of tumor) Perineural Invasion (For Histology - Be Specific If Possible): absent Special Stains Stage 1 - Results: Base On Clearance Noted Above Stage 2: Additional Anesthesia Type: 1% lidocaine with epinephrine Include Tumor Staging In Mohs Note?: Please Select the Appropriate Response Staging Info: By selecting yes to the question above you will include information on AJCC 8 tumor staging in your Mohs note. Information on tumor staging will be automatically added for SCCs on the head and neck. AJCC 8 includes tumor size, tumor depth, perineural involvement and bone invasion. Tumor Depth: Less than 6mm from granular layer and no invasion beyond the subcutaneous fat Medical Necessity Statement: Based on my medical judgement, Mohs surgery is the most appropriate treatment for this cancer compared to other treatments. Alternatives Discussed Intro (Do Not Add Period): I discussed alternative treatments to Mohs surgery and specifically discussed the risks and benefits of Consent 1/Introductory Paragraph: The rationale for Mohs was explained to the patient and consent was obtained. The risks, benefits and alternatives to therapy were discussed in detail. Specifically, the risks of infection, scarring, bleeding, prolonged wound healing, incomplete removal, allergy to anesthesia, nerve injury and recurrence were addressed. Prior to the procedure, the treatment site was clearly identified and confirmed by the patient. All components of Universal Protocol/PAUSE Rule completed. Consent 2/Introductory Paragraph: Mohs surgery was explained to the patient and consent was obtained. The risks, benefits and alternatives to therapy were discussed in detail. Specifically, the risks of infection, scarring, bleeding, prolonged wound healing, incomplete removal, allergy to anesthesia, nerve injury and recurrence were addressed. Prior to the procedure, the treatment site was clearly identified and confirmed by the patient. All components of Universal Protocol/PAUSE Rule completed. Consent 3/Introductory Paragraph: I gave the patient a chance to ask questions they had about the procedure.  Following this I explained the Mohs procedure and consent was obtained. The risks, benefits and alternatives to therapy were discussed in detail. Specifically, the risks of infection, scarring, bleeding, prolonged wound healing, incomplete removal, allergy to anesthesia, nerve injury and recurrence were addressed. Prior to the procedure, the treatment site was clearly identified and confirmed by the patient. All components of Universal Protocol/PAUSE Rule completed. Consent (Temporal Branch)/Introductory Paragraph: The rationale for Mohs was explained to the patient and consent was obtained. The risks, benefits and alternatives to therapy were discussed in detail. Specifically, the risks of damage to the temporal branch of the facial nerve, infection, scarring, bleeding, prolonged wound healing, incomplete removal, allergy to anesthesia, and recurrence were addressed. Prior to the procedure, the treatment site was clearly identified and confirmed by the patient. All components of Universal Protocol/PAUSE Rule completed. Consent (Marginal Mandibular)/Introductory Paragraph: The rationale for Mohs was explained to the patient and consent was obtained. The risks, benefits and alternatives to therapy were discussed in detail. Specifically, the risks of damage to the marginal mandibular branch of the facial nerve, infection, scarring, bleeding, prolonged wound healing, incomplete removal, allergy to anesthesia, and recurrence were addressed. Prior to the procedure, the treatment site was clearly identified and confirmed by the patient. All components of Universal Protocol/PAUSE Rule completed. Consent (Spinal Accessory)/Introductory Paragraph: The rationale for Mohs was explained to the patient and consent was obtained. The risks, benefits and alternatives to therapy were discussed in detail. Specifically, the risks of damage to the spinal accessory nerve, infection, scarring, bleeding, prolonged wound healing, incomplete removal, allergy to anesthesia, and recurrence were addressed. Prior to the procedure, the treatment site was clearly identified and confirmed by the patient. All components of Universal Protocol/PAUSE Rule completed. Consent (Near Eyelid Margin)/Introductory Paragraph: The rationale for Mohs was explained to the patient and consent was obtained. The risks, benefits and alternatives to therapy were discussed in detail. Specifically, the risks of ectropion or eyelid deformity, infection, scarring, bleeding, prolonged wound healing, incomplete removal, allergy to anesthesia, nerve injury and recurrence were addressed. Prior to the procedure, the treatment site was clearly identified and confirmed by the patient. All components of Universal Protocol/PAUSE Rule completed. Consent (Ear)/Introductory Paragraph: The rationale for Mohs was explained to the patient and consent was obtained. The risks, benefits and alternatives to therapy were discussed in detail. Specifically, the risks of ear deformity, infection, scarring, bleeding, prolonged wound healing, incomplete removal, allergy to anesthesia, nerve injury and recurrence were addressed. Prior to the procedure, the treatment site was clearly identified and confirmed by the patient. All components of Universal Protocol/PAUSE Rule completed. Consent (Nose)/Introductory Paragraph: The rationale for Mohs was explained to the patient and consent was obtained. The risks, benefits and alternatives to therapy were discussed in detail. Specifically, the risks of nasal deformity, changes in the flow of air through the nose, infection, scarring, bleeding, prolonged wound healing, incomplete removal, allergy to anesthesia, nerve injury and recurrence were addressed. Prior to the procedure, the treatment site was clearly identified and confirmed by the patient. All components of Universal Protocol/PAUSE Rule completed. Consent (Lip)/Introductory Paragraph: The rationale for Mohs was explained to the patient and consent was obtained. The risks, benefits and alternatives to therapy were discussed in detail. Specifically, the risks of lip deformity, changes in the oral aperture, infection, scarring, bleeding, prolonged wound healing, incomplete removal, allergy to anesthesia, nerve injury and recurrence were addressed. Prior to the procedure, the treatment site was clearly identified and confirmed by the patient. All components of Universal Protocol/PAUSE Rule completed. Consent (Scalp)/Introductory Paragraph: The rationale for Mohs was explained to the patient and consent was obtained. The risks, benefits and alternatives to therapy were discussed in detail. Specifically, the risks of changes in hair growth pattern secondary to repair, infection, scarring, bleeding, prolonged wound healing, incomplete removal, allergy to anesthesia, nerve injury and recurrence were addressed. Prior to the procedure, the treatment site was clearly identified and confirmed by the patient. All components of Universal Protocol/PAUSE Rule completed. Detail Level: Detailed Postop Diagnosis: same Surgeon: Dr. Tosha Royal Anesthesia Type: 1% lidocaine with 1:100,000 epinephrine and a 1:10 solution of 8.4% sodium bicarbonate Anesthesia Volume In Cc: 0.5 Hemostasis: Electrocautery Estimated Blood Loss (Cc): minimal Anesthesia Volume In Cc: 6 Brow Lift Text: A midfrontal incision was made medially to the defect to allow access to the tissues just superior to the left eyebrow. Following careful dissection inferiorly in a supraperiosteal plane to the level of the left eyebrow, several 3-0 monocryl sutures were used to resuspend the eyebrow orbicularis oculi muscular unit to the superior frontal bone periosteum. This resulted in an appropriate reapproximation of static eyebrow symmetry and correction of the left brow ptosis. Deep Sutures: 4-0 Vicryl Epidermal Sutures: 6-0 Ethilon Epidermal Closure: running Suturegard Intro: Intraoperative tissue expansion was performed, utilizing the SUTUREGARD device, in order to reduce wound tension. Suturegard Body: The suture ends were repeatedly re-tightened and re-clamped to achieve the desired tissue expansion. Hemigard Intro: Due to skin fragility and wound tension, it was decided to use HEMIGARD adhesive retention suture devices to permit a linear closure. The skin was cleaned and dried for a 6cm distance away from the wound. Excessive hair, if present, was removed to allow for adhesion. Hemigard Postcare Instructions: The HEMIGARD strips are to remain completely dry for at least 5-7 days. Donor Site Anesthesia Type: same as repair anesthesia Epidermal Closure Graft Donor Site (Optional): simple interrupted Closure 2 Information: This tab is for additional flaps and grafts, including complex repair and grafts and complex repair and flaps. You can also specify a different location for the additional defect, if the location is the same you do not need to select a new one. We will insert the automated text for the repair you select below just as we do for solitary flaps and grafts. Please note that at this time if you select a location with a different insurance zone you will need to override the ICD10 and CPT if appropriate. Closure 3 Information: This tab is for additional flaps and grafts above and beyond our usual structured repairs.  Please note if you enter information here it will not currently bill and you will need to add the billing information manually. Closure 4 Information: This tab is for additional flaps and grafts above and beyond our usual structured repairs.  Please note if you enter information here it will not currently bill and you will need to add the billing information manually. Wound Care: Vaseline Dressing: pressure dressing with telfa Wound Care (No Sutures): Petrolatum Dressing (No Sutures): dry sterile dressing Pain Refusal Text: I offered to prescribe pain medication but the patient refused to take this medication. Mauc Instructions: By selecting yes to the question below the MAUC number will be added into the note.  This will be calculated automatically based on the diagnosis chosen, the size entered, the body zone selected (H,M,L) and the specific indications you chose. You will also have the option to override the Mohs AUC if you disagree with the automatically calculated number and this option is found in the Case Summary tab. Where Do You Want The Question To Include Opioid Counseling Located?: Case Summary Tab Eye Protection Verbiage: Before proceeding with the stage, a plastic scleral shield was inserted. The globe was anesthetized with a few drops of tetracaine/hydrochloride 0.05%. Then, an appropriate sized scleral shield was chosen. The shield was gently inserted and left in place for the duration of each stage. After the stage was completed, the shield was gently removed. Mohs Method Verbiage: An incision at a 45 degree angle following the standard Mohs approach was done and the specimen was harvested as a microscopic controlled layer. Surgeon/Pathologist Verbiage (Will Incorporate Name Of Surgeon From Intro If Not Blank): operated in two distinct and integrated capacities as the surgeon and pathologist. Mohs Histo Method Verbiage: Each section was then chromacoded and processed in the Mohs lab using the Mohs protocol and submitted for frozen section. Subsequent Stages Histo Method Verbiage: Using a similar technique to that described above, a thin layer of tissue was removed from all areas where tumor was visible on the previous stage.  The tissue was again oriented, mapped, dyed, and processed as above. Mohs Rapid Report Verbiage: The area of clinically evident tumor was marked with skin marking ink and appropriately hatched.  The initial incision was made following the Mohs approach through the skin.  The specimen was taken to the lab, divided into the necessary number of pieces, chromacoded and processed according to the Mohs protocol.  This was repeated in successive stages until a tumor free defect was achieved. Complex Repair Preamble Text (Leave Blank If You Do Not Want): The defect edges were debeveled with a #15 scalpel blade. Given the location of the defect a complex repair was deemed most appropriate.  Using a sterile surgical marker, burrow triangles were drawn incorporating the defect and placing the expected incisions within the relaxed skin tension lines where possible.    The area thus outlined was incised to the appropriate tissue plane with a scalpel blade. Extensive wide undermining was performed in all directions to allow proper closure of the defect.  Hemostasis was obtained by cautery. Crescentic Complex Repair Preamble Text (Leave Blank If You Do Not Want): Extensive wide undermining was performed. Crescentic Intermediate Repair Preamble Text (Leave Blank If You Do Not Want): Undermining was performed with blunt dissection. Non-Graft Cartilage Fenestration Text: The cartilage was fenestrated with a 2mm punch biopsy to help facilitate healing. Graft Cartilage Fenestration Text: The cartilage was fenestrated with a 2mm punch biopsy to help facilitate graft survival and healing. Secondary Intention Text (Leave Blank If You Do Not Want): The defect will heal with secondary intention. No Repair - Repaired With Adjacent Surgical Defect Text (Leave Blank If You Do Not Want): After obtaining clear surgical margins the defect was repaired concurrently with another surgical defect which was in close approximation. Referred To Plastics For Closure Text (Leave Blank If You Do Not Want): After obtaining clear surgical margins the patient was sent to plastics for surgical repair.  The patient understands they will receive post-surgical care and follow-up from the plastic surgeons office. Advancement Flap (Single) Text: The defect edges were debeveled with a #15 scalpel blade.  Given the location of the defect and the proximity to free margins a single advancement flap was deemed most appropriate.  Using a sterile surgical marker, an appropriate advancement flap was drawn incorporating the defect and placing the expected incisions within the relaxed skin tension lines where possible.    The area thus outlined was incised deep to adipose tissue with a #15 scalpel blade.  The skin margins were undermined to an appropriate distance in all directions utilizing iris scissors. Advancement Flap (Double) Text: The defect edges were debeveled with a #15 scalpel blade.  Given the location of the defect and the proximity to free margins a double advancement flap was deemed most appropriate.  Using a sterile surgical marker, the appropriate advancement flaps were drawn incorporating the defect and placing the expected incisions within the relaxed skin tension lines where possible.    The area thus outlined was incised deep to adipose tissue with a #15 scalpel blade.  The skin margins were undermined to an appropriate distance in all directions utilizing iris scissors. Burow's Advancement Flap Text: The defect edges were debeveled with a #15 scalpel blade.  Given the location of the defect and the proximity to free margins a Burow's advancement flap was deemed most appropriate.  Using a sterile surgical marker, the appropriate advancement flap was drawn incorporating the defect and placing the expected incisions within the relaxed skin tension lines where possible.    The area thus outlined was incised deep to adipose tissue with a #15 scalpel blade.  The skin margins were undermined to an appropriate distance in all directions utilizing iris scissors. Chonodrocutaneous Helical Advancement Flap Text: The defect edges were debeveled with a #15 scalpel blade.  Given the location of the defect and the proximity to free margins a chondrocutaneous helical advancement flap was deemed most appropriate.  Using a sterile surgical marker, the appropriate advancement flap was drawn incorporating the defect and placing the expected incisions within the relaxed skin tension lines where possible.    The area thus outlined was incised deep to adipose tissue with a #15 scalpel blade.  The skin margins were undermined to an appropriate distance in all directions utilizing iris scissors. Crescentic Advancement Flap Text: The defect edges were debeveled with a #15 scalpel blade.  Given the location of the defect and the proximity to free margins a crescentic advancement flap was deemed most appropriate.  Using a sterile surgical marker, the appropriate advancement flap was drawn incorporating the defect and placing the expected incisions within the relaxed skin tension lines where possible.    The area thus outlined was incised deep to adipose tissue with a #15 scalpel blade.  The skin margins were undermined to an appropriate distance in all directions utilizing iris scissors. A-T Advancement Flap Text: The defect edges were debeveled with a #15 scalpel blade.  Given the location of the defect, shape of the defect and the proximity to free margins an A-T advancement flap was deemed most appropriate.  Using a sterile surgical marker, an appropriate advancement flap was drawn incorporating the defect and placing the expected incisions within the relaxed skin tension lines where possible.    The area thus outlined was incised deep to adipose tissue with a #15 scalpel blade.  The skin margins were undermined to an appropriate distance in all directions utilizing iris scissors. O-T Advancement Flap Text: The defect edges were debeveled with a #15 scalpel blade.  Given the location of the defect, shape of the defect and the proximity to free margins an O-T advancement flap was deemed most appropriate.  Using a sterile surgical marker, an appropriate advancement flap was drawn incorporating the defect and placing the expected incisions within the relaxed skin tension lines where possible.    The area thus outlined was incised deep to adipose tissue with a #15 scalpel blade.  The skin margins were undermined to an appropriate distance in all directions utilizing iris scissors. O-L Flap Text: The defect edges were debeveled with a #15 scalpel blade.  Given the location of the defect, shape of the defect and the proximity to free margins an O-L flap was deemed most appropriate.  Using a sterile surgical marker, an appropriate advancement flap was drawn incorporating the defect and placing the expected incisions within the relaxed skin tension lines where possible.    The area thus outlined was incised deep to adipose tissue with a #15 scalpel blade.  The skin margins were undermined to an appropriate distance in all directions utilizing iris scissors. O-Z Flap Text: The defect edges were debeveled with a #15 scalpel blade.  Given the location of the defect, shape of the defect and the proximity to free margins an O-Z flap was deemed most appropriate.  Using a sterile surgical marker, an appropriate transposition flap was drawn incorporating the defect and placing the expected incisions within the relaxed skin tension lines where possible. The area thus outlined was incised deep to adipose tissue with a #15 scalpel blade.  The skin margins were undermined to an appropriate distance in all directions utilizing iris scissors. Double O-Z Flap Text: The defect edges were debeveled with a #15 scalpel blade.  Given the location of the defect, shape of the defect and the proximity to free margins a Double O-Z flap was deemed most appropriate.  Using a sterile surgical marker, an appropriate transposition flap was drawn incorporating the defect and placing the expected incisions within the relaxed skin tension lines where possible. The area thus outlined was incised deep to adipose tissue with a #15 scalpel blade.  The skin margins were undermined to an appropriate distance in all directions utilizing iris scissors. V-Y Flap Text: The defect edges were debeveled with a #15 scalpel blade.  Given the location of the defect, shape of the defect and the proximity to free margins a V-Y flap was deemed most appropriate.  Using a sterile surgical marker, an appropriate advancement flap was drawn incorporating the defect and placing the expected incisions within the relaxed skin tension lines where possible.    The area thus outlined was incised deep to adipose tissue with a #15 scalpel blade.  The skin margins were undermined to an appropriate distance in all directions utilizing iris scissors. Advancement-Rotation Flap Text: The defect edges were debeveled with a #15 scalpel blade.  Given the location of the defect, shape of the defect and the proximity to free margins an advancement-rotation flap was deemed most appropriate.  Using a sterile surgical marker, an appropriate flap was drawn incorporating the defect and placing the expected incisions within the relaxed skin tension lines where possible. The area thus outlined was incised deep to adipose tissue with a #15 scalpel blade.  The skin margins were undermined to an appropriate distance in all directions utilizing iris scissors. Mercedes Flap Text: The defect edges were debeveled with a #15 scalpel blade.  Given the location of the defect, shape of the defect and the proximity to free margins a Mercedes flap was deemed most appropriate.  Using a sterile surgical marker, an appropriate advancement flap was drawn incorporating the defect and placing the expected incisions within the relaxed skin tension lines where possible. The area thus outlined was incised deep to adipose tissue with a #15 scalpel blade.  The skin margins were undermined to an appropriate distance in all directions utilizing iris scissors. Modified Advancement Flap Text: The defect edges were debeveled with a #15 scalpel blade.  Given the location of the defect, shape of the defect and the proximity to free margins a modified advancement flap was deemed most appropriate.  Using a sterile surgical marker, an appropriate advancement flap was drawn incorporating the defect and placing the expected incisions within the relaxed skin tension lines where possible.    The area thus outlined was incised deep to adipose tissue with a #15 scalpel blade.  The skin margins were undermined to an appropriate distance in all directions utilizing iris scissors. Mucosal Advancement Flap Text: Given the location of the defect, shape of the defect and the proximity to free margins a mucosal advancement flap was deemed most appropriate. Incisions were made with a 15 blade scalpel in the appropriate fashion along the cutaneous vermillion border and the mucosal lip. The remaining actinically damaged mucosal tissue was excised.  The mucosal advancement flap was then elevated to the gingival sulcus with care taken to preserve the neurovascular structures and advanced into the primary defect. Care was taken to ensure that precise realignment of the vermillion border was achieved. Peng Advancement Flap Text: The defect edges were debeveled with a #15 scalpel blade.  Given the location of the defect, shape of the defect and the proximity to free margins a Peng advancement flap was deemed most appropriate.  Using a sterile surgical marker, an appropriate advancement flap was drawn incorporating the defect and placing the expected incisions within the relaxed skin tension lines where possible. The area thus outlined was incised deep to adipose tissue with a #15 scalpel blade.  The skin margins were undermined to an appropriate distance in all directions utilizing iris scissors. Hatchet Flap Text: The defect edges were debeveled with a #15 scalpel blade.  Given the location of the defect, shape of the defect and the proximity to free margins a hatchet flap was deemed most appropriate.  Using a sterile surgical marker, an appropriate hatchet flap was drawn incorporating the defect and placing the expected incisions within the relaxed skin tension lines where possible.    The area thus outlined was incised deep to adipose tissue with a #15 scalpel blade.  The skin margins were undermined to an appropriate distance in all directions utilizing iris scissors. Rotation Flap Text: The defect edges were debeveled with a #15 scalpel blade.  Given the location of the defect, shape of the defect and the proximity to free margins a rotation flap was deemed most appropriate.  Using a sterile surgical marker, an appropriate rotation flap was drawn incorporating the defect and placing the expected incisions within the relaxed skin tension lines where possible.    The area thus outlined was incised deep to adipose tissue with a #15 scalpel blade.  The skin margins were undermined to an appropriate distance in all directions utilizing iris scissors. Spiral Flap Text: The defect edges were debeveled with a #15 scalpel blade.  Given the location of the defect, shape of the defect and the proximity to free margins a spiral flap was deemed most appropriate.  Using a sterile surgical marker, an appropriate rotation flap was drawn incorporating the defect and placing the expected incisions within the relaxed skin tension lines where possible. The area thus outlined was incised deep to adipose tissue with a #15 scalpel blade.  The skin margins were undermined to an appropriate distance in all directions utilizing iris scissors. Staged Advancement Flap Text: The defect edges were debeveled with a #15 scalpel blade.  Given the location of the defect, shape of the defect and the proximity to free margins a staged advancement flap was deemed most appropriate.  Using a sterile surgical marker, an appropriate advancement flap was drawn incorporating the defect and placing the expected incisions within the relaxed skin tension lines where possible. The area thus outlined was incised deep to adipose tissue with a #15 scalpel blade.  The skin margins were undermined to an appropriate distance in all directions utilizing iris scissors. Star Wedge Flap Text: The defect edges were debeveled with a #15 scalpel blade.  Given the location of the defect, shape of the defect and the proximity to free margins a star wedge flap was deemed most appropriate.  Using a sterile surgical marker, an appropriate rotation flap was drawn incorporating the defect and placing the expected incisions within the relaxed skin tension lines where possible. The area thus outlined was incised deep to adipose tissue with a #15 scalpel blade.  The skin margins were undermined to an appropriate distance in all directions utilizing iris scissors. Transposition Flap Text: The defect edges were debeveled with a #15 scalpel blade.  Given the location of the defect and the proximity to free margins a transposition flap was deemed most appropriate.  Using a sterile surgical marker, an appropriate transposition flap was drawn incorporating the defect.    The area thus outlined was incised deep to adipose tissue with a #15 scalpel blade.  The skin margins were undermined to an appropriate distance in all directions utilizing iris scissors. Muscle Hinge Flap Text: The defect edges were debeveled with a #15 scalpel blade.  Given the size, depth and location of the defect and the proximity to free margins a muscle hinge flap was deemed most appropriate.  Using a sterile surgical marker, an appropriate hinge flap was drawn incorporating the defect. The area thus outlined was incised with a #15 scalpel blade.  The skin margins were undermined to an appropriate distance in all directions utilizing iris scissors. Mustarde Flap Text: The defect edges were debeveled with a #15 scalpel blade.  Given the size, depth and location of the defect and the proximity to free margins a Mustarde flap was deemed most appropriate.  Using a sterile surgical marker, an appropriate flap was drawn incorporating the defect. The area thus outlined was incised with a #15 scalpel blade.  The skin margins were undermined to an appropriate distance in all directions utilizing iris scissors. Nasal Turnover Hinge Flap Text: The defect edges were debeveled with a #15 scalpel blade.  Given the size, depth, location of the defect and the defect being full thickness a nasal turnover hinge flap was deemed most appropriate.  Using a sterile surgical marker, an appropriate hinge flap was drawn incorporating the defect. The area thus outlined was incised with a #15 scalpel blade. The flap was designed to recreate the nasal mucosal lining and the alar rim. The skin margins were undermined to an appropriate distance in all directions utilizing iris scissors. Nasalis-Muscle-Based Myocutaneous Island Pedicle Flap Text: Using a #15 blade, an incision was made around the donor flap to the level of the nasalis muscle. Wide lateral undermining was then performed in both the subcutaneous plane above the nasalis muscle, and in a submuscular plane just above periosteum. This allowed the formation of a free nasalis muscle axial pedicle (based on the angular artery) which was still attached to the actual cutaneous flap, increasing its mobility and vascular viability. Hemostasis was obtained with pinpoint electrocoagulation. The flap was mobilized into position and the pivotal anchor points positioned and stabilized with buried interrupted sutures. Subcutaneous and dermal tissues were closed in a multilayered fashion with sutures. Tissue redundancies were excised, and the epidermal edges were apposed without significant tension and sutured with sutures. Orbicularis Oris Muscle Flap Text: The defect edges were debeveled with a #15 scalpel blade.  Given that the defect affected the competency of the oral sphincter an obicularis oris muscle flap was deemed most appropriate to restore this competency and normal muscle function.  Using a sterile surgical marker, an appropriate flap was drawn incorporating the defect. The area thus outlined was incised with a #15 scalpel blade. Melolabial Transposition Flap Text: The defect edges were debeveled with a #15 scalpel blade.  Given the location of the defect and the proximity to free margins a melolabial flap was deemed most appropriate.  Using a sterile surgical marker, an appropriate melolabial transposition flap was drawn incorporating the defect.    The area thus outlined was incised deep to adipose tissue with a #15 scalpel blade.  The skin margins were undermined to an appropriate distance in all directions utilizing iris scissors. Rhombic Flap Text: The defect edges were debeveled with a #15 scalpel blade.  Given the location of the defect and the proximity to free margins a rhombic flap was deemed most appropriate.  Using a sterile surgical marker, an appropriate rhombic flap was drawn incorporating the defect.    The area thus outlined was incised deep to adipose tissue with a #15 scalpel blade.  The skin margins were undermined to an appropriate distance in all directions utilizing iris scissors. Rhomboid Transposition Flap Text: The defect edges were debeveled with a #15 scalpel blade.  Given the location of the defect and the proximity to free margins a rhomboid transposition flap was deemed most appropriate.  Using a sterile surgical marker, an appropriate rhomboid flap was drawn incorporating the defect.    The area thus outlined was incised deep to adipose tissue with a #15 scalpel blade.  The skin margins were undermined to an appropriate distance in all directions utilizing iris scissors. Bi-Rhombic Flap Text: The defect edges were debeveled with a #15 scalpel blade.  Given the location of the defect and the proximity to free margins a bi-rhombic flap was deemed most appropriate.  Using a sterile surgical marker, an appropriate rhombic flap was drawn incorporating the defect. The area thus outlined was incised deep to adipose tissue with a #15 scalpel blade.  The skin margins were undermined to an appropriate distance in all directions utilizing iris scissors. Helical Rim Advancement Flap Text: The defect edges were debeveled with a #15 blade scalpel.  Given the location of the defect and the proximity to free margins (helical rim) a double helical rim advancement flap was deemed most appropriate.  Using a sterile surgical marker, the appropriate advancement flaps were drawn incorporating the defect and placing the expected incisions between the helical rim and antihelix where possible.  The area thus outlined was incised through and through with a #15 scalpel blade.  With a skin hook and iris scissors, the flaps were gently and sharply undermined and freed up. Bilateral Helical Rim Advancement Flap Text: The defect edges were debeveled with a #15 blade scalpel.  Given the location of the defect and the proximity to free margins (helical rim) a bilateral helical rim advancement flap was deemed most appropriate.  Using a sterile surgical marker, the appropriate advancement flaps were drawn incorporating the defect and placing the expected incisions between the helical rim and antihelix where possible.  The area thus outlined was incised through and through with a #15 scalpel blade.  With a skin hook and iris scissors, the flaps were gently and sharply undermined and freed up. Ear Star Wedge Flap Text: The defect edges were debeveled with a #15 blade scalpel.  Given the location of the defect and the proximity to free margins (helical rim) an ear star wedge flap was deemed most appropriate.  Using a sterile surgical marker, the appropriate flap was drawn incorporating the defect and placing the expected incisions between the helical rim and antihelix where possible.  The area thus outlined was incised through and through with a #15 scalpel blade. Banner Transposition Flap Text: The defect edges were debeveled with a #15 scalpel blade.  Given the location of the defect and the proximity to free margins a Banner transposition flap was deemed most appropriate.  Using a sterile surgical marker, an appropriate flap drawn around the defect. The area thus outlined was incised deep to adipose tissue with a #15 scalpel blade.  The skin margins were undermined to an appropriate distance in all directions utilizing iris scissors. Bilobed Flap Text: The defect edges were debeveled with a #15 scalpel blade.  Given the location of the defect and the proximity to free margins a bilobe flap was deemed most appropriate.  Using a sterile surgical marker, an appropriate bilobe flap drawn around the defect.    The area thus outlined was incised deep to adipose tissue with a #15 scalpel blade.  The skin margins were undermined to an appropriate distance in all directions utilizing iris scissors. Bilobed Transposition Flap Text: The defect edges were debeveled with a #15 scalpel blade.  Given the location of the defect and the proximity to free margins a bilobed transposition flap was deemed most appropriate.  Using a sterile surgical marker, an appropriate bilobe flap drawn around the defect.    The area thus outlined was incised deep to adipose tissue with a #15 scalpel blade.  The skin margins were undermined to an appropriate distance in all directions utilizing iris scissors. Trilobed Flap Text: The defect edges were debeveled with a #15 scalpel blade.  Given the location of the defect and the proximity to free margins a trilobed flap was deemed most appropriate.  Using a sterile surgical marker, an appropriate trilobed flap drawn around the defect.    The area thus outlined was incised deep to adipose tissue with a #15 scalpel blade.  The skin margins were undermined to an appropriate distance in all directions utilizing iris scissors. Dorsal Nasal Flap Text: The defect edges were debeveled with a #15 scalpel blade.  Given the location of the defect and the proximity to free margins a dorsal nasal flap was deemed most appropriate.  Using a sterile surgical marker, an appropriate dorsal nasal flap was drawn around the defect.    The area thus outlined was incised deep to adipose tissue with a #15 scalpel blade.  The skin margins were undermined to an appropriate distance in all directions utilizing iris scissors. Island Pedicle Flap Text: The defect edges were debeveled with a #15 scalpel blade.  Given the location of the defect, shape of the defect and the proximity to free margins an island pedicle advancement flap was deemed most appropriate.  Using a sterile surgical marker, an appropriate advancement flap was drawn incorporating the defect, outlining the appropriate donor tissue and placing the expected incisions within the relaxed skin tension lines where possible.    The area thus outlined was incised deep to adipose tissue with a #15 scalpel blade.  The skin margins were undermined to an appropriate distance in all directions around the primary defect and laterally outward around the island pedicle utilizing iris scissors.  There was minimal undermining beneath the pedicle flap. Island Pedicle Flap With Canthal Suspension Text: The defect edges were debeveled with a #15 scalpel blade.  Given the location of the defect, shape of the defect and the proximity to free margins an island pedicle advancement flap was deemed most appropriate.  Using a sterile surgical marker, an appropriate advancement flap was drawn incorporating the defect, outlining the appropriate donor tissue and placing the expected incisions within the relaxed skin tension lines where possible. The area thus outlined was incised deep to adipose tissue with a #15 scalpel blade.  The skin margins were undermined to an appropriate distance in all directions around the primary defect and laterally outward around the island pedicle utilizing iris scissors.  There was minimal undermining beneath the pedicle flap. A suspension suture was placed in the canthal tendon to prevent tension and prevent ectropion. Alar Island Pedicle Flap Text: The defect edges were debeveled with a #15 scalpel blade.  Given the location of the defect, shape of the defect and the proximity to the alar rim an island pedicle advancement flap was deemed most appropriate.  Using a sterile surgical marker, an appropriate advancement flap was drawn incorporating the defect, outlining the appropriate donor tissue and placing the expected incisions within the nasal ala running parallel to the alar rim. The area thus outlined was incised with a #15 scalpel blade.  The skin margins were undermined minimally to an appropriate distance in all directions around the primary defect and laterally outward around the island pedicle utilizing iris scissors.  There was minimal undermining beneath the pedicle flap. Double Island Pedicle Flap Text: The defect edges were debeveled with a #15 scalpel blade.  Given the location of the defect, shape of the defect and the proximity to free margins a double island pedicle advancement flap was deemed most appropriate.  Using a sterile surgical marker, an appropriate advancement flap was drawn incorporating the defect, outlining the appropriate donor tissue and placing the expected incisions within the relaxed skin tension lines where possible.    The area thus outlined was incised deep to adipose tissue with a #15 scalpel blade.  The skin margins were undermined to an appropriate distance in all directions around the primary defect and laterally outward around the island pedicle utilizing iris scissors.  There was minimal undermining beneath the pedicle flap. Island Pedicle Flap-Requiring Vessel Identification Text: The defect edges were debeveled with a #15 scalpel blade.  Given the location of the defect, shape of the defect and the proximity to free margins an island pedicle advancement flap was deemed most appropriate.  Using a sterile surgical marker, an appropriate advancement flap was drawn, based on the axial vessel mentioned above, incorporating the defect, outlining the appropriate donor tissue and placing the expected incisions within the relaxed skin tension lines where possible.    The area thus outlined was incised deep to adipose tissue with a #15 scalpel blade.  The skin margins were undermined to an appropriate distance in all directions around the primary defect and laterally outward around the island pedicle utilizing iris scissors.  There was minimal undermining beneath the pedicle flap. Keystone Flap Text: The defect edges were debeveled with a #15 scalpel blade.  Given the location of the defect, shape of the defect a keystone flap was deemed most appropriate.  Using a sterile surgical marker, an appropriate keystone flap was drawn incorporating the defect, outlining the appropriate donor tissue and placing the expected incisions within the relaxed skin tension lines where possible. The area thus outlined was incised deep to adipose tissue with a #15 scalpel blade.  The skin margins were undermined to an appropriate distance in all directions around the primary defect and laterally outward around the flap utilizing iris scissors. O-T Plasty Text: The defect edges were debeveled with a #15 scalpel blade.  Given the location of the defect, shape of the defect and the proximity to free margins an O-T plasty was deemed most appropriate.  Using a sterile surgical marker, an appropriate O-T plasty was drawn incorporating the defect and placing the expected incisions within the relaxed skin tension lines where possible.    The area thus outlined was incised deep to adipose tissue with a #15 scalpel blade.  The skin margins were undermined to an appropriate distance in all directions utilizing iris scissors. O-Z Plasty Text: The defect edges were debeveled with a #15 scalpel blade.  Given the location of the defect, shape of the defect and the proximity to free margins an O-Z plasty (double transposition flap) was deemed most appropriate.  Using a sterile surgical marker, the appropriate transposition flaps were drawn incorporating the defect and placing the expected incisions within the relaxed skin tension lines where possible.    The area thus outlined was incised deep to adipose tissue with a #15 scalpel blade.  The skin margins were undermined to an appropriate distance in all directions utilizing iris scissors.  Hemostasis was achieved with electrocautery.  The flaps were then transposed into place, one clockwise and the other counterclockwise, and anchored with interrupted buried subcutaneous sutures. Double O-Z Plasty Text: The defect edges were debeveled with a #15 scalpel blade.  Given the location of the defect, shape of the defect and the proximity to free margins a Double O-Z plasty (double transposition flap) was deemed most appropriate.  Using a sterile surgical marker, the appropriate transposition flaps were drawn incorporating the defect and placing the expected incisions within the relaxed skin tension lines where possible. The area thus outlined was incised deep to adipose tissue with a #15 scalpel blade.  The skin margins were undermined to an appropriate distance in all directions utilizing iris scissors.  Hemostasis was achieved with electrocautery.  The flaps were then transposed into place, one clockwise and the other counterclockwise, and anchored with interrupted buried subcutaneous sutures. V-Y Plasty Text: The defect edges were debeveled with a #15 scalpel blade.  Given the location of the defect, shape of the defect and the proximity to free margins an V-Y advancement flap was deemed most appropriate.  Using a sterile surgical marker, an appropriate advancement flap was drawn incorporating the defect and placing the expected incisions within the relaxed skin tension lines where possible.    The area thus outlined was incised deep to adipose tissue with a #15 scalpel blade.  The skin margins were undermined to an appropriate distance in all directions utilizing iris scissors. H Plasty Text: Given the location of the defect, shape of the defect and the proximity to free margins a H-plasty was deemed most appropriate for repair.  Using a sterile surgical marker, the appropriate advancement arms of the H-plasty were drawn incorporating the defect and placing the expected incisions within the relaxed skin tension lines where possible. The area thus outlined was incised deep to adipose tissue with a #15 scalpel blade. The skin margins were undermined to an appropriate distance in all directions utilizing iris scissors.  The opposing advancement arms were then advanced into place in opposite direction and anchored with interrupted buried subcutaneous sutures. W Plasty Text: The lesion was extirpated to the level of the fat with a #15 scalpel blade.  Given the location of the defect, shape of the defect and the proximity to free margins a W-plasty was deemed most appropriate for repair.  Using a sterile surgical marker, the appropriate transposition arms of the W-plasty were drawn incorporating the defect and placing the expected incisions within the relaxed skin tension lines where possible.    The area thus outlined was incised deep to adipose tissue with a #15 scalpel blade.  The skin margins were undermined to an appropriate distance in all directions utilizing iris scissors.  The opposing transposition arms were then transposed into place in opposite direction and anchored with interrupted buried subcutaneous sutures. Z Plasty Text: The lesion was extirpated to the level of the fat with a #15 scalpel blade.  Given the location of the defect, shape of the defect and the proximity to free margins a Z-plasty was deemed most appropriate for repair.  Using a sterile surgical marker, the appropriate transposition arms of the Z-plasty were drawn incorporating the defect and placing the expected incisions within the relaxed skin tension lines where possible.    The area thus outlined was incised deep to adipose tissue with a #15 scalpel blade.  The skin margins were undermined to an appropriate distance in all directions utilizing iris scissors.  The opposing transposition arms were then transposed into place in opposite direction and anchored with interrupted buried subcutaneous sutures. Zygomaticofacial Flap Text: Given the location of the defect, shape of the defect and the proximity to free margins a zygomaticofacial flap was deemed most appropriate for repair.  Using a sterile surgical marker, the appropriate flap was drawn incorporating the defect and placing the expected incisions within the relaxed skin tension lines where possible. The area thus outlined was incised deep to adipose tissue with a #15 scalpel blade with preservation of a vascular pedicle.  The skin margins were undermined to an appropriate distance in all directions utilizing iris scissors.  The flap was then placed into the defect and anchored with interrupted buried subcutaneous sutures. Cheek Interpolation Flap Text: A decision was made to reconstruct the defect utilizing an interpolation axial flap and a staged reconstruction.  A telfa template was made of the defect.  This telfa template was then used to outline the Cheek Interpolation flap.  The donor area for the pedicle flap was then injected with anesthesia.  The flap was excised through the skin and subcutaneous tissue down to the layer of the underlying musculature.  The interpolation flap was carefully excised within this deep plane to maintain its blood supply.  The edges of the donor site were undermined.   The donor site was closed in a primary fashion.  The pedicle was then rotated into position and sutured.  Once the tube was sutured into place, adequate blood supply was confirmed with blanching and refill.  The pedicle was then wrapped with xeroform gauze and dressed appropriately with a telfa and gauze bandage to ensure continued blood supply and protect the attached pedicle. Cheek-To-Nose Interpolation Flap Text: A decision was made to reconstruct the defect utilizing an interpolation axial flap and a staged reconstruction.  A telfa template was made of the defect.  This telfa template was then used to outline the Cheek-To-Nose Interpolation flap.  The donor area for the pedicle flap was then injected with anesthesia.  The flap was excised through the skin and subcutaneous tissue down to the layer of the underlying musculature.  The interpolation flap was carefully excised within this deep plane to maintain its blood supply.  The edges of the donor site were undermined.   The donor site was closed in a primary fashion.  The pedicle was then rotated into position and sutured.  Once the tube was sutured into place, adequate blood supply was confirmed with blanching and refill.  The pedicle was then wrapped with xeroform gauze and dressed appropriately with a telfa and gauze bandage to ensure continued blood supply and protect the attached pedicle. Interpolation Flap Text: A decision was made to reconstruct the defect utilizing an interpolation axial flap and a staged reconstruction.  A telfa template was made of the defect.  This telfa template was then used to outline the interpolation flap.  The donor area for the pedicle flap was then injected with anesthesia.  The flap was excised through the skin and subcutaneous tissue down to the layer of the underlying musculature.  The interpolation flap was carefully excised within this deep plane to maintain its blood supply.  The edges of the donor site were undermined.   The donor site was closed in a primary fashion.  The pedicle was then rotated into position and sutured.  Once the tube was sutured into place, adequate blood supply was confirmed with blanching and refill.  The pedicle was then wrapped with xeroform gauze and dressed appropriately with a telfa and gauze bandage to ensure continued blood supply and protect the attached pedicle. Melolabial Interpolation Flap Text: A decision was made to reconstruct the defect utilizing an interpolation axial flap and a staged reconstruction.  A telfa template was made of the defect.  This telfa template was then used to outline the melolabial interpolation flap.  The donor area for the pedicle flap was then injected with anesthesia.  The flap was excised through the skin and subcutaneous tissue down to the layer of the underlying musculature.  The pedicle flap was carefully excised within this deep plane to maintain its blood supply.  The edges of the donor site were undermined.   The donor site was closed in a primary fashion.  The pedicle was then rotated into position and sutured.  Once the tube was sutured into place, adequate blood supply was confirmed with blanching and refill.  The pedicle was then wrapped with xeroform gauze and dressed appropriately with a telfa and gauze bandage to ensure continued blood supply and protect the attached pedicle. Mastoid Interpolation Flap Text: A decision was made to reconstruct the defect utilizing an interpolation axial flap and a staged reconstruction.  A telfa template was made of the defect.  This telfa template was then used to outline the mastoid interpolation flap.  The donor area for the pedicle flap was then injected with anesthesia.  The flap was excised through the skin and subcutaneous tissue down to the layer of the underlying musculature.  The pedicle flap was carefully excised within this deep plane to maintain its blood supply.  The edges of the donor site were undermined.   The donor site was closed in a primary fashion.  The pedicle was then rotated into position and sutured.  Once the tube was sutured into place, adequate blood supply was confirmed with blanching and refill.  The pedicle was then wrapped with xeroform gauze and dressed appropriately with a telfa and gauze bandage to ensure continued blood supply and protect the attached pedicle. Posterior Auricular Interpolation Flap Text: A decision was made to reconstruct the defect utilizing an interpolation axial flap and a staged reconstruction.  A telfa template was made of the defect.  This telfa template was then used to outline the posterior auricular interpolation flap.  The donor area for the pedicle flap was then injected with anesthesia.  The flap was excised through the skin and subcutaneous tissue down to the layer of the underlying musculature.  The pedicle flap was carefully excised within this deep plane to maintain its blood supply.  The edges of the donor site were undermined.   The donor site was closed in a primary fashion.  The pedicle was then rotated into position and sutured.  Once the tube was sutured into place, adequate blood supply was confirmed with blanching and refill.  The pedicle was then wrapped with xeroform gauze and dressed appropriately with a telfa and gauze bandage to ensure continued blood supply and protect the attached pedicle. Paramedian Forehead Flap Text: A decision was made to reconstruct the defect utilizing an interpolation axial flap and a staged reconstruction.  A telfa template was made of the defect.  This telfa template was then used to outline the paramedian forehead pedicle flap.  The donor area for the pedicle flap was then injected with anesthesia.  The flap was excised through the skin and subcutaneous tissue down to the layer of the underlying musculature.  The pedicle flap was carefully excised within this deep plane to maintain its blood supply.  The edges of the donor site were undermined.   The donor site was closed in a primary fashion.  The pedicle was then rotated into position and sutured.  Once the tube was sutured into place, adequate blood supply was confirmed with blanching and refill.  The pedicle was then wrapped with xeroform gauze and dressed appropriately with a telfa and gauze bandage to ensure continued blood supply and protect the attached pedicle. Cheiloplasty (Less Than 50%) Text: A decision was made to reconstruct the defect with a  cheiloplasty.  The defect was undermined extensively.  Additional obicularis oris muscle was excised with a 15 blade scalpel.  The defect was converted into a full thickness wedge, of less than 50% of the vertical height of the lip, to facilite a better cosmetic result.  Small vessels were then tied off with 5-0 monocyrl. The obicularis oris, superficial fascia, adipose and dermis were then reapproximated.  After the deeper layers were approximated the epidermis was reapproximated with particular care given to realign the vermillion border. Cheiloplasty (Complex) Text: A decision was made to reconstruct the defect with a  cheiloplasty.  The defect was undermined extensively.  Additional obicularis oris muscle was excised with a 15 blade scalpel.  The defect was converted into a full thickness wedge to facilite a better cosmetic result.  Small vessels were then tied off with 5-0 monocyrl. The obicularis oris, superficial fascia, adipose and dermis were then reapproximated.  After the deeper layers were approximated the epidermis was reapproximated with particular care given to realign the vermillion border. Ear Wedge Repair Text: A wedge excision was completed by carrying down an excision through the full thickness of the ear and cartilage with an inward facing Burow's triangle. The wound was then closed in a layered fashion. Full Thickness Lip Wedge Repair (Flap) Text: Given the location of the defect and the proximity to free margins a full thickness wedge repair was deemed most appropriate.  Using a sterile surgical marker, the appropriate repair was drawn incorporating the defect and placing the expected incisions perpendicular to the vermillion border.  The vermillion border was also meticulously outlined to ensure appropriate reapproximation during the repair.  The area thus outlined was incised through and through with a #15 scalpel blade.  The muscularis and dermis were reaproximated with deep sutures following hemostasis. Care was taken to realign the vermillion border before proceeding with the superficial closure.  Once the vermillion was realigned the superfical and mucosal closure was finished. Ftsg Text: The defect edges were debeveled with a #15 scalpel blade.  Given the location of the defect, shape of the defect and the proximity to free margins a full thickness skin graft was deemed most appropriate.  Using a sterile surgical marker, the primary defect shape was transferred to the donor site. The area thus outlined was incised deep to adipose tissue with a #15 scalpel blade.  The harvested graft was then trimmed of adipose tissue until only dermis and epidermis was left.  The skin margins of the secondary defect were undermined to an appropriate distance in all directions utilizing iris scissors.  The secondary defect was closed with interrupted buried subcutaneous sutures.  The skin edges were then re-apposed with running  sutures.  The skin graft was then placed in the primary defect and oriented appropriately. Split-Thickness Skin Graft Text: The defect edges were debeveled with a #15 scalpel blade.  Given the location of the defect, shape of the defect and the proximity to free margins a split thickness skin graft was deemed most appropriate.  Using a sterile surgical marker, the primary defect shape was transferred to the donor site. The split thickness graft was then harvested.  The skin graft was then placed in the primary defect and oriented appropriately. Burow's Graft Text: The defect edges were debeveled with a #15 scalpel blade.  Given the location of the defect, shape of the defect, the proximity to free margins and the presence of a standing cone deformity a Burow's skin graft was deemed most appropriate. The standing cone was removed and this tissue was then trimmed to the shape of the primary defect. The adipose tissue was also removed until only dermis and epidermis were left.  The skin margins of the secondary defect were undermined to an appropriate distance in all directions utilizing iris scissors.  The secondary defect was closed with interrupted buried subcutaneous sutures.  The skin edges were then re-apposed with running  sutures.  The skin graft was then placed in the primary defect and oriented appropriately. Cartilage Graft Text: The defect edges were debeveled with a #15 scalpel blade.  Given the location of the defect, shape of the defect, the fact the defect involved a full thickness cartilage defect a cartilage graft was deemed most appropriate.  An appropriate donor site was identified, cleansed, and anesthetized. The cartilage graft was then harvested and transferred to the recipient site, oriented appropriately and then sutured into place.  The secondary defect was then repaired using a primary closure. Composite Graft Text: The defect edges were debeveled with a #15 scalpel blade.  Given the location of the defect, shape of the defect, the proximity to free margins and the fact the defect was full thickness a composite graft was deemed most appropriate.  The defect was outline and then transferred to the donor site.  A full thickness graft was then excised from the donor site. The graft was then placed in the primary defect, oriented appropriately and then sutured into place.  The secondary defect was then repaired using a primary closure. Epidermal Autograft Text: The defect edges were debeveled with a #15 scalpel blade.  Given the location of the defect, shape of the defect and the proximity to free margins an epidermal autograft was deemed most appropriate.  Using a sterile surgical marker, the primary defect shape was transferred to the donor site. The epidermal graft was then harvested.  The skin graft was then placed in the primary defect and oriented appropriately. Dermal Autograft Text: The defect edges were debeveled with a #15 scalpel blade.  Given the location of the defect, shape of the defect and the proximity to free margins a dermal autograft was deemed most appropriate.  Using a sterile surgical marker, the primary defect shape was transferred to the donor site. The area thus outlined was incised deep to adipose tissue with a #15 scalpel blade.  The harvested graft was then trimmed of adipose and epidermal tissue until only dermis was left.  The skin graft was then placed in the primary defect and oriented appropriately. Skin Substitute Text: The defect edges were debeveled with a #15 scalpel blade.  Given the location of the defect, shape of the defect and the proximity to free margins a skin substitute graft was deemed most appropriate.  The graft material was trimmed to fit the size of the defect. The graft was then placed in the primary defect and oriented appropriately. Tissue Cultured Epidermal Autograft Text: The defect edges were debeveled with a #15 scalpel blade.  Given the location of the defect, shape of the defect and the proximity to free margins a tissue cultured epidermal autograft was deemed most appropriate.  The graft was then trimmed to fit the size of the defect.  The graft was then placed in the primary defect and oriented appropriately. Xenograft Text: The defect edges were debeveled with a #15 scalpel blade.  Given the location of the defect, shape of the defect and the proximity to free margins a xenograft was deemed most appropriate.  The graft was then trimmed to fit the size of the defect.  The graft was then placed in the primary defect and oriented appropriately. Purse String (Simple) Text: Given the location of the defect and the characteristics of the surrounding skin a pursestring closure was deemed most appropriate.  Undermining was performed circumfirentially around the surgical defect.  A purstring suture was then placed and tightened. Purse String (Intermediate) Text: Given the location of the defect and the characteristics of the surrounding skin a pursestring intermediate closure was deemed most appropriate.  Undermining was performed circumfirentially around the surgical defect.  A purstring suture was then placed and tightened. Partial Purse String (Simple) Text: Given the location of the defect and the characteristics of the surrounding skin a simple purse string closure was deemed most appropriate.  Undermining was performed circumfirentially around the surgical defect.  A purse string suture was then placed and tightened. Wound tension only allowed a partial closure of the circular defect. Partial Purse String (Intermediate) Text: Given the location of the defect and the characteristics of the surrounding skin an intermediate purse string closure was deemed most appropriate.  Undermining was performed circumfirentially around the surgical defect.  A purse string suture was then placed and tightened. Wound tension only allowed a partial closure of the circular defect. Localized Dermabrasion Text: The patient was draped in routine manner.  Localized dermabrasion using 3 x 17 mm wire brush was performed in routine manner to papillary dermis. This spot dermabrasion is being performed to complete skin cancer reconstruction. It also will eliminate the other sun damaged precancerous cells that are known to be part of the regional effect of a lifetime's worth of sun exposure. This localized dermabrasion is therapeutic and should not be considered cosmetic in any regard. Tarsorrhaphy Text: A tarsorrhaphy was performed using Frost sutures. Complex Repair And Flap Additional Text (Will Appearing After The Standard Complex Repair Text): The complex repair was not sufficient to completely close the primary defect. The remaining additional defect was repaired with the flap mentioned below. Complex Repair And Graft Additional Text (Will Appearing After The Standard Complex Repair Text): The complex repair was not sufficient to completely close the primary defect. The remaining additional defect was repaired with the graft mentioned below. Manual Repair Warning Statement: We plan on removing the manually selected variable below in favor of our much easier automatic structured text blocks found in the previous tab. We decided to do this to help make the flow better and give you the full power of structured data. Manual selection is never going to be ideal in our platform and I would encourage you to avoid using manual selection from this point on, especially since I will be sunsetting this feature. It is important that you do one of two things with the customized text below. First, you can save all of the text in a word file so you can have it for future reference. Second, transfer the text to the appropriate area in the Library tab. Lastly, if there is a flap or graft type which we do not have you need to let us know right away so I can add it in before the variable is hidden. No need to panic, we plan to give you roughly 6 months to make the change. Same Histology In Subsequent Stages Text: The pattern and morphology of the tumor is as described in the first stage. No Residual Tumor Seen Histology Text: There were no malignant cells seen in the sections examined. Inflammation Suggestive Of Cancer Camouflage Histology Text: There was a dense lymphocytic infiltrate which prevented adequate histologic evaluation of adjacent structures. Bcc Histology Text: H & E reveals numerous aggregates of basaloid cells. Bcc Infiltrative Histology Text: H & E reveals numerous aggregates of basaloid cells demonstrating an infiltrative pattern. Bcc Micronodular Histology Text: Residual tumor (micronodular) was identified. H & E reveals numerous aggregates of basaloid cells demonstrating a micronodular pattern with peripheral palisading and retraction. Bcc  Morpheaform/Sclerosing Histology Text: Residual tumor (morpheoform BCC) was identified. H & E reveals numerous aggregates of basaloid cells demonstrating a morpheoform pattern with peripheral palisading and retraction. Bcc  Nodular Histology Text: Residual tumor(nodular BCC) was identified.  H & E reveals numerous aggregates of basaloid cells demonstrating a nodular pattern with peripheral palisading and retraction. Bcc Pigmented Histology Text: Residual tumor (nodular BCC) was identified.  H & E reveals numerous aggregates of basaloid cells demonstrating a nodular pattern with peripheral palisading and retraction. Bcc Superficial Histology Text: Residual tumor (superficial BCC) was identified. H & E reveals numerous aggregates of basaloid cells demonstrating a superficial pattern with peripheral palisading and retraction. Bcc Superficial Pigmented Histology Text: Residual tumor (superficial and pigmented) was identified. H & E reveals numerous aggregates of basaloid cells demonstrating a superficial pattern with peripheral palisading and retraction. Mixed Superficial And Nodular Bcc Histology Text: Residual tumor (mixed nodular and superficial BCC) was identified.  H & E reveals numerous aggregates of basaloid cells demonstrating a superficial and nodular pattern with peripheral palisading and retraction. Mixed Nodular And Infiltrative Bcc Histology Text: Residual tumor (mixed nodular and infiltrative BCC) was identified.  H & E reveals numerous aggregates of basaloid cells demonstrating a nodular and infiltrative pattern with peripheral palisading and retraction. Mixed Nodular And Micronodular Bcc Histology Text: Residual tumor (mixed nodular and micronodular) was identified.  H & E reveals numerous aggregates of basaloid cells demonstrating a nodular and micronodular pattern with peripheral palisading with retraction. Scc Histology Text: Residual tumor (squamous cell) was identified.  Hematoxylin and eosin stained sections reveal parakertaosis over an epidermis of variable thickness with disorganization and atypia of the basal and lower spinous layer keratinocytes with areas of full thickness atypia with buds of similar atypical keratinocytes extending into the underlying dermis Scc Well Differentiated Histology Text: Residual tumor (well-differentiated SCC) was identified.  Hematoxylin and eosin stained sections reveal parakertaosis over an epidermis of variable thickness with disorganization and atypia of the basal and lower spinous layer keratinocytes with areas of full thickness atypia with buds of similar atypical keratinocytes extending into the underlying dermis Scc Moderately Differentiated Histology Text: Residual tumor (moderately-differentiated SCC) was identified.  Hematoxylin and eosin stained sections reveal parakertaosis over an epidermis of variable thickness with disorganization and atypia of the basal and lower spinous layer keratinocytes with areas of full thickness atypia with buds of similar atypical keratinocytes extending into the underlying dermis Scc Poorly Differentiated Histology Text: Residual tumor (poorly-differentiated SCC) was identified.  Hematoxylin and eosin stained sections reveal parakertaosis over an epidermis of variable thickness with disorganization and atypia of the basal and lower spinous layer keratinocytes with areas of full thickness atypia with buds of similar atypical keratinocytes extending into the underlying dermis Scc Ka Subtype Histology Text: Residual tumor (SCC KA type) was identified.  Glassy islands of atypical squamous epithelium neutrophilic microabcesses, eosinophils, and elastic trapping are noted. Scc In Situ Histology Text: Residual tumor (SCCIS) was identified.  Parakeratosis over and acantholytic epidermis with full thickness disorganization and atypia of the keratinocytes are noted Merkel Cell Carcinoma Histology Text: Dermal sheets and nests of cells with hyperchromatic nuclei, a high metotic rate, and scat cytoplasm Afx Histology Text: Residual tumor (AFX) was identified. Spindel cell neoplasm, consistent with atypical fibroxanthoma Basosquamous Cell Carcinoma Histology Text: Residual tumor (SCC/ BCC) was identified. Desmoplastic Trichoepithelioma Histology Text: Residual tumor (desmoplastic trichoepithelioma) was identified. \\nStoriform spindel cell proliferation with infiltration into the subcutaneous tissue Desmoplastic Trichilemmoma Histology Text: orthokeratosis over an atrophic epidermis. The dermis contains a dense sclerotic stroma containing small thin, basaloid islands of cells without retraction. Rare horn cysts and calcifications are identified, as well as a rare focus of granulomatous inflammation in association with a ruptured horn cyst. There are focal areas that resemble follicular papillae. Sebaceous Carcinoma Histology Text: Residual tumor (sebaceous carcinoma) was identified.  Nodule of relatively undifferentiated cells with areas of necrosis, many apototic cells and scattered mitotic figures. Sebaceous differentiation is noted in several areas of the tumor. Spindle Cell Neoplasm Histology Text: Residual tumor (spindle cell neoplasm) was identified. Mart-1 - Positive Histology Text: MART-1 staining demonstrates areas of higher density and clustering of melanocytes with Pagetoid spread upwards within the epidermis. The surgical margins are positive for tumor cells. Mart-1 - Negative Histology Text: MART-1 staining demonstrates a normal density and pattern of melanocytes along the dermal-epidermal junction. The surgical margins are negative for tumor cells. Information: Selecting Yes will display possible errors in your note based on the variables you have selected. This validation is only offered as a suggestion for you. PLEASE NOTE THAT THE VALIDATION TEXT WILL BE REMOVED WHEN YOU FINALIZE YOUR NOTE. IF YOU WANT TO FAX A PRELIMINARY NOTE YOU WILL NEED TO TOGGLE THIS TO 'NO' IF YOU DO NOT WANT IT IN YOUR FAXED NOTE.

## 2021-11-16 NOTE — PROCEDURE NOTE - NSPROCCHESTCON_GEN_A_CORE
[Appropriately responsive] : appropriately responsive [Alert] : alert [No Acute Distress] : no acute distress [No Lymphadenopathy] : no lymphadenopathy [Soft] : soft [Non-tender] : non-tender [Non-distended] : non-distended [No HSM] : No HSM [No Lesions] : no lesions [No Mass] : no mass [Oriented x3] : oriented x3 [FreeTextEntry2] : 5'10" [Examination Of The Breasts] : a normal appearance [No Masses] : no breast masses were palpable [Labia Majora] : normal [Labia Minora] : normal [Normal] : normal Waterseal [Uterine Adnexae] : normal

## 2022-06-26 NOTE — PHYSICAL THERAPY INITIAL EVALUATION ADULT - ADDITIONAL COMMENTS
owns a RW, 2 steps 2 rails to enter home, 10 steps 1 rail bedroom. Pt states he lives with his children.
owns a RW, 2 steps 2 rails to enter home, 10 steps 1 rail bedroom
30-Apr-2022

## 2022-08-07 NOTE — CONSULT NOTE ADULT - ASSESSMENT
71 years old male with Metastatic Lung Cancer; which was recently diagnosed 7/2017 CAD s/p stents, VT, HTN, HLD and Hypothyroidism sent from [facility] with hypercalcemia, low albumin and leucocytosis. Patient was seen with family members, complaining of severe back,  abdominal pain; pain is constant, severe, throbbing, aching, 10/10 at it's worse, and improves with current medication regimen, since adjustment. Family states patient" intermittently moans, and rubs left side of abdomen during his sleep, "  feels the medication is helpful, but patient still has pain. Per family patient is scheduled to start immunotherapy Keytruda, while admitted. Unemployed

## 2022-10-20 NOTE — PATIENT PROFILE ADULT. - CAREGIVER NAME
Claudette Delprete Rhomboid Transposition Flap Text: The defect edges were debeveled with a #15 scalpel blade.  Given the location of the defect and the proximity to free margins a rhomboid transposition flap was deemed most appropriate.  Using a sterile surgical marker, an appropriate rhomboid flap was drawn incorporating the defect.    The area thus outlined was incised deep to adipose tissue with a #15 scalpel blade.  The skin margins were undermined to an appropriate distance in all directions utilizing iris scissors.

## 2023-05-01 NOTE — H&P PST ADULT - NSANTHTOTALSCORECAL_ENT_A_CORE
4 Island Pedicle Flap With Canthal Suspension Text: The defect edges were debeveled with a #15 scalpel blade.  Given the location of the defect, shape of the defect and the proximity to free margins an island pedicle advancement flap was deemed most appropriate.  Using a sterile surgical marker, an appropriate advancement flap was drawn incorporating the defect, outlining the appropriate donor tissue and placing the expected incisions within the relaxed skin tension lines where possible. The area thus outlined was incised deep to adipose tissue with a #15 scalpel blade.  The skin margins were undermined to an appropriate distance in all directions around the primary defect and laterally outward around the island pedicle utilizing iris scissors.  There was minimal undermining beneath the pedicle flap. A suspension suture was placed in the canthal tendon to prevent tension and prevent ectropion.

## 2023-11-30 NOTE — PROGRESS NOTE ADULT - SUBJECTIVE AND OBJECTIVE BOX
Rx Refill Note  Requested Prescriptions     Pending Prescriptions Disp Refills    Insulin Lispro, 1 Unit Dial, (HumaLOG KwikPen) 100 UNIT/ML solution pen-injector [Pharmacy Med Name: HUMALOG KWIKPEN U100 3ML 5'S 100U/ML] 15 mL 20     Sig: INJECT 20 UNITS PLUS 2 UNITS PER 50 OVER 150 BEFORE MEALS, MAXIMUM DAILY DOSE OF 90 UNITS      Last office visit with prescribing clinician: 8/24/2023   Last telemedicine visit with prescribing clinician: Visit date not found   Next office visit with prescribing clinician: Visit date not found                         Would you like a call back once the refill request has been completed: [] Yes [] No    If the office needs to give you a call back, can they leave a voicemail: [] Yes [] No    Grecia Jenkins  11/30/23, 14:43 EST    SULEIMAN GREGORY    [id number]    71y      Male    INTERVAL HPI/OVERNIGHT EVENTS: No events on. S/p biopsy yesterday. Resting in chair.    Hospital course:  70 y/o male w/PMHx of CAD, s/p stent in June 2017, HTN, HLD, w/recent hx of LLL mass discovered here after he was treated here for hemoptysis and pleural effusion. However, he then had a PET scan which confirmed a LLL mass with areas of metastasis including left hilar lymphadenopathy, left pleural mets, left sacrum lesion. He was supposed to have a CT guided biopsy of the lung but he was found to be ill-appearing, with cough and a high white count, prompting an admission for pneumonia. He was started as an outpatient on levaquin but his white count remains high. On 7/24, started radiation therapy at [facility]. Hgb trended down to 7.6, and received 1u PRBC with appropriate response. Developed RUE swelling, US showed superficial thrombosis of right cephalic vein. Received RUE PICC on 7/25. Repeat CXR showing worsening left pleural effusion with possible loculation. CT chest showing pleural metastases with large left loculated pleural effusion. On 7/26, received left chest tube. Noted to have hypercalcemia, received pamidronate and calcitonin. On 7/28 had IR guided lung biopsy.      REVIEW OF SYSTEMS:    CONSTITUTIONAL: No fever   RESPIRATORY: No shortness of breath  CARDIOVASCULAR: No chest pain, palpitations    Vital Signs Last 24 Hrs  T(C): 36.2 (29 Jul 2017 04:54), Max: 36.8 (28 Jul 2017 22:39)  T(F): 97.2 (29 Jul 2017 04:54), Max: 98.3 (28 Jul 2017 22:39)  HR: 83 (29 Jul 2017 08:02) (83 - 94)  BP: 124/79 (29 Jul 2017 04:54) (124/79 - 127/81)  BP(mean): --  RR: 19 (29 Jul 2017 04:54) (19 - 20)  SpO2: 98% (29 Jul 2017 08:02) (94% - 98%)    PHYSICAL EXAM:    GENERAL: NAD, well-groomed  HEENT: PERRL, +EOMI, MMM  CHEST/LUNG: coarse breath sounds  HEART: S1S2+, Regular rate and rhythm   ABDOMEN: Soft, Nontender, Nondistended; Bowel sounds present     LABS:                        9.1    18.5  )-----------( 425      ( 29 Jul 2017 05:09 )             28.4     07-29    135  |  101  |  21.0<H>  ----------------------------<  153<H>  4.8   |  24.0  |  1.09    Ca    10.5<H>      29 Jul 2017 05:09  Phos  2.6     07-28  Mg     1.7     07-28    TPro  6.1<L>  /  Alb  2.5<L>  /  TBili  0.6  /  DBili  x   /  AST  26  /  ALT  32  /  AlkPhos  204<H>  07-29            MEDICATIONS  (STANDING):  amiodarone    Tablet 200 milliGRAM(s) Oral daily  atorvastatin 40 milliGRAM(s) Oral at bedtime  fenofibrate Tablet 48 milliGRAM(s) Oral daily  metoprolol succinate  milliGRAM(s) Oral daily  famotidine    Tablet 20 milliGRAM(s) Oral daily  levothyroxine 75 MICROGram(s) Oral daily  multivitamin/minerals 1 Tablet(s) Oral daily  ALBUTerol/ipratropium for Nebulization 3 milliLiter(s) Nebulizer every 6 hours  senna 2 Tablet(s) Oral at bedtime  docusate sodium 100 milliGRAM(s) Oral three times a day  potassium chloride    Tablet ER 30 milliEquivalent(s) Oral two times a day  insulin lispro (HumaLOG) corrective regimen sliding scale   SubCutaneous three times a day before meals  insulin lispro (HumaLOG) corrective regimen sliding scale   SubCutaneous at bedtime  dextrose 5%. 1000 milliLiter(s) (50 mL/Hr) IV Continuous <Continuous>  dextrose 50% Injectable 12.5 Gram(s) IV Push once  dextrose 50% Injectable 25 Gram(s) IV Push once  dextrose 50% Injectable 25 Gram(s) IV Push once  sodium chloride 0.9%. 1000 milliLiter(s) (100 mL/Hr) IV Continuous <Continuous>  calcitonin Injectable 390 International Unit(s) IntraMuscular every 12 hours  levoFLOXacin  Tablet 500 milliGRAM(s) Oral every 24 hours  clopidogrel Tablet 75 milliGRAM(s) Oral daily  aspirin  chewable 81 milliGRAM(s) Oral daily    MEDICATIONS  (PRN):  oxyCODONE    5 mG/acetaminophen 325 mG 2 Tablet(s) Oral every 4 hours PRN breakthrough pain  dextrose Gel 1 Dose(s) Oral once PRN Blood Glucose LESS THAN 70 milliGRAM(s)/deciliter  glucagon  Injectable 1 milliGRAM(s) IntraMuscular once PRN Glucose LESS THAN 70 milligrams/deciliter  HYDROmorphone  Injectable 2 milliGRAM(s) IV Push every 4 hours PRN Severe Pain (7 - 10)  HYDROmorphone  Injectable 1 milliGRAM(s) IV Push every 4 hours PRN Moderate Pain (4 - 6)      RADIOLOGY & ADDITIONAL TESTS:

## 2023-12-19 NOTE — PROCEDURE NOTE - NSLINELOCINTERCO_GEN_A_CORE
Quality 431: Preventive Care And Screening: Unhealthy Alcohol Use - Screening: Patient not identified as an unhealthy alcohol user when screened for unhealthy alcohol use using a systematic screening method
Detail Level: Detailed
Quality 130: Documentation Of Current Medications In The Medical Record: Current Medications Documented
Quality 47: Advance Care Plan: Advance Care Planning discussed and documented; advance care plan or surrogate decision maker documented in the medical record.
5th intercostal space
Quality 226: Preventive Care And Screening: Tobacco Use: Screening And Cessation Intervention: Patient screened for tobacco use and is an ex/non-smoker

## 2025-05-21 NOTE — PROGRESS NOTE ADULT - PROBLEM/PLAN-1
DISPLAY PLAN FREE TEXT
Yes

## 2025-05-23 NOTE — ED PROVIDER NOTE - CPE EDP EYES NORM
DR. BLOCH, ATTENDING MD-  I performed a face to face bedside interview with patient regarding history of present illness, review of symptoms and past medical history. I completed an independent physical exam.  I have discussed patient's plan of care with the resident.  Patient on methadone, last dose 48 hours , used heroin yesterday, no n/v/dirrhea, hypertension, tachycardia or piloerection.  No sign of significant withdrawal. Heart s1s2, lungs clear Pupils equal and reactive. no motor deficit
normal...

## 2025-05-25 NOTE — ED ADULT NURSE NOTE - AS SC BRADEN ACTIVITY
Recent Labs     05/23/25  0423 05/24/25  0550 05/25/25  0538   HGB 7.9* 8.1* 7.8*     At approximate baseline  Iron panel suggestive of at least in part some iron deficiency anemia as cause  Will start iron supplementation today    (4) walks frequently